# Patient Record
Sex: FEMALE | Race: WHITE | NOT HISPANIC OR LATINO | Employment: UNEMPLOYED | URBAN - METROPOLITAN AREA
[De-identification: names, ages, dates, MRNs, and addresses within clinical notes are randomized per-mention and may not be internally consistent; named-entity substitution may affect disease eponyms.]

---

## 2020-03-19 ENCOUNTER — RECORDS - HEALTHEAST (OUTPATIENT)
Dept: LAB | Facility: CLINIC | Age: 60
End: 2020-03-19

## 2020-03-19 LAB
ALBUMIN SERPL-MCNC: 4.3 G/DL (ref 3.5–5)
ALP SERPL-CCNC: 92 U/L (ref 45–120)
ALT SERPL W P-5'-P-CCNC: 21 U/L (ref 0–45)
AST SERPL W P-5'-P-CCNC: 19 U/L (ref 0–40)
BASOPHILS # BLD AUTO: 0.1 THOU/UL (ref 0–0.2)
BASOPHILS NFR BLD AUTO: 1 % (ref 0–2)
BILIRUB SERPL-MCNC: 0.4 MG/DL (ref 0–1)
BUN SERPL-MCNC: 14 MG/DL (ref 8–22)
CALCIUM SERPL-MCNC: 9.7 MG/DL (ref 8.5–10.5)
CHLORIDE BLD-SCNC: 101 MMOL/L (ref 98–107)
CHOLEST SERPL-MCNC: 259 MG/DL
CHOLEST SERPL-MCNC: 259 MG/DL
CK SERPL-CCNC: 77 U/L (ref 30–190)
CO2 SERPL-SCNC: 26 MMOL/L (ref 22–31)
CREAT SERPL-MCNC: 0.71 MG/DL (ref 0.6–1.1)
EOSINOPHIL # BLD AUTO: 0.1 THOU/UL (ref 0–0.4)
EOSINOPHIL NFR BLD AUTO: 1 % (ref 0–6)
ERYTHROCYTE [DISTWIDTH] IN BLOOD BY AUTOMATED COUNT: 12.1 % (ref 11–14.5)
FASTING STATUS PATIENT QL REPORTED: ABNORMAL
GFR SERPL CREATININE-BSD FRML MDRD: >60 ML/MIN/1.73M2
GGT SERPL-CCNC: 18 U/L (ref 0–50)
GLUCOSE BLD-MCNC: 94 MG/DL (ref 70–125)
HCT VFR BLD AUTO: 44.8 % (ref 35–47)
HDLC SERPL-MCNC: 91 MG/DL
HGB BLD-MCNC: 14.8 G/DL (ref 12–16)
IRON SERPL-MCNC: 105 UG/DL (ref 42–175)
LDH SERPL L TO P-CCNC: 174 U/L (ref 125–220)
LDLC SERPL CALC-MCNC: 158 MG/DL
LYMPHOCYTES # BLD AUTO: 1.9 THOU/UL (ref 0.8–4.4)
LYMPHOCYTES NFR BLD AUTO: 33 % (ref 20–40)
MAGNESIUM SERPL-MCNC: 2 MG/DL (ref 1.8–2.6)
MCH RBC QN AUTO: 32.3 PG (ref 27–34)
MCHC RBC AUTO-ENTMCNC: 33 G/DL (ref 32–36)
MCV RBC AUTO: 98 FL (ref 80–100)
MONOCYTES # BLD AUTO: 0.5 THOU/UL (ref 0–0.9)
MONOCYTES NFR BLD AUTO: 8 % (ref 2–10)
NEUTROPHILS # BLD AUTO: 3.3 THOU/UL (ref 2–7.7)
NEUTROPHILS NFR BLD AUTO: 56 % (ref 50–70)
PHOSPHATE SERPL-MCNC: 3.8 MG/DL (ref 2.5–4.5)
PLATELET # BLD AUTO: 296 THOU/UL (ref 140–440)
PMV BLD AUTO: 10.6 FL (ref 8.5–12.5)
POTASSIUM BLD-SCNC: 3.8 MMOL/L (ref 3.5–5)
PROT SERPL-MCNC: 7.4 G/DL (ref 6–8)
RBC # BLD AUTO: 4.58 MILL/UL (ref 3.8–5.4)
SODIUM SERPL-SCNC: 139 MMOL/L (ref 136–145)
TRIGL SERPL-MCNC: 49 MG/DL
TRIGL SERPL-MCNC: 49 MG/DL
TSH SERPL DL<=0.005 MIU/L-ACNC: 2.24 UIU/ML (ref 0.3–5)
URATE SERPL-MCNC: 3.7 MG/DL (ref 2–7.5)
WBC: 5.8 THOU/UL (ref 4–11)

## 2020-03-20 LAB — 25(OH)D3 SERPL-MCNC: 32.4 NG/ML (ref 30–80)

## 2020-06-03 ENCOUNTER — TRANSFERRED RECORDS (OUTPATIENT)
Dept: HEALTH INFORMATION MANAGEMENT | Facility: CLINIC | Age: 60
End: 2020-06-03

## 2020-09-09 ENCOUNTER — TRANSFERRED RECORDS (OUTPATIENT)
Dept: HEALTH INFORMATION MANAGEMENT | Facility: CLINIC | Age: 60
End: 2020-09-09

## 2020-10-05 ENCOUNTER — TRANSFERRED RECORDS (OUTPATIENT)
Dept: HEALTH INFORMATION MANAGEMENT | Facility: CLINIC | Age: 60
End: 2020-10-05

## 2020-10-26 ENCOUNTER — TRANSFERRED RECORDS (OUTPATIENT)
Dept: HEALTH INFORMATION MANAGEMENT | Facility: CLINIC | Age: 60
End: 2020-10-26

## 2020-11-10 ENCOUNTER — TRANSFERRED RECORDS (OUTPATIENT)
Dept: HEALTH INFORMATION MANAGEMENT | Facility: CLINIC | Age: 60
End: 2020-11-10

## 2020-11-17 ENCOUNTER — TRANSFERRED RECORDS (OUTPATIENT)
Dept: HEALTH INFORMATION MANAGEMENT | Facility: CLINIC | Age: 60
End: 2020-11-17

## 2020-11-18 ENCOUNTER — TRANSFERRED RECORDS (OUTPATIENT)
Dept: HEALTH INFORMATION MANAGEMENT | Facility: CLINIC | Age: 60
End: 2020-11-18

## 2020-11-30 ENCOUNTER — TRANSFERRED RECORDS (OUTPATIENT)
Dept: HEALTH INFORMATION MANAGEMENT | Facility: CLINIC | Age: 60
End: 2020-11-30

## 2020-12-04 ENCOUNTER — TRANSFERRED RECORDS (OUTPATIENT)
Dept: HEALTH INFORMATION MANAGEMENT | Facility: CLINIC | Age: 60
End: 2020-12-04

## 2020-12-05 ENCOUNTER — TRANSFERRED RECORDS (OUTPATIENT)
Dept: HEALTH INFORMATION MANAGEMENT | Facility: CLINIC | Age: 60
End: 2020-12-05

## 2020-12-06 ENCOUNTER — TRANSFERRED RECORDS (OUTPATIENT)
Dept: HEALTH INFORMATION MANAGEMENT | Facility: CLINIC | Age: 60
End: 2020-12-06

## 2020-12-23 ENCOUNTER — TRANSFERRED RECORDS (OUTPATIENT)
Dept: HEALTH INFORMATION MANAGEMENT | Facility: CLINIC | Age: 60
End: 2020-12-23

## 2021-01-06 ENCOUNTER — TRANSFERRED RECORDS (OUTPATIENT)
Dept: HEALTH INFORMATION MANAGEMENT | Facility: CLINIC | Age: 61
End: 2021-01-06

## 2021-01-18 ENCOUNTER — TELEPHONE (OUTPATIENT)
Dept: NEUROSURGERY | Facility: CLINIC | Age: 61
End: 2021-01-18

## 2021-01-18 NOTE — TELEPHONE ENCOUNTER
Health Call Center    Phone Message    May a detailed message be left on voicemail: yes     Reason for Call: Other: Martha is a new pt, self referred. She states she has trigeminal neuralgia and would like to schedule an appointment with Dr. Stokes. Please call her back to discuss.     Action Taken: Message routed to:  [facility] ([facility]    Travel Screening: Not Applicable

## 2021-01-21 ENCOUNTER — DOCUMENTATION ONLY (OUTPATIENT)
Dept: CARE COORDINATION | Facility: CLINIC | Age: 61
End: 2021-01-21

## 2021-02-02 ENCOUNTER — PRE VISIT (OUTPATIENT)
Dept: NEUROSURGERY | Facility: CLINIC | Age: 61
End: 2021-02-02

## 2021-02-02 NOTE — TELEPHONE ENCOUNTER
FUTURE VISIT INFORMATION      FUTURE VISIT INFORMATION:    Date: 2/8/2021    Time: 140pm    Location: [facility] [place]  REFERRAL INFORMATION:    Referring provider:      Referring providers clinic:      Reason for visit/diagnosis      RECORDS REQUESTED FROM:       Clinic name Comments Records Status Imaging Status   [facility] MR Lumbar Spine-8/14/2020    CT Cervical Spine-2/1/2021 Care Everywhere Requested to PACS         [facility] CT Cervical Spine-6/4/2020    MR Head-6/3/2020 Care Everywhere and scanned to Chart Requested to PACS                        2/2/2021-Voicemail left for [facility] and Spoke with [facility] to have images pushed ASAP-MR @ 721am    [facility]-MR @ 556am

## 2021-02-08 ENCOUNTER — VIRTUAL VISIT (OUTPATIENT)
Dept: NEUROSURGERY | Facility: CLINIC | Age: 61
End: 2021-02-08
Payer: COMMERCIAL

## 2021-02-08 DIAGNOSIS — R51.9 FACIAL PAIN: Primary | ICD-10-CM

## 2021-02-08 PROCEDURE — 99203 OFFICE O/P NEW LOW 30 MIN: CPT | Mod: GT | Performed by: NEUROLOGICAL SURGERY

## 2021-02-08 RX ORDER — TIZANIDINE 2 MG/1
2-4 TABLET ORAL EVERY 6 HOURS PRN
Status: ON HOLD | COMMUNITY
Start: 2021-01-14 | End: 2021-08-27

## 2021-02-08 RX ORDER — ACETAMINOPHEN 500 MG
1000 TABLET ORAL PRN
COMMUNITY
Start: 2019-12-04 | End: 2023-01-10

## 2021-02-08 RX ORDER — ALISKIREN 150 MG/1
300 TABLET, FILM COATED ORAL AT BEDTIME
COMMUNITY
Start: 2020-11-10

## 2021-02-08 RX ORDER — DILTIAZEM HYDROCHLORIDE 120 MG/1
120 CAPSULE, EXTENDED RELEASE ORAL AT BEDTIME
COMMUNITY
Start: 2020-09-05 | End: 2022-06-21

## 2021-02-08 RX ORDER — LORAZEPAM 0.5 MG/1
.125-.25 TABLET ORAL 2 TIMES DAILY
COMMUNITY
Start: 2020-07-29 | End: 2021-11-23

## 2021-02-08 RX ORDER — PHENOL 1.4 %
10 AEROSOL, SPRAY (ML) MUCOUS MEMBRANE
COMMUNITY
End: 2022-11-08

## 2021-02-08 RX ORDER — OXYMETAZOLINE HYDROCHLORIDE 0.05 G/100ML
1 SPRAY NASAL 2 TIMES DAILY PRN
COMMUNITY

## 2021-02-08 RX ORDER — HYDROMORPHONE HYDROCHLORIDE 2 MG/1
2 TABLET ORAL DAILY PRN
Status: ON HOLD | COMMUNITY
Start: 2021-01-14 | End: 2021-08-27

## 2021-02-08 RX ORDER — BUTALBITAL/ACETAMINOPHEN 50MG-325MG
1 TABLET ORAL EVERY 4 HOURS PRN
COMMUNITY
Start: 2020-12-03 | End: 2022-11-08

## 2021-02-08 RX ORDER — CETIRIZINE HYDROCHLORIDE 10 MG/1
1 TABLET ORAL AT BEDTIME
COMMUNITY
Start: 2021-01-21 | End: 2021-10-07

## 2021-02-08 RX ORDER — CARBOXYMETHYLCELLULOSE SODIUM 5 MG/ML
1-2 SOLUTION/ DROPS OPHTHALMIC 3 TIMES DAILY PRN
COMMUNITY
Start: 2019-12-04

## 2021-02-08 RX ORDER — IBUPROFEN 200 MG
950 CAPSULE ORAL 2 TIMES DAILY
COMMUNITY
Start: 2020-11-19 | End: 2021-10-07

## 2021-02-08 RX ORDER — TRIPROLIDINE/PSEUDOEPHEDRINE 2.5MG-60MG
0.5 TABLET ORAL 2 TIMES DAILY
COMMUNITY

## 2021-02-08 RX ORDER — CARBAMAZEPINE 100 MG/1
1-2 TABLET, EXTENDED RELEASE ORAL DAILY
Status: ON HOLD | COMMUNITY
Start: 2021-02-04 | End: 2021-08-27

## 2021-02-08 RX ORDER — POLYETHYLENE GLYCOL 3350 17 G/17G
17 POWDER, FOR SOLUTION ORAL DAILY
Status: ON HOLD | COMMUNITY
Start: 2020-11-10 | End: 2021-08-27

## 2021-02-08 RX ORDER — BUTALBITAL, ASPIRIN, AND CAFFEINE 325; 50; 40 MG/1; MG/1; MG/1
1 CAPSULE ORAL EVERY 8 HOURS PRN
Status: ON HOLD | COMMUNITY
Start: 2020-11-06 | End: 2021-08-27

## 2021-02-08 RX ORDER — GABAPENTIN 100 MG/1
100 CAPSULE ORAL AT BEDTIME
COMMUNITY
Start: 2020-11-12 | End: 2022-06-21

## 2021-02-08 RX ORDER — MIRTAZAPINE 15 MG/1
26.25 TABLET, FILM COATED ORAL AT BEDTIME
COMMUNITY
Start: 2020-04-08 | End: 2021-12-09

## 2021-02-08 NOTE — LETTER
Date:April 11, 2021      Patient was self referred, no letter generated. Do not send.        [facility] Health Information

## 2021-02-08 NOTE — PROGRESS NOTES
Service Date: 02/08/2021      HISTORY OF PRESENT ILLNESS:  I had the pleasure to talk to Martha during a neurosurgical video consult using [employer].  She consented to this video consult.      Martha is a 60-year-old woman who is primarily managed in the [facility] system.  She has a longstanding history of neck, shoulder, occipital and facial pain.  Additionally, she has headaches.  She was seen by Dr. Ricketts for these symptoms and was told that most of this pain was arising from her neck and surgical fusion would help with this.  She had A C3 through 7 anterior and posterior cervical fusion.  Following this, she had exacerbation of pain in her neck, occipital region.  Her headaches and her facial pain have gotten worse as well.      She in the past has had a rhizotomy for occipital neuralgia, in addition to Botox injections.  She felt that the Botox injections did alleviate some of the pain.      Since her cervical fusion, she says that the facial pain has gotten a lot worse on the left side.  It is almost on the entire side of her face, but in particular in the maxillary area, and feels like a dull achy pressure that is there all the time.  She has also developed pain in the right maxillary area as well, which again is dull and aching in sensation.  She notes that over the last year or so, she had 2 shocks that had occurred, one when she went under a heat vent and another that occurred sporadically.  Otherwise, she has not had any shocking pain.  She feels that she has a type 2 trigeminal neuralgia and recently went to a neurologist in the [facility] the [facility] system, who had started her on carbamazepine.  She does not feel that the carbamazepine has helped at all.      Today, she had high expectations that we would add that I would diagnose her as having  microvascular decompression would eliminate her facial pain.  I think she has a very complex history of facial pain that Includes bilateral face, occipital region  headaches, neck pain and shoulder pain.  I have explained to her today that I do not think that this is trigeminal neuralgia and I do not expect a microvascular decompression would eliminate her pain.  She was very disappointed in this.  I have explained to her that she has got probably multifactorial type of pain and that her situation can be improved through medical management and likely physical therapy as well.  I would like to bring her to our Facial Pain Clinic for evaluation, with the expectation that we will then make recommendations for her that either we could pursue in our system or the folks over at [facility] could pursue.      Overall, I spent approximately half an hour on the video call with Farzad, with the majority of this time spent in consultation and developing a treatment plan.         DANIELLA COOLEY MD             D: 2021   T: 2021   MT: al      Name:     FARZAD DUDLEY   MRN:      [id number]        Account:      [id number]   :      1960           Service Date: 2021      Document: [id number]

## 2021-02-08 NOTE — PROGRESS NOTES
Martha is a 60 year old who is being evaluated via a billable video visit.      How would you like to obtain your AVS? Mail  If the video visit is dropped, the invitation should be resent by:[phone]      Video-Visit Details    Type of service:  Video Visit    Video Time:: 30 min    Originating Location (pt. Location): Home    Distant Location (provider location):  [facility] NEUROSURGERY [facility]     Platform used for Video Visit: [employer]      Please see full dictation for visit details.

## 2021-02-08 NOTE — LETTER
2/8/2021       RE: Martha Chun  [address] [facility] [place] [address]     Dear Colleague,    Thank you for referring your patient, Martha Chun, to the [facility] NEUROSURGERY CLINIC [place] [facility]. Please see a copy of my visit note below.    Martha is a 60 year old who is being evaluated via a billable video visit.      How would you like to obtain your AVS? Mail  If the video visit is dropped, the invitation should be resent by:[phone]      Video-Visit Details    Type of service:  Video Visit    Video Time:: 30 min    Originating Location (pt. Location): Home    Distant Location (provider location):  [facility] NEUROSURGERY [facility]     Platform used for Video Visit: [employer]      Please see full dictation for visit details.    Service Date: 02/08/2021      HISTORY OF PRESENT ILLNESS:  I had the pleasure to talk to Martha during a neurosurgical video consult using [employer].  She consented to this video consult.      Martha is a 60-year-old woman who is primarily managed in the [facility] system.  She has a longstanding history of neck, shoulder, occipital and facial pain.  Additionally, she has headaches.  She was seen by Dr. Ricketts for these symptoms and was told that most of this pain was arising from her neck and surgical fusion would help with this.  She had A C3 through 7 anterior and posterior cervical fusion.  Following this, she had exacerbation of pain in her neck, occipital region.  Her headaches and her facial pain have gotten worse as well.      She in the past has had a rhizotomy for occipital neuralgia, in addition to Botox injections.  She felt that the Botox injections did alleviate some of the pain.      Since her cervical fusion, she says that the facial pain has gotten a lot worse on the left side.  It is almost on the entire side of her face, but in particular in the maxillary area, and feels like a dull  achy pressure that is there all the time.  She has also developed pain in the right maxillary area as well, which again is dull and aching in sensation.  She notes that over the last year or so, she had 2 shocks that had occurred, one when she went under a heat vent and another that occurred sporadically.  Otherwise, she has not had any shocking pain.  She feels that she has a type 2 trigeminal neuralgia and recently went to a neurologist in the [facility] system, who had started her on carbamazepine.  She does not feel that the carbamazepine has helped at all.      Today, she had high expectations that we would add that I would diagnose her as having *** microvascular decompression would eliminate her facial pain.  I think she has a very complex history of facial pain that Includes bilateral face, occipital region headaches, neck pain and shoulder pain.  I have explained to her today that I do not think that this is trigeminal neuralgia and I do not expect a microvascular decompression would eliminate her pain.  She was very disappointed in this.  I have explained to her that she has got probably multifactorial type of pain and that her situation can be improved through medical management and likely physical therapy as well.  I would like to bring her to our Facial Pain Clinic for evaluation, with the expectation that we will then make recommendations for her that either we could pursue in our system or the folks over at [facility] could pursue.      Overall, I spent approximately half an hour on the video call with Farzad, with the majority of this time spent in consultation and developing a treatment plan.         DANIELLA COOLEY MD             D: 2021   T: 2021   MT: al      Name:     FARZAD DUDLEY   MRN:      [id number]        Account:      [id number]   :      1960           Service Date: 2021      Document: [id number]        Again, thank you for allowing me to participate in  the care of your patient.      Sincerely,    Pavan Stokes MD

## 2021-02-08 NOTE — PATIENT INSTRUCTIONS
I would like to bring her to our Facial Pain Clinic for evaluation, with the expectation that we will then make recommendations for her that either we could pursue in our system or the folks over at [facility] could pursue.     Note sent to Facial Pain Clinic for next available appointment  Diagnosis I have explained to her that she has got probably multifactorial type of pain and that her situation can be improved through medical management and likely physical therapy as well.    Thank you for using [employer]

## 2021-03-05 ENCOUNTER — TELEPHONE (OUTPATIENT)
Dept: NEUROSURGERY | Facility: CLINIC | Age: 61
End: 2021-03-05

## 2021-03-05 NOTE — TELEPHONE ENCOUNTER
Health Call Center    Phone Message    May a detailed message be left on voicemail: yes     Reason for Call: Other: Patient calling stating Dr. Sipple from [facility] is requesting to see if Dr. Stokes can provide patient with an occipital nerve stimulator. Please call to discuss thank you.      Action Taken: Message routed to:  [facility] ([facility]): neurosurgery    Travel Screening: Not Applicable

## 2021-03-09 NOTE — TELEPHONE ENCOUNTER
Callback to patient, left message all options available will be presented at [facility] on 3/15/21.    [facility] will give all options.    Call Izzy RN for questions/concerns

## 2021-03-10 ENCOUNTER — TRANSFERRED RECORDS (OUTPATIENT)
Dept: HEALTH INFORMATION MANAGEMENT | Facility: CLINIC | Age: 61
End: 2021-03-10

## 2021-03-15 ENCOUNTER — VIRTUAL VISIT (OUTPATIENT)
Dept: OTOLARYNGOLOGY | Facility: CLINIC | Age: 61
End: 2021-03-15
Payer: COMMERCIAL

## 2021-03-15 ENCOUNTER — APPOINTMENT (OUTPATIENT)
Dept: OTOLARYNGOLOGY | Facility: CLINIC | Age: 61
End: 2021-03-15
Payer: COMMERCIAL

## 2021-03-15 ENCOUNTER — VIRTUAL VISIT (OUTPATIENT)
Dept: BEHAVIORAL HEALTH | Facility: CLINIC | Age: 61
End: 2021-03-15
Payer: COMMERCIAL

## 2021-03-15 VITALS — HEIGHT: 67 IN | BODY MASS INDEX: 20.72 KG/M2 | WEIGHT: 132 LBS

## 2021-03-15 DIAGNOSIS — R51.9 FACIAL PAIN: Primary | ICD-10-CM

## 2021-03-15 DIAGNOSIS — F33.1 MODERATE EPISODE OF RECURRENT MAJOR DEPRESSIVE DISORDER (H): Primary | ICD-10-CM

## 2021-03-15 PROCEDURE — 99213 OFFICE O/P EST LOW 20 MIN: CPT | Mod: GT | Performed by: NEUROLOGICAL SURGERY

## 2021-03-15 PROCEDURE — 90834 PSYTX W PT 45 MINUTES: CPT | Mod: GT | Performed by: MARRIAGE & FAMILY THERAPIST

## 2021-03-15 PROCEDURE — 99202 OFFICE O/P NEW SF 15 MIN: CPT | Mod: GT | Performed by: OTOLARYNGOLOGY

## 2021-03-15 ASSESSMENT — MIFFLIN-ST. JEOR: SCORE: 1200.03

## 2021-03-15 ASSESSMENT — PAIN SCALES - GENERAL: PAINLEVEL: SEVERE PAIN (6)

## 2021-03-15 NOTE — LETTER
Date:March 16, 2021      Patient was self referred, no letter generated. Do not send.        [facility] Health Information

## 2021-03-15 NOTE — LETTER
3/15/2021       RE: Martha Chun  [address] [facility] [place] [address]     Dear Colleague,    Thank you for referring your patient, Martha Chun, to the [facility] EAR NOSE AND THROAT CLINIC [place] at [facility]. Please see a copy of my visit note below.    Martha is a 61 year old who is being evaluated via a billable video visit.      Martha was evaluated by our facial pain group.  Collectively we feel that she has a number of different factors contributing to her facial pain.      She does likely have some comment of occipital neuralgia.  We agree with continuing her Tegretol which may benefit this.  She will continue to work with her current providers who are planning to do some peripheral blocks to see if this helps the occipital pain.  After these blocks we would like her to follow up with Dr. Shrestha for evaluation of occipital stimulator.    We feel that her current neurologist is doing a very nice job of managing her facial pain.  She will continue to work with her provider at .  I do not feel that this is classic TN and would not benefit from ablative procedures.  I also have reviewed her MRI and see no compression of the TN.  Would not recommend MVD.    She does depression and a lot of stress in her life right now.  It is important to connect her with psychology.  Amando España from our group will reach out to her this week to try and help facilitate this.      Again, thank you for allowing me to participate in the care of your patient.      Sincerely,    Pavan Stokes MD

## 2021-03-15 NOTE — PROGRESS NOTES
Martha is a 61 year old who is being evaluated via a billable video visit.      Video Start Time: 4:25  Video-Visit Details    Type of service:  Video Visit    Video End Time:5:15    Originating Location (pt. Location): Home    Distant Location (provider location):  [facility] EAR NOSE AND THROAT CLINIC [place]     Platform used for Video Visit: [employer]      Patient is a 62 y/o female evaluated in  multidisciplinary facial pain clinic alonzo a video visit. Longstanding history of neck, shoulder, occipital, facial painon left and headaches. Has been followed in [facility]. Has undergone rhizotomy, botox injections and a fusion from C3-T1. No specific ENT related issues identified. See details notes from other providers. Describes pain as a headache in scalp, there all the time. Recently feels like medication trial from local neurologist is helping to some degree. Was not helped by migraine meds. Botox may have helped some. Was denied pain stimulator. Wears oral appliance.     Detailed history in colleagues notes.      Following cervical fusion, she had exacerbation of pain in her neck, occipital region.  Her headaches and her facial pain have gotten worse as well. Mostly left sided pain, some on right. Over the last year or so, she had 2 shocks that had occurred, one when she went under a heat vent and another that occurred sporadically.  Otherwise, she has not had any shocking pain.      GENERAL: Healthy, alert and no distress, affect is appropriate, seems disappointed with current situation.   EYES: Eyes grossly normal to inspection.  No discharge or erythema, or obvious scleral/conjunctival abnormalities.  RESP: No audible wheeze, cough, or visible cyanosis.  No visible retractions or increased work of breathing.    SKIN: Visible skin clear. No significant rash, abnormal pigmentation or lesions.  NEURO: Cranial nerves grossly intact.  Mentation and speech appropriate for age.  PSYCH: Mentation  appears normal, affect normal/bright, judgement and insight intact, normal speech and appearance well-groomed.    A/P: complex pain history. She is aware of mood issues, is working on guided imagery. Amando Taco will reach out to set up options for ongoing therapy. Will work with TMD for self care advice. Team feels she may have occipital neuralgia, but less likely TN.       15 minutes spent on the date of the encounter doing chart review, patient visit, documentation and discussion with other provider(s)   {

## 2021-03-15 NOTE — LETTER
3/15/2021       RE: Martha Chun  [address] [facility] [address]     Dear Colleague,    Thank you for referring your patient, Martha Chun, to the [facility] EAR NOSE AND THROAT CLINIC [place] at [facility]. Please see a copy of my visit note below.    Martha is a 61 year old who is being evaluated via a billable video visit.      Video Start Time: 4:25  Video-Visit Details    Type of service:  Video Visit    Video End Time:5:15    Originating Location (pt. Location): Home    Distant Location (provider location):  [facility] EAR NOSE AND THROAT CLINIC [place]     Platform used for Video Visit: Zoom      Patient is a 62 y/o female evaluated in  multidisciplinary facial pain clinic thorugh a video visit. Longstanding history of neck, shoulder, occipital, facial painon left and headaches. Has been followed in [employer] [facility] system. Has undergone rhizotomy, botox injections and a fusion from C3-T1. No specific ENT related issues identified. See details notes from other providers. Describes pain as a headache in scalp, there all the time. Recently feels like medication trial from local neurologist is helping to some degree. Was not helped by migraine meds. Botox may have helped some. Was denied pain stimulator. Wears oral appliance.     Detailed history in colleagues notes.      Following cervical fusion, she had exacerbation of pain in her neck, occipital region.  Her headaches and her facial pain have gotten worse as well. Mostly left sided pain, some on right. Over the last year or so, she had 2 shocks that had occurred, one when she went under a heat vent and another that occurred sporadically.  Otherwise, she has not had any shocking pain.      GENERAL: Healthy, alert and no distress, affect is appropriate, seems disappointed with current situation.   EYES: Eyes grossly normal to inspection.  No discharge or erythema, or obvious  scleral/conjunctival abnormalities.  RESP: No audible wheeze, cough, or visible cyanosis.  No visible retractions or increased work of breathing.    SKIN: Visible skin clear. No significant rash, abnormal pigmentation or lesions.  NEURO: Cranial nerves grossly intact.  Mentation and speech appropriate for age.  PSYCH: Mentation appears normal, affect normal/bright, judgement and insight intact, normal speech and appearance well-groomed.    A/P: complex pain history. She is aware of mood issues, is working on guided imagery. Amando España will reach out to set up options for ongoing therapy. Will work with TMD for self care advice. Team feels she may have occipital neuralgia, but less likely TN.       15 minutes spent on the date of the encounter doing chart review, patient visit, documentation and discussion with other provider(s)   {  Again, thank you for allowing me to participate in the care of your patient.      Sincerely,    Parvin Johnston MD

## 2021-03-15 NOTE — PROGRESS NOTES
Martha is a 61 year old who is being evaluated via a billable video visit.      Martha was evaluated by our facial pain group.  Collectively we feel that she has a number of different factors contributing to her facial pain.      She does likely have some comment of occipital neuralgia.  We agree with continuing her Tegretol which may benefit this.  She will continue to work with her current providers who are planning to do some peripheral blocks to see if this helps the occipital pain.  After these blocks we would like her to follow up with Dr. Shrestha for evaluation of occipital stimulator.    We feel that her current neurologist is doing a very nice job of managing her facial pain.  She will continue to work with her provider at .  I do not feel that this is classic TN and would not benefit from ablative procedures.  I also have reviewed her MRI and see no compression of the TN.  Would not recommend MVD.    She does depression and a lot of stress in her life right now.  It is important to connect her with psychology.  Amando España from our group will reach out to her this week to try and help facilitate this.

## 2021-03-16 NOTE — PATIENT INSTRUCTIONS
Patient will continue to work with her current providers who are planning to do some peripheral blocks to see if this helps the occipital pain.  After these blocks we would like her to follow up with Dr. Shrestha for evaluation of occipital stimulator.  Amando España from our group will reach out to her this week to try and help facilitate psychology help and referral.    Patient to call Izzy SANCHEZ  after treatment from current provider are completed (Peripheral Blocks) to schedule with Dr Kobi Shrestha.     Thank you for using [employer]

## 2021-03-24 ENCOUNTER — TELEPHONE (OUTPATIENT)
Dept: NEUROSURGERY | Facility: CLINIC | Age: 61
End: 2021-03-24

## 2021-03-24 NOTE — TELEPHONE ENCOUNTER
[facility] Call Center    Phone Message    May a detailed message be left on voicemail: yes     Reason for Call: Other: Writer does not see note in chart regarding this request. pt reporting that her nerve block will be completed at the end of this week and she wants to schedule appt with Dr. Shrestha. Please review and call pt to schedule. Thank you.    Action Taken: Message routed to:  [facility] ([facility]): [facility]rology    Travel Screening: Not Applicable

## 2021-03-30 ENCOUNTER — VIRTUAL VISIT (OUTPATIENT)
Dept: NEUROSURGERY | Facility: CLINIC | Age: 61
End: 2021-03-30
Payer: COMMERCIAL

## 2021-03-30 DIAGNOSIS — M54.81 BILATERAL OCCIPITAL NEURALGIA: Primary | ICD-10-CM

## 2021-03-30 DIAGNOSIS — M79.7 FIBROMYALGIA: ICD-10-CM

## 2021-03-30 DIAGNOSIS — F33.9 RECURRENT MAJOR DEPRESSIVE DISORDER, REMISSION STATUS UNSPECIFIED (H): ICD-10-CM

## 2021-03-30 PROCEDURE — 99214 OFFICE O/P EST MOD 30 MIN: CPT | Mod: GT | Performed by: NEUROLOGICAL SURGERY

## 2021-03-30 NOTE — LETTER
3/30/2021       RE: Martha Chun  [address] [facility] [address]     Dear Colleague,    Thank you for referring your patient, Martha Chun, to the [facility] NEUROSURGERY CLINIC [place] [facility]. Please see a copy of my visit note below.    Martha is a 61 year old who is being evaluated via a billable video visit.      How would you like to obtain your AVS? MAIL  If the video visit is dropped, the invitation should be resent by: PLEASE SEND LINK -[phone]  Will anyone else be joining your video visit? NO      Video-Visit Details    Type of service:  Video Visit    Video Start Time: 4:00 PM  Video End Time: 5:10 PM    Originating Location (pt. Location): Home    Distant Location (provider location):  [facility] NEUROSURGERY [facility]     Platform used for Video Visit: [facility]    Neurosurgery Clinic Note     Reason for Visit: occipital neuralgia    History of Present Illness  Martha Chun is a 61 year old woman with a pertinent past medical history of fibromyalgia, TMJ, anxiety, and depression with previous suicide attempts who presents with progressively worsening headaches and pain over the back of her head. She recalls that she has always had headaches that were amenable to chiropractic manipulation of her upper cervical spine until the summer of 2019. She began to have terrible headaches that were not responsive to manipulation. After seeing several therapists, she underwent a C4 corpectomy and 360 cervical fusion from C3-7 followed by extension to the thoracic spine in December of 2020. Her pain became worse after the fusion with worsened headaches and migraines.    She describes the pain as pulsating and burning with Left sided stabbing pain below and into the ear. She has tenderness over the inion. The pain extends up over to the parietal surface, mostly on the Left. The pain is constantly there. She tried chiropractic  again, which didn't help. She has undergone physical therapy without improvement. She has done numerous rounds of botox without improvement. Radiofrequency ablation has not help for long enough. Gabapentin and tegretol have provided only marginal improvement while causing significant side effects including potentiation of her depression. She has had mild improvement from the use of a TENS unit.     She does have temporary from local anesthetic injections, but these generally only last three hours. Her pain has been revved up over the past week, which she suspects is due to the botox wearing off.     She describes her pain as certainly contributing to poor mental health, and currently she is stuck in a stressful situation with a pending separation from her . She knows that she will have to leave her home, and this is causing significant stress. She feels like she does need to talk to someone. She denies any current suicidal ideation or plan.       PMH  HTN  Fibromyalgia  TMJ  Anxiety and depression  Allergic rhinitis  Suicide attempts in 2019 in     PSH      TUBAL LIGATION     Tubal Ligation/Transection     KNEE ARTHROSCOPY     Arthroscopy, Knee     HIP REPLACEMENT 2011 Right      LEFT TOTAL KNEE REPLACEMENT 2018 Left      CONVERSION PREVIOUS TOTAL HIP ARTHROPLASTY          STAGE 1 - ERP- ANTERIOR CERVICAL FUSION C3-7, PARTIAL INFERIOR CORPECTOMY C4 AND C5 2020        ERP - STAGE 2; POSTERIOR CERVICAL FUSION C3-T1 2020        ERP - STAGE 2; POSTERIOR CERVICAL FUSION C3-T1 2020            FH: noncontributory    Social History     Socioeconomic History     Marital status:      Spouse name: Not on file     Number of children: Not on file     Years of education: Not on file     Highest education level: Not on file   Tobacco Use     Smoking status: Former Smoker     Quit date: 10/10/2014     Years since quittin.4     Smokeless tobacco: Never Used          Allergies  "  Allergen Reactions     Amlodipine Other (See Comments)     Constipation, \"liver pain\".  Constipation, \"liver pain\".       Carvedilol Other (See Comments)     Worsening depression  Worsening depression       Clonidine      Other reaction(s): Dizziness     Codeine Nausea and Vomiting     Other reaction(s): Headache     Cortisone Other (See Comments)     Patient reports and possibly all steroids \"makes me to feel sick\", get fungal infections  Patient reports and possibly all steroids \"makes me to feel sick\", get fungal infections       Duloxetine      Other reaction(s): Headache     Escitalopram Muscle Pain (Myalgia)     Other reaction(s): Myalgias     Mold Nausea     Ringing in ears, racing heart     Oxycodone      Other reaction(s): Headache     Paroxetine Other (See Comments)     Palpitations/racing heart  Palpitations/racing heart       Penicillins Unknown     Does not remember reaction, mother said she allergic to mold; patient refuses Pcn or any derivative  Does not remember reaction, mother said she allergic to mold; patient refuses Pcn or any derivative       Sulfa Drugs Unknown     Tramadol      Other reaction(s): Runny Nose  Facial pain, sinus swelling  Facial pain, sinus swelling         Current Outpatient Medications   Medication     acetaminophen (TYLENOL) 500 MG tablet     aliskiren (TEKTURNA) 150 MG tablet     Butalbital-Acetaminophen (PHRENILIN)  MG TABS per tablet     butalbital-aspirin-caffeine (FIORINAL) -40 MG capsule     calcium citrate (CITRACAL) 950 (200 Ca) MG tablet     carboxymethylcellulose (REFRESH PLUS) 0.5 % SOLN ophthalmic solution     cetirizine (ZYRTEC) 10 MG tablet     cholecalciferol 50 MCG (2000 UT) tablet     diltiazem ER (DILT-XR) 120 MG 24 hr capsule     LORazepam (ATIVAN) 0.5 MG tablet     Melatonin 10 MG TABS tablet     mirtazapine (REMERON) 15 MG tablet     Oxymetazoline HCl (NASAL SPRAY) 0.05 % SOLN     polyethylene glycol (MIRALAX) 17 GM/Dose powder     " tapentadol (NUCYNTA) 50 MG TABS tablet     tiZANidine (ZANAFLEX) 2 MG tablet     triprolidine-pseudoePHEDrine (APRODINE) 2.5-60 MG TABS per tablet     carBAMazepine (TEGRETOL XR) 100 MG 12 hr tablet     gabapentin (NEURONTIN) 100 MG capsule     HYDROmorphone (DILAUDID) 2 MG tablet     No current facility-administered medications for this visit.        ROS: 10 point ROS neg other than the symptoms noted above in the HPI.      Assessment and Plan   Martha Chun is a 61 year old female with a complex form of occipital neuralgia with concomitant major depression and significant life stress. Her pain appears to be repeatedly responsive to local anesthetic and has many of the features of occipital neuralgia including tenderness and radiation to the scalp. She has already proven her condition is refractory to typical medical management as well as extensive cervical fusion. I believe that a trial externalized trial of occipital nerve stimulation is warranted while simultaneously addressing other modifiable contributors to her health. I will enlist our clinical psychologist to reach out to her. I will also refer her to Dr. Mondragon for another opinion. Lastly, in preparation for trial stimulation, I will refer her over to our neuropsychologist, Alyson Pino.       - Visit with Amando España  - referral to Dr. Mondragon  - referral to Alyson Pino  - occipital nerve stimulator trial    Kobi Shrestha MD  Department of Neurosurgery  [facility]

## 2021-03-30 NOTE — PROGRESS NOTES
Martha is a 61 year old who is being evaluated via a billable video visit.      How would you like to obtain your AVS? MAIL  If the video visit is dropped, the invitation should be resent by: PLEASE SEND LINK -[phone]  Will anyone else be joining your video visit? NO      Video Start Time: 4:00 PM  Video-Visit Details    Type of service:  Video Visit    Video End Time: 5:10 PM    Originating Location (pt. Location): Home    Distant Location (provider location):  [facility] NEUROSURGERY CLINIC [place]     Platform used for Video Visit: [facility]    Neurosurgery Clinic Note     Reason for Visit: occipital neuralgia    History of Present Illness  Martha Chun is a 61 year old woman with a pertinent past medical history of fibromyalgia, TMJ, anxiety, and depression with previous suicide attempts who presents with progressively worsening headaches and pain over the back of her head. She recalls that she has always had headaches that were amenable to chiropractic manipulation of her upper cervical spine until the summer of 2019. She began to have terrible headaches that were not responsive to manipulation. After seeing several therapists, she underwent a C4 corpectomy and 360 cervical fusion from C3-7 followed by extension to the thoracic spine in December of 2020. Her pain became worse after the fusion with worsened headaches and migraines.    She describes the pain as pulsating and burning with Left sided stabbing pain below and into the ear. She has tenderness over the inion. The pain extends up over to the parietal surface, mostly on the Left. The pain is constantly there. She tried chiropractic again, which didn't help. She has undergone physical therapy without improvement. She has done numerous rounds of botox without improvement. Radiofrequency ablation has not help for long enough. Gabapentin and tegretol have provided only marginal improvement while causing significant side effects including  "potentiation of her depression. She has had mild improvement from the use of a TENS unit.     She does have temporary from local anesthetic injections, but these generally only last three hours. Her pain has been revved up over the past week, which she suspects is due to the botox wearing off.     She describes her pain as certainly contributing to poor mental health, and currently she is stuck in a stressful situation with a pending separation from her . She knows that she will have to leave her home, and this is causing significant stress. She feels like she does need to talk to someone. She denies any current suicidal ideation or plan.       PMH  HTN  Fibromyalgia  TMJ  Anxiety and depression  Allergic rhinitis  Suicide attempts in 2019 in     PSH      TUBAL LIGATION     Tubal Ligation/Transection     KNEE ARTHROSCOPY     Arthroscopy, Knee     HIP REPLACEMENT 2011 Right      LEFT TOTAL KNEE REPLACEMENT 2018 Left      CONVERSION PREVIOUS TOTAL HIP ARTHROPLASTY          STAGE 1 - ERP- ANTERIOR CERVICAL FUSION C3-7, PARTIAL INFERIOR CORPECTOMY C4 AND C5 2020        ERP - STAGE 2; POSTERIOR CERVICAL FUSION C3-T1 2020        ERP - STAGE 2; POSTERIOR CERVICAL FUSION C3-T1 2020            FH: noncontributory    Social History     Socioeconomic History     Marital status:      Spouse name: Not on file     Number of children: Not on file     Years of education: Not on file     Highest education level: Not on file   Tobacco Use     Smoking status: Former Smoker     Quit date: 10/10/2014     Years since quittin.4     Smokeless tobacco: Never Used          Allergies   Allergen Reactions     Amlodipine Other (See Comments)     Constipation, \"liver pain\".  Constipation, \"liver pain\".       Carvedilol Other (See Comments)     Worsening depression  Worsening depression       Clonidine      Other reaction(s): Dizziness     Codeine Nausea and Vomiting     Other reaction(s): " "Headache     Cortisone Other (See Comments)     Patient reports and possibly all steroids \"makes me to feel sick\", get fungal infections  Patient reports and possibly all steroids \"makes me to feel sick\", get fungal infections       Duloxetine      Other reaction(s): Headache     Escitalopram Muscle Pain (Myalgia)     Other reaction(s): Myalgias     Mold Nausea     Ringing in ears, racing heart     Oxycodone      Other reaction(s): Headache     Paroxetine Other (See Comments)     Palpitations/racing heart  Palpitations/racing heart       Penicillins Unknown     Does not remember reaction, mother said she allergic to mold; patient refuses Pcn or any derivative  Does not remember reaction, mother said she allergic to mold; patient refuses Pcn or any derivative       Sulfa Drugs Unknown     Tramadol      Other reaction(s): Runny Nose  Facial pain, sinus swelling  Facial pain, sinus swelling         Current Outpatient Medications   Medication     acetaminophen (TYLENOL) 500 MG tablet     aliskiren (TEKTURNA) 150 MG tablet     Butalbital-Acetaminophen (PHRENILIN)  MG TABS per tablet     butalbital-aspirin-caffeine (FIORINAL) -40 MG capsule     calcium citrate (CITRACAL) 950 (200 Ca) MG tablet     carboxymethylcellulose (REFRESH PLUS) 0.5 % SOLN ophthalmic solution     cetirizine (ZYRTEC) 10 MG tablet     cholecalciferol 50 MCG (2000 UT) tablet     diltiazem ER (DILT-XR) 120 MG 24 hr capsule     LORazepam (ATIVAN) 0.5 MG tablet     Melatonin 10 MG TABS tablet     mirtazapine (REMERON) 15 MG tablet     Oxymetazoline HCl (NASAL SPRAY) 0.05 % SOLN     polyethylene glycol (MIRALAX) 17 GM/Dose powder     tapentadol (NUCYNTA) 50 MG TABS tablet     tiZANidine (ZANAFLEX) 2 MG tablet     triprolidine-pseudoePHEDrine (APRODINE) 2.5-60 MG TABS per tablet     carBAMazepine (TEGRETOL XR) 100 MG 12 hr tablet     gabapentin (NEURONTIN) 100 MG capsule     HYDROmorphone (DILAUDID) 2 MG tablet     No current " facility-administered medications for this visit.        ROS: 10 point ROS neg other than the symptoms noted above in the HPI.      Assessment and Plan   Martha Chun is a 61 year old female with a complex form of occipital neuralgia with concomitant major depression and significant life stress. Her pain appears to be repeatedly responsive to local anesthetic and has many of the features of occipital neuralgia including tenderness and radiation to the scalp. She has already proven her condition is refractory to typical medical management as well as extensive cervical fusion. I believe that a trial externalized trial of occipital nerve stimulation is warranted while simultaneously addressing other modifiable contributors to her health. I will enlist our clinical psychologist to reach out to her. I will also refer her to Dr. Mondragon for another opinion. Lastly, in preparation for trial stimulation, I will refer her over to our neuropsychologist, Alyson Pino.       - Visit with Amando España  - referral to Dr. Mondragon  - referral to Alyson Pino  - occipital nerve stimulator trial        Kobi Shrestha MD  Department of Neurosurgery  [facility]

## 2021-04-12 ENCOUNTER — TELEPHONE (OUTPATIENT)
Dept: NEUROSURGERY | Facility: CLINIC | Age: 61
End: 2021-04-12

## 2021-04-12 NOTE — CONFIDENTIAL NOTE
Left VM at [facility] medical records dept that requesting neuropsych evaluation be faxed to [phone]. Left my direct phone number as well.     [facility] left a VM saying the exam was not done there so they cannot fax the records. They said it was done through [facility].     Called [facility] medical records dept at [phone]. [facility] has no record or a neuropsych evaluation. Will follow-up with pt.

## 2021-04-12 NOTE — TELEPHONE ENCOUNTER
Pt received a call to schedule a neuropsych evaluation for trial and  placement of occipital nerve stimulator. Pt said she had a neuropsych evaluation done through [facility] within the last 3 weeks, by Dr. Blankenship. I will follow-up with [facility] to get the records. I will have Dr. Shrestha review them; if he needs anything more, I will let pt know.     She has not had been called yet to schedule with Dr. Mondragon. Referral placed 3/30. I have message neurology scheduling about this.     Pt has my direct number. No further questions at this time.

## 2021-04-13 ENCOUNTER — TELEPHONE (OUTPATIENT)
Dept: BEHAVIORAL HEALTH | Facility: CLINIC | Age: 61
End: 2021-04-13
Payer: MEDICAID

## 2021-04-13 ENCOUNTER — TELEPHONE (OUTPATIENT)
Dept: NEUROSURGERY | Facility: CLINIC | Age: 61
End: 2021-04-13

## 2021-04-13 NOTE — TELEPHONE ENCOUNTER
Visit Activities (Refresh list every visit): Phone Encounter    I called Martha to follow up with after the [facility].   Spoke with Martha for about 10-15 minutes today.    We explored a bit further her history with chronic pain complicated by mental health concerns. Discussed the complex interaction of pain and depression/anxiety. She reports that she has had five years or so of counseling, including some marriage therapy and individual work. It appears a lot of that has been focused on relationship issues. Unfortunately, as she reported at the recent [facility], she has multiple stressors right now, and is in the process of leaving a thirty-five year marriage. She reports having difficulty with her adult kids, and is now also needing to search for a new place to live.     She and Dr Shrestha are exploring a possible pain stimulator, she reports, dealing with insurance concerns.     We explored the possible benefits of ongoing therapy to support her in managing the stress of these issues, explore her underlying hx of depression, and give her support in developing coping skills of living with pain.     She reports that she has trouble with video visits and might need someone willing to work over the phone or meet in person, due to computer problems.  She says that although she has had trouble with medications for depression in the past (side effects), but she is open to discussing this with someone, as well.     Discussed the need to find a good fit In a therapist. She admits that she is a bit worn down by it, so not sure she is ready to try, and may be hesitant a bit, but is open. I informed the patient of my new role at [facility], which includes reduced clinical hours. Discussed finding her another provider for ongoing therapy, and offered a referral to [facility], or assistance in finding resources outside our system. We agreed that I would explore a few options  and send some ideas to her in email, and she can let me know what she would like to pursue.    Email resources to: [email or web address]       SHAYNE Shaw, [facility]

## 2021-04-13 NOTE — CONFIDENTIAL NOTE
Pt left VM messages saying that the neuropsych evaluation was done at [facility] by Dr. Blankenship. She has requested that they fax the records to us. I left pt a VM acknowledging her messages and let her know that I will follow-up with [facility] ([phone]) if I do not receive the records. Left my direct number.

## 2021-04-13 NOTE — CONFIDENTIAL NOTE
Spoke with pt to let her know that [facility] cannot fax me the records b/c they said the neuropsych eval was done through [facility]. Christopher said it was not done at one of their clinics. Pt will review her records, call where it was done and request that they fax the neuropsych eval to us. She will let me know where it was done so that if I don't receive it, I can follow-up with the correct provider. I did not see this evaluation in [facility].     No further questions.

## 2021-04-19 ENCOUNTER — TELEPHONE (OUTPATIENT)
Dept: NEUROSURGERY | Facility: CLINIC | Age: 61
End: 2021-04-19

## 2021-04-19 NOTE — CONFIDENTIAL NOTE
Spoke with pt to let her know we have not received a fax from [facility] with pt's neuropsych evaluation. Let her know that I left their med records dept a VM with my direct number and our fax number and requested they fax the records asap. I will call pt when the records are received. Pt in agreement with plan. No further questions.

## 2021-04-19 NOTE — CONFIDENTIAL NOTE
Left VM with med records dept of [facility] ([phone]) requesting pt's neuropsych eval be faxed to [phone]. Left my direct number should there be any questions.

## 2021-04-21 ENCOUNTER — TELEPHONE (OUTPATIENT)
Dept: NEUROSURGERY | Facility: CLINIC | Age: 61
End: 2021-04-21

## 2021-04-21 NOTE — CONFIDENTIAL NOTE
Let pt know that we received her neuropsych eval and that I have forwarded it to Dr. Shrestha for his review. I will follow-up with her about next steps once he reviews the report. Pt in agreement with plan. No further questions at this time.

## 2021-05-04 ENCOUNTER — TELEPHONE (OUTPATIENT)
Dept: NEUROSURGERY | Facility: CLINIC | Age: 61
End: 2021-05-04

## 2021-05-04 NOTE — CONFIDENTIAL NOTE
Spoke with pt to discuss status of occipital nerve stimulator authorization with insurance company. Because insurance considers this investigational, requesting a predetermination will not necessarily be helpful b/c insurance will say the treatment is excluded from the policy. There isn't an opportunity to appeal b/c we haven't requested authorization. I have messaged Dr. Shrestha with a scheduling strategy that may allow time for a potentially lengthy appeal process. I will follow-up with pt when I hear back from him. Pt in agreement with plan. No further questions at this time.

## 2021-05-06 ENCOUNTER — TELEPHONE (OUTPATIENT)
Dept: NEUROSURGERY | Facility: CLINIC | Age: 61
End: 2021-05-06

## 2021-05-06 NOTE — TELEPHONE ENCOUNTER
Pt got a call from scheduling to set up an appt for gavin mcclain. She declined b/c we have the report from the evaluation she already had. Pt wanted to confirm that is the case and I let her know that we do have the report and further evaluation isn't necessary.     Pt mentioned she heard from another doctor that if insurance declines coverage that there is an appeal process through the state. I explained to pt that if we exhaust the appeal process within the insurance company, we will then appeal through the [place] [employer] [place], as this is the agency that regulates insurance within the state. Pt expressed understanding. No further questions at this time.

## 2021-05-17 ENCOUNTER — TELEPHONE (OUTPATIENT)
Dept: NEUROSURGERY | Facility: CLINIC | Age: 61
End: 2021-05-17

## 2021-05-17 NOTE — CONFIDENTIAL NOTE
Pt would like to schedule a follow-up with Dr. Shrestha to discuss plan for occipital nerve stimulator. I have requested our clinic scheduler follow-up with her to schedule a video appt for tomorrow. Pt in agreement with plan. No further questions at this time.

## 2021-05-18 ENCOUNTER — VIRTUAL VISIT (OUTPATIENT)
Dept: NEUROSURGERY | Facility: CLINIC | Age: 61
End: 2021-05-18
Payer: COMMERCIAL

## 2021-05-18 DIAGNOSIS — M54.81 BILATERAL OCCIPITAL NEURALGIA: Primary | ICD-10-CM

## 2021-05-18 DIAGNOSIS — M79.7 FIBROMYALGIA: ICD-10-CM

## 2021-05-18 DIAGNOSIS — F33.9 RECURRENT MAJOR DEPRESSIVE DISORDER, REMISSION STATUS UNSPECIFIED (H): ICD-10-CM

## 2021-05-18 PROCEDURE — 99215 OFFICE O/P EST HI 40 MIN: CPT | Mod: GT | Performed by: NEUROLOGICAL SURGERY

## 2021-05-18 NOTE — PROGRESS NOTES
"Martha is a 61 year old who is being evaluated via a billable video visit.      How would you like to obtain your AVS? mail  If the video visit is dropped, the invitation should be resent by: send link to [phone]  Will anyone else be joining your video visit? no      Video Start Time: 4:03 PM  Video-Visit Details    Type of service:  Video Visit    Video End Time:4:54 PM    Originating Location (pt. Location): Home    Distant Location (provider location):  [facility] [place]     Platform used for Video Visit: [facility]    Neurosurgery [facility] Note    Reason for Visit: discussion about occipital nerve stimulation    History of Present Illness  Martha Chun is a 61 year old woman with a pertinent past medical history of fibromyalgia, TMJ, anxiety, and depression with previous suicide attempts who presents with occipital neuralgia and migraines that have been refractory to medical management. She returns today to talk about trialing an occipital nerve stimulator. She tells me that she did undergo radiofrequency ablation which had minimal effect on her pain. She is interested in moving forward with a percutaneous trial of occipital nerve stimulation and if that works then a permanent implant.    She has no problems with surgery and has had no issues with infection or healing in the past.     Allergies   Allergen Reactions     Amlodipine Other (See Comments)     Constipation, \"liver pain\".  Constipation, \"liver pain\".       Carvedilol Other (See Comments)     Worsening depression  Worsening depression       Clonidine      Other reaction(s): Dizziness     Codeine Nausea and Vomiting     Other reaction(s): Headache     Cortisone Other (See Comments)     Patient reports and possibly all steroids \"makes me to feel sick\", get fungal infections  Patient reports and possibly all steroids \"makes me to feel sick\", get fungal infections       Duloxetine      Other reaction(s): Headache     " Escitalopram Muscle Pain (Myalgia)     Other reaction(s): Myalgias     Mold Nausea     Ringing in ears, racing heart     Oxycodone      Other reaction(s): Headache     Paroxetine Other (See Comments)     Palpitations/racing heart  Palpitations/racing heart       Penicillins Unknown     Does not remember reaction, mother said she allergic to mold; patient refuses Pcn or any derivative  Does not remember reaction, mother said she allergic to mold; patient refuses Pcn or any derivative       Sulfa Drugs Unknown     Tramadol      Other reaction(s): Runny Nose  Facial pain, sinus swelling  Facial pain, sinus swelling         Current Outpatient Medications   Medication     acetaminophen (TYLENOL) 500 MG tablet     aliskiren (TEKTURNA) 150 MG tablet     Butalbital-Acetaminophen (PHRENILIN)  MG TABS per tablet     butalbital-aspirin-caffeine (FIORINAL) -40 MG capsule     calcium citrate (CITRACAL) 950 (200 Ca) MG tablet     carBAMazepine (TEGRETOL XR) 100 MG 12 hr tablet     carboxymethylcellulose (REFRESH PLUS) 0.5 % SOLN ophthalmic solution     cetirizine (ZYRTEC) 10 MG tablet     cholecalciferol 50 MCG (2000 UT) tablet     diltiazem ER (DILT-XR) 120 MG 24 hr capsule     gabapentin (NEURONTIN) 100 MG capsule     HYDROmorphone (DILAUDID) 2 MG tablet     LORazepam (ATIVAN) 0.5 MG tablet     Melatonin 10 MG TABS tablet     mirtazapine (REMERON) 15 MG tablet     Oxymetazoline HCl (NASAL SPRAY) 0.05 % SOLN     polyethylene glycol (MIRALAX) 17 GM/Dose powder     tapentadol (NUCYNTA) 50 MG TABS tablet     tiZANidine (ZANAFLEX) 2 MG tablet     triprolidine-pseudoePHEDrine (APRODINE) 2.5-60 MG TABS per tablet     No current facility-administered medications for this visit.            Assessment and Plan   Martha Chun is a 61 year old female with overlapping functional pain disorders and occipital neuralgia that is refractory to medical management at this point.  While she does have significant life stressors  currently at this time she has responded to local anesthetic in the past and is failed numerous medication trials.  A percutaneous trial implant of an occipital nerve stimulator appears to be the least invasive option at this time.  We will move forward with obtaining preapproval for occipital nerve stimulator trial.        Kobi Shrestha MD  Department of Neurosurgery  [facility]

## 2021-05-18 NOTE — LETTER
"5/18/2021       RE: Martha Chun  [address] [facility] [place] [address]     Dear Colleague,    Thank you for referring your patient, Martha Chun, to the [facility] NEUROSURGERY [facility]. Please see a copy of my visit note below.    Martha is a 61 year old who is being evaluated via a billable video visit.      How would you like to obtain your AVS? mail  If the video visit is dropped, the invitation should be resent by: send link to [phone]  Will anyone else be joining your video visit? no      Video-Visit Details    Type of service:  Video Visit    Video Start Time: 4:03 PM  Video End Time:4:54 PM    Originating Location (pt. Location): Home    Distant Location (provider location):  [facility] NEUROSURGERY [facility]     Platform used for Video Visit: [facility]    Neurosurgery [facility] Note    Reason for Visit: discussion about occipital nerve stimulation    History of Present Illness  Martha Chun is a 61 year old woman with a pertinent past medical history of fibromyalgia, TMJ, anxiety, and depression with previous suicide attempts who presents with occipital neuralgia and migraines that have been refractory to medical management. She returns today to talk about trialing an occipital nerve stimulator. She tells me that she did undergo radiofrequency ablation which had minimal effect on her pain. She is interested in moving forward with a percutaneous trial of occipital nerve stimulation and if that works then a permanent implant.    She has no problems with surgery and has had no issues with infection or healing in the past.     Allergies   Allergen Reactions     Amlodipine Other (See Comments)     Constipation, \"liver pain\".  Constipation, \"liver pain\".       Carvedilol Other (See Comments)     Worsening depression  Worsening depression       Clonidine      Other reaction(s): Dizziness     Codeine Nausea and Vomiting     " "Other reaction(s): Headache     Cortisone Other (See Comments)     Patient reports and possibly all steroids \"makes me to feel sick\", get fungal infections  Patient reports and possibly all steroids \"makes me to feel sick\", get fungal infections       Duloxetine      Other reaction(s): Headache     Escitalopram Muscle Pain (Myalgia)     Other reaction(s): Myalgias     Mold Nausea     Ringing in ears, racing heart     Oxycodone      Other reaction(s): Headache     Paroxetine Other (See Comments)     Palpitations/racing heart  Palpitations/racing heart       Penicillins Unknown     Does not remember reaction, mother said she allergic to mold; patient refuses Pcn or any derivative  Does not remember reaction, mother said she allergic to mold; patient refuses Pcn or any derivative       Sulfa Drugs Unknown     Tramadol      Other reaction(s): Runny Nose  Facial pain, sinus swelling  Facial pain, sinus swelling         Current Outpatient Medications   Medication     acetaminophen (TYLENOL) 500 MG tablet     aliskiren (TEKTURNA) 150 MG tablet     Butalbital-Acetaminophen (PHRENILIN)  MG TABS per tablet     butalbital-aspirin-caffeine (FIORINAL) -40 MG capsule     calcium citrate (CITRACAL) 950 (200 Ca) MG tablet     carBAMazepine (TEGRETOL XR) 100 MG 12 hr tablet     carboxymethylcellulose (REFRESH PLUS) 0.5 % SOLN ophthalmic solution     cetirizine (ZYRTEC) 10 MG tablet     cholecalciferol 50 MCG (2000 UT) tablet     diltiazem ER (DILT-XR) 120 MG 24 hr capsule     gabapentin (NEURONTIN) 100 MG capsule     HYDROmorphone (DILAUDID) 2 MG tablet     LORazepam (ATIVAN) 0.5 MG tablet     Melatonin 10 MG TABS tablet     mirtazapine (REMERON) 15 MG tablet     Oxymetazoline HCl (NASAL SPRAY) 0.05 % SOLN     polyethylene glycol (MIRALAX) 17 GM/Dose powder     tapentadol (NUCYNTA) 50 MG TABS tablet     tiZANidine (ZANAFLEX) 2 MG tablet     triprolidine-pseudoePHEDrine (APRODINE) 2.5-60 MG TABS per tablet     No current " facility-administered medications for this visit.        Assessment and Plan   Martha Chun is a 61 year old female with overlapping functional pain disorders and occipital neuralgia that is refractory to medical management at this point.  While she does have significant life stressors currently at this time she has responded to local anesthetic in the past and is failed numerous medication trials.  A percutaneous trial implant of an occipital nerve stimulator appears to be the least invasive option at this time.  We will move forward with obtaining preapproval for occipital nerve stimulator trial.    Kobi Shrestha MD  Department of Neurosurgery  [facility]

## 2021-05-20 ENCOUNTER — TELEPHONE (OUTPATIENT)
Dept: NEUROSURGERY | Facility: CLINIC | Age: 61
End: 2021-05-20

## 2021-05-20 NOTE — CONFIDENTIAL NOTE
Left  for pt returning her call. I will try her again tomorrow if I don't hear back from her. I left my direct number.

## 2021-05-21 ENCOUNTER — TELEPHONE (OUTPATIENT)
Dept: NEUROSURGERY | Facility: CLINIC | Age: 61
End: 2021-05-21

## 2021-05-21 NOTE — CONFIDENTIAL NOTE
Spoke with pt to discuss our approach with the insurance company for her surgery. She had a phone appt with Dr. Shrestha last Tuesday in which they also discussed this. I will follow-up with Dr. Shrestha Tuesday afternoon in clinic to discuss status with him and follow-up with pt afterwards. Pt in agreement with plan. No further questions at this time.

## 2021-05-21 NOTE — TELEPHONE ENCOUNTER
Returned pt's call and left a VM. Asked her to call me back and leave a message with best time to call if she gets my VM.

## 2021-05-26 ENCOUNTER — RECORDS - HEALTHEAST (OUTPATIENT)
Dept: ADMINISTRATIVE | Facility: CLINIC | Age: 61
End: 2021-05-26

## 2021-06-03 ENCOUNTER — TELEPHONE (OUTPATIENT)
Dept: NEUROSURGERY | Facility: CLINIC | Age: 61
End: 2021-06-03

## 2021-06-03 NOTE — TELEPHONE ENCOUNTER
Call back to patient.   Referral requested to [facility].  To Dept of Pain Medicine if needed.  Patient states it just seems to take a long time.     Explained Stimulator issues do take a long time and will have Keya callback next week.  Sorry I am not in the know on this.  Patient voices understanding, just wanted to let patient know call was received and we would work it through.    Voices understanding.

## 2021-06-03 NOTE — TELEPHONE ENCOUNTER
[facility] Call Center    Phone Message    May a detailed message be left on voicemail: yes     Reason for Call: Other: Pt called to state to Dr. Shrestha that in view od how long this is taking, she assumes he is no longer interested in pursuing stimulator.      She is now requesting ans would really appreciate referral to [place] submitted to her insurance as [place] would  pursue pain stimulator.    Please call Pt back to advise.    Action Taken: Message routed to:  [facility] Neurosurgery    Travel Screening: Not Applicable

## 2021-06-09 ENCOUNTER — TELEPHONE (OUTPATIENT)
Dept: NEUROSURGERY | Facility: CLINIC | Age: 61
End: 2021-06-09

## 2021-06-09 DIAGNOSIS — M54.81 BILATERAL OCCIPITAL NEURALGIA: Primary | ICD-10-CM

## 2021-06-09 NOTE — TELEPHONE ENCOUNTER
Spoke with pt about occipital nerve stimulation trial and implant and let her know I will send her the letter Dr[facility]rest[facility]. Pt expressed that she knows [employer] will deny coverage and she would like to get information on costs if she pays out of pocket. Will request that Dr. Shrestha enter the case request so that we can pursue insurance coverage and help her find out costs. Pt in agreement with plan. No further questions at this time.

## 2021-06-11 ENCOUNTER — TELEPHONE (OUTPATIENT)
Dept: NEUROSURGERY | Facility: CLINIC | Age: 61
End: 2021-06-11

## 2021-06-11 NOTE — CONFIDENTIAL NOTE
Pt left me a VM earlier asking if Dr. Shrestha could place a cervical spine stimulator at the same time he is placing the occipital nerve stimulator (if the occipital stimulator gets approved). Dr. Shrestha said this is not possible, and pt expressed understanding.     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 Pt wants to know if the occipital nerve stimulation will improve the hypermobility. I will follow-up with Dr. Shrestha, but I don't think so.     Pt is moving in July so would want to wait until August for surgery. I will give our surgery scheduler this information.     No further questions at this time.

## 2021-06-15 ENCOUNTER — TELEPHONE (OUTPATIENT)
Dept: NEUROSURGERY | Facility: CLINIC | Age: 61
End: 2021-06-15

## 2021-06-15 PROBLEM — M54.81 BILATERAL OCCIPITAL NEURALGIA: Status: ACTIVE | Noted: 2021-06-15

## 2021-06-15 NOTE — TELEPHONE ENCOUNTER
I spoke to the patient she is aware that her surgery date on 08/27/21 is a place [facility] so that her insurance can review and work on approval or denial. The patient is aware this might be a long process and it could be denied. I spoke to Dr. Shrestha's RN CC and she will be working with our Prior Auth team throughout the whole process. Patient had no questions or concerns.

## 2021-06-16 DIAGNOSIS — Z11.59 ENCOUNTER FOR SCREENING FOR OTHER VIRAL DISEASES: ICD-10-CM

## 2021-06-28 ENCOUNTER — TELEPHONE (OUTPATIENT)
Dept: NEUROSURGERY | Facility: CLINIC | Age: 61
End: 2021-06-28

## 2021-06-28 NOTE — TELEPHONE ENCOUNTER
Health Call Center    Phone Message    May a detailed message be left on voicemail: yes     Reason for Call: Other: pt has questions:    1) pt has been tracking her headaches and the additional nerves are being pinched, greater auricular nerve and the auricular temporal nerve--will the occipital nerve stimulator affect /calm those nerves as well?  Or does the stimulator need to be custom-made for pt?    2) Did insurance company respond to Dr. Shrestha's letter?    Please call pt to respond.      Action Taken: Message routed to:  [facility]    Travel Screening: Not Applicable

## 2021-06-29 NOTE — TELEPHONE ENCOUNTER
Spoke to Martha,  Continue to wait on Insurance letter and information.  Martha wants Dr Shrestha to know if Insurance denies procedure she is willing to pay.     Requested let time go by and see what will happen.  Procedure is not scheduled until 8/27/21.    Patient voices understanding, but very frustrated it is taking so long.

## 2021-07-02 ENCOUNTER — TELEPHONE (OUTPATIENT)
Dept: NEUROSURGERY | Facility: CLINIC | Age: 61
End: 2021-07-02

## 2021-07-02 NOTE — TELEPHONE ENCOUNTER
[facility] Call Center    Phone Message    May a detailed message be left on voicemail: yes     Reason for Call: Other: Pt called to request to schedule appt with Dr. Shrestha due to additional pain she is experiencing.      Please call Pt back to schedule.    Action Taken: Message routed to:  [facility] Neurosurgery    Travel Screening: Not Applicable

## 2021-07-05 NOTE — TELEPHONE ENCOUNTER
Writer LVM for pt to call back and schedule follow up with Dr Shrestha.    Please schedule next available appt with Dr. Shrestha. In person or virtual is good    Chante Costa

## 2021-07-13 ENCOUNTER — RECORDS - HEALTHEAST (OUTPATIENT)
Dept: ADMINISTRATIVE | Facility: CLINIC | Age: 61
End: 2021-07-13

## 2021-07-20 ENCOUNTER — VIRTUAL VISIT (OUTPATIENT)
Dept: NEUROSURGERY | Facility: CLINIC | Age: 61
End: 2021-07-20
Payer: COMMERCIAL

## 2021-07-20 DIAGNOSIS — M54.81 BILATERAL OCCIPITAL NEURALGIA: Primary | ICD-10-CM

## 2021-07-20 PROCEDURE — 99213 OFFICE O/P EST LOW 20 MIN: CPT | Mod: GT | Performed by: NEUROLOGICAL SURGERY

## 2021-07-20 RX ORDER — BUTALBITAL, ACETAMINOPHEN AND CAFFEINE 50; 325; 40 MG/1; MG/1; MG/1
1 TABLET ORAL EVERY 4 HOURS PRN
COMMUNITY
Start: 2021-07-02 | End: 2022-11-08

## 2021-07-20 RX ORDER — DILTIAZEM HYDROCHLORIDE 120 MG/1
120 CAPSULE, COATED, EXTENDED RELEASE ORAL DAILY
Status: ON HOLD | COMMUNITY
Start: 2021-07-04 | End: 2021-08-27

## 2021-07-20 RX ORDER — MIRTAZAPINE 7.5 MG/1
2 TABLET, FILM COATED ORAL AT BEDTIME
COMMUNITY
Start: 2021-07-13 | End: 2022-01-18

## 2021-07-20 ASSESSMENT — PATIENT HEALTH QUESTIONNAIRE - PHQ9: SUM OF ALL RESPONSES TO PHQ QUESTIONS 1-9: 4

## 2021-07-20 NOTE — PROGRESS NOTES
"Martha is a 61 year old who is being evaluated via a billable video visit.      How would you like to obtain your AVS? Mail  If the video visit is dropped, the invitation should be resent by: [phone]      Video Start Time: 1:48 PM  Video-Visit Details    Type of service:  Video Visit    Video End Time:2:28 PM    Originating Location (pt. Location): Home    Distant Location (provider location):  [facility] NEUROSURGERY CLINIC [place]     Platform used for Video Visit: [facility]    Neurosurgery Clinic Note    Reason for Visit: occipital neuralgia follow up    History of Present Illness  Martha Chun is a 61 year old woman with occipital neuralgia and complex facial pain and migraine who returns to discuss the ongoing discussion with her insurance company about covering a trial of occipital nerve stimulation for treatment of her occipital neuralgia. She continues to have significant pain and is hoping to be able to move forward with her life. Otherwise, she has been also suffering from significant back pain.          Allergies   Allergen Reactions     Amlodipine Other (See Comments)     Constipation, \"liver pain\".  Constipation, \"liver pain\".       Carvedilol Other (See Comments)     Worsening depression  Worsening depression       Clonidine      Other reaction(s): Dizziness     Codeine Nausea and Vomiting     Other reaction(s): Headache     Cortisone Other (See Comments)     Patient reports and possibly all steroids \"makes me to feel sick\", get fungal infections  Patient reports and possibly all steroids \"makes me to feel sick\", get fungal infections       Duloxetine      Other reaction(s): Headache     Escitalopram Muscle Pain (Myalgia)     Other reaction(s): Myalgias     Mold Nausea     Ringing in ears, racing heart     Oxycodone      Other reaction(s): Headache     Paroxetine Other (See Comments)     Palpitations/racing heart  Palpitations/racing heart       Penicillins Unknown     Does not remember " reaction, mother said she allergic to mold; patient refuses Pcn or any derivative  Does not remember reaction, mother said she allergic to mold; patient refuses Pcn or any derivative       Sulfa Drugs Unknown     Tramadol      Other reaction(s): Runny Nose  Facial pain, sinus swelling  Facial pain, sinus swelling         Current Outpatient Medications   Medication     acetaminophen (TYLENOL) 500 MG tablet     aliskiren (TEKTURNA) 150 MG tablet     Butalbital-Acetaminophen (PHRENILIN)  MG TABS per tablet     butalbital-acetaminophen-caffeine (ESGIC) -40 MG tablet     butalbital-aspirin-caffeine (FIORINAL) -40 MG capsule     calcium citrate (CITRACAL) 950 (200 Ca) MG tablet     carBAMazepine (TEGRETOL XR) 100 MG 12 hr tablet     carboxymethylcellulose (REFRESH PLUS) 0.5 % SOLN ophthalmic solution     cetirizine (ZYRTEC) 10 MG tablet     cholecalciferol 50 MCG (2000 UT) tablet     diltiazem ER (DILT-XR) 120 MG 24 hr capsule     diltiazem ER COATED BEADS (CARDIZEM CD/CARTIA XT) 120 MG 24 hr capsule     gabapentin (NEURONTIN) 100 MG capsule     HYDROmorphone (DILAUDID) 2 MG tablet     LORazepam (ATIVAN) 0.5 MG tablet     Melatonin 10 MG TABS tablet     mirtazapine (REMERON) 15 MG tablet     mirtazapine (REMERON) 7.5 MG tablet     Oxymetazoline HCl (NASAL SPRAY) 0.05 % SOLN     polyethylene glycol (MIRALAX) 17 GM/Dose powder     tapentadol (NUCYNTA) 50 MG TABS tablet     tiZANidine (ZANAFLEX) 2 MG tablet     triprolidine-pseudoePHEDrine (APRODINE) 2.5-60 MG TABS per tablet     No current facility-administered medications for this visit.       ROS: 10 point ROS neg other than the symptoms noted above in the HPI.          Assessment and Plan   Martha Chun is a 61 year old female with overlapping functional pain disorders and occipital neuralgia that is refractory to medical management.  While she does have significant life stressors currently at this time she has responded to local anesthetic in the past  and is failed numerous medication trials.  A percutaneous trial implant of an occipital nerve stimulator appears to be the least invasive option at this time.    Her insurance company has denied approval for an occipital stimulator trial for which will now have to appeal.  We discussed the appeal process and we had an active discussion about the externalization process.        Kobi Shrestha MD  Department of Neurosurgery  [facility]

## 2021-07-20 NOTE — LETTER
"7/20/2021       RE: Mratha Chun  [id number]  [place] [address]     Dear Colleague,    Thank you for referring your patient, Martha Chun, to the [facility] NEUROSURGERY CLINIC [place] at [facility]. Please see a copy of my visit note below.    Neurosurgery Clinic Note    Reason for Visit: occipital neuralgia follow up    History of Present Illness  Martha Chun is a 61 year old woman with occipital neuralgia and complex facial pain and migraine who returns to discuss the ongoing discussion with her insurance company about covering a trial of occipital nerve stimulation for treatment of her occipital neuralgia. She continues to have significant pain and is hoping to be able to move forward with her life. Otherwise, she has been also suffering from significant back pain.      Allergies   Allergen Reactions     Amlodipine Other (See Comments)     Constipation, \"liver pain\".  Constipation, \"liver pain\".       Carvedilol Other (See Comments)     Worsening depression  Worsening depression       Clonidine      Other reaction(s): Dizziness     Codeine Nausea and Vomiting     Other reaction(s): Headache     Cortisone Other (See Comments)     Patient reports and possibly all steroids \"makes me to feel sick\", get fungal infections  Patient reports and possibly all steroids \"makes me to feel sick\", get fungal infections       Duloxetine      Other reaction(s): Headache     Escitalopram Muscle Pain (Myalgia)     Other reaction(s): Myalgias     Mold Nausea     Ringing in ears, racing heart     Oxycodone      Other reaction(s): Headache     Paroxetine Other (See Comments)     Palpitations/racing heart  Palpitations/racing heart       Penicillins Unknown     Does not remember reaction, mother said she allergic to mold; patient refuses Pcn or any derivative  Does not remember reaction, mother said she allergic to mold; patient refuses Pcn or any " derivative       Sulfa Drugs Unknown     Tramadol      Other reaction(s): Runny Nose  Facial pain, sinus swelling  Facial pain, sinus swelling         Current Outpatient Medications   Medication     acetaminophen (TYLENOL) 500 MG tablet     aliskiren (TEKTURNA) 150 MG tablet     Butalbital-Acetaminophen (PHRENILIN)  MG TABS per tablet     butalbital-acetaminophen-caffeine (ESGIC) -40 MG tablet     butalbital-aspirin-caffeine (FIORINAL) -40 MG capsule     calcium citrate (CITRACAL) 950 (200 Ca) MG tablet     carBAMazepine (TEGRETOL XR) 100 MG 12 hr tablet     carboxymethylcellulose (REFRESH PLUS) 0.5 % SOLN ophthalmic solution     cetirizine (ZYRTEC) 10 MG tablet     cholecalciferol 50 MCG (2000 UT) tablet     diltiazem ER (DILT-XR) 120 MG 24 hr capsule     diltiazem ER COATED BEADS (CARDIZEM CD/CARTIA XT) 120 MG 24 hr capsule     gabapentin (NEURONTIN) 100 MG capsule     HYDROmorphone (DILAUDID) 2 MG tablet     LORazepam (ATIVAN) 0.5 MG tablet     Melatonin 10 MG TABS tablet     mirtazapine (REMERON) 15 MG tablet     mirtazapine (REMERON) 7.5 MG tablet     Oxymetazoline HCl (NASAL SPRAY) 0.05 % SOLN     polyethylene glycol (MIRALAX) 17 GM/Dose powder     tapentadol (NUCYNTA) 50 MG TABS tablet     tiZANidine (ZANAFLEX) 2 MG tablet     triprolidine-pseudoePHEDrine (APRODINE) 2.5-60 MG TABS per tablet     No current facility-administered medications for this visit.       ROS: 10 point ROS neg other than the symptoms noted above in the HPI.    Assessment and Plan   Martha Chun is a 61 year old female with overlapping functional pain disorders and occipital neuralgia that is refractory to medical management.  While she does have significant life stressors currently at this time she has responded to local anesthetic in the past and is failed numerous medication trials.  A percutaneous trial implant of an occipital nerve stimulator appears to be the least invasive option at this time.    Her insurance  company has denied approval for an occipital stimulator trial for which will now have to appeal.  We discussed the appeal process and we had an active discussion about the externalization process.        Kobi Shrestha MD  Department of Neurosurgery  [facility]      Again, thank you for allowing me to participate in the care of your patient.      Sincerely,    Kobi Shrestha MD

## 2021-07-21 ENCOUNTER — RECORDS - HEALTHEAST (OUTPATIENT)
Dept: ADMINISTRATIVE | Facility: CLINIC | Age: 61
End: 2021-07-21

## 2021-07-28 ENCOUNTER — TELEPHONE (OUTPATIENT)
Dept: NEUROSURGERY | Facility: CLINIC | Age: 61
End: 2021-07-28

## 2021-07-28 NOTE — CONFIDENTIAL NOTE
Becky from  [employer] called to get some general dates that may work for a peer to peer. She said Tiffani would be calling to set a specific date and time between Dr. Vincent and Dr. Shrestha. Peer to peer will not be with a neurosurgeon. Will await call from Tiffani.

## 2021-07-28 NOTE — CONFIDENTIAL NOTE
Received call from Tiffani to schedule peer to peer with Dr. Shrestha and medical director Dr. Vincent. We tentatively settled on 10:00 am tomorrow, 7/29. I will call Tiffani back at [phone] when I receive confirmation from Dr. Shrestha.

## 2021-07-28 NOTE — CONFIDENTIAL NOTE
Left VM wicho Nixon at  asking if we can change the time of the peer to peer tomorrow from 10:00 am to either 12:00, 1:00 or 2:00 pm. Requested a call back on my direct line as soon as possible.

## 2021-07-28 NOTE — CONFIDENTIAL NOTE
Left  at  Utilization Review ([phone]) to request a peer to peer review of denial of occipital nerve stim surgery. Requested call back to schedule between Dr. Shrestha and a neurosurgeon with .     Authorization # [id number]    (initially called number on the denial form - [phone]. Was transferred to [employer] (Carmen) at [phone], and then [facility] to [facility]).

## 2021-07-29 ENCOUNTER — PATIENT OUTREACH (OUTPATIENT)
Dept: NEUROSURGERY | Facility: CLINIC | Age: 61
End: 2021-07-29

## 2021-07-29 NOTE — PROGRESS NOTES
"Pt is scheduled for trial of occipital nerve stimulator 8/27/21. Pt is aware that [employer] denied authorization for surgery and I let her know that Dr. Shrestha is scheduled for a [id number] with the medical director this afternoon. If the denial is upheld, the next step is a 2nd level written appeal. We purposely scheduled the surgery farther out so we would have time to manage the appeal process.     Pt expressed understanding of the above, but says she can't wait that long for surgery because the pain is so overwhelming and constant. She expressed that she recently sold her house and moved into an apartment, but cannot invite friends to see it because \"I can barely lift my head off the pillow.\" She expressed how this surgery is her only hope for relief, that she wants to keep living, but that the pain is so bad she does not know how she can withstand it. She said she is not suicidal and has no plan to harm herself or others.     She wants to figure out a way to pay for the surgery herself  if insurance will not cover it; she is also pursing kiersten coverage through [employer]. She would like to move the surgery date up if possible.     I encouraged pt to let us go through the insurance appeal process to try to obtain coverage. Pt agreed for now, but would still like to move the date up and possibly forgo that.     I will follow-up with Dr. Shrestha after the peer to peer for next steps and will keep the pt informed. No further questions at this time.   "

## 2021-08-04 ENCOUNTER — PATIENT OUTREACH (OUTPATIENT)
Dept: NEUROSURGERY | Facility: CLINIC | Age: 61
End: 2021-08-04

## 2021-08-04 NOTE — PROGRESS NOTES
Spoke with pt who said that she applied for [facility] care with [facility] for the surgery scheduled on 8/27, as insurance has denied coverage. Pt said that she did not qualify, so she would like to get a cost estimate for surgery in the event that she has to pay out of pocket. I let her know the [place] code is 75630.    We discussed that Dr. Shrestha and I would like to pursue all avenues of appeal. Dr. Shrestha is not keen on pt paying out of pocket b/c if the trial stimulation does not work, pt will be in a worse financial position and she will still be in pain. He does not want pt to sustain this kind of risk. Pt expressed understanding.    No further questions at this time.

## 2021-08-12 ENCOUNTER — TELEPHONE (OUTPATIENT)
Dept: NEUROSURGERY | Facility: CLINIC | Age: 61
End: 2021-08-12

## 2021-08-12 NOTE — CONFIDENTIAL NOTE
Pt has questions about occipital nerve stimulation surgery planned for the end of August pending insurance approval.     She wants to confirm that the stimulation will also address her shooting facial pain. I will follow-up with Dr. Shrestha, as I am not sure.     She wants to know if this type of neuromodulation will also improve her depression/mood issues. I let pt know that if her pain improves, mood may be improved secondarily, but that this type of neuromodulation is not used to treat depression directly. I will confirm with Dr. Shrestha, however.     Pt says she is looking into getting a bank loan and her  is looking into other sources of funding should insurance not authorize the surgery. We have discussed that Dr. Shrestha does not want to proceed without insurance coverage and I mentioned this again. Pt is very focused on moving forward regardless. We will know more re authorization in the coming days.     Will follow-up with Dr. Shrestha about the above and get back to pt.

## 2021-08-13 ENCOUNTER — TELEPHONE (OUTPATIENT)
Dept: NEUROSURGERY | Facility: CLINIC | Age: 61
End: 2021-08-13

## 2021-08-13 ENCOUNTER — DOCUMENTATION ONLY (OUTPATIENT)
Dept: NEUROSURGERY | Facility: CLINIC | Age: 61
End: 2021-08-13

## 2021-08-13 NOTE — CONFIDENTIAL NOTE
Spoke with pt re her question about whether the occipital nerve stimulator will resolve her facial pain. I let her know I talked to Dr. Shrestha and he said that the facial pain will have to be addressed separately, as there won't be room for more electrodes. Pt expressed concern about this and would like an appointment with Dr. Shrestha to discuss how her facial pain will be addressed.     Requested that clinic scheduler follow up with pt to schedule a phone/video appointment.

## 2021-08-13 NOTE — CONFIDENTIAL NOTE
"Received call from Carmen [facility] acknowledging receipt of appeal documentation and letting us know they will process this as a \"fast\" appeal. [phone].  "

## 2021-08-13 NOTE — PROGRESS NOTES
Faxed Fast Appeal to [facility] at [phone]. Included [employer] denial letter, facesheet, provider letter, clinical notes, and medical literature.

## 2021-08-16 ENCOUNTER — DOCUMENTATION ONLY (OUTPATIENT)
Dept: NEUROSURGERY | Facility: CLINIC | Age: 61
End: 2021-08-16

## 2021-08-16 ENCOUNTER — TELEPHONE (OUTPATIENT)
Dept: NEUROSURGERY | Facility: CLINIC | Age: 61
End: 2021-08-16

## 2021-08-16 ENCOUNTER — PATIENT OUTREACH (OUTPATIENT)
Dept: NEUROSURGERY | Facility: CLINIC | Age: 61
End: 2021-08-16

## 2021-08-16 NOTE — CONFIDENTIAL NOTE
Received 8/13/21 VM from Carmen [facility] letting me know that pt's 8/27 surgery has been approved. Authorization #[id number].     Carmen: [phone].

## 2021-08-16 NOTE — PROGRESS NOTES
Called pt to discuss that [facility] approved her 8/27 surgery on 8/13. Pt had also gotten a call from , so she was aware.     Because we were waiting for prior authorization before scheduling pt's PAC and covid appts, we will now schedule these. I have message our surgery scheduler and pt should get a call in the next few days to get these appts scheduled.     Will also f/u with clinic scheduler for a telephone appt with Dr. Shrestha tomorrow.     I will send a packet of information in the mail today with information about preparing for surgery. I will be out of the office tomorrow through Friday, so I may send it out without the PAC and covid test dates.     We reviewed the following preop information:     Occipital nerve stimulator  8/27/21 at 8:00 am. Arrive on 3C at 6:00 am  Dr. Shrestha    Discussed pre-op routine and requirements to include:  surgical procedure, post-op recovery and expectations, need for H&P/PAC, NPO prior to OR for 8 hours, clear liquids OK until 2 hours before surgery; pre-op antibacterial showers, pain control and importance of follow-up visits.  Surgery scheduling will coordinate OR time/date and update patient as appropriate.  3C will call with more instructions 24-48 hour pre-op.   Ample time was provided for patient questions and in-depth discussion of topics of heightened interest.      A four ounce bottle of antibacterial soap solution may be given to patient at PAC appt. If not, pt will need to purchase Hibiclens or Techni-Care (or comparable brand) at local pharmacy. Discussed specific instructions for use the night before and the morning of surgery.     Discussed stopping asprin and and all aspirin products 7 days before surgery. Stop ibuprofen 24 hours before surgery. Pt occasionally takes meloxicam, which should be stopped 10 days before surgery. Pt expressed understanding.     Discussed that pt will need someone to stay with her the first 24 hours after discharge. Estimated  length of hospital stay is 2-3 days, per Dr. Shrestha's case request.       Patient verbalized understanding of instructions.  Approximately 20 minutes spent with patient discussing and reviewing. No further questions at this time.

## 2021-08-17 ENCOUNTER — VIRTUAL VISIT (OUTPATIENT)
Dept: NEUROSURGERY | Facility: CLINIC | Age: 61
End: 2021-08-17
Payer: COMMERCIAL

## 2021-08-17 DIAGNOSIS — M54.81 BILATERAL OCCIPITAL NEURALGIA: Primary | ICD-10-CM

## 2021-08-17 PROCEDURE — 99213 OFFICE O/P EST LOW 20 MIN: CPT | Mod: TEL | Performed by: NEUROLOGICAL SURGERY

## 2021-08-17 NOTE — LETTER
8/17/2021       RE: Martha Chun  [address]  [place] [address]     Dear Colleague,    Thank you for referring your patient, Martha Chun, to the [facility] NEUROSURGERY [facility]INIC [place] [facility]. Please see a copy of my visit note below.    Martha is a 61 year old who is being evaluated via a billable telephone visit.      What phone number would you like to be contacted at? [phone]    How would you like to obtain your AVS? Mail a copy  Phone call duration: 20 minutes      Today and then I discussed her upcoming procedure of externalized trial of occipital nerve stimulator.  She had several questions which we discussed in detail.  She is ready for surgery and expect excited about the prospects of pain relief.    -------  Kobi Shrestha MD      Again, thank you for allowing me to participate in the care of your patient.      Sincerely,    Kobi Shrestha MD

## 2021-08-17 NOTE — LETTER
8/17/2021       RE: Martha Chun  [address]  [place] [address]     Dear Colleague,    Thank you for referring your patient, Martha Chun, to the [facility] NEUROSURGERY CLINIC [facility]. Please see a copy of my visit note below.    Today and then I discussed her upcoming procedure of externalized trial of occipital nerve stimulator.  She had several questions which we discussed in detail.  She is ready for surgery and expect excited about the prospects of pain relief.    -------  Kobi Shrestha MD      Again, thank you for allowing me to participate in the care of your patient.      Sincerely,    Kobi Shrestha MD

## 2021-08-17 NOTE — PROGRESS NOTES
Martha is a 61 year old who is being evaluated via a billable telephone visit.      What phone number would you like to be contacted at? [phone]    How would you like to obtain your AVS? Mail a copy  Phone call duration: 20 minutes      Today and then I discussed her upcoming procedure of externalized trial of occipital nerve stimulator.  She had several questions which we discussed in detail.  She is ready for surgery and expect excited about the prospects of pain relief.    -------  Kobi Shrestha MD

## 2021-08-18 ENCOUNTER — TELEPHONE (OUTPATIENT)
Dept: NEUROSURGERY | Facility: CLINIC | Age: 61
End: 2021-08-18

## 2021-08-18 NOTE — TELEPHONE ENCOUNTER
FUTURE VISIT INFORMATION      SURGERY INFORMATION:    Date: 21    Location: [facility] OR    Surgeon:  Kobi Shrestha MD    Anesthesia Type:  General    Procedure: Externalized trial of percutaneous occipital nerve stimulator bilateral  Percutaneous implantation of neurostimulator electrode array; cranial nerve CPT 61499     Consult: Virtual visit 21    RECORDS REQUESTED FROM:       Primary Care Provider: Fox Vieira MD- [facility]    Most recent EKG+ Tracin/10/20

## 2021-08-18 NOTE — TELEPHONE ENCOUNTER
DOS:   08/27/21  Provider:   Dr. Shrestha  Location:    [facility] OR  PAC:    08/23/21  COVID test:    08/25  Post Op:      09/28/21  Patient Called: Called and confirmed all upcoming appointments with patient.    Surgery packet sent    The nurse mailed out     Nuvasive:    N/A     Extra Imaging:    N/A    Additional comments: The patient is aware they need a PRE-OP/PAC appt at least a couple of weeks before surgery date. We went over the patient needing a  and someone to stay with them for 24 hours after the surgery. The patient was given my direct number for any questions [phone]

## 2021-08-23 ENCOUNTER — ANESTHESIA EVENT (OUTPATIENT)
Dept: SURGERY | Facility: CLINIC | Age: 61
End: 2021-08-23
Payer: COMMERCIAL

## 2021-08-23 ENCOUNTER — VIRTUAL VISIT (OUTPATIENT)
Dept: SURGERY | Facility: CLINIC | Age: 61
End: 2021-08-23
Payer: COMMERCIAL

## 2021-08-23 ENCOUNTER — PRE VISIT (OUTPATIENT)
Dept: SURGERY | Facility: CLINIC | Age: 61
End: 2021-08-23

## 2021-08-23 DIAGNOSIS — M54.81 BILATERAL OCCIPITAL NEURALGIA: Primary | ICD-10-CM

## 2021-08-23 DIAGNOSIS — Z01.818 PREOPERATIVE EVALUATION TO RULE OUT SURGICAL CONTRAINDICATION: Primary | ICD-10-CM

## 2021-08-23 DIAGNOSIS — Z01.818 PRE-OP EXAMINATION: ICD-10-CM

## 2021-08-23 PROCEDURE — 99204 OFFICE O/P NEW MOD 45 MIN: CPT | Mod: GT | Performed by: NURSE PRACTITIONER

## 2021-08-23 RX ORDER — IBUPROFEN 200 MG
200 TABLET ORAL EVERY 4 HOURS PRN
Status: ON HOLD | COMMUNITY
End: 2021-08-27

## 2021-08-23 ASSESSMENT — LIFESTYLE VARIABLES: TOBACCO_USE: 1

## 2021-08-23 ASSESSMENT — PAIN SCALES - GENERAL: PAINLEVEL: MODERATE PAIN (4)

## 2021-08-23 NOTE — ANESTHESIA PREPROCEDURE EVALUATION
"Anesthesia Pre-Procedure Evaluation    Patient: Martha Chun   MRN: [id number] : 1960        Preoperative Diagnosis: * No surgery found *   Procedure :      No past medical history on file.   Past Surgical History:   Procedure Laterality Date     C LIGATE FALLOPIAN TUBE      Description: Tubal Ligation;  Recorded: 2012;      Allergies   Allergen Reactions     Amlodipine Other (See Comments)     Constipation, \"liver pain\".  Constipation, \"liver pain\".       Carvedilol Other (See Comments)     Worsening depression  Worsening depression       Clonidine      Other reaction(s): Dizziness     Codeine Nausea and Vomiting     Other reaction(s): Headache     Cortisone Other (See Comments)     Patient reports and possibly all steroids \"makes me to feel sick\", get fungal infections  Patient reports and possibly all steroids \"makes me to feel sick\", get fungal infections       Duloxetine      Other reaction(s): Headache     Escitalopram Muscle Pain (Myalgia)     Other reaction(s): Myalgias     Mold Nausea     Ringing in ears, racing heart     Oxycodone      Other reaction(s): Headache     Paroxetine Other (See Comments)     Palpitations/racing heart  Palpitations/racing heart       Penicillins Unknown     Does not remember reaction, mother said she allergic to mold; patient refuses Pcn or any derivative  Does not remember reaction, mother said she allergic to mold; patient refuses Pcn or any derivative       Sulfa Drugs Unknown     Tramadol      Other reaction(s): Runny Nose  Facial pain, sinus swelling  Facial pain, sinus swelling        Social History     Tobacco Use     Smoking status: Former Smoker     Quit date: 10/10/2014     Years since quittin.8     Smokeless tobacco: Never Used   Substance Use Topics     Alcohol use: Not on file      Wt Readings from Last 1 Encounters:   03/15/21 59.9 kg (132 lb)        Anesthesia Evaluation   Pt has had prior anesthetic. Type: General and MAC.    No history of " anesthetic complications       ROS/MED HX  ENT/Pulmonary: Comment: (+) motion sickness    (+) ALINE risk factors, snores loudly, hypertension, allergic rhinitis, tobacco use (Quit 10/10/14.  Smoked ~15 years 1 PPD.), Past use, 1 packs/day, 15  Pack-Year Hx,      Neurologic: Comment: B/L occipital neuralgia    (+) fibromyalgia.         (+) migraines,     Cardiovascular: Comment: Denies cardiac symptoms including chest pain, SOB, palpitations, syncope, GARCIA, orthopnea, or PND.      (+) hypertension-----Previous cardiac testing     METS/Exercise Tolerance: >4 METS    Hematologic:     (+) History of blood clots (Daughter with h/o DVT 2/2 oral birth control pills. ), pt is not anticoagulated,  (-) history of blood transfusion   Musculoskeletal: Comment: Degenerative arthritis:   - s/p cervical fusion posterior C3-T1 12/2020   - S/p cervical fusion anterior C3-7 fusion 11/2020   - s/p right EMELINA 2011   - s/p left TKA.       Chronic pain: In bed by 15:00 in the afternoon due to pain.      (+) arthritis,     GI/Hepatic:  - neg GI/hepatic ROS     Renal/Genitourinary:  - neg Renal ROS     Endo:  - neg endo ROS     Psychiatric/Substance Use:     (+) psychiatric history anxiety and depression  (-) alcohol abuse history and chronic opioid use history   Infectious Disease: Comment: Did not get COVID vaccine - neg infectious disease ROS     Malignancy:  - neg malignancy ROS     Other: Comment:   from patient after 33 years of marriage.   is in home and patient is in an apartment.   will be brining her for the surgery.  - neg other ROS    (+) , H/O Chronic Pain,        Physical Exam    Airway        Mallampati: II   TM distance: > 3 FB   Neck ROM: limited   Mouth opening: > 3 cm    Respiratory Devices and Support         Dental  no notable dental history         Cardiovascular   cardiovascular exam normal          Pulmonary   pulmonary exam normal                OUTSIDE LABS:  CBC:   Lab Results    Component Value Date    WBC 5.8 03/19/2020    HGB 14.8 03/19/2020    HCT 44.8 03/19/2020     03/19/2020     BMP:   Lab Results   Component Value Date     03/19/2020    POTASSIUM 3.8 03/19/2020    CHLORIDE 101 03/19/2020    CO2 26 03/19/2020    BUN 14 03/19/2020    CR 0.71 03/19/2020    GLC 94 03/19/2020     COAGS: No results found for: PTT, INR, FIBR  POC: No results found for: BGM, HCG, HCGS  HEPATIC:   Lab Results   Component Value Date    ALBUMIN 4.3 03/19/2020    PROTTOTAL 7.4 03/19/2020    ALT 21 03/19/2020    AST 19 03/19/2020    GGT 18 03/19/2020    ALKPHOS 92 03/19/2020    BILITOTAL 0.4 03/19/2020     OTHER:   Lab Results   Component Value Date    ALYSE 9.7 03/19/2020    PHOS 3.8 03/19/2020    MAG 2.0 03/19/2020    TSH 2.24 03/19/2020       Anesthesia Plan    ASA Status:  2   NPO Status:  NPO Appropriate    Anesthesia Type: General.     - Airway: ETT   Induction: Intravenous.   Maintenance: Balanced.   Techniques and Equipment:     - Airway: Video-Laryngoscope, Fiberoptic Bronchoscope         Consents    Anesthesia Plan(s) and associated risks, benefits, and realistic alternatives discussed. Questions answered and patient/representative(s) expressed understanding.     - Discussed with:  Patient      - Extended Intubation/Ventilatory Support Discussed: No.      - Patient is DNR/DNI Status: No    Use of blood products discussed: No .     Postoperative Care    Pain management: IV analgesics.   PONV prophylaxis: Ondansetron (or other 5HT-3), Scopolamine patch     Comments:    Very limited neck mobility, including extension, however enough clearance from chest to attempt videolaryngoscope. Discussed waking up and doing awake FOI. Voice hoarseness from neck surgeries, so will avoid regional block for awake FOI.          PAC Discussion and Assessment    ASA Classification: 3  Case is suitable for: [place]  Anesthetic techniques and relevant risks discussed: GA                  PAC Resident/NP  Anesthesia Assessment: Martha Chun is a 61-year-old female scheduled for Externalized trial of percutaneous occipital nerve stimulator bilateral  Percutaneous implantation of neurostimulator electrode array; cranial nerve CPT 81428  - Bilateral with Kobi Shrestha MD on 8/27/2021 at [facility] under general anesthesia.      Ms. Chun has a history of occipital neuralgia, complex facial pain and migraines.  She was seen by Dr. Shrestha on 7/20/21 in neurosurgical follow-up for trial of occipital nerve stimulation for treatment of her occipital neuralgia.   When Ms Chun saw Dr. Shrestha in July, they were working with the insurance company on coverage for the stimulator.   Ms. Chun has overlapping functional pain disorders and occipital neuralgia that has been refractory to medical management.  Records indicate that a percutaneous trial implant of an occipital nerve stimulator appears to be the least invasive option.  Ms. Chun presents to the PAC in virtual visit in preparation for the above surgical intervention.           PROCEDURES      EKG 11/11/2020     Sinus rhythm      Septal infarct vs lead placement (V2)      Abnormal ECG      When compared with ECG of 26-JUN-2020 12:23,      Vent. rate has decreased BY  42 BPM      Septal infarct is now Present      Confirmed by MD JEFFRIES MICHAEL     The patient's records and results personally reviewed by this provider.     Outside records reviewed from: care everywhere    ASSESSMENT and PLAN    Specific operative considerations:   - ALINE # of risks 2/8 = low   - VTE risk:  1.8%  - Risk of PONV score = 2.  If > 2, anti-emetic intervention recommended. (+) h/o motion sickness.       #  Cardiology  - Denies known coronary artery disease.  Denies cardiac symptoms. METS:  >4. RCRI : No serious cardiac risks.  0.4 % risk of major adverse cardiac event.       - HTN, take diltiazem as prescribed DOS. Hold Aliskiren per  protocol.     #  Pulmonary   - remote  smoking hx  - Denies pulmonary symptoms  - No inhalers     #  Orthopedic  - Degenerative arthritis, consideration for careful positioning.    ~ s/p cervical fusion posterior C3-T1 2020    ~ S/p cervical fusion anterior C3-7 fusion 2020    ~ s/p right EMELINA     ~ s/p left TKA.   # Neuro   - Chronic pain, reports most days in bed by 15:00 due to pain.    ~  overlapping functional pain disorders including migraines, fibromyalgia and b/l occipital neuralgia.  B/l occipital neuralgia refractory to medical management.  Above percutaneous trial implant of an occipital nerve stimulator surgery as planned above.  Pre-op labs ordered by Dr. Shrestha.  Okay to take pain medications as prescribed with the exception of Fiorinal w/ aspirin component >>> hold for 7 days prior to surgery.     - Depression and anxiety with h/o suicide attempt in the past.  Going through a separation with spouse.  Take antidepressants and antianxiety as prescribed DOS.     #  HEENT    - Seasonal allergies, take Loratadine as prescribed DOS.     # Multiple medication allergies    #  ID  - COVID-19 testing per surgeon's office    #   Anesthesia considerations  - s/p Cervical fusion   -  Refer to PAC assessment in anesthesia records      Arrival time, NPO, shower and medication instructions provided by nursing staff today.  Patient is an acceptable candidate for the proposed procedure.  No further diagnostic evaluation is needed.     **For further details of assessment, testing, and physical exam please see H and P completed on same date.      --  Type of service:  Video Visit    Patient verbally consented to video service today: YES    Two identifiers used:  yes (name and )    Video Start Time: 10:30  Video End Time (time video stopped): 10:57    Originating Location (pt. Location): Home    Distant Location (provider location):  home    Mode of Communication:  Video Conference via abeo    Please note, because this was a virtual visit,  a full physical could not be completed.  On the DOS, the OOD of anesthesia will complete the appropriate components of the physical exam.    Reviewed and Signed by PAC Mid-Level Provider/Resident  Mid-Level Provider/Resident: Luli CLARKE CNP  Date: 8/23/21                                 TRICIA Andre CNP

## 2021-08-23 NOTE — H&P
Pre-Operative H & P     CC:  Preoperative exam to assess for increased cardiopulmonary risk while undergoing surgery and anesthesia.    Date of Encounter: 8/23/2021  Primary Care Physician:  No primary care provider on file.    HPI  Martha Chun is a 61 year old female who presents for pre-operative H & P in preparation for Externalized trial of percutaneous occipital nerve stimulator bilateral  Percutaneous implantation of neurostimulator electrode array; cranial nerve CPT 96137  - Bilateral with Kobi Shrestha MD on 8/27/2021 at [facility] under general anesthesia.      Ms. Chun has a history of occipital neuralgia, complex facial pain and migraines.  She was seen by Dr. Shrestha on 7/20/21 in neurosurgical follow-up for trial of occipital nerve stimulation for treatment of her occipital neuralgia.   When Ms Chun saw Dr. Shrestha in July, they were working with the insurance company on coverage for the stimulator.   Ms. Chun has overlapping functional pain disorders and occipital neuralgia that has been refractory to medical management.  Records indicate that a percutaneous trial implant of an occipital nerve stimulator appears to be the least invasive option.  Ms. Chun presents to the PAC in virtual visit in preparation for the above surgical intervention.        History is obtained from the patient and electronic health record.   Dx:  B/l occipital neuralgia and pre-op exam    Past Medical History  Past Medical History:   Diagnosis Date     Anxiety      Degenerative arthritis      Depression      Fibromyalgia      Hypertension      Migraines      Occipital neuralgia     b/l     Other chronic pain      Seasonal allergies        Past Surgical History  Past Surgical History:   Procedure Laterality Date     C LIGATE FALLOPIAN TUBE      Description: Tubal Ligation;  Recorded: 01/16/2012;     ORTHOPEDIC SURGERY      s/p right EMELINA; left TKA and s/p cervical fusions       Hx of Blood  transfusions/reactions: denies     Hx of abnormal bleeding or anti-platelet use: yes - Fiorinal for migraines that has ASA.     Menstrual history: No LMP recorded. Patient is postmenopausal.:     Steroid use in the last year: denies     Personal or FH with difficulty with Anesthesia:  denies    Prior to Admission Medications  Current Outpatient Medications   Medication Sig Dispense Refill     aliskiren (TEKTURNA) 150 MG tablet Take 150 mg by mouth At Bedtime        Butalbital-Acetaminophen (PHRENILIN)  MG TABS per tablet Take 1 tablet by mouth every 4 hours as needed       butalbital-acetaminophen-caffeine (ESGIC) -40 MG tablet Take 1 tablet by mouth every 4 hours as needed       calcium citrate (CITRACAL) 950 (200 Ca) MG tablet Take 950 mg by mouth 2 times daily       carboxymethylcellulose (REFRESH PLUS) 0.5 % SOLN ophthalmic solution Apply 1-2 drops to eye daily as needed       cetirizine (ZYRTEC) 10 MG tablet Take 1 tablet by mouth At Bedtime        cholecalciferol 50 MCG (2000 UT) tablet Take 2 tablets by mouth every morning        diltiazem ER (DILT-XR) 120 MG 24 hr capsule Take 120 mg by mouth At Bedtime        diphenhydrAMINE-acetaminophen (TYLENOL PM)  MG tablet Take 1 tablet by mouth nightly as needed for sleep       gabapentin (NEURONTIN) 100 MG capsule Take 100 mg by mouth At Bedtime        ibuprofen (ADVIL/MOTRIN) 200 MG tablet Take 200 mg by mouth every 4 hours as needed for mild pain       LORazepam (ATIVAN) 0.5 MG tablet Take 0.125-0.25 mg by mouth 2 times daily       Melatonin 10 MG TABS tablet Take 10 mg by mouth nightly as needed        mirtazapine (REMERON) 15 MG tablet Take 26.25 mg by mouth At Bedtime        Oxymetazoline HCl (NASAL SPRAY) 0.05 % SOLN Spray 1 spray in nostril 2 times daily       triprolidine-pseudoePHEDrine (APRODINE) 2.5-60 MG TABS per tablet Take 0.5 tablets by mouth every 6 hours as needed       acetaminophen (TYLENOL) 500 MG tablet Take 1,000 mg by mouth  "every 8 hours as needed (Patient not taking: Reported on 8/23/2021)       butalbital-aspirin-caffeine (FIORINAL) -40 MG capsule Take 1 capsule by mouth every 8 hours as needed (Patient not taking: Reported on 8/23/2021)       carBAMazepine (TEGRETOL XR) 100 MG 12 hr tablet Take 1-2 tablets by mouth daily (Patient not taking: Reported on 8/23/2021)       diltiazem ER COATED BEADS (CARDIZEM CD/CARTIA XT) 120 MG 24 hr capsule Take 120 mg by mouth daily       HYDROmorphone (DILAUDID) 2 MG tablet Take 2 mg by mouth daily as needed (Patient not taking: Reported on 8/23/2021)       mirtazapine (REMERON) 7.5 MG tablet Take 2 tablets by mouth daily       polyethylene glycol (MIRALAX) 17 GM/Dose powder Take 17 g by mouth daily (Patient not taking: Reported on 8/23/2021)       tapentadol (NUCYNTA) 50 MG TABS tablet Take 1 tablet by mouth daily (Patient not taking: Reported on 8/23/2021)       tiZANidine (ZANAFLEX) 2 MG tablet Take 2-4 mg by mouth every 6 hours as needed (Patient not taking: Reported on 8/23/2021)         Allergies  Allergies   Allergen Reactions     Amlodipine Other (See Comments)     Constipation, \"liver pain\".  Constipation, \"liver pain\".       Carvedilol Other (See Comments)     Worsening depression  Worsening depression       Clonidine      Other reaction(s): Dizziness     Codeine Nausea and Vomiting     Other reaction(s): Headache     Cortisone Other (See Comments)     Patient reports and possibly all steroids \"makes me to feel sick\", get fungal infections  Patient reports and possibly all steroids \"makes me to feel sick\", get fungal infections       Duloxetine      Other reaction(s): Headache     Escitalopram Muscle Pain (Myalgia)     Other reaction(s): Myalgias     Mold Nausea     Ringing in ears, racing heart     Oxycodone      Other reaction(s): Headache     Paroxetine Other (See Comments)     Palpitations/racing heart  Palpitations/racing heart       Penicillins Unknown     Does not remember " reaction, mother said she allergic to mold; patient refuses Pcn or any derivative  Does not remember reaction, mother said she allergic to mold; patient refuses Pcn or any derivative       Sulfa Drugs Unknown     Tramadol      Other reaction(s): Runny Nose  Facial pain, sinus swelling  Facial pain, sinus swelling         Social History  Social History     Socioeconomic History     Marital status:      Spouse name: Not on file     Number of children: Not on file     Years of education: Not on file     Highest education level: Not on file   Occupational History     Not on file   Tobacco Use     Smoking status: Former Smoker     Packs/day: 1.00     Years: 15.00     Pack years: 15.00     Quit date: 10/10/2014     Years since quittin.8     Smokeless tobacco: Never Used   Substance and Sexual Activity     Alcohol use: Not Currently     Comment: Last drink 10/2014     Drug use: Not Currently     Sexual activity: Not on file   Other Topics Concern     Not on file   Social History Narrative     Not on file     Social Determinants of Health     Financial Resource Strain:      Difficulty of Paying Living Expenses:    Food Insecurity:      Worried About Running Out of Food in the Last Year:      Ran Out of Food in the Last Year:    Transportation Needs:      Lack of Transportation (Medical):      Lack of Transportation (Non-Medical):    Physical Activity:      Days of Exercise per Week:      Minutes of Exercise per Session:    Stress:      Feeling of Stress :    Social Connections:      Frequency of Communication with Friends and Family:      Frequency of Social Gatherings with Friends and Family:      Attends Congregation Services:      Active Member of Clubs or Organizations:      Attends Club or Organization Meetings:      Marital Status:    Intimate Partner Violence:      Fear of Current or Ex-Partner:      Emotionally Abused:      Physically Abused:      Sexually Abused:        Family History  Family History    Problem Relation Age of Onset     Cancer Mother      Diabetes Father      Hypertension Father        ROS/MED HX  ENT/Pulmonary: Comment: (+) motion sickness    (+) ALINE risk factors, snores loudly, hypertension, allergic rhinitis, tobacco use (Quit 10/10/14.  Smoked ~15 years 1 PPD.), Past use, 1 packs/day, 15  Pack-Year Hx,      Neurologic: Comment: B/L occipital neuralgia    (+) fibromyalgia.         (+) migraines,     Cardiovascular: Comment: Denies cardiac symptoms including chest pain, SOB, palpitations, syncope, GARCIA, orthopnea, or PND.      (+) hypertension-----Previous cardiac testing     METS/Exercise Tolerance: >4 METS    Hematologic:     (+) History of blood clots (Daughter with h/o DVT 2/2 oral birth control pills. ), pt is not anticoagulated,  (-) history of blood transfusion   Musculoskeletal: Comment: Degenerative arthritis:   - s/p cervical fusion posterior C3-T1 12/2020   - S/p cervical fusion anterior C3-7 fusion 11/2020   - s/p right EMELINA 2011   - s/p left TKA.       Chronic pain: In bed by 15:00 in the afternoon due to pain.      (+) arthritis,     GI/Hepatic:  - neg GI/hepatic ROS     Renal/Genitourinary:  - neg Renal ROS     Endo:  - neg endo ROS     Psychiatric/Substance Use:     (+) psychiatric history anxiety and depression  (-) alcohol abuse history and chronic opioid use history   Infectious Disease: Comment: Did not get COVID vaccine - neg infectious disease ROS     Malignancy:  - neg malignancy ROS     Other: Comment:   from patient after 33 years of marriage.   is in home and patient is in an apartment.   will be brining her for the surgery.  - neg other ROS    (+) , H/O Chronic Pain,           No vital signs taken since this is a virtual visit.       Physical Exam  Constitutional: Awake, alert, cooperative, no apparent distress, and appears stated age.  HENT: Normocephalic   Respiratory: Regular respiratory rate.  Effort is non-labored, quiet, easy  breathing.   Musculoskeletal:  Full ROM of neck.   Neurologic: Awake, alert, oriented to name, place and time.   Neuropsychiatric: Calm, cooperative. Normal affect.     **Please note, because this was a virtual visit due to COVID -19 pandemic, a full physical could not be completed.  On the DOS, the OOD of anesthesia will complete the appropriate components of the physical exam. Please refer to the physical examination documented by the anesthesiologist in the anesthesia record on the day of surgery. **      Labs: (personally reviewed)  Lab Results   Component Value Date    WBC 5.8 03/19/2020     Lab Results   Component Value Date    RBC 4.58 03/19/2020     Lab Results   Component Value Date    HGB 14.8 03/19/2020     Lab Results   Component Value Date    HCT 44.8 03/19/2020     Lab Results   Component Value Date    MCV 98 03/19/2020     Lab Results   Component Value Date    MCH 32.3 03/19/2020     Lab Results   Component Value Date    MCHC 33.0 03/19/2020     Lab Results   Component Value Date    RDW 12.1 03/19/2020     Lab Results   Component Value Date     03/19/2020       Last Comprehensive Metabolic Panel:  Sodium   Date Value Ref Range Status   03/19/2020 139 136 - 145 mmol/L Final     Potassium   Date Value Ref Range Status   03/19/2020 3.8 3.5 - 5.0 mmol/L Final     Chloride   Date Value Ref Range Status   03/19/2020 101 98 - 107 mmol/L Final     Carbon Dioxide (CO2)   Date Value Ref Range Status   03/19/2020 26 22 - 31 mmol/L Final     Glucose   Date Value Ref Range Status   03/19/2020 94 70 - 125 mg/dL Final     Urea Nitrogen   Date Value Ref Range Status   03/19/2020 14 8 - 22 mg/dL Final     Creatinine   Date Value Ref Range Status   03/19/2020 0.71 0.60 - 1.10 mg/dL Final     GFR Estimate   Date Value Ref Range Status   03/19/2020 >60 >60 mL/min/1.73m2 Final     Calcium   Date Value Ref Range Status   03/19/2020 9.7 8.5 - 10.5 mg/dL Final       Lab Results   Component Value Date    AST 19  03/19/2020     Lab Results   Component Value Date    ALT 21 03/19/2020     No results found for: BILICONJ   Lab Results   Component Value Date    BILITOTAL 0.4 03/19/2020     Lab Results   Component Value Date    ALBUMIN 4.3 03/19/2020     Lab Results   Component Value Date    PROTTOTAL 7.4 03/19/2020      Lab Results   Component Value Date    ALKPHOS 92 03/19/2020     PROCEDURES   EKG 11/11/2020  Sinus rhythm    Septal infarct vs lead placement (V2)    Abnormal ECG    When compared with ECG of 26-JUN-2020 12:23,    Vent. rate has decreased BY  42 BPM    Septal infarct is now Present    Confirmed by MD JEFFRIES MICHAEL     The patient's records and results personally reviewed by this provider.     Outside records reviewed from: care everywhere      ASSESSMENT and PLAN  Specific operative considerations:   - ALINE # of risks 2/8 = low   - VTE risk:  1.8%  - Risk of PONV score = 2.  If > 2, anti-emetic intervention recommended. (+) h/o motion sickness.       #  Cardiology  - Denies known coronary artery disease.  Denies cardiac symptoms. METS:  >4. RCRI : No serious cardiac risks.  0.4 % risk of major adverse cardiac event.       - HTN, take diltiazem as prescribed DOS.  Hold Aliskiren per [facility] protocol.     #  Pulmonary   - remote smoking hx  - Denies pulmonary symptoms  - No inhalers     #  Orthopedic  - Degenerative arthritis, consideration for careful positioning.    ~ s/p cervical fusion posterior C3-T1 12/2020    ~ S/p cervical fusion anterior C3-7 fusion 11/2020    ~ s/p right EMELINA 2011    ~ s/p left TKA.     # Neuro   - Chronic pain, reports most days in bed by 15:00 due to pain.    ~  overlapping functional pain disorders including migraines, fibromyalgia and b/l occipital neuralgia.  B/l occipital neuralgia refractory to medical management.  Above percutaneous trial implant of an occipital nerve stimulator surgery as planned above.  Pre-op labs ordered by Dr. Shrestha.  Okay to take pain medications as prescribed  "with the exception of Fiorinal w/ aspirin component >>> hold for 7 days prior to surgery.     - Depression and anxiety with h/o suicide attempt in the past.  Going through a separation with spouse.  Reports \"stable\". Take antidepressants and antianxiety as prescribed DOS.     #  HEENT    - Seasonal allergies, take Loratadine as prescribed DOS.     # Multiple medication allergies    #  ID  - COVID-19 testing per surgeon's office    #   Anesthesia considerations  - s/p Cervical fusion   -  Refer to PAC assessment in anesthesia records      Arrival time, NPO, shower and medication instructions provided by nursing staff today.  Patient is an acceptable candidate for the proposed procedure.  No further diagnostic evaluation is needed.     **For further details of assessment, testing, and physical exam please see H and P completed on same date.      --  Type of service:  Video Visit    Patient verbally consented to video service today: YES    Two identifiers used:  yes (name and )    Video Start Time: 10:30  Video End Time (time video stopped): 10:57    Originating Location (pt. Location): Home    Distant Location (provider location):  home    Mode of Communication:  Video Conference via [employer]    Please note, because this was a virtual visit, a full physical could not be completed.  On the DOS, the OOD of anesthesia will complete the appropriate components of the physical exam.    TRICIA Andre CNP  Preoperative Assessment Center  [facility] [place]  Phone: [phone]  Fax: [phone]  "

## 2021-08-23 NOTE — PATIENT INSTRUCTIONS
Preparing for Your Surgery      Name:  Martha Chun   MRN:  [id number]   :  1960   Today's Date:  2021       Arriving for surgery:  Surgery date:  21  Arrival time:  6:00 am    Restrictions due to COVID 19:       One visitor is allowed in the Pre Op area. When you go into surgery, one visitor is allowed to wait in the Surgery Waiting Room       (provided there is enough space to social distance).   After surgery- Two visitors are allowed at a time if you have a private room and one visitor is allowed for those in a semi-private room.   Every 4 days the visitor(s) can rotate. During the 4 day period, the visitor(s) must be consistent. No visitors under the age of 18 years old.   Visiting Hours: 8 am - 8:30 pm   No ill visitors.   All visitors must wear face mask.    [employer] parking is available for anyone with mobility limitations or disabilities.  ([place]  24 hours/ 7 days a week; [facility]  7 am- 3:30 pm, Mon- Fri)    Please come to:     [facility] [address]  [address] [place] [address]    - ? parking is available in front of the hospital      -    Please proceed to [address] on the 3rd floor. [phone]?     - ?If you are in need of directions, wheelchair or escort please stop at the Information Desk in the lobby.  Inform the information person that you are here for surgery; a wheelchair and escort to [address] will be provided.?     What can I eat or drink?  -  You may eat and drink normally for up to 8 hours before your surgery. (Until midnight)  -  You may have clear liquids until 2 hours before surgery. (Until 6:00 am)    Examples of clear liquids:  Water  Clear broth  Juices (apple, white grape, white cranberry  and cider) without pulp  Noncarbonated, powder based beverages  (lemonade and Samy-Aid)  Sodas (Sprite, 7-Up, ginger ale and seltzer)  Coffee or tea (without milk or cream)  Gatorade    -  No Alcohol for at  least 24 hours before surgery     Which medicines can I take?    Hold Aspirin for 7 days before surgery.   Hold Multivitamins for 7 days before surgery.  Hold Supplements for 7 days before surgery.    **Hold Ibuprofen (Advil, Motrin) for 1 day before surgery--unless otherwise directed by surgeon.**    Hold Naproxen (Aleve) for 4 days before surgery.    **Hold Aliskiren (Tekturna) the night before surgery.*    -  DO NOT take these medications the day of surgery:  Calcium Citrate (Citracal)    -  PLEASE TAKE these medications the day of surgery:  Refresh eye drops  Lorazepam (Ativan)  Nasal Spray  Diltiazem (Cardizem)  Mirtazepine (Remeron)  Butalbital-Acetaminophen (Phrenilin)  Butalbital-Acetaminophen-Caffeine (Esgic)    How do I prepare myself?  - Please take 2 showers before surgery using Scrubcare or Hibiclens soap.    Use this soap only from the neck to your toes.     Leave the soap on your skin for one minute--then rinse thoroughly.      You may use your own shampoo and conditioner; no other hair products.   - Please remove all jewelry and body piercings.  - No lotions, deodorants or fragrance.  - No makeup or fingernail polish.   - Bring your ID and insurance card.    -If you have a Deep Brain Stimulator, Spinal Cord Stimulator or any neuro stimulator device---you must bring the remote control to the hospital     - All patients are required to have a Covid-19 test within 4 days of surgery/procedure.      -Patients will be contacted by the [facility] scheduling team within 1 week of surgery to make an appointment.      - Patients may call the Scheduling team at [phone] if they have not been scheduled within 4 days of  surgery.      Questions or Concerns:    - For any questions regarding the day of surgery or your hospital stay, please contact the Pre Admission Nursing Office at [phone].       - If you have health changes between today and your surgery please call your surgeon.       For  questions after surgery please call your surgeons office.

## 2021-08-23 NOTE — PROGRESS NOTES
Martha is a 61 year old who is being evaluated via a billable video visit.      How would you like to obtain your AVS? Email a Copy  If the video visit is dropped, the invitation should be resent by: Text to cell phone: [phone]    HPI         Review of Systems         Objective    Vitals - Patient Reported  Pain Score: Moderate Pain (4)  Pain Loc:  (behind ear)        Physical Exam

## 2021-08-25 ENCOUNTER — LAB (OUTPATIENT)
Dept: LAB | Facility: CLINIC | Age: 61
End: 2021-08-25

## 2021-08-25 ENCOUNTER — LAB (OUTPATIENT)
Dept: LAB | Facility: CLINIC | Age: 61
End: 2021-08-25
Payer: COMMERCIAL

## 2021-08-25 DIAGNOSIS — Z11.59 ENCOUNTER FOR SCREENING FOR OTHER VIRAL DISEASES: ICD-10-CM

## 2021-08-25 DIAGNOSIS — Z01.818 PREOPERATIVE EVALUATION TO RULE OUT SURGICAL CONTRAINDICATION: ICD-10-CM

## 2021-08-25 LAB
ANION GAP SERPL CALCULATED.3IONS-SCNC: 1 MMOL/L (ref 3–14)
APTT PPP: 26 SECONDS (ref 22–38)
BUN SERPL-MCNC: 15 MG/DL (ref 7–30)
CALCIUM SERPL-MCNC: 8.9 MG/DL (ref 8.5–10.1)
CHLORIDE BLD-SCNC: 104 MMOL/L (ref 94–109)
CO2 SERPL-SCNC: 32 MMOL/L (ref 20–32)
CREAT SERPL-MCNC: 0.71 MG/DL (ref 0.52–1.04)
ERYTHROCYTE [DISTWIDTH] IN BLOOD BY AUTOMATED COUNT: 12.1 % (ref 10–15)
GFR SERPL CREATININE-BSD FRML MDRD: >90 ML/MIN/1.73M2
GLUCOSE BLD-MCNC: 91 MG/DL (ref 70–99)
HCT VFR BLD AUTO: 41.6 % (ref 35–47)
HGB BLD-MCNC: 13.7 G/DL (ref 11.7–15.7)
INR PPP: 0.91 (ref 0.85–1.15)
MCH RBC QN AUTO: 32.2 PG (ref 26.5–33)
MCHC RBC AUTO-ENTMCNC: 32.9 G/DL (ref 31.5–36.5)
MCV RBC AUTO: 98 FL (ref 78–100)
PLATELET # BLD AUTO: 303 10E3/UL (ref 150–450)
POTASSIUM BLD-SCNC: 3.7 MMOL/L (ref 3.4–5.3)
RBC # BLD AUTO: 4.26 10E6/UL (ref 3.8–5.2)
SARS-COV-2 RNA RESP QL NAA+PROBE: NEGATIVE
SODIUM SERPL-SCNC: 137 MMOL/L (ref 133–144)
WBC # BLD AUTO: 8 10E3/UL (ref 4–11)

## 2021-08-25 PROCEDURE — 85730 THROMBOPLASTIN TIME PARTIAL: CPT

## 2021-08-25 PROCEDURE — 36415 COLL VENOUS BLD VENIPUNCTURE: CPT

## 2021-08-25 PROCEDURE — U0005 INFEC AGEN DETEC AMPLI PROBE: HCPCS

## 2021-08-25 PROCEDURE — 85027 COMPLETE CBC AUTOMATED: CPT

## 2021-08-25 PROCEDURE — 85610 PROTHROMBIN TIME: CPT

## 2021-08-25 PROCEDURE — 80048 BASIC METABOLIC PNL TOTAL CA: CPT

## 2021-08-25 PROCEDURE — U0003 INFECTIOUS AGENT DETECTION BY NUCLEIC ACID (DNA OR RNA); SEVERE ACUTE RESPIRATORY SYNDROME CORONAVIRUS 2 (SARS-COV-2) (CORONAVIRUS DISEASE [COVID-19]), AMPLIFIED PROBE TECHNIQUE, MAKING USE OF HIGH THROUGHPUT TECHNOLOGIES AS DESCRIBED BY CMS-2020-01-R: HCPCS

## 2021-08-27 ENCOUNTER — APPOINTMENT (OUTPATIENT)
Dept: GENERAL RADIOLOGY | Facility: CLINIC | Age: 61
End: 2021-08-27
Attending: NEUROLOGICAL SURGERY
Payer: COMMERCIAL

## 2021-08-27 ENCOUNTER — ANESTHESIA (OUTPATIENT)
Dept: SURGERY | Facility: CLINIC | Age: 61
End: 2021-08-27
Payer: COMMERCIAL

## 2021-08-27 ENCOUNTER — HOSPITAL ENCOUNTER (OUTPATIENT)
Facility: CLINIC | Age: 61
Discharge: HOME | End: 2021-08-27
Attending: NEUROLOGICAL SURGERY | Admitting: NEUROLOGICAL SURGERY
Payer: COMMERCIAL

## 2021-08-27 VITALS
OXYGEN SATURATION: 99 % | TEMPERATURE: 98.8 F | HEIGHT: 66 IN | RESPIRATION RATE: 18 BRPM | WEIGHT: 137.13 LBS | DIASTOLIC BLOOD PRESSURE: 88 MMHG | BODY MASS INDEX: 22.04 KG/M2 | HEART RATE: 80 BPM | SYSTOLIC BLOOD PRESSURE: 142 MMHG

## 2021-08-27 DIAGNOSIS — M54.81 BILATERAL OCCIPITAL NEURALGIA: ICD-10-CM

## 2021-08-27 LAB — GLUCOSE BLDC GLUCOMTR-MCNC: 82 MG/DL (ref 70–99)

## 2021-08-27 PROCEDURE — 250N000011 HC RX IP 250 OP 636: Performed by: ANESTHESIOLOGY

## 2021-08-27 PROCEDURE — 250N000009 HC RX 250: Performed by: ANESTHESIOLOGY

## 2021-08-27 PROCEDURE — 999N000141 HC STATISTIC PRE-PROCEDURE NURSING ASSESSMENT: Performed by: NEUROLOGICAL SURGERY

## 2021-08-27 PROCEDURE — C1778 LEAD, NEUROSTIMULATOR: HCPCS | Performed by: NEUROLOGICAL SURGERY

## 2021-08-27 PROCEDURE — 999N000180 XR SURGERY CARM FLUORO LESS THAN 5 MIN: Mod: TC

## 2021-08-27 PROCEDURE — 64553 IMPLANT NEUROELECTRODES: CPT | Mod: 59 | Performed by: NEUROLOGICAL SURGERY

## 2021-08-27 PROCEDURE — 272N000001 HC OR GENERAL SUPPLY STERILE: Performed by: NEUROLOGICAL SURGERY

## 2021-08-27 PROCEDURE — 710N000011 HC RECOVERY PHASE 1, LEVEL 3, PER MIN: Performed by: NEUROLOGICAL SURGERY

## 2021-08-27 PROCEDURE — 250N000025 HC SEVOFLURANE, PER MIN: Performed by: NEUROLOGICAL SURGERY

## 2021-08-27 PROCEDURE — 250N000013 HC RX MED GY IP 250 OP 250 PS 637: Performed by: ANESTHESIOLOGY

## 2021-08-27 PROCEDURE — 258N000003 HC RX IP 258 OP 636: Performed by: ANESTHESIOLOGY

## 2021-08-27 PROCEDURE — 272N000002 HC OR SUPPLY OTHER OPNP: Performed by: NEUROLOGICAL SURGERY

## 2021-08-27 PROCEDURE — 250N000011 HC RX IP 250 OP 636: Performed by: NEUROLOGICAL SURGERY

## 2021-08-27 PROCEDURE — 710N000012 HC RECOVERY PHASE 2, PER MINUTE: Performed by: NEUROLOGICAL SURGERY

## 2021-08-27 PROCEDURE — 360N000084 HC SURGERY LEVEL 4 W/ FLUORO, PER MIN: Performed by: NEUROLOGICAL SURGERY

## 2021-08-27 PROCEDURE — 370N000017 HC ANESTHESIA TECHNICAL FEE, PER MIN: Performed by: NEUROLOGICAL SURGERY

## 2021-08-27 PROCEDURE — 250N000009 HC RX 250: Performed by: NEUROLOGICAL SURGERY

## 2021-08-27 RX ORDER — CLINDAMYCIN HCL 300 MG
300 CAPSULE ORAL 3 TIMES DAILY
Qty: 93 CAPSULE | Refills: 0 | Status: SHIPPED | OUTPATIENT
Start: 2021-08-27 | End: 2021-09-27

## 2021-08-27 RX ORDER — CLINDAMYCIN PHOSPHATE 900 MG/50ML
900 INJECTION, SOLUTION INTRAVENOUS SEE ADMIN INSTRUCTIONS
Status: DISCONTINUED | OUTPATIENT
Start: 2021-08-27 | End: 2021-08-27 | Stop reason: HOSPADM

## 2021-08-27 RX ORDER — SODIUM CHLORIDE, SODIUM LACTATE, POTASSIUM CHLORIDE, CALCIUM CHLORIDE 600; 310; 30; 20 MG/100ML; MG/100ML; MG/100ML; MG/100ML
INJECTION, SOLUTION INTRAVENOUS CONTINUOUS
Status: DISCONTINUED | OUTPATIENT
Start: 2021-08-27 | End: 2021-08-27 | Stop reason: HOSPADM

## 2021-08-27 RX ORDER — SCOLOPAMINE TRANSDERMAL SYSTEM 1 MG/1
1 PATCH, EXTENDED RELEASE TRANSDERMAL ONCE
Status: DISCONTINUED | OUTPATIENT
Start: 2021-08-27 | End: 2021-08-27 | Stop reason: HOSPADM

## 2021-08-27 RX ORDER — ONDANSETRON 2 MG/ML
INJECTION INTRAMUSCULAR; INTRAVENOUS PRN
Status: DISCONTINUED | OUTPATIENT
Start: 2021-08-27 | End: 2021-08-27

## 2021-08-27 RX ORDER — FENTANYL CITRATE 50 UG/ML
INJECTION, SOLUTION INTRAMUSCULAR; INTRAVENOUS PRN
Status: DISCONTINUED | OUTPATIENT
Start: 2021-08-27 | End: 2021-08-27

## 2021-08-27 RX ORDER — ACETAMINOPHEN 325 MG/1
975 TABLET ORAL ONCE
Status: COMPLETED | OUTPATIENT
Start: 2021-08-27 | End: 2021-08-27

## 2021-08-27 RX ORDER — ALBUTEROL SULFATE 0.83 MG/ML
2.5 SOLUTION RESPIRATORY (INHALATION) ONCE
Status: COMPLETED | OUTPATIENT
Start: 2021-08-27 | End: 2021-08-27

## 2021-08-27 RX ORDER — PROPOFOL 10 MG/ML
INJECTION, EMULSION INTRAVENOUS PRN
Status: DISCONTINUED | OUTPATIENT
Start: 2021-08-27 | End: 2021-08-27

## 2021-08-27 RX ORDER — OXYCODONE HYDROCHLORIDE 5 MG/1
5 TABLET ORAL EVERY 4 HOURS PRN
Status: DISCONTINUED | OUTPATIENT
Start: 2021-08-27 | End: 2021-08-27 | Stop reason: HOSPADM

## 2021-08-27 RX ORDER — ACETAMINOPHEN 325 MG/1
650 TABLET ORAL EVERY 4 HOURS PRN
Status: DISCONTINUED | OUTPATIENT
Start: 2021-08-27 | End: 2021-08-27 | Stop reason: HOSPADM

## 2021-08-27 RX ORDER — CLINDAMYCIN PHOSPHATE 900 MG/50ML
900 INJECTION, SOLUTION INTRAVENOUS
Status: DISCONTINUED | OUTPATIENT
Start: 2021-08-27 | End: 2021-08-27 | Stop reason: HOSPADM

## 2021-08-27 RX ORDER — BUPIVACAINE HYDROCHLORIDE AND EPINEPHRINE 5; 5 MG/ML; UG/ML
INJECTION, SOLUTION PERINEURAL PRN
Status: DISCONTINUED | OUTPATIENT
Start: 2021-08-27 | End: 2021-08-27 | Stop reason: HOSPADM

## 2021-08-27 RX ORDER — DEXMEDETOMIDINE HYDROCHLORIDE 4 UG/ML
0.2-0.7 INJECTION, SOLUTION INTRAVENOUS CONTINUOUS
Status: DISCONTINUED | OUTPATIENT
Start: 2021-08-27 | End: 2021-08-27 | Stop reason: HOSPADM

## 2021-08-27 RX ORDER — SENNA AND DOCUSATE SODIUM 50; 8.6 MG/1; MG/1
1 TABLET, FILM COATED ORAL AT BEDTIME
Qty: 14 TABLET | Refills: 0 | Status: SHIPPED | OUTPATIENT
Start: 2021-08-27 | End: 2021-09-10

## 2021-08-27 RX ORDER — EPHEDRINE SULFATE 50 MG/ML
INJECTION, SOLUTION INTRAMUSCULAR; INTRAVENOUS; SUBCUTANEOUS PRN
Status: DISCONTINUED | OUTPATIENT
Start: 2021-08-27 | End: 2021-08-27

## 2021-08-27 RX ORDER — LIDOCAINE 40 MG/G
CREAM TOPICAL
Status: DISCONTINUED | OUTPATIENT
Start: 2021-08-27 | End: 2021-08-27 | Stop reason: HOSPADM

## 2021-08-27 RX ORDER — CAFFEINE 200 MG
100 TABLET ORAL ONCE
Status: COMPLETED | OUTPATIENT
Start: 2021-08-27 | End: 2021-08-27

## 2021-08-27 RX ORDER — ONDANSETRON 4 MG/1
4 TABLET, ORALLY DISINTEGRATING ORAL EVERY 30 MIN PRN
Status: DISCONTINUED | OUTPATIENT
Start: 2021-08-27 | End: 2021-08-27 | Stop reason: HOSPADM

## 2021-08-27 RX ORDER — HYDROMORPHONE HYDROCHLORIDE 2 MG/1
2 TABLET ORAL EVERY 6 HOURS PRN
Qty: 30 TABLET | Refills: 0 | Status: SHIPPED | OUTPATIENT
Start: 2021-08-27 | End: 2021-09-04

## 2021-08-27 RX ORDER — ONDANSETRON 2 MG/ML
4 INJECTION INTRAMUSCULAR; INTRAVENOUS EVERY 30 MIN PRN
Status: DISCONTINUED | OUTPATIENT
Start: 2021-08-27 | End: 2021-08-27 | Stop reason: HOSPADM

## 2021-08-27 RX ORDER — LIDOCAINE HYDROCHLORIDE 10 MG/ML
INJECTION, SOLUTION EPIDURAL; INFILTRATION; INTRACAUDAL; PERINEURAL PRN
Status: DISCONTINUED | OUTPATIENT
Start: 2021-08-27 | End: 2021-08-27 | Stop reason: HOSPADM

## 2021-08-27 RX ORDER — SODIUM CHLORIDE, SODIUM LACTATE, POTASSIUM CHLORIDE, CALCIUM CHLORIDE 600; 310; 30; 20 MG/100ML; MG/100ML; MG/100ML; MG/100ML
INJECTION, SOLUTION INTRAVENOUS CONTINUOUS PRN
Status: DISCONTINUED | OUTPATIENT
Start: 2021-08-27 | End: 2021-08-27

## 2021-08-27 RX ORDER — ESMOLOL HYDROCHLORIDE 10 MG/ML
INJECTION INTRAVENOUS PRN
Status: DISCONTINUED | OUTPATIENT
Start: 2021-08-27 | End: 2021-08-27

## 2021-08-27 RX ADMIN — SUGAMMADEX 200 MG: 100 INJECTION, SOLUTION INTRAVENOUS at 10:20

## 2021-08-27 RX ADMIN — Medication 1 UNITS: at 09:43

## 2021-08-27 RX ADMIN — CLINDAMYCIN PHOSPHATE 900 MG: 900 INJECTION, SOLUTION INTRAVENOUS at 09:07

## 2021-08-27 RX ADMIN — ESMOLOL HYDROCHLORIDE 30 MG: 10 INJECTION, SOLUTION INTRAVENOUS at 08:57

## 2021-08-27 RX ADMIN — SCOPALAMINE 1 PATCH: 1 PATCH, EXTENDED RELEASE TRANSDERMAL at 07:47

## 2021-08-27 RX ADMIN — SODIUM CHLORIDE, POTASSIUM CHLORIDE, SODIUM LACTATE AND CALCIUM CHLORIDE: 600; 310; 30; 20 INJECTION, SOLUTION INTRAVENOUS at 08:08

## 2021-08-27 RX ADMIN — SODIUM CHLORIDE, POTASSIUM CHLORIDE, SODIUM LACTATE AND CALCIUM CHLORIDE: 600; 310; 30; 20 INJECTION, SOLUTION INTRAVENOUS at 07:08

## 2021-08-27 RX ADMIN — ACETAMINOPHEN 975 MG: 325 TABLET, FILM COATED ORAL at 07:09

## 2021-08-27 RX ADMIN — Medication 80 MG: at 08:46

## 2021-08-27 RX ADMIN — LIDOCAINE HYDROCHLORIDE 3 ML: 40 INJECTION, SOLUTION RETROBULBAR; TOPICAL at 08:02

## 2021-08-27 RX ADMIN — PROPOFOL 30 MG: 10 INJECTION, EMULSION INTRAVENOUS at 08:53

## 2021-08-27 RX ADMIN — Medication 0.5 UNITS: at 09:33

## 2021-08-27 RX ADMIN — FENTANYL CITRATE 75 MCG: 50 INJECTION, SOLUTION INTRAMUSCULAR; INTRAVENOUS at 09:11

## 2021-08-27 RX ADMIN — Medication 10 MG: at 09:23

## 2021-08-27 RX ADMIN — PHENYLEPHRINE HYDROCHLORIDE 200 MCG: 10 INJECTION INTRAVENOUS at 09:20

## 2021-08-27 RX ADMIN — Medication 10 MG: at 09:29

## 2021-08-27 RX ADMIN — Medication 50 MG: at 11:40

## 2021-08-27 RX ADMIN — Medication 5 MG: at 09:09

## 2021-08-27 RX ADMIN — MIDAZOLAM 2 MG: 1 INJECTION INTRAMUSCULAR; INTRAVENOUS at 08:00

## 2021-08-27 RX ADMIN — PROPOFOL 50 MG: 10 INJECTION, EMULSION INTRAVENOUS at 08:51

## 2021-08-27 RX ADMIN — PHENYLEPHRINE HYDROCHLORIDE 100 MCG: 10 INJECTION INTRAVENOUS at 09:41

## 2021-08-27 RX ADMIN — ALBUTEROL SULFATE 2.5 MG: 2.5 SOLUTION RESPIRATORY (INHALATION) at 07:09

## 2021-08-27 RX ADMIN — ROCURONIUM BROMIDE 30 MG: 10 INJECTION INTRAVENOUS at 08:53

## 2021-08-27 RX ADMIN — PROPOFOL 100 MG: 10 INJECTION, EMULSION INTRAVENOUS at 08:46

## 2021-08-27 RX ADMIN — ONDANSETRON 4 MG: 2 INJECTION INTRAMUSCULAR; INTRAVENOUS at 09:13

## 2021-08-27 ASSESSMENT — MIFFLIN-ST. JEOR: SCORE: 1213.75

## 2021-08-27 NOTE — DISCHARGE INSTRUCTIONS
Same-Day Surgery   Adult Discharge Orders & Instructions     For 24 hours after surgery:  1. Get plenty of rest.  A responsible adult must stay with you for at least 24 hours after you leave the hospital.   2. Pain medication can slow your reflexes. Do not drive or use heavy equipment.  If you have weakness or tingling, don't drive or use heavy equipment until this feeling goes away.  3. Mixing alcohol and pain medication can cause dizziness and slow your breathing. It can even be fatal. Do not drink alcohol while taking pain medication.  4. Avoid strenuous or risky activities.  Ask for help when climbing stairs.   5. You may feel lightheaded.  If so, sit for a few minutes before standing.  Have someone help you get up.   6. If you have nausea (feel sick to your stomach), drink only clear liquids such as apple juice, ginger ale, broth or 7-Up.  Rest may also help.  Be sure to drink enough fluids.  Move to a regular diet as you feel able. Take pain medications with a small amount of solid food, such as toast or crackers, to avoid nausea.   7. A slight fever is normal. Call the doctor if your fever is over 100 F (37.7 C) (taken under the tongue) or lasts longer than 24 hours.  8. You may have a dry mouth, muscle aches, trouble sleeping or a sore throat.  These symptoms should go away after 24 hours.  9. Do not make important or legal decisions.   Pain Management:      1. Take pain medication (if prescribed) for pain as directed by your physician.        2. WARNING: If the pain medication you have been prescribed contains Tylenol  (acetaminophen), DO NOT take additional doses of Tylenol (acetaminophen).     Call your doctor for any of the followin.  Signs of infection (fever, growing tenderness at the surgery site, severe pain, a large amount of drainage or bleeding, foul-smelling drainage, redness, swelling).    2.  It has been over 8 to 10 hours since surgery and you are still not able to urinate (pee).    3.   Headache for over 24 hours.    4.  Numbness, tingling or weakness the day after surgery (if you had spinal anesthesia).  To contact a doctor, call _____________________________________ or:      [phone] and ask for the Resident On Call for:          __________________________________________ (answered 24 hours a day)      Emergency Department:  [facility]: [phone]  [facility]: [phone]               Rev. 10/2014 Scopolamine Patch  (Absorbed through the skin)    The Scopolamine Patch prevents nausea and vomiting caused by motion sickness or anesthesia and surgery in adults.    Brand Name(s): Transderm Scop, Transderm-Scope  There may be other brand names for this medicine.    When This Medicine Should Not Be Used:  You should not use this medicine if you have had an allergic reaction to scopolamine, or if you have narrow angle glaucoma.    How to Use This Medicine:    Your doctor will tell you how many patches to use, where to apply them, and how often to apply them.     Do not use more patches or apply them more often than your doctor tells you to.    This medicine comes with patient instructions. Read and follow these instructions carefully. Ask your doctor or pharmacist if you have any questions.    To prevent motion sickness, apply the patch at least 4 hours before you need it.    Wash and dry your hands thoroughly before applying the patch.    Leave the patch in its sealed wrapper until you are ready to put it on. Tear the wrapper open carefully. NEVER CUT the wrapper or the patch with scissors. Do not use any patch that has been cut by accident.    Take the liner off the sticky side before applying.    Apply the patch to dry, hairless skin behind the ear.    If the patch is loose or falls off, apply a new patch at a different place behind the ear.    After you take off the patch, wash the place where the patch was and your hands thoroughly.    Only one patch  should be used at any time.    If a dose is missed:  If you forget to wear or change a patch, put one on as soon as you can. If it is almost time to put on your next patch, wait until then to apply a new patch and skip the one you missed. Do not apply extra patches to make up for a missed dose.    How to Store and Dispose of This Medicine:    Store the patches at room temperature in a closed container, away from heat, moisture and direct light.    Fold the used patch in half with the sticky sides together. Throw any used patch away so that children or pets cannot get to it. You will also need to throw away old patches after the expiration date has passed.    Keep all medicine away from children and never share your medicine with anyone.    Ask your doctor or pharmacist before using any other medicine, including over-the-counter medicines, vitamins, and herbal products.  Tell your doctor if you are using any medicines that make you sleepy. These include sleeping pills, cold and allergy medicine, narcotic pain relievers and sedatives.     Tell your doctor if you are using any medicine that make you sleepy. These include sleeping pills, lazaro and allergy medicine, narcotic pain relievers and sedatives.    Do not drink alcohol while you are using this medicine.     Warnings While Using This Medicine:    Make sure your doctor knows if you are pregnant or breastfeeding or if you have glaucoma, prostate problems, trouble urinating, blocked bowels, liver disease, kidney disease or a history of seizures or mental illness.    This medicine can cause blurring of vision and other vision problems if it comes in contact with the eyes. This medicine may also cause problems with urination. If any of these reactions occur, remove the patch and call your doctor right away.    This medicine may make you dizzy or drowsy. Avoid driving, using machines, or doing anything else that could be dangerous if you are not alert. If you plan to  participate in underwater sports, this medicine may cause disorienting effects. If this is a concern for you, talk with your doctor.    This medicine may make you sweat less and cause your body to get too hot. Be careful in hot weather, when you are exercising or if using sauna or whirlpool.    Make sure any doctor or dentist who treats you knows that you are using this medicine. This medicine may affect the results of certain medical tests.    Skin burns have been reported at the patch site in several patients wearing an aluminized transdermal system during a magnetic resonance imaging scan (MRI). Because Transderm Scop contains aluminum, it is recommended to remove the system before undergoing an MRI.    Call your doctor right away if you notice any of these side effects:    Allergic reaction: Itching or hives, swelling in your face or hands, swelling or tingling in your mouth or throat, chest tightness, trouble breathing    Blurred vision    Confusion or memory loss    Fast, slow or uneven heartbeat    Lightheadedness, dizziness, drowsiness or fainting    Seeing, hearing or feeling things that are not there    Severe eye pain    Trouble urinating    If you notice these less serious side effects, talk with your doctor:    Dry mouth    Dry, itchy or red eyes    Restlessness    Skin rash or redness    If you notice other side effects that you think are caused by this medicine, tell your doctor immediately.    Rev. 4/2014    Scopolamine Patch- (Absorbed through the skin)    Prevents nausea and vomiting caused by motion sickness or anesthesia and surgery in adults.    Brand Name(s): Transderm Scop, Transderm-Scope  There may be other brand names for this medicine.    When This Medicine Should Not Be Used:  You should not use this medicine if you have had an allergic reaction to scopolomine, or if you have narrow angle glaucoma.    How to Use This Medicine:  Patch      Your doctor will tell you how many patches to use,  where to apply them, and how often to apply them. Do not use more patches or apply them more often than your doctor tells you to.    This medicine comes with patient instructions. Read and follow these instructions carefully. Ask your doctor or pharmacist if you have any questions.    To prevent motion sickness, apply the patch at least 4 hours before you need it.    Wash and dry your hands thoroughly before applying the patch.    Leave the patch in its sealed wrapper until you are ready to put it on. Tear the wrapper open carefully. NEVER CUT the wrapper or the patch with scissors. Do not use any patch that has been cut by accident.    Take the liner off the sticky side before applying.    Apply the patch to dry, hairless skin behind the ear.    If the patch is loose or falls off,apply a new patch at a different place behind the ear.    After you take off the patch, wash the place where the patch was and your hands thoroughly.    Only one patch should be used at any time.    If a dose is missed:      If you forget to wear or change a patch, put one on as soon as you can. If it is almost time to put on your next patch, wait until then to apply a new patch and skip the one you missed. Do not apply extra patches to make up for a missed dose.    How to Store and Dispose of This Medicine:      Store the patches at room temperature in a closed container, away from heat, moisture, and direct light.    Fold the used patch in half with the sticky sides together. Throw any used patch away so that children or pets cannot get to it. You will also need to throw away old patches after the expiration date has passed.    Keep all medicine away from children and never share your medicine with anyone.    Drugs and Foods to Avoid:  Ask your doctor or pharmacist before using any other medicine, including over-the-counter medicines, vitamins, and herbal products.      Tell your doctor if you are using any medicines that make you  sleepy. These include sleeping pills, cold and allergy medicine, narcotic pain relievers, and sedatives.     Do not drink alcohol while you are using this medicine.    Warnings While Using This Medicine:      Make sure your doctor knows if you are pregnant or breastfeeding, or if you have glaucoma, prostate problems, trouble urinating, blocked bowels, liver disease, kidney disease, or a history of seizures or mental illness.    This medicine can cause blurring of vision and other vision problems if it comes in contact with the eyes. This medicine may also cause problems with urination. If any of these reactions occur, remove the patch and call your doctor right away.    This medicine may make you dizzy or drowsy. Avoid driving, using machines, or doing anything else that could be dangerous if you are not alert. If you plan to participate in underwater sports, this medicine may cause disorienting effects. If this is a concern for you, talk with your doctor.    This medicine may make you sweat less and cause your body to get too hot. Be careful in hot weather, when your are exercising, or if using sauna or whirlpool.    Make sure any doctor or dentist who treats you knows that you are using this medicine. This medicine may affect the results of certain medical tests.    Skin burns have been reported at the patch site in several patients wearing an aluminized transdermal system during a magnetic resonance imaging scan (MRI). Because Transderm Scop contains aluminum, it is recommended to remove the system before undergoing an MRI.    Possible Side Effects While Using This Medicine:  Call your doctor right away if you notice any of these side effects:      Allergic reaction: Itching or hives, swelling in your face or hands, swelling or tingling in your mouth or throat, chest tightness, trouble breathing.    Blurred vision,    Confusion or memory loss.    Fast,slow, or uneven heartbeat.    Lightheadedness, dizziness,  drowsiness, or fainting.    Seeing, hearing, or feeling things that are not there.    Severe eye pain.    Trouble urinating.      If you notice these less serious side effects, talk with your doctor:      Dry mouth.    Dry, itchy, or red eyes.    Restlessness    Skin rash or redness.    If yoScopolamine Patch- (Absorbed through the skin)    Prevents nausea and vomiting caused by motion sickness or anesthesia and surgery in adults.    Brand Name(s): Transderm Scop, Transderm-Scope  There may be other brand names for this medicine.    When This Medicine Should Not Be Used:  You should not use this medicine if you have had an allergic reaction to scopolomine, or if you have narrow angle glaucoma.    How to Use This Medicine:  Patch      Your doctor will tell you how many patches to use, where to apply them, and how often to apply them. Do not use more patches or apply them more often than your doctor tells you to.    This medicine comes with patient instructions. Read and follow these instructions carefully. Ask your doctor or pharmacist if you have any questions.    To prevent motion sickness, apply the patch at least 4 hours before you need it.    Wash and dry your hands thoroughly before applying the patch.    Leave the patch in its sealed wrapper until you are ready to put it on. Tear the wrapper open carefully. NEVER CUT the wrapper or the patch with scissors. Do not use any patch that has been cut by accident.    Take the liner off the sticky side before applying.    Apply the patch to dry, hairless skin behind the ear.    If the patch is loose or falls off,apply a new patch at a different place behind the ear.    After you take off the patch, wash the place where the patch was and your hands thoroughly.    Only one patch should be used at any time.    If a dose is missed:      If you forget to wear or change a patch, put one on as soon as you can. If it is almost time to put on your next patch, wait until then  to apply a new patch and skip the one you missed. Do not apply extra patches to make up for a missed dose.    How to Store and Dispose of This Medicine:      Store the patches at room temperature in a closed container, away from heat, moisture, and direct light.    Fold the used patch in half with the sticky sides together. Throw any used patch away so that children or pets cannot get to it. You will also need to throw away old patches after the expiration date has passed.    Keep all medicine away from children and never share your medicine with anyone.    Drugs and Foods to Avoid:  Ask your doctor or pharmacist before using any other medicine, including over-the-counter medicines, vitamins, and herbal products.      Tell your doctor if you are using any medicines that make you sleepy. These include sleeping pills, cold and allergy medicine, narcotic pain relievers, and sedatives.     Do not drink alcohol while you are using this medicine.    Warnings While Using This Medicine:      Make sure your doctor knows if you are pregnant or breastfeeding, or if you have glaucoma, prostate problems, trouble urinating, blocked bowels, liver disease, kidney disease, or a history of seizures or mental illness.    This medicine can cause blurring of vision and other vision problems if it comes in contact with the eyes. This medicine may also cause problems with urination. If any of these reactions occur, remove the patch and call your doctor right away.    This medicine may make you dizzy or drowsy. Avoid driving, using machines, or doing anything else that could be dangerous if you are not alert. If you plan to participate in underwater sports, this medicine may cause disorienting effects. If this is a concern for you, talk with your doctor.    This medicine may make you sweat less and cause your body to get too hot. Be careful in hot weather, when your are exercising, or if using sauna or whirlpool.    Make sure any doctor or  dentist who treats you knows that you are using this medicine. This medicine may affect the results of certain medical tests.    Skin burns have been reported at the patch site in several patients wearing an aluminized transdermal system during a magnetic resonance imaging scan (MRI). Because Transderm Scop contains aluminum, it is recommended to remove the system before undergoing an MRI.    Possible Side Effects While Using This Medicine:  Call your doctor right away if you notice any of these side effects:      Allergic reaction: Itching or hives, swelling in your face or hands, swelling or tingling in your mouth or throat, chest tightness, trouble breathing.    Blurred vision,    Confusion or memory loss.    Fast,slow, or uneven heartbeat.    Lightheadedness, dizziness, drowsiness, or fainting.    Seeing, hearing, or feeling things that are not there.    Severe eye pain.    Trouble urinating.      If you notice these less serious side effects, talk with your doctor:      Dry mouth.    Dry, itchy, or red eyes.    Restlessness    Skin rash or redness.    If you notice other side effects that you think are caused by this medicine, tell your doctor immediately.u notice other side effects that you think are caused by this medicine, tell your doctor immediately.[facility], [place]  Same-Day Surgery   Adult Discharge Orders & Instructions     For 24 hours after surgery    1. Get plenty of rest.  A responsible adult must stay with you for at least 24 hours after you leave the hospital.   2. Do not drive or use heavy equipment.  If you have weakness or tingling, don't drive or use heavy equipment until this feeling goes away.  3. Do not drink alcohol.  4. Avoid strenuous or risky activities.  Ask for help when climbing stairs.   5. You may feel lightheaded.  IF so, sit for a few minutes before standing.  Have someone help you get up.   6. If you have nausea (feel sick to your stomach): Drink  only clear liquids such as apple juice, ginger ale, broth or 7-Up.  Rest may also help.  Be sure to drink enough fluids.  Move to a regular diet as you feel able.  7. You may have a slight fever. Call the doctor if your fever is over 100 F (37.7 C) (taken under the tongue) or lasts longer than 24 hours.  8. You may have a dry mouth, a sore throat, muscle aches or trouble sleeping.  These should go away after 24 hours.  9. Do not make important or legal decisions.   Call your doctor for any of the followin.  Signs of infection (fever, growing tenderness at the surgery site, a large amount of drainage or bleeding, severe pain, foul-smelling drainage, redness, swelling).    2. It has been over 8 to 10 hours since surgery and you are still not able to urinate (pass water).    3.  Headache for over 24 hours.      To contact a doctor, call ____Dr Shrestha  at [phone] at the Neurosurgery Clinic from 8 am till 5 pm -- _____________________________ or:        [phone] and ask for the resident on call for   _______Neurosurgery ___________________ (answered 24 hours a day)      Emergency Department:    [facility]: [phone]       (TTY for hearing impaired: [phone])

## 2021-08-27 NOTE — PROGRESS NOTES
SPIRITUAL HEALTH SERVICES  [facility] ([place] 3C   PRE-SURGERY VISIT    Pt was listed on pre-surgery list for  visit. However pt declined visit stating she would prefer a  visit after her surgery, once she has gone to the floor.     Unit  please follow-up.    Rev. Chari Kirby MDiv, [facility]  Staff    Pager [phone]

## 2021-08-27 NOTE — OR NURSING
Patient is anxious about not having leads placed to cover temporal area. Dr Shrestha came by and explained plan for temporal coverage after trial is over. Martha is anxious unable to get comfortable.

## 2021-08-27 NOTE — ANESTHESIA POSTPROCEDURE EVALUATION
Patient: Martha Chun    Procedure(s):  Externalized trial of percutaneous occipital nerve stimulator bilateral Percutaneous implantation of neurostimulator electrode array; cranial nerve CPT 14446    Diagnosis:Bilateral occipital neuralgia [M54.81]  Diagnosis Additional Information: No value filed.    Anesthesia Type:  General    Note:  Disposition: ICU   Postop Pain Control: Uneventful            Sign Out: Well controlled pain   PONV: No   Neuro/Psych: Uneventful            Sign Out: Acceptable/Baseline neuro status   Airway/Respiratory: Uneventful            Sign Out: Acceptable/Baseline resp. status   CV/Hemodynamics: Uneventful            Sign Out: Acceptable CV status; No obvious hypovolemia; No obvious fluid overload   Other NRE: NONE   DID A NON-ROUTINE EVENT OCCUR? No           Last vitals:  Vitals Value Taken Time   /75 08/27/21 1253   Temp 36.8  C (98.2  F) 08/27/21 1253   Pulse 78 08/27/21 1253   Resp 18 08/27/21 1253   SpO2 100 % 08/27/21 1254   Vitals shown include unvalidated device data.    Electronically Signed By: Mikhail Carey MD  August 27, 2021  1:01 PM

## 2021-08-27 NOTE — BRIEF OP NOTE
[facility]    Brief Operative Note    Pre-operative diagnosis: Bilateral occipital neuralgia [M54.81]  Post-operative diagnosis Same as pre-operative diagnosis    Procedure: Procedure(s):  Externalized trial of percutaneous occipital nerve stimulator bilateral Percutaneous implantation of neurostimulator electrode array; cranial nerve CPT 96652  Surgeon: Surgeon(s) and Role:     * Kobi Shrestha MD - Primary     * Kobi Soto MD - Resident - Assisting  Anesthesia: General   Estimated blood loss: 2 ml  Drains: None  Specimens: * No specimens in log *  Findings:   Bilateral occipital electrodes placed. Good impedances seen. Anchored in place.   Complications: None.  Implants: * No implants in log *

## 2021-08-27 NOTE — ANESTHESIA CARE TRANSFER NOTE
Patient: Martha Chun    Procedure(s):  Externalized trial of percutaneous occipital nerve stimulator bilateral Percutaneous implantation of neurostimulator electrode array; cranial nerve CPT 40007    Diagnosis: Bilateral occipital neuralgia [M54.81]  Diagnosis Additional Information: No value filed.    Anesthesia Type:   General     Note:    Oropharynx: spontaneously breathing and oropharynx clear of all foreign objects  Level of Consciousness: drowsy  Oxygen Supplementation: face mask  Level of Supplemental Oxygen (L/min / FiO2): 8  Independent Airway: airway patency satisfactory and stable  Dentition: dentition unchanged  Vital Signs Stable: post-procedure vital signs reviewed and stable  Report to RN Given: handoff report given  Patient transferred to: PACU    Handoff Report: Identifed the Patient, Identified the Reponsible Provider, Reviewed the pertinent medical history, Discussed the surgical course, Reviewed Intra-OP anesthesia mangement and issues during anesthesia, Set expectations for post-procedure period and Allowed opportunity for questions and acknowledgement of understanding      Vitals:  Vitals Value Taken Time   /78 08/27/21 1030   Temp 36 08/27/21 1033   Pulse 74 08/27/21 1033   Resp 14    SpO2 100 % 08/27/21 1033   Vitals shown include unvalidated device data.    Electronically Signed By: Kelly England MD  August 27, 2021  10:34 AM

## 2021-08-27 NOTE — ANESTHESIA PROCEDURE NOTES
Airway       Patient location during procedure: OR       Procedure Start/Stop Times: 8/27/2021 8:52 AM  Staff -        Anesthesiologist:  Mikhail Carey MD       Resident/Fellow: Kelly England MD       Performed By: residentIndications and Patient Condition       Indications for airway management: diogenes-procedural       Induction type:intravenous       Mask difficulty assessment: 1 - vent by mask    Final Airway Details       Final airway type: endotracheal airway       Successful airway: ETT - single  Endotracheal Airway Details        ETT size (mm): 7.0       Cuffed: yes       Successful intubation technique: video laryngoscopy       VL Blade Size: Glidescope 3       Grade View of Cords: 1       Adjucts: stylet       Position: Left       Measured from: lips       Secured at (cm): 22       Bite block used: Soft    Post intubation assessment        Placement verified by: capnometry, equal breath sounds and chest rise        Number of attempts at approach: 1       Number of other approaches attempted: 0       Secured with: pink tape (Benzoin, tegaderm)       Ease of procedure: easy       Dentition: Intact and Unchanged

## 2021-08-30 NOTE — OP NOTE
Procedure Date: 08/27/2021    ATTENDING SURGEON:  Kobi Shrestha MD    :  Kobi Soto MD, Ph.D    PREOPERATIVE DIAGNOSIS:  Bilateral occipital neuralgia.    POSTOPERATIVE DIAGNOSIS:  Bilateral occipital neuralgia.    PROCEDURE PERFORMED:    1.  Externalized trial of percutaneous occipital nerve stimulator.  2.  Use of intraoperative fluoroscopy.    ANESTHESIA:  General endotracheal.    ESTIMATED BLOOD LOSS:  2 mL    DRAINS:  None.    SPECIMENS:  Nil.    FINDINGS:  Bilateral electrodes placed suboccipital.     COMPLICATIONS:  Nil.    IMPLANTS:  Abbott octrode electrodes x 2, one with a left paramedian entry point towards the right hand side serial #[id number] and a right paramedian entry trajectory towards the left hand side serial #[id number].  Generator placed in pocket over the right shoulder.    INDICATIONS FOR OPERATION:  Martha Chnu is a 61-year-old female who has a history of occipital neuralgia and complex facial pain, who was then seen by Dr. Shrestha at the [facility] to discuss an occipital nerve stimulation trial for treatment of her longstanding occipital neuralgia, significant occipital pain, and had a successful trial of occipital nerve block; however, that did not provide any durable relief.  After insurance approval, the patient was booked for surgery.  Risks, benefits and alternatives were thoroughly explained.  Questions were elicited.  The patient did receive the aforementioned surgery.    DESCRIPTION OF PROCEDURE:  The patient was identified in the preoperative holding area using 2 unique identifiers.  Informed consent was signed and verified.    Our Anesthesia colleagues brought the patient back to the operating room.  There, general anesthesia was induced.  The patient was successfully endotracheally intubated.  Adequate IV access was obtained.  Rousseau skull clamp was applied.  The patient was then positioned in the prone position on the  operating table with gel rolls.  Belgrade skull clamp was attached to the Belgrade bed adaptor.  Care was taken to ensure that all pressure points were adequately padded.  Her neck was in mild flexion.  Her posterior occiput was identified and marked out and shaved.  The site was then prepped and draped in the standard sterile fashion.  Both sites were infiltrated with local anesthetic.  Surgical timeout was performed confirming the patient's identity, side and site of surgery, procedure to be performed, administration of preoperative prophylactic antibiotics and presence of appropriate imaging, medical devices, and representative in the room.  Following a surgical timeout, a 15 blade scalpel was used to make stab incisions over the paramedian entry points on the right hand side.  The 14-gauge Tuohy needle introducer was slightly curved and advanced towards the left mastoid on fluoro.  At a point approximately FCI between the inion/occipital protuberance mastoid.  Lateral fluoroscopic x-rays were obtained, confirming the appropriate trajectory.  Once this appropriate depth was reached the octrode Abbott electrode was passed through the 14-gauge Tuohy needle.  The introducer needle was then removed.  A lateral x-ray was taken confirming it maintained its position.  The same procedure was repeated on the contralateral side.  Both electrodes demonstrated good positioning.    At this point, we attempted placement for facial pain electrodes.  A similar process was initiated.  A stab incision made.  A 14-gauge Tuohy needle was advanced in the supratemporal space; however, we were unable to adequately advance  without perforating through the supratemporal skin, dermis, and epidermis.  Decision was made at that point to hold off on the more anterior electrodes until final implant if she found the effect alleviating.  Lateral x-rays were taken confirming the appropriate placement.  The occipital electrodes were anchored  in place utilizing 3-0 nylon suture.  The patient electrode entry sites were sutured closed utilizing a 3-0 Monocryl suture.  Wound was clean, dry and chloraprepped.  Sterile dressings were applied.  Drapes were taken down.  The patient was returned to the supine position on her hospital bed.  Rousseau skull clamp was removed.  The patient was successfully extubated.  No immediate postoperative complications were noted.  All sponge, needle and instrument counts were correct x 2 at the conclusion of procedure.  Dr. Moise was present and scrubbed for all critical portions of the procedure and immediately available for the remainder.    Kobi Moise MD    As Dictated by KOBI NAJERA MD        ATTESTATION: My signature attests that I was present for the key portions and immediately available for the entire operation described above. I agree with the above findings.    KOBI MOISE MD      D: 2021   T: 2021   MT: [id number]    Name:     FARZAD DUDLEY  MRN:      [id number]        Account:        [id number]   :      1960           Procedure Date: 2021     Document: [id number]

## 2021-08-31 ENCOUNTER — TELEPHONE (OUTPATIENT)
Dept: NEUROSURGERY | Facility: CLINIC | Age: 61
End: 2021-08-31

## 2021-08-31 ENCOUNTER — PATIENT OUTREACH (OUTPATIENT)
Dept: NEUROSURGERY | Facility: CLINIC | Age: 61
End: 2021-08-31

## 2021-08-31 NOTE — PROGRESS NOTES
Pt s/p trial bilateral occipital nerve stimulator by Dr. Shrestha on 8/27/21. Left VM checking on pt's postop status. Left my direct number and requested pt call at her earliest convenience.

## 2021-09-01 NOTE — CONFIDENTIAL NOTE
Left 2nd message to check on pt's postop status following trial occipital nerve stimulation by Dr. Shrestha on 8/27. Will continue to try reaching pt.

## 2021-09-02 ENCOUNTER — PATIENT OUTREACH (OUTPATIENT)
Dept: NEUROSURGERY | Facility: CLINIC | Age: 61
End: 2021-09-02

## 2021-09-02 NOTE — PROGRESS NOTES
"[facility]: Post-Discharge Note  SITUATION                                                      Admission:    Admission Date: 08/27/21   Reason for Admission: Externalized trial of percutaneous occipital nerve stimulator.  Discharge:   Discharge Date: 08/27/21  Discharge Diagnosis: Occipital neuralgia    BACKGROUND                                                      Neurosurgery Discharge Coordination Note     Attending physician: Dr. Shrestha  Discharge to: Home     Current status: Patient states she is not getting any pain relief from the stimulation and thinks that the surgery and stimulation are making her headache pain worse. Pt repeatedly questioned \"whether the lesser occipital nerve is being stimulated because the whole left side of my head is throbbing.\" She also mentioned that when she gets high cervical spine adjustments from her chiropractor, her headache pain decreases for a day and then comes back, which leads her to think that the a C2 ganglionectomy, which she says she has been reading about, would be helpful for her pain.     Pt says she would like the leads out asap. She has worked with the [employer] rep twice by phone but has only made one stimulation adjustment so far.     Denies redness, swelling, increased tenderness, drainage, incision opening or elevated temp. Reports Incision CDI without signs of infection. Dressings intact  Denies current bowel or bladder issues.    Discharge instructions and medications reviewed with patient.  Follow up appointments/imaging/tests needed: 2 week post op with NP Hue Michaels on 9/10 at 12:30 pm       Will follow-up with Dr. Shrestha and [facility] re plan of care.    RN triage/on call number given: [phone]/ [phone]        ASSESSMENT           Discharge Assessment  How are your symptoms? (Red Flag symptoms escalate to triage hotline per guidelines): Worsening  Does the patient have all of their medications?: Yes  Discharge follow-up appointment " scheduled within 14 calendar days? : Yes  Discharge Follow Up Appointment Date: 09/10/21  Discharge Follow Up Appointment Scheduled with?: Specialty Care Provider         Post-op (Clinicians Only)  Did the patient have surgery or a procedure: Yes  Incision: closed;healing  Drainage: No  Bleeding: none  Fever: No  Chills: No  Redness: No  Warmth: No  Swelling: No  Incision site pain: Yes  Closure: suture  Eating & Drinking: eating and drinking without complaints/concerns  PO Intake: regular diet  Bowel Function: normal  Urinary Status: voiding without complaint/concerns        PLAN                                                      Outpatient Plan: see above    Future Appointments   Date Time Provider Department Center   9/10/2021 12:30 PM Filomena Michaels APRN [facility]         For any urgent concerns, please contact our 24 hour nurse triage line: [phone] ([phone])         FARHAN ROSAS RN

## 2021-09-03 ENCOUNTER — TELEPHONE (OUTPATIENT)
Dept: NEUROSURGERY | Facility: CLINIC | Age: 61
End: 2021-09-03

## 2021-09-03 NOTE — TELEPHONE ENCOUNTER
Writer LVM for pt explaining that Dr. Shrestha would like to see her in clinic on 9/7 at 1:30. Writer asked pt to call back and confirm that they can attend this appt.    Please message writer if pt is able or not able to make appt.    Chante Costa

## 2021-09-07 ENCOUNTER — OFFICE VISIT (OUTPATIENT)
Dept: NEUROSURGERY | Facility: CLINIC | Age: 61
End: 2021-09-07
Payer: COMMERCIAL

## 2021-09-07 VITALS
BODY MASS INDEX: 21.98 KG/M2 | OXYGEN SATURATION: 99 % | RESPIRATION RATE: 16 BRPM | WEIGHT: 136.2 LBS | SYSTOLIC BLOOD PRESSURE: 167 MMHG | HEART RATE: 79 BPM | DIASTOLIC BLOOD PRESSURE: 83 MMHG

## 2021-09-07 DIAGNOSIS — R51.9 FACIAL PAIN: ICD-10-CM

## 2021-09-07 DIAGNOSIS — M54.81 BILATERAL OCCIPITAL NEURALGIA: Primary | ICD-10-CM

## 2021-09-07 PROCEDURE — 99213 OFFICE O/P EST LOW 20 MIN: CPT | Performed by: NEUROLOGICAL SURGERY

## 2021-09-07 ASSESSMENT — PAIN SCALES - GENERAL: PAINLEVEL: SEVERE PAIN (6)

## 2021-09-07 NOTE — NURSING NOTE
Chief Complaint   Patient presents with     RECHECK     [facility] RETURN - trial occipital nerve stimulator programming     Sheldon Lechuga

## 2021-09-07 NOTE — LETTER
"9/7/2021       RE: Martha Chun  [address]  [place] [address]     Dear Colleague,    Thank you for referring your patient, Martha Chun, to the [facility]OS[facility]IC [place] at [facility]. Please see a copy of my visit note below.    Neurosurgery Clinic Note    Reason for Visit: s/p externalization of occipital nerve stimulator electrodes    History of Present Illness  Martha Chun is following up today to discuss coverage from her occipital nerve stimulator trial. Apparently, she was unable to achieve appropriate coverage with recent programming attempts. I asked her to come into clinic for reprogramming.         Allergies   Allergen Reactions     Amlodipine Other (See Comments)     Constipation, \"liver pain\".  Constipation, \"liver pain\".       Clonidine      Other reaction(s): Dizziness     Escitalopram Muscle Pain (Myalgia)     Other reaction(s): Myalgias     Mold Nausea     Ringing in ears, racing heart     Paroxetine Other (See Comments)     Palpitations/racing heart  Palpitations/racing heart       Penicillins Unknown     Does not remember reaction, mother said she allergic to mold; patient refuses Pcn or any derivative  Does not remember reaction, mother said she allergic to mold; patient refuses Pcn or any derivative       Sulfa Drugs Unknown     Tramadol      Other reaction(s): Runny Nose  Facial pain, sinus swelling  Facial pain, sinus swelling       Carvedilol Other (See Comments)     Worsening depression  Worsening depression       Codeine Headache and Nausea and Vomiting     Cortisone Other (See Comments)     Patient reports and possibly all steroids \"makes me to feel sick\", get fungal infections       Duloxetine Headache     Oxycodone Headache       Current Outpatient Medications   Medication     acetaminophen (TYLENOL) 500 MG tablet     aliskiren (TEKTURNA) 150 MG tablet     Butalbital-Acetaminophen (PHRENILIN) "  MG TABS per tablet     butalbital-acetaminophen-caffeine (ESGIC) -40 MG tablet     calcium citrate (CITRACAL) 950 (200 Ca) MG tablet     carboxymethylcellulose (REFRESH PLUS) 0.5 % SOLN ophthalmic solution     cetirizine (ZYRTEC) 10 MG tablet     cholecalciferol 50 MCG (2000 UT) tablet     clindamycin (CLEOCIN) 300 MG capsule     diltiazem ER (DILT-XR) 120 MG 24 hr capsule     diphenhydrAMINE-acetaminophen (TYLENOL PM)  MG tablet     gabapentin (NEURONTIN) 100 MG capsule     LORazepam (ATIVAN) 0.5 MG tablet     Melatonin 10 MG TABS tablet     mirtazapine (REMERON) 15 MG tablet     mirtazapine (REMERON) 7.5 MG tablet     Oxymetazoline HCl (NASAL SPRAY) 0.05 % SOLN     triprolidine-pseudoePHEDrine (APRODINE) 2.5-60 MG TABS per tablet     No current facility-administered medications for this visit.             Assessment and Plan   Martha Chun is a 61 year old female undergoing externalized trial of occipital nerve stimulator electrodes. Today I was able to reprogram the EPG in order to provide coverage over bilateral occipital nerves with extension to bilateral scalp. She is going to continue to use the stimulator and assess its effectiveness over the next few days.      Kobi Shrestha MD  Department of Neurosurgery  [facility]        Again, thank you for allowing me to participate in the care of your patient.      Sincerely,    Kobi Shrestha MD

## 2021-09-08 ENCOUNTER — TELEPHONE (OUTPATIENT)
Dept: NEUROSURGERY | Facility: CLINIC | Age: 61
End: 2021-09-08

## 2021-09-08 NOTE — TELEPHONE ENCOUNTER
[facility] Call Center    Phone Message    May a detailed message be left on voicemail: yes     Reason for Call: Other: Pt calling in requesting to r/s her apoinment on 09/10/21, please call back to discuss further      Action Taken: Message routed to:  [facility] ([facility] neurosurgery     Travel Screening: Not Applicable

## 2021-09-09 NOTE — TELEPHONE ENCOUNTER
Writer spoke with pt and rescheduled wound check for 9/14. Pt does not have any concerns or irritation near wound    Chante Costa

## 2021-09-12 NOTE — PROGRESS NOTES
"Neurosurgery Clinic Note    Reason for Visit: s/p externalization of occipital nerve stimulator electrodes    History of Present Illness  Martha Chun is following up today to discuss coverage from her occipital nerve stimulator trial. Apparently, she was unable to achieve appropriate coverage with recent programming attempts. I asked her to come into clinic for reprogramming.         Allergies   Allergen Reactions     Amlodipine Other (See Comments)     Constipation, \"liver pain\".  Constipation, \"liver pain\".       Clonidine      Other reaction(s): Dizziness     Escitalopram Muscle Pain (Myalgia)     Other reaction(s): Myalgias     Mold Nausea     Ringing in ears, racing heart     Paroxetine Other (See Comments)     Palpitations/racing heart  Palpitations/racing heart       Penicillins Unknown     Does not remember reaction, mother said she allergic to mold; patient refuses Pcn or any derivative  Does not remember reaction, mother said she allergic to mold; patient refuses Pcn or any derivative       Sulfa Drugs Unknown     Tramadol      Other reaction(s): Runny Nose  Facial pain, sinus swelling  Facial pain, sinus swelling       Carvedilol Other (See Comments)     Worsening depression  Worsening depression       Codeine Headache and Nausea and Vomiting     Cortisone Other (See Comments)     Patient reports and possibly all steroids \"makes me to feel sick\", get fungal infections       Duloxetine Headache     Oxycodone Headache       Current Outpatient Medications   Medication     acetaminophen (TYLENOL) 500 MG tablet     aliskiren (TEKTURNA) 150 MG tablet     Butalbital-Acetaminophen (PHRENILIN)  MG TABS per tablet     butalbital-acetaminophen-caffeine (ESGIC) -40 MG tablet     calcium citrate (CITRACAL) 950 (200 Ca) MG tablet     carboxymethylcellulose (REFRESH PLUS) 0.5 % SOLN ophthalmic solution     cetirizine (ZYRTEC) 10 MG tablet     cholecalciferol 50 MCG (2000 UT) tablet     clindamycin " (CLEOCIN) 300 MG capsule     diltiazem ER (DILT-XR) 120 MG 24 hr capsule     diphenhydrAMINE-acetaminophen (TYLENOL PM)  MG tablet     gabapentin (NEURONTIN) 100 MG capsule     LORazepam (ATIVAN) 0.5 MG tablet     Melatonin 10 MG TABS tablet     mirtazapine (REMERON) 15 MG tablet     mirtazapine (REMERON) 7.5 MG tablet     Oxymetazoline HCl (NASAL SPRAY) 0.05 % SOLN     triprolidine-pseudoePHEDrine (APRODINE) 2.5-60 MG TABS per tablet     No current facility-administered medications for this visit.             Assessment and Plan   Martha Chun is a 61 year old female undergoing externalized trial of occipital nerve stimulator electrodes. Today I was able to reprogram the EPG in order to provide coverage over bilateral occipital nerves with extension to bilateral scalp. She is going to continue to use the stimulator and assess its effectiveness over the next few days.      Kobi Shrestha MD  Department of Neurosurgery  [facility]

## 2021-09-14 ENCOUNTER — OFFICE VISIT (OUTPATIENT)
Dept: NEUROSURGERY | Facility: CLINIC | Age: 61
End: 2021-09-14
Payer: COMMERCIAL

## 2021-09-14 VITALS
DIASTOLIC BLOOD PRESSURE: 74 MMHG | OXYGEN SATURATION: 96 % | SYSTOLIC BLOOD PRESSURE: 151 MMHG | RESPIRATION RATE: 16 BRPM | HEART RATE: 85 BPM

## 2021-09-14 DIAGNOSIS — M54.81 BILATERAL OCCIPITAL NEURALGIA: Primary | ICD-10-CM

## 2021-09-14 PROCEDURE — 99212 OFFICE O/P EST SF 10 MIN: CPT | Performed by: PHYSICIAN ASSISTANT

## 2021-09-14 RX ORDER — CROMOLYN SODIUM 5.2 MG
AEROSOL, SPRAY WITH PUMP (ML) NASAL
COMMUNITY
Start: 2021-09-07 | End: 2021-10-07

## 2021-09-14 RX ORDER — FLUTICASONE PROPIONATE 50 MCG
2 SPRAY, SUSPENSION (ML) NASAL EVERY EVENING
COMMUNITY

## 2021-09-14 ASSESSMENT — PAIN SCALES - GENERAL: PAINLEVEL: MODERATE PAIN (5)

## 2021-09-14 NOTE — LETTER
2021       RE: Martha Chun  [address]  [address]  [place] [address]     Dear Colleague,    Thank you for referring your patient, aMrtha Chun, to the [facility] NEUROSURGERY CLINIC [place] at [facility]. Please see a copy of my visit note below.      [facility]  Department of Neurosurgery    Name: Martha Chun  MRN: [id number]  Age: 61 year old  : 1960  2021      Chief Complaint:   S/P externalization of occipital nerve stimulator electrodes, follow up visit    History of Present Illness:   Martha Chun is a 61 year old female with a history of bilateral occipital neuralgia who is seen today for a follow up visit. She underwent trial of an externalized occipital nerve stimulator recently with Dr. Shrestha. She needs her electrode leads removed today. She reports that she's had significant relief of her pain with this trial. She thinks overall bilateral pain is at least >50% improved, likely around 70%. She denies new complaints today.     Review of Systems:   Pertinent items are noted in HPI or as in patient entered ROS below, remainder of complete ROS is negative.     Physical Exam:   BP (!) 151/74   Pulse 85   Resp 16   SpO2 96%    General: No acute distress.    MSK: Moves all extremities.  No obvious deformity.  Neuro: The patient is fully oriented. Speech is normal. Gait is normal.  Psych: Normal mood and affect. Behavior is normal.    Skin: Her electrodes were removed today using sterile instrumentation. No complications, patient tolerated this well.     Assessment:  S/P externalization of occipital nerve stimulator electrodes, follow up visit    Plan:  Will discuss next steps with Dr. Shrestha and the patient was encouraged to reach out in the meantime with any new questions or concerns.      Ofelia Ortiz PA-C  Department of Neurosurgery          Again, thank you for allowing me to participate in the care  of your patient.      Sincerely,    Ofelia Ortiz PA-C

## 2021-09-14 NOTE — PROGRESS NOTES
[facility]  Department of Neurosurgery    Name: Martha Chun  MRN: [id number]  Age: 61 year old  : 1960  2021      Chief Complaint:   S/P externalization of occipital nerve stimulator electrodes, follow up visit    History of Present Illness:   Martha Chun is a 61 year old female with a history of bilateral occipital neuralgia who is seen today for a follow up visit. She underwent trial of an externalized occipital nerve stimulator recently with Dr. Shrestha. She needs her electrode leads removed today. She reports that she's had significant relief of her pain with this trial. She thinks overall bilateral pain is at least >50% improved, likely around 70%. She denies new complaints today.     Review of Systems:   Pertinent items are noted in HPI or as in patient entered ROS below, remainder of complete ROS is negative.     Physical Exam:   BP (!) 151/74   Pulse 85   Resp 16   SpO2 96%    General: No acute distress.    MSK: Moves all extremities.  No obvious deformity.  Neuro: The patient is fully oriented. Speech is normal. Gait is normal.  Psych: Normal mood and affect. Behavior is normal.    Skin: Her electrodes were removed today using sterile instrumentation. No complications, patient tolerated this well.     Assessment:  S/P externalization of occipital nerve stimulator electrodes, follow up visit    Plan:  Will discuss next steps with Dr. Shrestha and the patient was encouraged to reach out in the meantime with any new questions or concerns.      Ofelia Ortiz PA-C  Department of Neurosurgery

## 2021-09-14 NOTE — NURSING NOTE
Chief Complaint   Patient presents with     Surgical Followup     [facility] Return - 2 wk post-op, suture removal and wound check     Bentley Payne

## 2021-09-17 DIAGNOSIS — R51.9 FACIAL PAIN: ICD-10-CM

## 2021-09-17 DIAGNOSIS — M54.81 BILATERAL OCCIPITAL NEURALGIA: Primary | ICD-10-CM

## 2021-09-22 ENCOUNTER — TELEPHONE (OUTPATIENT)
Dept: NEUROSURGERY | Facility: CLINIC | Age: 61
End: 2021-09-22

## 2021-09-22 NOTE — TELEPHONE ENCOUNTER
FUTURE VISIT INFORMATION      SURGERY INFORMATION:    Date: 10/22/21    Location: [facility] or    Surgeon:  Kobi Shrestha MD    Anesthesia Type:  general    Procedure: Phase II occipital nerve stimulator placement, bilateral occipital electrodes and bilateral trigeminal electrodes, Implantation of bilateral subcutaneous neurostimulator  Percutaneous implantation of neurostimulator electrode array; cranial nerve Implantable neurostimulator electrode, each CPT 52152 Implantable neurostimulator pulse generator, dual array, nonrechargeable, includes extension CPT       RECORDS REQUESTED FROM:       Primary Care Provider: Fox Vieira MD  - [facility]    Most recent EKG+ Tracin/10/20

## 2021-09-28 DIAGNOSIS — Z11.59 ENCOUNTER FOR SCREENING FOR OTHER VIRAL DISEASES: ICD-10-CM

## 2021-10-04 ENCOUNTER — TELEPHONE (OUTPATIENT)
Dept: NEUROSURGERY | Facility: CLINIC | Age: 61
End: 2021-10-04

## 2021-10-04 NOTE — CONFIDENTIAL NOTE
Pt left me a VM saying she wants to cancel the PAC appt on 10/7 and schedule an H&P with a primary care provider at [facility] on 10/19. She then spoke with our  who canceled the 10/7 appt. Pt has not yet scheduled the H&P with her PCP.     Pt said she had additional questions about her surgery. Left her a VM with my direct number and she may call at her convenience.

## 2021-10-05 ENCOUNTER — DOCUMENTATION ONLY (OUTPATIENT)
Dept: NEUROSURGERY | Facility: CLINIC | Age: 61
End: 2021-10-05

## 2021-10-05 NOTE — TELEPHONE ENCOUNTER
FUTURE VISIT INFORMATION        SURGERY INFORMATION:    Date: 10/22/21    Location: [facility] or    Surgeon:  Kobi Shrestha MD    Anesthesia Type:  general    Procedure: Phase II occipital nerve stimulator placement, bilateral occipital electrodes and bilateral trigeminal electrodes, Implantation of bilateral subcutaneous neurostimulator  Percutaneous implantation of neurostimulator electrode array; cranial nerve Implantable neurostimulator electrode, each CPT 81509 Implantable neurostimulator pulse generator, dual array, nonrechargeable, includes extension CPT         RECORDS REQUESTED FROM:         [facility]     Most recent EKG+ Tracin/10/20

## 2021-10-05 NOTE — TELEPHONE ENCOUNTER
Pt has concerns she would like addressed prior to surgery on 10/22/21:    1. She has one stitch on back of right neck she would like removed  2. Pt perceived that anesthesiologist team was talking a lot during first procedure which made her uncomfortable.   3. She was given anti-anxiety medication through her IV which burned when it was injected. She will need medication to be pushed more slowly to avoid burning.   4. Pt has a bad left hip. She was more sore than usual after the first procedure. She requests more attention to positioning.   5. Pt requesting that either [employer] rep Shivam or TYRESE be present for surgery and programming. Pt specifically requests not to have the other rep.  6. Pt wants to confirm position of stimulators   7. Pt would like to know if stimulation can be extended to C1 and C2. She said she has discussed this and does not think it's possible but wants to let Dr. Shrestha know this is important to her for pain management.   8. Looking ahead to the spinal cord stim surgery, where will those batteries go? She sleeps on her back so does not think she can have the battery at the buttock.   9. Pt would like a referral to pain management for medication management.     Will follow-up with pt after discussing with Dr. Shrestha. She is in agreement with plan.

## 2021-10-05 NOTE — TELEPHONE ENCOUNTER
FUTURE VISIT INFORMATION        SURGERY INFORMATION:    Date: 10/22/21    Location: [facility] or    Surgeon:  Kobi Shrestha MD    Anesthesia Type:  general    Procedure: Phase II occipital nerve stimulator placement, bilateral occipital electrodes and bilateral trigeminal electrodes, Implantation of bilateral subcutaneous neurostimulator  Percutaneous implantation of neurostimulator electrode array; cranial nerve Implantable neurostimulator electrode, each CPT 80708 Implantable neurostimulator pulse generator, dual array, nonrechargeable, includes extension CPT         RECORDS REQUESTED FROM:         Primary Care Provider: Fox Vieira MD  - [facility]     Most recent EKG+ Tracin/10/20

## 2021-10-07 ENCOUNTER — PRE VISIT (OUTPATIENT)
Dept: SURGERY | Facility: CLINIC | Age: 61
End: 2021-10-07

## 2021-10-07 ENCOUNTER — ANESTHESIA EVENT (OUTPATIENT)
Dept: SURGERY | Facility: CLINIC | Age: 61
End: 2021-10-07

## 2021-10-07 ENCOUNTER — VIRTUAL VISIT (OUTPATIENT)
Dept: SURGERY | Facility: CLINIC | Age: 61
End: 2021-10-07
Payer: COMMERCIAL

## 2021-10-07 DIAGNOSIS — R51.9 FACIAL PAIN: ICD-10-CM

## 2021-10-07 DIAGNOSIS — M54.81 BILATERAL OCCIPITAL NEURALGIA: ICD-10-CM

## 2021-10-07 DIAGNOSIS — Z01.818 PREOP EXAMINATION: Primary | ICD-10-CM

## 2021-10-07 PROCEDURE — 99215 OFFICE O/P EST HI 40 MIN: CPT | Mod: GT | Performed by: NURSE PRACTITIONER

## 2021-10-07 RX ORDER — BUTALBITAL, ASPIRIN, AND CAFFEINE 325; 50; 40 MG/1; MG/1; MG/1
1 CAPSULE ORAL EVERY 4 HOURS PRN
COMMUNITY
Start: 2021-10-07 | End: 2021-11-23

## 2021-10-07 ASSESSMENT — LIFESTYLE VARIABLES: TOBACCO_USE: 1

## 2021-10-07 ASSESSMENT — PAIN SCALES - GENERAL: PAINLEVEL: SEVERE PAIN (7)

## 2021-10-07 NOTE — PROGRESS NOTES
Martha is a 61 year old who is being evaluated via a billable video visit.      How would you like to obtain your AVS? Mail a copy  If the video visit is dropped, the invitation should be resent by: Text to cell phone: [phone]    HPI       Review of Systems         Objective    Vitals - Patient Reported  Pain Score: Severe Pain (7) (Headache)        Physical Exam

## 2021-10-07 NOTE — PATIENT INSTRUCTIONS
Preparing for Your Surgery      Name:  Martha Chun   MRN:  [id number]   :  1960   Today's Date:  10/7/2021       Arriving for surgery:  Surgery date:  10/22/21  Arrival time:  9:10 am    Restrictions due to COVID 19:       One visitor is allowed in the Pre Op area.       When you go into surgery, one visitor is allowed to wait in the Surgery Waiting Room       (provided there is enough space to social distance).         In hospital patients are allowed 1 visitor per day       The visitor may change daily     Visiting Hours: 8 am - 8:30 pm   No ill visitors.   All visitors must wear face mask.    [employer] parking is available for anyone with mobility limitations or disabilities.  ([place]  24 hours/ 7 days a week; [facility]  7 am- 3:30 pm, Mon- Fri)    Please come to:     [facility] [address]  [address] [place] [address]     -    On arrival to hospital, you will be asked some screening questions and directed to Patient Registration. Patient Registration will then give you further instructions. [phone]?     - ?If you are in need of directions, wheelchair or escort please stop at the Information Desk in the lobby.  Inform the information person that you are here for surgery; a wheelchair and escort to [address] will be provided.?     What can I eat or drink?  -  You may eat and drink normally for up to 8 hours before your surgery. (Until 3:10 am)  -  You may have clear liquids until 2 hours before surgery. (Until 9:10 am)    Examples of clear liquids:  Water  Clear broth  Juices (apple, white grape, white cranberry  and cider) without pulp  Noncarbonated, powder based beverages  (lemonade and Samy-Aid)  Sodas (Sprite, 7-Up, ginger ale and seltzer)  Coffee or tea (without milk or cream)  Gatorade    -  No Alcohol for at least 24 hours before surgery     Which medicines can I take?  * Hold Aspirin/Aspirin products for 7 days before  surgery.  (Includes Butalbital-Aspirin-Caffeine (Fiorinal))  Hold Multivitamins for 7 days before surgery.  * Hold Supplements for 7 days before surgery. Take the last dose of Fish Oil on 10-14-21 and hold until surgery.  * Hold Ibuprofen (Advil, Motrin) for 1 day before surgery--unless otherwise directed by surgeon.  Hold Naproxen (Aleve) for 4 days before surgery.    -  DO NOT take these medications the day of surgery:  Vitamin D3,     -  PLEASE TAKE these medications the day of surgery:  Lorazepam (Ativan), Oxymetazoline nasal spray,   Acetaminophen (Tylenol) if needed  Refresh Plus eye drops if needed  Triprolidine-Pseudoephedrine (Aprodine) if needed  Butalbital-Acetaminophen (Phrenilin) if needed  Butalbital-Acetaminophen-Caffeine (Fioricet) if needed    How do I prepare myself?  - Please take 2 showers before surgery using Scrubcare or Hibiclens soap.    Use this soap only from the neck to your toes.     Leave the soap on your skin for one minute--then rinse thoroughly.      You may use your own shampoo and conditioner; no other hair products.   - Please remove all jewelry and body piercings.  - No lotions, deodorants or fragrance.  - No makeup or fingernail polish.   - Bring your ID and insurance card.    -If you have a Deep Brain Stimulator, Spinal Cord Stimulator or any neuro stimulator device---you must bring the remote control to the hospital     - All patients are required to have a Covid-19 test within 4 days of surgery/procedure.      -Patients will be contacted by the [facility] scheduling team within 1 week of surgery to make an appointment.      - Patients may call the Scheduling team at [phone] if they have not been scheduled within 4 days of  surgery.      ALL PATIENTS GOING HOME THE SAME DAY OF SURGERY ARE REQUIRED TO HAVE A RESPONSIBLE ADULT TO DRIVE AND BE IN ATTENDANCE WITH THEM FOR 24 HOURS FOLLOWING SURGERY.      Questions or Concerns:    - For any questions regarding the day of  surgery or your hospital stay, please contact the Pre Admission Nursing Office at [phone].       - If you have health changes between today and your surgery please call your surgeon.       For questions after surgery please call your surgeons office.

## 2021-10-07 NOTE — ANESTHESIA PREPROCEDURE EVALUATION
"Anesthesia Pre-Procedure Evaluation    Patient: Martha Chun   MRN: [id number] : 1960        Preoperative Diagnosis: * No surgery found *    Procedure :   Video Visit       Past Medical History:   Diagnosis Date     Anxiety      Degenerative arthritis      Depression      Fibromyalgia      Hypertension      Migraines      Occipital neuralgia     b/l     Other chronic pain      Seasonal allergies       Past Surgical History:   Procedure Laterality Date     C LIGATE FALLOPIAN TUBE      Description: Tubal Ligation;  Recorded: 2012;     CERVICAL FUSION       INSERT STIMULATOR OCCIPITAL Bilateral 2021    Procedure: Externalized trial of percutaneous occipital nerve stimulator bilateral Percutaneous implantation of neurostimulator electrode array; cranial nerve CPT 40290;  Surgeon: Kobi Shrestha MD;  Location: [facility] OR     ORTHOPEDIC SURGERY      s/p right EMELINA; left TKA and s/p cervical fusions      Allergies   Allergen Reactions     Amlodipine Other (See Comments)     Constipation, \"liver pain\".  Constipation, \"liver pain\".       Clonidine      Other reaction(s): Dizziness     Escitalopram Muscle Pain (Myalgia)     Other reaction(s): Myalgias     Mold Nausea     Ringing in ears, racing heart     Paroxetine Other (See Comments)     Palpitations/racing heart  Palpitations/racing heart       Penicillins Unknown     Does not remember reaction, mother said she allergic to mold; patient refuses Pcn or any derivative  Does not remember reaction, mother said she allergic to mold; patient refuses Pcn or any derivative       Sulfa Drugs Unknown     Tramadol      Other reaction(s): Runny Nose  Facial pain, sinus swelling  Facial pain, sinus swelling       Carvedilol Other (See Comments)     Worsening depression  Worsening depression       Codeine Headache and Nausea and Vomiting     Cortisone Other (See Comments)     Patient reports and possibly all steroids \"makes me to feel sick\", get fungal infections       " Duloxetine Headache     Oxycodone Headache      Social History     Tobacco Use     Smoking status: Former Smoker     Packs/day: 1.00     Years: 15.00     Pack years: 15.00     Quit date: 10/10/2014     Years since quittin.9     Smokeless tobacco: Never Used   Substance Use Topics     Alcohol use: Not Currently     Comment: Last drink 10/2014      Wt Readings from Last 1 Encounters:   21 61.8 kg (136 lb 3.2 oz)        Anesthesia Evaluation   Pt has had prior anesthetic. Type: General and Regional.    History of anesthetic complications  - PONV.      ROS/MED HX  ENT/Pulmonary:     (+) ALINE risk factors, snores loudly, allergic rhinitis, tobacco use, Past use,  (-) recent URI   Neurologic: Comment: Bilateral occipital neuralgia and facial pain    (+) migraines,     Cardiovascular:     (+) hypertension-----Previous cardiac testing   Echo: Date: Results:    Stress Test: Date: Results:    ECG Reviewed: Date: 2020 Results:    Cath: Date: Results:      METS/Exercise Tolerance: 3 - Able to walk 1-2 blocks without stopping    Hematologic:  - neg hematologic  ROS  (-) history of blood clots and history of blood transfusion   Musculoskeletal: Comment: Fibromyalgia, DJD, chronic neck, back and multiple joint pain  (+) arthritis,     GI/Hepatic:  - neg GI/hepatic ROS     Renal/Genitourinary:  - neg Renal ROS     Endo:  - neg endo ROS     Psychiatric/Substance Use:     (+) psychiatric history anxiety and depression  (-) alcohol abuse history   Infectious Disease:  - neg infectious disease ROS  (-) Recent Fever   Malignancy:   (+) Malignancy, History of Skin.    Other:  - neg other ROS    (+) , H/O Chronic Pain, (-) Any chance pregnant          OUTSIDE LABS:  CBC:   Lab Results   Component Value Date    WBC 8.0 2021    WBC 5.8 2020    HGB 13.7 2021    HGB 14.8 2020    HCT 41.6 2021    HCT 44.8 2020     2021     2020     BMP:   Lab Results   Component Value  Date     08/25/2021     03/19/2020    POTASSIUM 3.7 08/25/2021    POTASSIUM 3.8 03/19/2020    CHLORIDE 104 08/25/2021    CHLORIDE 101 03/19/2020    CO2 32 08/25/2021    CO2 26 03/19/2020    BUN 15 08/25/2021    BUN 14 03/19/2020    CR 0.71 08/25/2021    CR 0.71 03/19/2020    GLC 82 08/27/2021    GLC 91 08/25/2021     COAGS:   Lab Results   Component Value Date    PTT 26 08/25/2021    INR 0.91 08/25/2021     POC: No results found for: BGM, HCG, HCGS  HEPATIC:   Lab Results   Component Value Date    ALBUMIN 4.3 03/19/2020    PROTTOTAL 7.4 03/19/2020    ALT 21 03/19/2020    AST 19 03/19/2020    GGT 18 03/19/2020    ALKPHOS 92 03/19/2020    BILITOTAL 0.4 03/19/2020     OTHER:   Lab Results   Component Value Date    ALYSE 8.9 08/25/2021    PHOS 3.8 03/19/2020    MAG 2.0 03/19/2020    TSH 2.24 03/19/2020             PAC Discussion and Assessment    ASA Classification: 3  Case is suitable for: [place]  Anesthetic techniques and relevant risks discussed: GA                  PAC Resident/NP Anesthesia Assessment: Martha Chun 61 year old female scheduled for Phase II occipital nerve stimulator placement, bilateral occipital electrodes and bilateral trigeminal electrodes, Implantation of bilateral subcutaneous neurostimulator  Percutaneous implantation of neurostimulator electrode array; cranial nerve Implantable neurostimulator electrode, each CPT 01560 Implantable neurostimulator pulse generator, dual array, nonrechargeable, includes extension on 10/22/21 by Dr. Shrestha in treatment of bilateral occipital neuralgia and facial pain.  PAC referral for risk assessment and optimization for anesthesia with comorbid conditions of: hypertension, allergic rhinitis, history of smoking, fibromyalgia, TMJ, migraines, arthritis, depression and anxiety.     Pre-operative considerations:  1.  Cardiac:  Functional status- METS 3.  A little over a year ago, prior to having a cervical fusion, she was using a nordic track and  doing yoga for exercise.   Now she notes that she isn't able to exercise due to pain, but can walk the distance of a block or two in the store with no cardiopulmonary complications.  Hypertension is managed with diltiazema nd tekturna.  Intermediate risk surgery with 0.4% risk of major adverse cardiac event.   2.  Pulm:  Airway- MORRIS.  Neck ROM limited s/p cervical fusion.  ALINE risk: intermediate.  Quit smoking in 2014.  3.  GI:  Risk of PONV score = 4.  If > 2, anti-emetic intervention recommended.  She isn't quite sure, but thinks she has had a history of PONV.  PONV prevention measures as per anesthesia DOS.  4. Musculoskeletal:  She has chronic back, neck and multiple joint pain.  Consider cautious positioning.   5. Neuro:  Hold fiorinal 7 days prior to surgery due to aspirin content.  6. Other:  She requests that any IV push medications be given very slowly as with the last procedure something was pushed too fast and caused severe burning.  7. ENT:  Has a mouthpiece that she wears for TMJ.    VTE risk: 1.8% due to a family history of her daughter having a PE secondary to depo-provera.    Virtual visit - Please refer to the physical examination documented by the anesthesiologist in the anesthesia record on the day of surgery      Patient is optimized and is acceptable candidate for the proposed procedure.  No further diagnostic evaluation is needed.     **For further details of assessment, testing, and physical exam please see H and P completed on same date.          Sherri Castellon DNP, RN, APRN      Reviewed and Signed by PAC Mid-Level Provider/Resident  Mid-Level Provider/Resident: Sherri Castellon DNP, RN, APRN  Date: 10/7/21  Time: 7671                               TRICIA Avila CNP

## 2021-10-07 NOTE — H&P
Pre-Operative H & P     CC:  Preoperative exam to assess for increased cardiopulmonary risk while undergoing surgery and anesthesia.    Date of Encounter: 10/7/2021  Primary Care Physician:  Fox Vieira     Reason for visit:   Encounter Diagnoses   Name Primary?     Preop examination Yes     Bilateral occipital neuralgia      Facial pain          HPI  Martha Chun is a 61 year old female who presents for pre-operative H & P in preparation for Phase II occipital nerve stimulator placement, bilateral occipital electrodes and bilateral trigeminal electrodes, Implantation of bilateral subcutaneous neurostimulator  Percutaneous implantation of neurostimulator electrode array; cranial nerve Implantable neurostimulator electrode, each CPT 73265 Implantable neurostimulator pulse generator, dual array, nonrechargeable, includes extension on 10/22/21 by Dr. Shrestha at [facility].     Martha Chun 61 year old female with hypertension, allergic rhinitis, history of smoking, fibromyalgia, TMJ, migraines, arthritis, depression and anxiety that has bilateral occipital neuralgia and facial pain. She is currently managed on gabapentin.  She underwent trial of an externalized occipital nerve stimulator recently with Dr. Shrestha.  She reports that she's had significant relief of her pain with this trial after some adjustments were made.   The above listed procedure has now been recommended for long term treatment.      History is obtained from the patient and chart review      Hx of abnormal bleeding or anti-platelet use: none    Menstrual history: No LMP recorded. Patient is postmenopausal.:     Prior to Admission Medications  Current Outpatient Medications   Medication Sig Dispense Refill     acetaminophen (TYLENOL) 500 MG tablet Take 1,000 mg by mouth every 8 hours as needed        aliskiren (TEKTURNA) 150 MG tablet Take 150 mg by mouth At Bedtime        Butalbital-Acetaminophen  (PHRENILIN)  MG TABS per tablet Take 1 tablet by mouth every 4 hours as needed       butalbital-aspirin-caffeine (FIORINAL) -40 MG capsule Take 1 capsule by mouth every 4 hours as needed for headaches       carboxymethylcellulose (REFRESH PLUS) 0.5 % SOLN ophthalmic solution Apply 1-2 drops to eye daily as needed       cholecalciferol 50 MCG (2000 UT) tablet Take 2 tablets by mouth every morning        diltiazem ER (DILT-XR) 120 MG 24 hr capsule Take 120 mg by mouth At Bedtime        diphenhydrAMINE-acetaminophen (TYLENOL PM)  MG tablet Take 1 tablet by mouth nightly as needed for sleep       fluticasone (FLONASE) 50 MCG/ACT nasal spray Spray 1 spray into both nostrils every evening        gabapentin (NEURONTIN) 100 MG capsule Take 100 mg by mouth At Bedtime        LORazepam (ATIVAN) 0.5 MG tablet Take 0.125-0.25 mg by mouth 2 times daily       Melatonin 10 MG TABS tablet Take 10 mg by mouth nightly as needed        mirtazapine (REMERON) 15 MG tablet Take 26.25 mg by mouth At Bedtime        mirtazapine (REMERON) 7.5 MG tablet Take 2 tablets by mouth At Bedtime        Omega-3 Fatty Acids (FISH OIL PO) Take by mouth 2 times daily 360 mg in AM and 600 mg in PM       Oxymetazoline HCl (NASAL SPRAY) 0.05 % SOLN Spray 1 spray in nostril 2 times daily       butalbital-acetaminophen-caffeine (ESGIC) -40 MG tablet Take 1 tablet by mouth every 4 hours as needed       triprolidine-pseudoePHEDrine (APRODINE) 2.5-60 MG TABS per tablet Take 0.5 tablets by mouth every 6 hours as needed         Family History  Family History   Problem Relation Age of Onset     Cancer Mother      Diabetes Father      Hypertension Father      Pulmonary Embolism Daughter      Anesthesia Reaction No family hx of          Past Medical History:   Diagnosis Date     Anxiety      Degenerative arthritis      Depression      Fibromyalgia      Hypertension      Migraines      Occipital neuralgia     b/l     Other chronic pain       "Seasonal allergies       Past Surgical History:   Procedure Laterality Date     C LIGATE FALLOPIAN TUBE      Description: Tubal Ligation;  Recorded: 2012;     CERVICAL FUSION       INSERT STIMULATOR OCCIPITAL Bilateral 2021    Procedure: Externalized trial of percutaneous occipital nerve stimulator bilateral Percutaneous implantation of neurostimulator electrode array; cranial nerve CPT 94004;  Surgeon: Kobi Shrestha MD;  Location: [facility] OR     ORTHOPEDIC SURGERY      s/p right EMELINA; left TKA and s/p cervical fusions      Allergies   Allergen Reactions     Amlodipine Other (See Comments)     Constipation, \"liver pain\".  Constipation, \"liver pain\".       Clonidine      Other reaction(s): Dizziness     Escitalopram Muscle Pain (Myalgia)     Other reaction(s): Myalgias     Mold Nausea     Ringing in ears, racing heart     Paroxetine Other (See Comments)     Palpitations/racing heart  Palpitations/racing heart       Penicillins Unknown     Does not remember reaction, mother said she allergic to mold; patient refuses Pcn or any derivative  Does not remember reaction, mother said she allergic to mold; patient refuses Pcn or any derivative       Sulfa Drugs Unknown     Tramadol      Other reaction(s): Runny Nose  Facial pain, sinus swelling  Facial pain, sinus swelling       Carvedilol Other (See Comments)     Worsening depression  Worsening depression       Codeine Headache and Nausea and Vomiting     Cortisone Other (See Comments)     Patient reports and possibly all steroids \"makes me to feel sick\", get fungal infections       Duloxetine Headache     Oxycodone Headache      Social History     Tobacco Use     Smoking status: Former Smoker     Packs/day: 1.00     Years: 15.00     Pack years: 15.00     Quit date: 10/10/2014     Years since quittin.9     Smokeless tobacco: Never Used   Substance Use Topics     Alcohol use: Not Currently     Comment: Last drink 10/2014      Wt Readings from Last 1 Encounters: "   09/07/21 61.8 kg (136 lb 3.2 oz)        Anesthesia Evaluation   Pt has had prior anesthetic. Type: General and Regional.    History of anesthetic complications  - PONV.      ROS/MED History  The complete review of systems is negative other than noted in the HPI or here.   ENT/Pulmonary:     (+) ALINE risk factors, snores loudly, allergic rhinitis, tobacco use, Past use,  (-) recent URI   Neurologic: Comment: Bilateral occipital neuralgia and facial pain    (+) migraines,     Cardiovascular:     (+) hypertension-----Previous cardiac testing   Echo: Date: Results:    Stress Test: Date: Results:    ECG Reviewed: Date: 11/2020 Results:    Cath: Date: Results:      METS/Exercise Tolerance: 3 - Able to walk 1-2 blocks without stopping    Hematologic:  - neg hematologic  ROS  (-) history of blood clots and history of blood transfusion   Musculoskeletal: Comment: Fibromyalgia, DJD, chronic neck, back and multiple joint pain  (+) arthritis,     GI/Hepatic:  - neg GI/hepatic ROS     Renal/Genitourinary:  - neg Renal ROS     Endo:  - neg endo ROS     Psychiatric/Substance Use:     (+) psychiatric history anxiety and depression  (-) alcohol abuse history   Infectious Disease:  - neg infectious disease ROS  (-) Recent Fever   Malignancy:   (+) Malignancy, History of Skin.    Other:  - neg other ROS    (+) , H/O Chronic Pain, (-) Any chance pregnant            PAC Discussion and Assessment    ASA Classification: 3  Case is suitable for: [place]  Anesthetic techniques and relevant risks discussed: GA            Virtual visit -  No vitals were obtained       Physical Exam  Constitutional: Awake, alert, cooperative, no apparent distress, and appears stated age.  Eyes: Pupils equal  HENT: Normocephalic  Respiratory: non labored breathing   Neurologic: Awake, alert, oriented to name, place and time.   Neuropsychiatric: Calm, cooperative. Normal affect.          LABS/DIAGNOSTIC STUDIES:   All labs and imaging personally reviewed        EKG  11/2020  Sinus rhythm   Septal infarct vs lead placement (V2)   Abnormal ECG     Labs:      Component      Latest Ref Rng & Units 10/19/2021   Sodium      133 - 144 mmol/L 136   Potassium      3.4 - 5.3 mmol/L 4.2   Chloride      94 - 109 mmol/L 103   Carbon Dioxide      20 - 32 mmol/L 28   Anion Gap      3 - 14 mmol/L 5   Urea Nitrogen      7 - 30 mg/dL 12   Creatinine      0.52 - 1.04 mg/dL 0.64   Calcium      8.5 - 10.1 mg/dL 8.9   Glucose      70 - 99 mg/dL 90   GFR Estimate      >60 mL/min/1.73m2 >90   WBC      4.0 - 11.0 10e3/uL 6.5   RBC Count      3.80 - 5.20 10e6/uL 4.09   Hemoglobin      11.7 - 15.7 g/dL 13.3   Hematocrit      35.0 - 47.0 % 39.1   MCV      78 - 100 fL 96   MCH      26.5 - 33.0 pg 32.5   MCHC      31.5 - 36.5 g/dL 34.0   RDW      10.0 - 15.0 % 11.8   Platelet Count      150 - 450 10e3/uL 302   N-Terminal Pro Bnp      0 - 125 pg/mL 137 (H)   PTT      22 - 38 Seconds 26   INR      0.85 - 1.15 0.96         The patient's records and results personally reviewed by this provider.     Outside records reviewed from: care everywhere      ASSESSMENT and PLAN    Martha Chun 61 year old female scheduled for Phase II occipital nerve stimulator placement, bilateral occipital electrodes and bilateral trigeminal electrodes, Implantation of bilateral subcutaneous neurostimulator  Percutaneous implantation of neurostimulator electrode array; cranial nerve Implantable neurostimulator electrode, each CPT 54763 Implantable neurostimulator pulse generator, dual array, nonrechargeable, includes extension on 10/22/21 by Dr. Shrestha in treatment of bilateral occipital neuralgia and facial pain.  PAC referral for risk assessment and optimization for anesthesia with comorbid conditions of: hypertension, allergic rhinitis, history of smoking, fibromyalgia, TMJ, migraines, arthritis, depression and anxiety.     Pre-operative considerations:  1.  Cardiac:  Functional status- METS 3.  A little over a year  ago, prior to having a cervical fusion, she was using a nordic track and doing yoga for exercise.   Now she notes that she isn't able to exercise due to pain, but can walk the distance of a block or two in the store with no cardiopulmonary complications.  Hypertension is managed with diltiazema nd tekturna.  Intermediate risk surgery with 0.4% risk of major adverse cardiac event.   2.  Pulm:  Airway- MORRIS.  Neck ROM limited s/p cervical fusion.  ALINE risk: intermediate.  Quit smoking in 2014.  3.  GI:  Risk of PONV score = 4.  If > 2, anti-emetic intervention recommended.  She isn't quite sure, but thinks she has had a history of PONV.  PONV prevention measures as per anesthesia DOS.  4. Musculoskeletal:  She has chronic back, neck and multiple joint pain.  Consider cautious positioning.   5. Neuro:  Hold fiorinal 7 days prior to surgery due to aspirin content.  6. Other:  She requests that any IV push medications be given very slowly as with the last procedure something was pushed too fast and caused severe burning.  7. ENT:  Has a mouthpiece that she wears for TMJ.    VTE risk: 1.8% due to a family history of her daughter having a PE secondary to depo-provera.    Virtual visit - Please refer to the physical examination documented by the anesthesiologist in the anesthesia record on the day of surgery      Patient is optimized and is acceptable candidate for the proposed procedure.  No further diagnostic evaluation is needed.               Sherri Castellon DNP, RN, APRN      Video-Visit Details    Type of service:  Video Visit    Patient verbally consented to video service today: YES      Video Start Time: 1308  Video End Time (time video stopped): 1333    Originating Location (pt. Location): Home    Distant Location (provider location):  provider's home    Mode of Communication:  Doximity      On the day of service:     Prep time:  5 minutes (majority of prep completed day prior to DOS)  Visit time: 25  minutes  Documentation time: 15 minutes  ------------------------------------------  Total time: 45 minutes      TRICIA Avila CNP  Preoperative Assessment Center  [facility]  Phone: [phone]  Fax: [phone]

## 2021-10-08 ENCOUNTER — TELEPHONE (OUTPATIENT)
Dept: NEUROSURGERY | Facility: CLINIC | Age: 61
End: 2021-10-08

## 2021-10-08 NOTE — TELEPHONE ENCOUNTER
Spoke with pt to let her know that her preop lab appointment is scheduled for 10/19 at 12:15 pm following her 12:00 pm covid test. Pt expressed understanding. No further questions at this time.

## 2021-10-14 ENCOUNTER — TELEPHONE (OUTPATIENT)
Dept: NEUROSURGERY | Facility: CLINIC | Age: 61
End: 2021-10-14

## 2021-10-14 NOTE — CONFIDENTIAL NOTE
Spoke with pt after talking with Dr. Shrestha to confirm that her surgery is a same day procedure; she will be discharged on the day of surgery. No further questions at this time.

## 2021-10-19 ENCOUNTER — LAB (OUTPATIENT)
Dept: LAB | Facility: CLINIC | Age: 61
End: 2021-10-19
Payer: COMMERCIAL

## 2021-10-19 ENCOUNTER — APPOINTMENT (OUTPATIENT)
Dept: LAB | Facility: CLINIC | Age: 61
End: 2021-10-19
Payer: COMMERCIAL

## 2021-10-19 DIAGNOSIS — Z11.59 ENCOUNTER FOR SCREENING FOR OTHER VIRAL DISEASES: ICD-10-CM

## 2021-10-19 DIAGNOSIS — Z01.818 PREOP EXAMINATION: ICD-10-CM

## 2021-10-19 LAB
ANION GAP SERPL CALCULATED.3IONS-SCNC: 5 MMOL/L (ref 3–14)
APTT PPP: 26 SECONDS (ref 22–38)
BUN SERPL-MCNC: 12 MG/DL (ref 7–30)
CALCIUM SERPL-MCNC: 8.9 MG/DL (ref 8.5–10.1)
CHLORIDE BLD-SCNC: 103 MMOL/L (ref 94–109)
CO2 SERPL-SCNC: 28 MMOL/L (ref 20–32)
CREAT SERPL-MCNC: 0.64 MG/DL (ref 0.52–1.04)
ERYTHROCYTE [DISTWIDTH] IN BLOOD BY AUTOMATED COUNT: 11.8 % (ref 10–15)
GFR SERPL CREATININE-BSD FRML MDRD: >90 ML/MIN/1.73M2
GLUCOSE BLD-MCNC: 90 MG/DL (ref 70–99)
HCT VFR BLD AUTO: 39.1 % (ref 35–47)
HGB BLD-MCNC: 13.3 G/DL (ref 11.7–15.7)
INR PPP: 0.96 (ref 0.85–1.15)
MCH RBC QN AUTO: 32.5 PG (ref 26.5–33)
MCHC RBC AUTO-ENTMCNC: 34 G/DL (ref 31.5–36.5)
MCV RBC AUTO: 96 FL (ref 78–100)
NT-PROBNP SERPL-MCNC: 137 PG/ML (ref 0–125)
PLATELET # BLD AUTO: 302 10E3/UL (ref 150–450)
POTASSIUM BLD-SCNC: 4.2 MMOL/L (ref 3.4–5.3)
RBC # BLD AUTO: 4.09 10E6/UL (ref 3.8–5.2)
SODIUM SERPL-SCNC: 136 MMOL/L (ref 133–144)
WBC # BLD AUTO: 6.5 10E3/UL (ref 4–11)

## 2021-10-19 PROCEDURE — 80048 BASIC METABOLIC PNL TOTAL CA: CPT

## 2021-10-19 PROCEDURE — 36415 COLL VENOUS BLD VENIPUNCTURE: CPT

## 2021-10-19 PROCEDURE — U0005 INFEC AGEN DETEC AMPLI PROBE: HCPCS

## 2021-10-19 PROCEDURE — 85027 COMPLETE CBC AUTOMATED: CPT

## 2021-10-19 PROCEDURE — 83880 ASSAY OF NATRIURETIC PEPTIDE: CPT

## 2021-10-19 PROCEDURE — U0003 INFECTIOUS AGENT DETECTION BY NUCLEIC ACID (DNA OR RNA); SEVERE ACUTE RESPIRATORY SYNDROME CORONAVIRUS 2 (SARS-COV-2) (CORONAVIRUS DISEASE [COVID-19]), AMPLIFIED PROBE TECHNIQUE, MAKING USE OF HIGH THROUGHPUT TECHNOLOGIES AS DESCRIBED BY CMS-2020-01-R: HCPCS

## 2021-10-19 PROCEDURE — 85610 PROTHROMBIN TIME: CPT

## 2021-10-19 PROCEDURE — 85730 THROMBOPLASTIN TIME PARTIAL: CPT

## 2021-10-20 LAB — SARS-COV-2 RNA RESP QL NAA+PROBE: NEGATIVE

## 2021-10-21 ENCOUNTER — ANESTHESIA EVENT (OUTPATIENT)
Dept: SURGERY | Facility: CLINIC | Age: 61
End: 2021-10-21
Payer: COMMERCIAL

## 2021-10-22 ENCOUNTER — APPOINTMENT (OUTPATIENT)
Dept: GENERAL RADIOLOGY | Facility: CLINIC | Age: 61
End: 2021-10-22
Attending: NEUROLOGICAL SURGERY
Payer: COMMERCIAL

## 2021-10-22 ENCOUNTER — HOSPITAL ENCOUNTER (OUTPATIENT)
Facility: CLINIC | Age: 61
Discharge: HOME | End: 2021-10-22
Attending: NEUROLOGICAL SURGERY | Admitting: NEUROLOGICAL SURGERY
Payer: COMMERCIAL

## 2021-10-22 ENCOUNTER — ANESTHESIA (OUTPATIENT)
Dept: SURGERY | Facility: CLINIC | Age: 61
End: 2021-10-22
Payer: COMMERCIAL

## 2021-10-22 VITALS
DIASTOLIC BLOOD PRESSURE: 72 MMHG | WEIGHT: 137.35 LBS | TEMPERATURE: 98.1 F | SYSTOLIC BLOOD PRESSURE: 160 MMHG | HEIGHT: 66 IN | OXYGEN SATURATION: 98 % | HEART RATE: 92 BPM | BODY MASS INDEX: 22.07 KG/M2 | RESPIRATION RATE: 14 BRPM

## 2021-10-22 DIAGNOSIS — M54.81 BILATERAL OCCIPITAL NEURALGIA: Primary | ICD-10-CM

## 2021-10-22 DIAGNOSIS — R51.9 FACIAL PAIN: ICD-10-CM

## 2021-10-22 LAB — GLUCOSE BLDC GLUCOMTR-MCNC: 79 MG/DL (ref 70–99)

## 2021-10-22 PROCEDURE — 272N000002 HC OR SUPPLY OTHER OPNP: Performed by: NEUROLOGICAL SURGERY

## 2021-10-22 PROCEDURE — 278N000051 HC OR IMPLANT GENERAL: Performed by: NEUROLOGICAL SURGERY

## 2021-10-22 PROCEDURE — 2894A VOIDCORRECT: CPT | Mod: 58 | Performed by: NEUROLOGICAL SURGERY

## 2021-10-22 PROCEDURE — 70250 X-RAY EXAM OF SKULL: CPT | Mod: 26 | Performed by: RADIOLOGY

## 2021-10-22 PROCEDURE — 250N000009 HC RX 250: Performed by: NEUROLOGICAL SURGERY

## 2021-10-22 PROCEDURE — 250N000025 HC SEVOFLURANE, PER MIN: Performed by: NEUROLOGICAL SURGERY

## 2021-10-22 PROCEDURE — 250N000011 HC RX IP 250 OP 636

## 2021-10-22 PROCEDURE — 258N000003 HC RX IP 258 OP 636: Performed by: ANESTHESIOLOGY

## 2021-10-22 PROCEDURE — 360N000076 HC SURGERY LEVEL 3, PER MIN: Performed by: NEUROLOGICAL SURGERY

## 2021-10-22 PROCEDURE — 250N000011 HC RX IP 250 OP 636: Performed by: ANESTHESIOLOGY

## 2021-10-22 PROCEDURE — 710N000010 HC RECOVERY PHASE 1, LEVEL 2, PER MIN: Performed by: NEUROLOGICAL SURGERY

## 2021-10-22 PROCEDURE — 999N000065 XR SKULL 1/3 VIEWS

## 2021-10-22 PROCEDURE — 82962 GLUCOSE BLOOD TEST: CPT

## 2021-10-22 PROCEDURE — 999N000141 HC STATISTIC PRE-PROCEDURE NURSING ASSESSMENT: Performed by: NEUROLOGICAL SURGERY

## 2021-10-22 PROCEDURE — 64553 IMPLANT NEUROELECTRODES: CPT | Mod: 58 | Performed by: NEUROLOGICAL SURGERY

## 2021-10-22 PROCEDURE — C1787 PATIENT PROGR, NEUROSTIM: HCPCS | Performed by: NEUROLOGICAL SURGERY

## 2021-10-22 PROCEDURE — 61885 INSRT/REDO NEUROSTIM 1 ARRAY: CPT | Mod: 58 | Performed by: NEUROLOGICAL SURGERY

## 2021-10-22 PROCEDURE — 250N000009 HC RX 250: Performed by: ANESTHESIOLOGY

## 2021-10-22 PROCEDURE — 272N000001 HC OR GENERAL SUPPLY STERILE: Performed by: NEUROLOGICAL SURGERY

## 2021-10-22 PROCEDURE — 370N000017 HC ANESTHESIA TECHNICAL FEE, PER MIN: Performed by: NEUROLOGICAL SURGERY

## 2021-10-22 PROCEDURE — 999N000179 XR SURGERY CARM FLUORO LESS THAN 5 MIN W STILLS: Mod: TC

## 2021-10-22 PROCEDURE — C1778 LEAD, NEUROSTIMULATOR: HCPCS | Performed by: NEUROLOGICAL SURGERY

## 2021-10-22 PROCEDURE — 710N000012 HC RECOVERY PHASE 2, PER MINUTE: Performed by: NEUROLOGICAL SURGERY

## 2021-10-22 PROCEDURE — 250N000011 HC RX IP 250 OP 636: Performed by: NEUROLOGICAL SURGERY

## 2021-10-22 DEVICE — IMPLANTABLE DEVICE: Type: IMPLANTABLE DEVICE | Site: CRANIAL | Status: FUNCTIONAL

## 2021-10-22 RX ORDER — MEPERIDINE HYDROCHLORIDE 25 MG/ML
12.5 INJECTION INTRAMUSCULAR; INTRAVENOUS; SUBCUTANEOUS
Status: DISCONTINUED | OUTPATIENT
Start: 2021-10-22 | End: 2021-10-22 | Stop reason: HOSPADM

## 2021-10-22 RX ORDER — ONDANSETRON 2 MG/ML
INJECTION INTRAMUSCULAR; INTRAVENOUS PRN
Status: DISCONTINUED | OUTPATIENT
Start: 2021-10-22 | End: 2021-10-22

## 2021-10-22 RX ORDER — ONDANSETRON 2 MG/ML
4 INJECTION INTRAMUSCULAR; INTRAVENOUS EVERY 30 MIN PRN
Status: DISCONTINUED | OUTPATIENT
Start: 2021-10-22 | End: 2021-10-22 | Stop reason: HOSPADM

## 2021-10-22 RX ORDER — MAGNESIUM HYDROXIDE 1200 MG/15ML
LIQUID ORAL PRN
Status: DISCONTINUED | OUTPATIENT
Start: 2021-10-22 | End: 2021-10-22 | Stop reason: HOSPADM

## 2021-10-22 RX ORDER — LORAZEPAM 0.5 MG/1
0.5 TABLET ORAL 2 TIMES DAILY PRN
Qty: 60 TABLET | Refills: 0 | Status: SHIPPED | OUTPATIENT
Start: 2021-10-22 | End: 2021-12-09

## 2021-10-22 RX ORDER — ONDANSETRON 4 MG/1
4 TABLET, ORALLY DISINTEGRATING ORAL EVERY 30 MIN PRN
Status: DISCONTINUED | OUTPATIENT
Start: 2021-10-22 | End: 2021-10-22 | Stop reason: HOSPADM

## 2021-10-22 RX ORDER — SODIUM CHLORIDE, SODIUM LACTATE, POTASSIUM CHLORIDE, CALCIUM CHLORIDE 600; 310; 30; 20 MG/100ML; MG/100ML; MG/100ML; MG/100ML
INJECTION, SOLUTION INTRAVENOUS CONTINUOUS PRN
Status: DISCONTINUED | OUTPATIENT
Start: 2021-10-22 | End: 2021-10-22

## 2021-10-22 RX ORDER — FENTANYL CITRATE 50 UG/ML
INJECTION, SOLUTION INTRAMUSCULAR; INTRAVENOUS PRN
Status: DISCONTINUED | OUTPATIENT
Start: 2021-10-22 | End: 2021-10-22

## 2021-10-22 RX ORDER — ACETAMINOPHEN 325 MG/1
975 TABLET ORAL ONCE
Status: DISCONTINUED | OUTPATIENT
Start: 2021-10-22 | End: 2021-10-22 | Stop reason: HOSPADM

## 2021-10-22 RX ORDER — LORAZEPAM 0.5 MG/1
0.5 TABLET ORAL 2 TIMES DAILY PRN
Status: DISCONTINUED | OUTPATIENT
Start: 2021-10-22 | End: 2021-10-22 | Stop reason: HOSPADM

## 2021-10-22 RX ORDER — DEXAMETHASONE SODIUM PHOSPHATE 4 MG/ML
INJECTION, SOLUTION INTRA-ARTICULAR; INTRALESIONAL; INTRAMUSCULAR; INTRAVENOUS; SOFT TISSUE PRN
Status: DISCONTINUED | OUTPATIENT
Start: 2021-10-22 | End: 2021-10-22

## 2021-10-22 RX ORDER — LIDOCAINE HYDROCHLORIDE 20 MG/ML
INJECTION, SOLUTION INFILTRATION; PERINEURAL PRN
Status: DISCONTINUED | OUTPATIENT
Start: 2021-10-22 | End: 2021-10-22

## 2021-10-22 RX ORDER — OXYCODONE HYDROCHLORIDE 5 MG/1
5 TABLET ORAL EVERY 6 HOURS PRN
Qty: 20 TABLET | Refills: 0 | Status: SHIPPED | OUTPATIENT
Start: 2021-10-22 | End: 2021-11-04

## 2021-10-22 RX ORDER — CLINDAMYCIN HCL 300 MG
300 CAPSULE ORAL 4 TIMES DAILY
Qty: 28 CAPSULE | Refills: 0 | Status: SHIPPED | OUTPATIENT
Start: 2021-10-22 | End: 2021-10-29

## 2021-10-22 RX ORDER — FENTANYL CITRATE 50 UG/ML
25 INJECTION, SOLUTION INTRAMUSCULAR; INTRAVENOUS EVERY 5 MIN PRN
Status: DISCONTINUED | OUTPATIENT
Start: 2021-10-22 | End: 2021-10-22 | Stop reason: HOSPADM

## 2021-10-22 RX ORDER — LIDOCAINE 40 MG/G
CREAM TOPICAL
Status: DISCONTINUED | OUTPATIENT
Start: 2021-10-22 | End: 2021-10-22 | Stop reason: HOSPADM

## 2021-10-22 RX ORDER — CLINDAMYCIN PHOSPHATE 900 MG/50ML
900 INJECTION, SOLUTION INTRAVENOUS SEE ADMIN INSTRUCTIONS
Status: DISCONTINUED | OUTPATIENT
Start: 2021-10-22 | End: 2021-10-22 | Stop reason: HOSPADM

## 2021-10-22 RX ORDER — BUTALBITAL, ASPIRIN, AND CAFFEINE 325; 50; 40 MG/1; MG/1; MG/1
1 CAPSULE ORAL EVERY 4 HOURS PRN
Qty: 60 CAPSULE | Refills: 0 | Status: SHIPPED | OUTPATIENT
Start: 2021-10-25 | End: 2022-03-10

## 2021-10-22 RX ORDER — FENTANYL CITRATE 50 UG/ML
25 INJECTION, SOLUTION INTRAMUSCULAR; INTRAVENOUS
Status: DISCONTINUED | OUTPATIENT
Start: 2021-10-22 | End: 2021-10-22 | Stop reason: HOSPADM

## 2021-10-22 RX ORDER — EPHEDRINE SULFATE 50 MG/ML
INJECTION, SOLUTION INTRAMUSCULAR; INTRAVENOUS; SUBCUTANEOUS PRN
Status: DISCONTINUED | OUTPATIENT
Start: 2021-10-22 | End: 2021-10-22

## 2021-10-22 RX ORDER — CLINDAMYCIN PHOSPHATE 900 MG/50ML
900 INJECTION, SOLUTION INTRAVENOUS
Status: COMPLETED | OUTPATIENT
Start: 2021-10-22 | End: 2021-10-22

## 2021-10-22 RX ORDER — SODIUM CHLORIDE, SODIUM LACTATE, POTASSIUM CHLORIDE, CALCIUM CHLORIDE 600; 310; 30; 20 MG/100ML; MG/100ML; MG/100ML; MG/100ML
INJECTION, SOLUTION INTRAVENOUS CONTINUOUS
Status: DISCONTINUED | OUTPATIENT
Start: 2021-10-22 | End: 2021-10-22 | Stop reason: HOSPADM

## 2021-10-22 RX ORDER — PROPOFOL 10 MG/ML
INJECTION, EMULSION INTRAVENOUS PRN
Status: DISCONTINUED | OUTPATIENT
Start: 2021-10-22 | End: 2021-10-22

## 2021-10-22 RX ORDER — BUTALBITAL, ASPIRIN, AND CAFFEINE 325; 50; 40 MG/1; MG/1; MG/1
1 CAPSULE ORAL EVERY 4 HOURS PRN
Status: DISCONTINUED | OUTPATIENT
Start: 2021-10-25 | End: 2021-10-22 | Stop reason: HOSPADM

## 2021-10-22 RX ADMIN — FENTANYL CITRATE 25 MCG: 50 INJECTION INTRAMUSCULAR; INTRAVENOUS at 17:15

## 2021-10-22 RX ADMIN — PHENYLEPHRINE HYDROCHLORIDE 100 MCG: 10 INJECTION INTRAVENOUS at 13:44

## 2021-10-22 RX ADMIN — MIDAZOLAM 2 MG: 1 INJECTION INTRAMUSCULAR; INTRAVENOUS at 12:53

## 2021-10-22 RX ADMIN — FENTANYL CITRATE 50 MCG: 50 INJECTION, SOLUTION INTRAMUSCULAR; INTRAVENOUS at 15:24

## 2021-10-22 RX ADMIN — ROCURONIUM BROMIDE 20 MG: 50 INJECTION, SOLUTION INTRAVENOUS at 13:57

## 2021-10-22 RX ADMIN — CLINDAMYCIN PHOSPHATE 900 MG: 900 INJECTION, SOLUTION INTRAVENOUS at 13:07

## 2021-10-22 RX ADMIN — PHENYLEPHRINE HYDROCHLORIDE 100 MCG: 10 INJECTION INTRAVENOUS at 15:58

## 2021-10-22 RX ADMIN — PHENYLEPHRINE HYDROCHLORIDE 100 MCG: 10 INJECTION INTRAVENOUS at 13:14

## 2021-10-22 RX ADMIN — ROCURONIUM BROMIDE 10 MG: 50 INJECTION, SOLUTION INTRAVENOUS at 15:30

## 2021-10-22 RX ADMIN — PHENYLEPHRINE HYDROCHLORIDE 100 MCG: 10 INJECTION INTRAVENOUS at 13:27

## 2021-10-22 RX ADMIN — FENTANYL CITRATE 100 MCG: 50 INJECTION, SOLUTION INTRAMUSCULAR; INTRAVENOUS at 13:00

## 2021-10-22 RX ADMIN — PHENYLEPHRINE HYDROCHLORIDE 200 MCG: 10 INJECTION INTRAVENOUS at 13:50

## 2021-10-22 RX ADMIN — SUGAMMADEX 200 MG: 100 INJECTION, SOLUTION INTRAVENOUS at 16:26

## 2021-10-22 RX ADMIN — ROCURONIUM BROMIDE 10 MG: 50 INJECTION, SOLUTION INTRAVENOUS at 14:50

## 2021-10-22 RX ADMIN — SODIUM CHLORIDE, POTASSIUM CHLORIDE, SODIUM LACTATE AND CALCIUM CHLORIDE: 600; 310; 30; 20 INJECTION, SOLUTION INTRAVENOUS at 12:54

## 2021-10-22 RX ADMIN — ROCURONIUM BROMIDE 15 MG: 50 INJECTION, SOLUTION INTRAVENOUS at 13:35

## 2021-10-22 RX ADMIN — PHENYLEPHRINE HYDROCHLORIDE 100 MCG: 10 INJECTION INTRAVENOUS at 16:06

## 2021-10-22 RX ADMIN — PHENYLEPHRINE HYDROCHLORIDE 100 MCG: 10 INJECTION INTRAVENOUS at 14:50

## 2021-10-22 RX ADMIN — PHENYLEPHRINE HYDROCHLORIDE 100 MCG: 10 INJECTION INTRAVENOUS at 13:33

## 2021-10-22 RX ADMIN — ONDANSETRON 4 MG: 2 INJECTION INTRAMUSCULAR; INTRAVENOUS at 16:34

## 2021-10-22 RX ADMIN — Medication 5 MG: at 13:35

## 2021-10-22 RX ADMIN — FENTANYL CITRATE 50 MCG: 50 INJECTION, SOLUTION INTRAMUSCULAR; INTRAVENOUS at 13:57

## 2021-10-22 RX ADMIN — PHENYLEPHRINE HYDROCHLORIDE 100 MCG: 10 INJECTION INTRAVENOUS at 16:13

## 2021-10-22 RX ADMIN — PHENYLEPHRINE HYDROCHLORIDE 50 MCG: 10 INJECTION INTRAVENOUS at 14:39

## 2021-10-22 RX ADMIN — ROCURONIUM BROMIDE 10 MG: 50 INJECTION, SOLUTION INTRAVENOUS at 15:11

## 2021-10-22 RX ADMIN — ONDANSETRON 4 MG: 2 INJECTION INTRAMUSCULAR; INTRAVENOUS at 15:57

## 2021-10-22 RX ADMIN — PROCHLORPERAZINE EDISYLATE 10 MG: 5 INJECTION INTRAMUSCULAR; INTRAVENOUS at 16:54

## 2021-10-22 RX ADMIN — DEXAMETHASONE SODIUM PHOSPHATE 6 MG: 4 INJECTION, SOLUTION INTRA-ARTICULAR; INTRALESIONAL; INTRAMUSCULAR; INTRAVENOUS; SOFT TISSUE at 13:15

## 2021-10-22 RX ADMIN — PHENYLEPHRINE HYDROCHLORIDE 100 MCG: 10 INJECTION INTRAVENOUS at 15:38

## 2021-10-22 RX ADMIN — ROCURONIUM BROMIDE 10 MG: 50 INJECTION, SOLUTION INTRAVENOUS at 14:28

## 2021-10-22 RX ADMIN — PHENYLEPHRINE HYDROCHLORIDE 100 MCG: 10 INJECTION INTRAVENOUS at 15:29

## 2021-10-22 RX ADMIN — SODIUM CHLORIDE, POTASSIUM CHLORIDE, SODIUM LACTATE AND CALCIUM CHLORIDE: 600; 310; 30; 20 INJECTION, SOLUTION INTRAVENOUS at 16:10

## 2021-10-22 RX ADMIN — Medication 10 MG: at 13:41

## 2021-10-22 RX ADMIN — PHENYLEPHRINE HYDROCHLORIDE 100 MCG: 10 INJECTION INTRAVENOUS at 13:35

## 2021-10-22 RX ADMIN — PROPOFOL 100 MG: 10 INJECTION, EMULSION INTRAVENOUS at 13:02

## 2021-10-22 RX ADMIN — ROCURONIUM BROMIDE 35 MG: 50 INJECTION, SOLUTION INTRAVENOUS at 13:04

## 2021-10-22 RX ADMIN — PHENYLEPHRINE HYDROCHLORIDE 50 MCG: 10 INJECTION INTRAVENOUS at 14:44

## 2021-10-22 RX ADMIN — PROPOFOL 30 MG: 10 INJECTION, EMULSION INTRAVENOUS at 13:03

## 2021-10-22 RX ADMIN — LIDOCAINE HYDROCHLORIDE 100 MG: 20 INJECTION, SOLUTION INFILTRATION; PERINEURAL at 13:00

## 2021-10-22 ASSESSMENT — MIFFLIN-ST. JEOR: SCORE: 1214.75

## 2021-10-22 NOTE — ANESTHESIA CARE TRANSFER NOTE
Patient: Martha Chun    Procedure: Procedure(s):  Phase II occipital nerve stimulator placement, bilateral occipital electrodes and bilateral trigeminal electrodes, Implantation of bilateral subcutaneous neurostimulator Percutaneous implantation of neurostimulator electrode array; cranial nerve Implantable neurostimulator electrode, each CPT 65023 Implantable neurostimulator pulse generator, dual array, nonrechargeable, includes extension CPT        Diagnosis: Bilateral occipital neuralgia [M54.81]  Facial pain [R51.9]  Diagnosis Additional Information: No value filed.    Anesthesia Type:   No value filed.     Note:    Oropharynx: oropharynx clear of all foreign objects and spontaneously breathing    Oxygen Supplementation: nasal cannula    Independent Airway: airway patency satisfactory and stable  Dentition: dentition unchanged  Vital Signs Stable: post-procedure vital signs reviewed and stable  Report to RN Given: handoff report given  Patient transferred to: PACU  Comments: Patient transferred to PACU ins table condition, breathing spontaneously on NC, VSS.  Report given to RN.  Handoff Report: Identifed the Patient, Identified the Reponsible Provider, Reviewed the pertinent medical history, Discussed the surgical course, Reviewed Intra-OP anesthesia mangement and issues during anesthesia, Set expectations for post-procedure period and Allowed opportunity for questions and acknowledgement of understanding      Vitals:  Vitals Value Taken Time   BP     Temp     Pulse     Resp     SpO2         Electronically Signed By: TRICIA Schmitz CRNA  October 22, 2021  4:34 PM

## 2021-10-22 NOTE — ANESTHESIA PREPROCEDURE EVALUATION
"Anesthesia Pre-Procedure Evaluation    Patient: Martha Chun   MRN: [id number] : 1960        Preoperative Diagnosis: Bilateral occipital neuralgia [M54.81]  Facial pain [R51.9]    Procedure : Procedure(s):  Phase II occipital nerve stimulator placement, bilateral occipital electrodes and bilateral trigeminal electrodes, Implantation of bilateral subcutaneous neurostimulator Percutaneous implantation of neurostimulator electrode array; cranial nerve Implantable neurostimulator electrode, each CPT 89386 Implantable neurostimulator pulse generator, dual array, nonrechargeable, includes extension CPT           Past Medical History:   Diagnosis Date     Anxiety      Degenerative arthritis      Depression      Fibromyalgia      Hypertension      Migraines      Occipital neuralgia     b/l     Other chronic pain      Seasonal allergies       Past Surgical History:   Procedure Laterality Date     C LIGATE FALLOPIAN TUBE      Description: Tubal Ligation;  Recorded: 2012;     CERVICAL FUSION  2020 and Dec 2020     INSERT STIMULATOR OCCIPITAL Bilateral 2021    Procedure: Externalized trial of percutaneous occipital nerve stimulator bilateral Percutaneous implantation of neurostimulator electrode array; cranial nerve CPT 85279;  Surgeon: Kobi Shrestha MD;  Location: [facility] OR     ORTHOPEDIC SURGERY      s/p right EMELINA; left TKA and s/p cervical fusions      Allergies   Allergen Reactions     Amlodipine Other (See Comments)     Constipation, \"liver pain\".         Clonidine      Other reaction(s): Dizziness     Escitalopram Muscle Pain (Myalgia)     Other reaction(s): Myalgias     Mold Nausea     Ringing in ears, racing heart     Paroxetine Other (See Comments)     Palpitations/racing heart         Penicillins Unknown     Does not remember reaction, mother said she allergic to mold; patient refuses Pcn or any derivative       Sulfa Drugs Unknown     Tramadol      Other reaction(s): Runny " "Nose  Facial pain, sinus swelling       Carvedilol Other (See Comments)     Worsening depression         Codeine Headache and Nausea and Vomiting     Cortisone Other (See Comments)     Patient reports and possibly all steroids \"makes me to feel sick\", get fungal infections       Duloxetine Headache     Oxycodone Headache      Social History     Tobacco Use     Smoking status: Former Smoker     Packs/day: 1.00     Years: 15.00     Pack years: 15.00     Quit date: 10/10/2014     Years since quittin.0     Smokeless tobacco: Never Used   Substance Use Topics     Alcohol use: Not Currently     Comment: Last drink 10/2014      Wt Readings from Last 1 Encounters:   10/22/21 62.3 kg (137 lb 5.6 oz)        Anesthesia Evaluation   Pt has had prior anesthetic.         ROS/MED HX  ENT/Pulmonary:  - neg pulmonary ROS     Neurologic:     (+) migraines,     Cardiovascular:  - neg cardiovascular ROS     METS/Exercise Tolerance:     Hematologic:  - neg hematologic  ROS     Musculoskeletal:       GI/Hepatic:  - neg GI/hepatic ROS     Renal/Genitourinary:  - neg Renal ROS     Endo:  - neg endo ROS     Psychiatric/Substance Use:     (+) psychiatric history     Infectious Disease:  - neg infectious disease ROS     Malignancy:       Other:  - neg other ROS    (+) , H/O Chronic Pain,        Physical Exam    Airway  airway exam normal           Respiratory Devices and Support         Dental  no notable dental history         Cardiovascular   cardiovascular exam normal          Pulmonary   pulmonary exam normal                OUTSIDE LABS:  CBC:   Lab Results   Component Value Date    WBC 6.5 10/19/2021    WBC 8.0 2021    HGB 13.3 10/19/2021    HGB 13.7 2021    HCT 39.1 10/19/2021    HCT 41.6 2021     10/19/2021     2021     BMP:   Lab Results   Component Value Date     10/19/2021     2021    POTASSIUM 4.2 10/19/2021    POTASSIUM 3.7 2021    CHLORIDE 103 10/19/2021    " CHLORIDE 104 08/25/2021    CO2 28 10/19/2021    CO2 32 08/25/2021    BUN 12 10/19/2021    BUN 15 08/25/2021    CR 0.64 10/19/2021    CR 0.71 08/25/2021    GLC 79 10/22/2021    GLC 90 10/19/2021     COAGS:   Lab Results   Component Value Date    PTT 26 10/19/2021    INR 0.96 10/19/2021     POC: No results found for: BGM, HCG, HCGS  HEPATIC:   Lab Results   Component Value Date    ALBUMIN 4.3 03/19/2020    PROTTOTAL 7.4 03/19/2020    ALT 21 03/19/2020    AST 19 03/19/2020    GGT 18 03/19/2020    ALKPHOS 92 03/19/2020    BILITOTAL 0.4 03/19/2020     OTHER:   Lab Results   Component Value Date    ALYSE 8.9 10/19/2021    PHOS 3.8 03/19/2020    MAG 2.0 03/19/2020    TSH 2.24 03/19/2020       Anesthesia Plan    ASA Status:  2      Anesthesia Type: General.     - Airway: ETT              Consents    Anesthesia Plan(s) and associated risks, benefits, and realistic alternatives discussed. Questions answered and patient/representative(s) expressed understanding.     - Discussed with:  Patient      - Extended Intubation/Ventilatory Support Discussed: No.      - Patient is DNR/DNI Status: No    Use of blood products discussed: No .     Postoperative Care    Pain management: IV analgesics.   PONV prophylaxis: Ondansetron (or other 5HT-3), Promethazine or metoclopramide     Comments:                Fox Gramajo MD

## 2021-10-22 NOTE — ANESTHESIA POSTPROCEDURE EVALUATION
Patient: Martha Chun    Procedure: Procedure(s):  Phase II occipital nerve stimulator placement, bilateral occipital electrodes and bilateral trigeminal electrodes, Implantation of bilateral subcutaneous neurostimulator Percutaneous implantation of neurostimulator electrode array; cranial nerve Implantable neurostimulator electrode, each CPT 80787 Implantable neurostimulator pulse generator, dual array, nonrechargeable, includes extension CPT        Diagnosis:Bilateral occipital neuralgia [M54.81]  Facial pain [R51.9]  Diagnosis Additional Information: No value filed.    Anesthesia Type:  General    Note:  Disposition: Outpatient   Postop Pain Control: Challenging            Challenges/Interventions: Exacerbation of chronic pain   PONV: Yes            Symptoms: Nausea only            Sign Out: PONV/POV resolved with treatment   Neuro/Psych:    Airway/Respiratory: Uneventful            Sign Out: Acceptable/Baseline resp. status   CV/Hemodynamics: Uneventful            Sign Out: Acceptable CV status; No obvious hypovolemia; No obvious fluid overload   Other NRE: NONE   DID A NON-ROUTINE EVENT OCCUR? No    Event details/Postop Comments:  Complaints of widespread discomfort, pain, feeling hot, nausea.  Given chronic nature of her problems not entirely surprising.  Rx pain meds and multiple antiemetics.             Last vitals:  Vitals Value Taken Time   /88 10/22/21 1730   Temp 37.2  C (98.9  F) 10/22/21 1634   Pulse 92 10/22/21 1741   Resp 12 10/22/21 1700   SpO2 88 % 10/22/21 1741   Vitals shown include unvalidated device data.    Electronically Signed By: Fox Gramajo MD  October 22, 2021  5:42 PM

## 2021-10-22 NOTE — BRIEF OP NOTE
[facility]    Brief Operative Note    Pre-operative diagnosis: Bilateral occipital neuralgia [M54.81]  Facial pain [R51.9]  Post-operative diagnosis Same as pre-operative diagnosis    Procedure: Procedure(s):  Phase II occipital nerve stimulator placement, bilateral occipital electrodes and bilateral trigeminal electrodes, Implantation of bilateral subcutaneous neurostimulator Percutaneous implantation of neurostimulator electrode array; cranial nerve Implantable neurostimulator electrode, each CPT 64833 Implantable neurostimulator pulse generator, dual array, nonrechargeable, includes extension CPT   Surgeon: Surgeon(s) and Role:     * Kobi Shrestha MD - Primary   Fatou Randolph MD- Resident- Assisting  Anesthesia: General   Estimated Blood Loss: 20 mL  Drains:  None  Specimens: None  Findings:  None .  Complications: None  Implants:   Implant Name Type Inv. Item Serial No.  Lot No. LRB No. Used Action   OCTRODE LEAD KIT, 60CM    [id number] [employer]WIN  Right 1 Implanted   OCTRODE LEAD KIT, 60 CM   [id number] [employer]WIN  Right 1 Implanted   PROCLAIM XR5 BATTERY   [id number] [employer]  Right 1 Implanted    Octrode lead kit Neurology device  [id number]  Left 1 Implanted   OCTRODE LEAD KIT, 60CM   [id number]   Left 1 Implanted   PROCLAIM XR 5   [id number]   Left 1 Implanted     Fatou Randolph MD  Neurosurgery PGY-1

## 2021-10-23 NOTE — PROVIDER NOTIFICATION
Paged Dr Shrestha, patient verbalized speaking to him regarding a prescription for more Lorazepam.  No page returned.  Patient verbalizes she has enough to get through the weekend and wants to go home without the prescription.  Before patient left, a team member from Neuro surgery stopped by patient room and filled out the Rx to her home pharmacy.

## 2021-10-25 ENCOUNTER — PATIENT OUTREACH (OUTPATIENT)
Dept: NEUROSURGERY | Facility: CLINIC | Age: 61
End: 2021-10-25

## 2021-10-25 NOTE — PROGRESS NOTES
[facility]: Post-Discharge Note  SITUATION                                                      Admission:    Admission Date: 10/22/21   Reason for Admission: Phase II occipital nerve stimulator placement, bilateral occipital electrodes and bilateral trigeminal electrodes, Implantation of bilateral subcutaneous neurostimulator Percutaneous implantation of neurostimulator electrode array; cranial nerve Implantable neurostimulator electrode, each CPT 07560 Implantable neurostimulator pulse generator, dual array, nonrechargeable, includes extension  Discharge:   Discharge Date: 10/22/21  Discharge Diagnosis: occipital neuralgia    BACKGROUND                                                      Neurosurgery Discharge Coordination Note     Attending physician: Dr. Shrestha  Discharge to: Home     Current status: Patient states she is doing well. Postoperative pain is manageable. She noted some swelling at the generator sites over the weekend and had a temp of 99F, but swelling has improved and pt now afebrile. Denies redness, swelling, increased tenderness, drainage, incisions opening or elevated temp. Reports Incision CDI without signs of infection.  Denies current bowel or bladder issues.    Discharge instructions and medications reviewed with patient.  Follow up appointments/imaging/tests needed: 2 week post op with Ashley Wilson on 11/4/21. Will notify [employer] rep.      RN triage/on call number given: [phone]/ [phone]        ASSESSMENT           Discharge Assessment  How are your symptoms? (Red Flag symptoms escalate to triage hotline per guidelines): Improved  Does the patient have their discharge instructions? : Yes  Do you have all of your needed medical supplies or equipment (DME)?  (i.e. oxygen tank, CPAP, cane, etc.): Yes  Discharge follow-up appointment scheduled within 14 calendar days? : Yes  Discharge Follow Up Appointment Date: 11/04/21  Discharge Follow Up Appointment Scheduled with?:  Specialty Care Provider         Post-op (Clinicians Only)  Did the patient have surgery or a procedure: Yes  Incision: closed;healing  Drainage: No  Bleeding: none  Fever: No  Chills: No  Redness: No  Warmth: No  Swelling: Yes (improved)  Incision site pain: Yes  Closure: suture  Eating & Drinking: eating and drinking without complaints/concerns  PO Intake: regular diet  Bowel Function: normal  Urinary Status: voiding without complaint/concerns        PLAN                                                      Outpatient Plan:  Routine postop follow-up      Future Appointments   Date Time Provider Department Center   11/4/2021 11:30 AM Ashley Wilson APRN [facility]         For any urgent concerns, please contact our 24 hour nurse triage line: [phone] ([phone])         FARHAN RSOAS RN

## 2021-10-26 ENCOUNTER — TELEPHONE (OUTPATIENT)
Dept: NEUROSURGERY | Facility: CLINIC | Age: 61
End: 2021-10-26

## 2021-10-26 NOTE — TELEPHONE ENCOUNTER
Because of the recent TIA, we will try pravastatin 10mg every supper time.     See back in about 6 weeks.     No other medication changes at this time .   Returned pt's call. She said she is not getting the occipital stimulation she was expecting and [employer] has not been able to find a programming solution. Pt would like me to follow-up with [employer]. I will do this and will also notify Dr. Shrestha when he returns to the office next week.     Pt in agreement with plan. No further questions at this time.

## 2021-11-03 NOTE — OP NOTE
Procedure Date: 10/22/2021     ATTENDING SURGEON:  Kobi Shrestha MD     :  Fatou Randolph MD, Ph.D     PREOPERATIVE DIAGNOSIS:  Bilateral occipital neuralgia.     POSTOPERATIVE DIAGNOSIS:  Bilateral occipital neuralgia.     PROCEDURE PERFORMED:    1.  Phase II occipital nerve stimulator implant bilateral  2.  Use of intraoperative fluoroscopy.  3. Electronic interrogation of neurostimulator     ANESTHESIA:  General endotracheal.     ESTIMATED BLOOD LOSS:  10 mL     DRAINS:  None.     SPECIMENS:  Nil.     FINDINGS:  excellent impedances     COMPLICATIONS:  Nil.     IMPLANTS:    Implant Name Type Inv. Item Serial No.  Lot No. LRB No. Used Action   OCTRODE LEAD KIT, 60CM    [id number] IRWIN  Right 1 Implanted   OCTRODE LEAD KIT, 60 CM   [id number] IRWIN  Right 1 Implanted   PROCLAIM XR5 BATTERY   [id number] ABBOTT  Right 1 Implanted    Octrode lead kit Neurology device  [id number] IRWIN NA Left 1 Implanted   OCTRODE LEAD KIT, 60CM   [id number]   Left 1 Implanted   PROCLAIM XR 5   [id number]   Left 1 Implanted        INDICATIONS FOR OPERATION:  Martha Chun is a 61-year-old female who has a history of occipital neuralgia and complex facial pain, who was then seen by Dr. Shrestha at the [facility] to discuss an occipital nerve stimulation trial for treatment of her longstanding occipital neuralgia, significant occipital pain, and had a successful trial of occipital nerve block; however, that did not provide any durable relief.  She underwent percutaneous placement of bilateral occipital nerve stimulators externalized trial that was successful. She had significant pain reduction but wanted more coverage laterally. We had a significant discussion about the risks, benefits and alternatives.     DESCRIPTION OF PROCEDURE:  The patient was identified in the preoperative holding area using 2 unique identifiers.  Informed consent was signed and verified.     Our Anesthesia  colleagues brought the patient back to the operating room.  There, general anesthesia was induced.  The patient was successfully endotracheally intubated.  Adequate IV access was obtained. She was placed supine and turned lateral, and we began on the Right side. We planned bilateral postauricular incisions and bilateral chest incisions for the IPGs.    We cleaned, prepped, and draped the Right side with her turned to the Left. A timeout was performed. Both sites were infiltrated with local anesthetic with epinephrine.  After waiting sufficient amount of time for maximal effect of the anesthetic and epinephrine, a 10 blade scalpel was used to make the post-auricular incision. Using intraoperative fluoroscopy, we used curved Tuohy needles to place electrodes across the occipital nerves on the Right and anteriorly towards V1 for temporal coverage. Xrays confirmed appropriate placement and electrodes were left in each spot. Anchors were secured for both electrode and secured to the underlying fascia using ethibond sutures.  We then made horizontal incisions over the preplanned incisions of the chest. Subcutaneous dissection was performed to make a pocket. The wound was irrigated and hemostasis obtained. A shunt tunneler was used to pass the two electrodes from the post-auricular incision to the chest wall pocket. The two wires were identified and connected to the implantable pulse generator. At this time we electronically interrogated the device and found excellent impedances across both electrodes consisting of a total of 16 contacts. We again repeated an xray to confirm appropriate locations. Both wounds were irrigated and dried. Both were carefully inspected for hemostasis. The subcutaneous layers were reapproximated with 2-0 and 3-0 vicryl sutures. The postauricular skin incision was closed with a running 3-0 monocryl and the chest wall incision was reapproximated with a subcuticular 4-0 monocryl. Both incisions  were cleaned, dried, prepped, and covered with exofin.    We then turned our attention to the Left side. We cleaned, prepped, and draped the Left side with her turned to the Right. A timeout was performed. Both sites were infiltrated with local anesthetic with epinephrine.  After waiting sufficient amount of time for maximal effect of the anesthetic and epinephrine, a 10 blade scalpel was used to make the post-auricular incision. Using intraoperative fluoroscopy, we used curved Tuohy needles to place electrodes across the occipital nerves on the Left and anteriorly towards V1 for temporal coverage. Xrays confirmed appropriate placement and electrodes were left in each spot. Anchors were secured for both electrode and secured to the underlying fascia using ethibond sutures.  We then made horizontal incisions over the preplanned incisions of the chest. Subcutaneous dissection was performed to make a pocket. The wound was irrigated and hemostasis obtained. A shunt tunneler was used to pass the two electrodes from the post-auricular incision to the chest wall pocket. The two wires were identified and connected to the implantable pulse generator. At this time we electronically interrogated the device and found excellent impedances across both electrodes consisting of a total of 16 contacts. We again repeated an xray to confirm appropriate locations. Both wounds were irrigated and dried. Both were carefully inspected for hemostasis. The subcutaneous layers were reapproximated with 2-0 and 3-0 vicryl sutures. The postauricular skin incision was closed with a running 3-0 monocryl and the chest wall incision was reapproximated with a subcuticular 4-0 monocryl. Both incisions were cleaned, dried, prepped, and covered with exofin.     All counts were correct at the end of the case x2    I was present and scrubbed for all portions of the procedure.     Kobi Shrestha MD

## 2021-11-04 ENCOUNTER — OFFICE VISIT (OUTPATIENT)
Dept: NEUROSURGERY | Facility: CLINIC | Age: 61
End: 2021-11-04
Payer: MEDICAID

## 2021-11-04 ENCOUNTER — VIRTUAL VISIT (OUTPATIENT)
Dept: BEHAVIORAL HEALTH | Facility: CLINIC | Age: 61
End: 2021-11-04
Payer: MEDICAID

## 2021-11-04 VITALS
HEART RATE: 86 BPM | RESPIRATION RATE: 16 BRPM | DIASTOLIC BLOOD PRESSURE: 72 MMHG | OXYGEN SATURATION: 100 % | SYSTOLIC BLOOD PRESSURE: 185 MMHG

## 2021-11-04 DIAGNOSIS — F33.1 MODERATE EPISODE OF RECURRENT MAJOR DEPRESSIVE DISORDER (H): Primary | ICD-10-CM

## 2021-11-04 DIAGNOSIS — M79.7 FIBROMYALGIA: ICD-10-CM

## 2021-11-04 DIAGNOSIS — M54.81 BILATERAL OCCIPITAL NEURALGIA: Primary | ICD-10-CM

## 2021-11-04 DIAGNOSIS — Z45.42 ENCOUNTER FOR ADJUSTMENT AND MANAGEMENT OF NEUROSTIMULATOR: ICD-10-CM

## 2021-11-04 DIAGNOSIS — R51.9 FACIAL PAIN: ICD-10-CM

## 2021-11-04 PROCEDURE — 99024 POSTOP FOLLOW-UP VISIT: CPT | Performed by: NURSE PRACTITIONER

## 2021-11-04 ASSESSMENT — COLUMBIA-SUICIDE SEVERITY RATING SCALE - C-SSRS
1. IN THE PAST MONTH, HAVE YOU WISHED YOU WERE DEAD OR WISHED YOU COULD GO TO SLEEP AND NOT WAKE UP?: YES
1. IN THE PAST MONTH, HAVE YOU WISHED YOU WERE DEAD OR WISHED YOU COULD GO TO SLEEP AND NOT WAKE UP?: YES

## 2021-11-04 ASSESSMENT — PAIN SCALES - GENERAL: PAINLEVEL: EXTREME PAIN (8)

## 2021-11-04 NOTE — PROGRESS NOTES
[facility] Phone Encounter    Encounter Activities (Refresh/Reselect every encounter): NEW, [facility] Only, Warm-handoff  and Phone Encounter  Type of Contact Today: Phone call (patient / identified key support person reached)  Date of phone call: November 5, 2021    Length of Call: about 7 minutes                   Presenting Issue: [facility] was requested today to speak with Martha about reported concerns by her neurology care team about endorsed thoughts of suicide. Her neurology care team expressed Martha was struggling today because her implanted spinal cord stimulator is not working effectively and will not likely further assist in her management of chronic pain. [facility] spoke with Martha to provide support and evaluate her reported thoughts of suicide. Martha provided some background about her concern, how she has dealt with chronic pain for many years and lacks social support. She described how she feels lonely and doesn't have many people to help her right now. When asked about her thoughts of suicide, Martha stated she is experiencing thoughts similar to  being better off dead, however she denied having a plan, identified method of self-harm, and denied intent to act on her thoughts. She readily cited her daughter getting  and wanting to be there for her as her main protective factor right now. She denied history of previous suicide attempts or gestures. Martha denied having personal safety concerns about herself today, but is feeling upset/hopeless about her situation managing pain.     [facility] expressed to Martha concern about her SI and requested we complete the C-SSR and develop a safety plan, however Martha declined to complete these steps and wanted to end the call. She reportedly had another appointment to get to. [facility] encouraged her to remain on the line so that a full evaluation could be completed and so she could receive information about behavioral health treatment options. Martha stated she is interested in counseling, though noted  she needed to go and inquired if this writer would call her back. [facility] offered to call her back later today to review resources. Martha agreed and hung up the call.     [facility] attempted to call Martha at approximately 4:20 pm today to further evaluate SI concerns and review behavioral health treatment options. Martha did not answer the call. [facility] left a VM with call-back information.     Based on the available information, this writer did not determine it clinically necessary to have Martha evaluated further in the ED and/or complete a welfare check. Rationale for this determination included her thoughts of being better off dead today appearing understandably reactionary to her setback with her SCS, the absence of an identified method of self harm, and her denying having intent for self harm. Protective factors also appeared to include her absence of past suicide attempts, future orientation (she voiced needing to be somewhere, another appointment), and voiced commitment to her daughter and family. Risk factors considered included her age, evidence of chronic pain, and lack of social support. These risk factors did not appear to meet clinical threshold for a 72-hour hold and appeared appropriate for an outpatient level intervention.    Estimated risk of self-harm, based on interview, appeared low.     Martha would ideally benefit from brief psychological intervention. If she is unable to be reached, her care team would do well to encourage [facility] services further to help stabilize mood and help her establish with mental health resources. This referral can be found through [facility] integrated behavioral health.     [facility] Patient: No  MI Intervention: Expressed Empathy/Understanding, Supported Autonomy, Collaboration, Evocation, Permission to raise concern or advise, Reflections: simple and complex and Change talk (evoked)     [place] Suicide Severity Rating Scale (Short Version)  [place] Suicide Severity Rating (Short Version) 8/27/2021  10/22/2021   Over the past 2 weeks have you felt down, depressed, or hopeless? no no   Over the past 2 weeks have you had thoughts of killing yourself? no no   Have you ever attempted to kill yourself? no no       Safety Concerns:  Risk status (Self / Other harm or suicidal ideation)  Patient denies current fears or concerns for personal safety.  Patient denies current or recent suicidal ideation or behaviors.  Patient denies current or recent homicidal ideation or behaviors.  Patient denies current or recent self injurious behavior or ideation.  Patient denies other safety concerns.    Disposition:   Recommended that patient call 911 or go to the local ED should there be a change in any of these risk factors. [facility] was unable to complete a safety plan due to the patient ending the call.     Ion Haywood Psy.D, ALVAREZ   Behavioral Health Clinician   [facility]     November 4, 2021

## 2021-11-04 NOTE — LETTER
----- Message from Karen Orosco MD sent at 12/10/2018  8:10 AM CST -----  Pt attempted to refill carafate.  Last seen by Kasandra one year ago, scope negative by AA. Please give pt next available f/u appt with Kasandra.   11/4/2021      RE: Martha Chun  [address]  [place] [address]       NEUROSURGERY CLINIC NOTE       Reason for Visit:              Post Operative Evaluation and Wound Assessment       PROCEDURE PERFORMED:  10/22/2021           Dr. Kobi Shrestha   1.  Phase II occipital nerve stimulator implant bilateral  2.  Use of intraoperative fluoroscopy.  3. Electronic interrogation of neurostimulator        IMPLANTS:    Implant Name Type Inv. Item Serial No.  Lot No. LRB No. Used Action   OCTRODE LEAD KIT, 60CM      [id number] [employer]   Right 1 Implanted   OCTRODE LEAD KIT, 60 CM     [id number] [employer]   Right 1 Implanted   PROCLAIM XR5 BATTERY     [id number] [employer]   Right 1 Implanted    Octrode lead kit Neurology device   [id number] [employer] NA Left 1 Implanted   OCTRODE LEAD KIT, 60CM     [id number]     Left 1 Implanted   PROCLAIM XR 5     [id number]     Left 1 Implanted         HISTORY OF PRESENT ILLNESS:  Martha Chun is a 61-year-old female who has a history of occipital neuralgia and complex facial pain, who was seen by Dr. Shrestha at the [facility] to discuss an occipital nerve stimulation trial for treatment of her longstanding occipital neuralgia, significant occipital pain, and had a successful trial of occipital nerve block; however, that did not provide any durable relief.  She underwent percutaneous placement of bilateral occipital nerve stimulators externalized trial that was successful. She had significant pain reduction but wanted more coverage laterally. We had a significant discussion about the risks, benefits and alternatives.    Today,   She is having difficulty finding an optimal program at this time.   She is tearful at times during the exam, and discouraged.   She is meeting with the [employer] representative today, and continuing to work with programming towards adequate coverage.    She would also like a referral to pain management for head/neck pain, and  TMJ   She is interested in botox for neck pain   She did have some bruising at the site of the stimulator placement  -primarily on the right.   Some swelling - right, but improved.   After patient visited w/me, the [employer] representative came out of the room and stated   That the patient was citing severe depression, and having suicidal ideation.   She has been evaluated in the past by [facility] Freed in Mental Health and was recommended  to have follow up, but she has not followed up.   Today, we were able to reach out to psychology/mental health and the patient spoke directly to   Ion Haywood. (see separate dictation and progress note dated today)   She also spoke directly to Dr. Shrestha via conference telephone call.     Current Outpatient Medications   Medication     acetaminophen (TYLENOL) 500 MG tablet     aliskiren (TEKTURNA) 150 MG tablet     Butalbital-Acetaminophen (PHRENILIN)  MG TABS per tablet     butalbital-acetaminophen-caffeine (ESGIC) -40 MG tablet     butalbital-aspirin-caffeine (FIORINAL) -40 MG capsule     carboxymethylcellulose (REFRESH PLUS) 0.5 % SOLN ophthalmic solution     cholecalciferol 50 MCG (2000 UT) tablet     diltiazem ER (DILT-XR) 120 MG 24 hr capsule     diphenhydrAMINE-acetaminophen (TYLENOL PM)  MG tablet     fluticasone (FLONASE) 50 MCG/ACT nasal spray     gabapentin (NEURONTIN) 100 MG capsule     LORazepam (ATIVAN) 0.5 MG tablet     Melatonin 10 MG TABS tablet     mirtazapine (REMERON) 15 MG tablet     mirtazapine (REMERON) 7.5 MG tablet     Omega-3 Fatty Acids (FISH OIL PO)     Oxymetazoline HCl (NASAL SPRAY) 0.05 % SOLN     triprolidine-pseudoePHEDrine (APRODINE) 2.5-60 MG TABS per tablet     No current facility-administered medications for this visit.       Examination:   There were no vitals taken for this visit.      Neurological Assessment:      General    Alert, cooperative.  In mild distress, tearful at times   Pulmonary:   Breathing  comfortably on room air. No cough, or shortness of breath  Skin:    Visible skin without lesions or obvious rash  Speech is fluent  Maintains eye contact  Musculoskeletal:    Moving extremities freely with good strength  Incision:    Clean, dry, and intact.  No open drainage.   Some swelling of surgical site on the right.   She has some ecchymosis, and bruising R> L  Closed w/surgical skin glue   She has some skin tightness on the right- but on exam- skin is pliable   And palpable tubing, but does not elicit pain response.    Cranial nerve exam   Maintains eye contact, extra ocular movements grossly intact   Able to raise eyebrows  Puff out checks  Smile/show teeth with no facial droop  Facial features symmetrical  Tongue protrusion midline- no deviation  Able to shrug shoulders   Finger-to-nose movement intact  No pronator drift     Assessment:    S/p   1.  Phase II occipital nerve stimulator implant bilateral  2.  Use of intraoperative fluoroscopy.  3. Electronic interrogation of neurostimulator    Plan:   ~Patient will follow up w/mental health/psychology provider  ~Patient will give authorization for remote access for programming.  ~Dr. Shrestha reassured the patient that he will then work together w/the patient to see if she  can obtain optimal programming and coverage.   ~Referral placed for pain management per patient request.   ~Dr Shrestha will follow up with patient as discussed.       At the end of the visit, all the patient's questions and concerns had been addressed and the patient was agreeable with the plan of care as outlined above. The patient has our office contact information at [phone], and knows to call with any questions, concerns, or changes in condition.       Ashley Wilson DNP  Neurosurgery Nurse Practitioner  [facility]  [phone]

## 2021-11-04 NOTE — LETTER
11/4/2021       RE: Martha Chun  [address]  [address]  [place] [address]     Dear Colleague,    Thank you for referring your patient, Martha Chun, to the [facility] [place] at [facility]. Please see a copy of my visit note below.    NEUROSURGERY CLINIC NOTE       Reason for Visit:              Post Operative Evaluation and Wound Assessment       PROCEDURE PERFORMED:  10/22/2021           Dr. Kobi Shrestha   1.  Phase II occipital nerve stimulator implant bilateral  2.  Use of intraoperative fluoroscopy.  3. Electronic interrogation of neurostimulator        IMPLANTS:    Implant Name Type Inv. Item Serial No.  Lot No. LRB No. Used Action   OCTRODE LEAD KIT, 60CM      [id number] [employer]WIN   Right 1 Implanted   OCTRODE LEAD KIT, 60 CM     [id number] [employer]WIN   Right 1 Implanted   PROCLAIM XR5 BATTERY     [id number] [employer]BBOTT   Right 1 Implanted    Octrode lead kit Neurology device   [id number] [employer] NA Left 1 Implanted   OCTRODE LEAD KIT, 60CM     [id number]     Left 1 Implanted   PROCLAIM XR 5     [id number]     Left 1 Implanted         HISTORY OF PRESENT ILLNESS:  Martha Chun is a 61-year-old female who has a history of occipital neuralgia and complex facial pain, who was seen by Dr. Shrestha at the [facility] to discuss an occipital nerve stimulation trial for treatment of her longstanding occipital neuralgia, significant occipital pain, and had a successful trial of occipital nerve block; however, that did not provide any durable relief.  She underwent percutaneous placement of bilateral occipital nerve stimulators externalized trial that was successful. She had significant pain reduction but wanted more coverage laterally. We had a significant discussion about the risks, benefits and alternatives.    Today,   She is having difficulty finding an optimal program at this time.   She is tearful at times  during the exam, and discouraged.   She is meeting with the [employer] representative today, and continuing to work with programming towards adequate coverage.    She would also like a referral to pain management for head/neck pain, and TMJ   She is interested in botox for neck pain   She did have some bruising at the site of the stimulator placement  -primarily on the right.   Some swelling - right, but improved.   After patient visited w/me, the [employer] representative came out of the room and stated   That the patient was citing severe depression, and having suicidal ideation.   She has been evaluated in the past by [facility] Freed in Mental Health and was recommended  to have follow up, but she has not followed up.   Today, we were able to reach out to psychology/mental health and the patient spoke directly to   Ion Haywood. (see separate dictation and progress note dated today)   She also spoke directly to Dr. Shrestha via conference telephone call.     Current Outpatient Medications   Medication     acetaminophen (TYLENOL) 500 MG tablet     aliskiren (TEKTURNA) 150 MG tablet     Butalbital-Acetaminophen (PHRENILIN)  MG TABS per tablet     butalbital-acetaminophen-caffeine (ESGIC) -40 MG tablet     butalbital-aspirin-caffeine (FIORINAL) -40 MG capsule     carboxymethylcellulose (REFRESH PLUS) 0.5 % SOLN ophthalmic solution     cholecalciferol 50 MCG (2000 UT) tablet     diltiazem ER (DILT-XR) 120 MG 24 hr capsule     diphenhydrAMINE-acetaminophen (TYLENOL PM)  MG tablet     fluticasone (FLONASE) 50 MCG/ACT nasal spray     gabapentin (NEURONTIN) 100 MG capsule     LORazepam (ATIVAN) 0.5 MG tablet     Melatonin 10 MG TABS tablet     mirtazapine (REMERON) 15 MG tablet     mirtazapine (REMERON) 7.5 MG tablet     Omega-3 Fatty Acids (FISH OIL PO)     Oxymetazoline HCl (NASAL SPRAY) 0.05 % SOLN     triprolidine-pseudoePHEDrine (APRODINE) 2.5-60 MG TABS per tablet     No current  facility-administered medications for this visit.       Examination:   There were no vitals taken for this visit.      Neurological Assessment:      General    Alert, cooperative.  In mild distress, tearful at times   Pulmonary:   Breathing comfortably on room air. No cough, or shortness of breath  Skin:    Visible skin without lesions or obvious rash  Speech is fluent  Maintains eye contact  Musculoskeletal:    Moving extremities freely with good strength  Incision:    Clean, dry, and intact.  No open drainage.   Some swelling of surgical site on the right.   She has some ecchymosis, and bruising R> L  Closed w/surgical skin glue   She has some skin tightness on the right- but on exam- skin is pliable   And palpable tubing, but does not elicit pain response.    Cranial nerve exam   Maintains eye contact, extra ocular movements grossly intact   Able to raise eyebrows  Puff out checks  Smile/show teeth with no facial droop  Facial features symmetrical  Tongue protrusion midline- no deviation  Able to shrug shoulders   Finger-to-nose movement intact  No pronator drift     Assessment:    S/p   1.  Phase II occipital nerve stimulator implant bilateral  2.  Use of intraoperative fluoroscopy.  3. Electronic interrogation of neurostimulator    Plan:   ~Patient will follow up w/mental health/psychology provider  ~Patient will give authorization for remote access for programming.  ~Dr. Shrestha reassured the patient that he will then work together w/the patient to see if she  can obtain optimal programming and coverage.   ~Referral placed for pain management per patient request.   ~Dr Shrestha will follow up with patient as discussed.       At the end of the visit, all the patient's questions and concerns had been addressed and the patient was agreeable with the plan of care as outlined above. The patient has our office contact information at [phone], and knows to call with any questions, concerns, or changes in condition.        Ashley Wilson DNP  Neurosurgery Nurse Practitioner  [facility]  [phone]        Again, thank you for allowing me to participate in the care of your patient.      Sincerely,    TRICIA Gillette CNP

## 2021-11-04 NOTE — NURSING NOTE
Chief Complaint   Patient presents with     Surgical Followup     [facility] Return - 2 wk post-op     [facility]

## 2021-11-05 ENCOUNTER — TELEPHONE (OUTPATIENT)
Dept: NEUROSURGERY | Facility: CLINIC | Age: 61
End: 2021-11-05

## 2021-11-05 NOTE — TELEPHONE ENCOUNTER
Returned pt's call to discuss next steps for programming. Vick boyd did some programming at pt's postop wound check appt yesterday, but pt said she needs further programming as she is not getting enough pain relief. She spoke with [employer] rep AJ today and they have an appt on 11/9 at noon to set up remote programming. I will follow-up with pt about an appt with Dr. Shrestha once I hear back from him (I messaged him earlier today about plan for programming). Pt in agreement with plan. No further questions at this time.

## 2021-11-08 ENCOUNTER — TELEPHONE (OUTPATIENT)
Dept: BEHAVIORAL HEALTH | Facility: CLINIC | Age: 61
End: 2021-11-08
Payer: MEDICAID

## 2021-11-08 ENCOUNTER — TELEPHONE (OUTPATIENT)
Dept: NEUROSURGERY | Facility: CLINIC | Age: 61
End: 2021-11-08
Payer: MEDICAID

## 2021-11-08 ENCOUNTER — DOCUMENTATION ONLY (OUTPATIENT)
Dept: BEHAVIORAL HEALTH | Facility: CLINIC | Age: 61
End: 2021-11-08
Payer: MEDICAID

## 2021-11-08 DIAGNOSIS — F33.1 MODERATE EPISODE OF RECURRENT MAJOR DEPRESSIVE DISORDER (H): Primary | ICD-10-CM

## 2021-11-08 NOTE — CONFIDENTIAL NOTE
Called pt to let her know that we will find a time for remote programming. Confirmed that she is meeting with the [employer] rep TYRESE to enable remote programming tomorrow afternoon. Pt is dismayed that she is not getting the same pain relief that she got during the trial. She would like to know how the placement of the leads for the permanent implant differ from the trial placement. Pt also concerned about how the wires will effect getting botox. Will follow-up with Dr. Shrestha tomorrow about all of the above and call pt back. She is in agreement with plan. No further questions at this time.

## 2021-11-08 NOTE — TELEPHONE ENCOUNTER
[facility] Phone Encounter    Encounter Activities (Refresh/Reselect every encounter): [facility] Only and Phone Encounter  Type of Contact Today: Phone call (patient / identified key support person reached)  Date of phone call: November 8, 2021                   Presenting Issue:     Day treatment  Health Psych  Psychiatry  No immediate safety concerns      [facility] Patient: No  MI Intervention: Expressed Empathy/Understanding, Supported Autonomy, Collaboration, Evocation, Open-ended questions, Reflections: simple and complex and Change talk (evoked)     Safety Concerns:  Risk status (Self / Other harm or suicidal ideation)  Patient denies current fears or concerns for personal safety.  Patient reports the following current or recent suicidal ideation or behaviors: chronic passive thoughts of ending her life.  Patient denies current or recent homicidal ideation or behaviors.  Patient denies current or recent self injurious behavior or ideation.  Patient denies other safety concerns.     [place] Suicide Severity Rating Scale (Short Version)  [place] Suicide Severity Rating (Short Version) 8/27/2021 10/22/2021 11/8/2021   Over the past 2 weeks have you felt down, depressed, or hopeless? no no yes   Over the past 2 weeks have you had thoughts of killing yourself? no no yes   Have you ever attempted to kill yourself? no no yes   When did this last happen? - - more than 6 months ago   Q1 Wished to be Dead (Past Month) - - yes   Q2 Suicidal Thoughts (Past Month) - - yes   Q3 Suicidal Thought Method - - no   Q4 Suicidal Intent without Specific Plan - - no   Q5 Suicide Intent with Specific Plan - - no   Q6 Suicide Behavior (Lifetime) - - yes       Disposition:   A safety and risk management plan has been developed including: Patient consented to co-developed safety plan.  Safety and risk management plan was completed.  Patient agreed to use safety plan should any safety concerns arise.  A copy was given to the patient.  Reviewed safety  "planning steps, including when/how to call 911 and visit the ED if needed. She expressed knowing how to call crisis lines if needed.       Ion DENISSEAddison Haywood    ______________________________________________________________________________      [facility]                                      Patient's Name: Martha Chun  November 8, 2021    SAFETY PLAN:  Step 1: Warning signs / cues (Thoughts, images, mood, situation, behavior) that a crisis may be developing:    Thoughts: \"I can't do this anymore\", \"I just want this to end\" and \"Nothing makes it better\"    Images: NA    Thinking Processes: highly critical and negative thoughts: such as about my relationships with my children    Mood: worsening depression, hopelessness and helplessness    Behaviors: isolating/withdrawing     Situations: pain and medical condition / diagnosis: my spinal cord stimulator not working   Step 2: Coping strategies - Things I can do to take my mind off of my problems without contacting another person (relaxation technique, physical activity):    Distress Tolerance Strategies:  relaxation activities: talk to other people, sensory based activities/self-soothe with five senses: guided imagery, pray, read a book: [employer]  and paced breathing/progressive muscle relaxation    Physical Activities: deep breathing and Guided Imagery Exercises    Focus on helpful thoughts:  God will help me make this better  Step 3: People and social settings that provide distraction:    Yarsani - I could go to my new [employer]   Step 4: Remind myself of people and things that are important to me and worth living for:  Family, Tawanna, and my children    Step 5: When I am in crisis, I can ask these people to help me use my safety plan:  My friend Tawanna and I can call a crisis line  Step 6: Making the environment safe:     be around others  Step 7: Professionals or agencies I can contact during a crisis:    Suicide Prevention " Lifeline: 9-886-768-TALK (4099)    Crisis Text Line Service: Text   [place]  to 331967.    Call 911 or go to my nearest emergency department.   I helped develop this safety plan and agree to use it when needed.  I have been given a copy of this plan.      Patient signature: _______________________________________________________________  Today s date:  November 8, 2021  Adapted from Safety Plan Template 2008 Jerri Lao and Pierre Luke is reprinted with the express permission of the authors.  No portion of the Safety Plan Template may be reproduced without the express, written permission.  You can contact the authors at bhs@[email or web address] or [email or web address].  Crisis Resources    Refer to the resources below as needed.    Steps to care for yourself    1. If you are currently in counseling, call your counselor for an appointment  2. Call the local crisis resources below if needed.  3. Contact friends or family for support.  4. Get more exercise.  5. Do activities you enjoy.  6. Eat a well-balanced diet and drink plenty of fluids.  7. Rest as needed.  8. Limit alcohol and recreational drugs. These can worsen depression.    When to contact your primary care provider       You have thoughts of harming or killing yourself but have not made a plan to carry it out.    Your depression gets in the way of daily activities.    You are often unable to sleep.    You need help cutting back on alcohol or recreational drugs.    When to call 911 or go to the Emergency Room     Get emergency help right away if you have any of the following:    You are planning to harm or kill yourself and you have a way to carry out the plan.     You have injured yourself or others. Or, you think you will.    You feel confused or are having trouble thinking or remembering.    You are having delusions (false beliefs).    You are hearing voices or seeing things that aren t there.    You are feeling psychotic (paranoid, fearful, restless,  agitated, nervous, racing thoughts or speech)    Crisis Resources     These hotlines are for both adults and children. The and are open 24 hours a day, 7 days a week unless noted otherwise.    National Suicide Prevention Lifeline   1-802-363-TALK (1783)    Crisis Text Line    www.crisistextline.org  Text HOME to 801665 from anywhere in the [place], anytime, about any type of crisis. A live, trained crisis counselor will receive the text and respond quickly.    [place] Lifeline for LGBTQ Youth  A national crisis intervention and suicide lifeline for LGBTQ youth under 25. Provides a safe place to talk without judgement.   Call 1-288.788.2523; text START to 214457 or visit www.thetrevorproject.org to talk to a trained counselor.  For county crisis numbers, visit the [place] DHS website at:  https://[place].gov/dhs/people-we-serve/adults/health-care/mental-health/resources/crisis-contacts.jsp

## 2021-11-08 NOTE — PROGRESS NOTES
[place] Suicide Severity Rating Scale (Short Version)  [place] Suicide Severity Rating (Short Version) 8/27/2021 10/22/2021 11/8/2021   Over the past 2 weeks have you felt down, depressed, or hopeless? no no yes   Over the past 2 weeks have you had thoughts of killing yourself? no no yes   Have you ever attempted to kill yourself? no no yes   When did this last happen? - - more than 6 months ago   Q1 Wished to be Dead (Past Month) - - yes   Q2 Suicidal Thoughts (Past Month) - - yes   Q3 Suicidal Thought Method - - no   Q4 Suicidal Intent without Specific Plan - - no   Q5 Suicide Intent with Specific Plan - - no   Q6 Suicide Behavior (Lifetime) - - yes

## 2021-11-09 ENCOUNTER — TELEPHONE (OUTPATIENT)
Dept: NEUROSURGERY | Facility: CLINIC | Age: 61
End: 2021-11-09
Payer: MEDICAID

## 2021-11-09 NOTE — CONFIDENTIAL NOTE
I let pt know that Dr. Shrestha can do remote programming with her on Monday, 11/15/21 at 1:00 pm. Pt would like to be called on her landline, [phone]. This number is not currently included in her chart; pt would like me to add it. This will not be a formal scheduled appointment; pt expressed understanding.      I spoke with Dr. Shrestha about trial lead placement v implanted lead placement and Dr. Shrestha said location is essentially the same. Pt said she will talk about this further when she meets with him on 11/15. I let her know that her neck spasms should improve over time. Pt will  ask him about whether she can get botox injections during the call next week.

## 2021-11-18 ENCOUNTER — TELEPHONE (OUTPATIENT)
Dept: NEUROSURGERY | Facility: CLINIC | Age: 61
End: 2021-11-18
Payer: MEDICAID

## 2021-11-18 NOTE — TELEPHONE ENCOUNTER
Writer IVÁN for pt confirmed that a virtual visit with Dr. Shrestha has been scheduled for 11/23 at 4:30pm.    Chante Costa

## 2021-11-18 NOTE — CONFIDENTIAL NOTE
Left pt a ANNE MARIE saying we can schedule a telephone appt with Dr. Shrestha at 4:30 on 11/23/21. I have asked our  to call pt to confirm with her. Pt welcome to call back with any questions or concerns.

## 2021-11-23 ENCOUNTER — HOSPITAL ENCOUNTER (OUTPATIENT)
Facility: CLINIC | Age: 61
End: 2021-11-23
Attending: ORTHOPAEDIC SURGERY | Admitting: ORTHOPAEDIC SURGERY
Payer: COMMERCIAL

## 2021-11-23 ENCOUNTER — VIRTUAL VISIT (OUTPATIENT)
Dept: NEUROSURGERY | Facility: CLINIC | Age: 61
End: 2021-11-23
Payer: MEDICAID

## 2021-11-23 DIAGNOSIS — M54.81 BILATERAL OCCIPITAL NEURALGIA: Primary | ICD-10-CM

## 2021-11-23 DIAGNOSIS — R51.9 FACIAL PAIN: ICD-10-CM

## 2021-11-23 PROCEDURE — 99215 OFFICE O/P EST HI 40 MIN: CPT | Mod: 95 | Performed by: NEUROLOGICAL SURGERY

## 2021-11-23 RX ORDER — DILTIAZEM HYDROCHLORIDE 120 MG/1
120 CAPSULE, COATED, EXTENDED RELEASE ORAL DAILY
COMMUNITY
Start: 2021-11-16 | End: 2022-06-21

## 2021-11-23 RX ORDER — LORAZEPAM 0.5 MG/1
.125-.25 TABLET ORAL 2 TIMES DAILY
Qty: 60 TABLET | Refills: 0 | Status: SHIPPED | OUTPATIENT
Start: 2021-11-23 | End: 2021-12-21

## 2021-11-23 RX ORDER — MELOXICAM 7.5 MG/1
1 TABLET ORAL DAILY
COMMUNITY
Start: 2021-11-19 | End: 2023-01-10

## 2021-11-23 RX ORDER — BUTALBITAL, ASPIRIN, AND CAFFEINE 325; 50; 40 MG/1; MG/1; MG/1
1 CAPSULE ORAL EVERY 4 HOURS PRN
Qty: 60 CAPSULE | Refills: 3 | Status: SHIPPED | OUTPATIENT
Start: 2021-11-23 | End: 2022-05-13

## 2021-11-23 NOTE — LETTER
"11/23/2021       RE: Martha Chun  [address]  [place] [address]     Dear Colleague,    Thank you for referring your patient, Martha Chun, to the [facility] NEUROSURGERY CLINIC [place] [facility]. Please see a copy of my visit note below.    Neurosurgery Clinic Note    Reason for Visit: occipital neuralgia    History of Present Illness  Martha Chun is a 61 year old woman with complex history including occipital neuralgia bilaterally and facial pain for which he is now status post bilateral ONS and facial stimulation. She has been struggling with coverage since our last programming appointment last week. In addition, she is feeling some hopelessness but will not expand on her symptoms, since feeling some hopeful improvement during programming.    She also complains of tightness in her neck since surgery that she feels is potentially exacerbated by the electrodes. She denies any problems with her incisions.            Allergies   Allergen Reactions     Amlodipine Other (See Comments)     Constipation, \"liver pain\".         Clonidine      Other reaction(s): Dizziness     Escitalopram Muscle Pain (Myalgia)     Other reaction(s): Myalgias     Mold Nausea     Ringing in ears, racing heart     Paroxetine Other (See Comments)     Palpitations/racing heart         Penicillins Unknown     Does not remember reaction, mother said she allergic to mold; patient refuses Pcn or any derivative       Sulfa Drugs Unknown     Tramadol      Other reaction(s): Runny Nose  Facial pain, sinus swelling       Carvedilol Other (See Comments)     Worsening depression         Codeine Headache and Nausea and Vomiting     Cortisone Other (See Comments)     Patient reports and possibly all steroids \"makes me to feel sick\", get fungal infections       Duloxetine Headache     Oxycodone Headache       Current Outpatient Medications   Medication     acetaminophen " (TYLENOL) 500 MG tablet     aliskiren (TEKTURNA) 150 MG tablet     Butalbital-Acetaminophen (PHRENILIN)  MG TABS per tablet     butalbital-acetaminophen-caffeine (ESGIC) -40 MG tablet     butalbital-aspirin-caffeine (FIORINAL) -40 MG capsule     butalbital-aspirin-caffeine (FIORINAL) -40 MG capsule     carboxymethylcellulose (REFRESH PLUS) 0.5 % SOLN ophthalmic solution     cholecalciferol 50 MCG (2000 UT) tablet     diltiazem ER (DILT-XR) 120 MG 24 hr capsule     diltiazem ER COATED BEADS (CARDIZEM CD/CARTIA XT) 120 MG 24 hr capsule     diphenhydrAMINE-acetaminophen (TYLENOL PM)  MG tablet     fluticasone (FLONASE) 50 MCG/ACT nasal spray     gabapentin (NEURONTIN) 100 MG capsule     LORazepam (ATIVAN) 0.5 MG tablet     LORazepam (ATIVAN) 0.5 MG tablet     Melatonin 10 MG TABS tablet     meloxicam (MOBIC) 7.5 MG tablet     mirtazapine (REMERON) 15 MG tablet     mirtazapine (REMERON) 7.5 MG tablet     Omega-3 Fatty Acids (FISH OIL PO)     Oxymetazoline HCl (NASAL SPRAY) 0.05 % SOLN     triprolidine-pseudoePHEDrine (APRODINE) 2.5-60 MG TABS per tablet     No current facility-administered medications for this visit.     ROS: 10 point ROS neg other than the symptoms noted above in the HPI.          Assessment and Plan   Martha Chun is a 61 year old female with ONS for which we performed significant programming today. We programmed the right side and then programmed the left side with good efficacy. In addition, we will refill her medications today for her ongoing headache and neck pain.      Follow up remotely in clinic next week.       Kobi Shrestha MD  Department of Neurosurgery  [facility]          Again, thank you for allowing me to participate in the care of your patient.      Sincerely,    Kobi Shrestha MD

## 2021-11-23 NOTE — PROGRESS NOTES
"Martha is a 61 year old who is being evaluated via a billable video visit.      How would you like to obtain your AVS? Mail  If the video visit is dropped, the invitation should be resent by: [phone]      Video Start Time: 4:00 PM  Video-Visit Details    Type of service:  Video Visit    Video End Time:5:00 PM    Originating Location (pt. Location): Home    Distant Location (provider location):  [facility] NEUROSURGERY [facility]     Platform used for Video Visit: [facility]    Neurosurgery [facility] Note    Reason for Visit: occipital neuralgia    History of Present Illness  Martha Chun is a 61 year old woman with complex history including occipital neuralgia bilaterally and facial pain for which he is now status post bilateral ONS and facial stimulation. She has been struggling with coverage since our last programming appointment last week. In addition, she is feeling some hopelessness but will not expand on her symptoms, since feeling some hopeful improvement during programming.    She also complains of tightness in her neck since surgery that she feels is potentially exacerbated by the electrodes. She denies any problems with her incisions.            Allergies   Allergen Reactions     Amlodipine Other (See Comments)     Constipation, \"liver pain\".         Clonidine      Other reaction(s): Dizziness     Escitalopram Muscle Pain (Myalgia)     Other reaction(s): Myalgias     Mold Nausea     Ringing in ears, racing heart     Paroxetine Other (See Comments)     Palpitations/racing heart         Penicillins Unknown     Does not remember reaction, mother said she allergic to mold; patient refuses Pcn or any derivative       Sulfa Drugs Unknown     Tramadol      Other reaction(s): Runny Nose  Facial pain, sinus swelling       Carvedilol Other (See Comments)     Worsening depression         Codeine Headache and Nausea and Vomiting     Cortisone Other (See Comments)     Patient reports and possibly all " "steroids \"makes me to feel sick\", get fungal infections       Duloxetine Headache     Oxycodone Headache       Current Outpatient Medications   Medication     acetaminophen (TYLENOL) 500 MG tablet     aliskiren (TEKTURNA) 150 MG tablet     Butalbital-Acetaminophen (PHRENILIN)  MG TABS per tablet     butalbital-acetaminophen-caffeine (ESGIC) -40 MG tablet     butalbital-aspirin-caffeine (FIORINAL) -40 MG capsule     butalbital-aspirin-caffeine (FIORINAL) -40 MG capsule     carboxymethylcellulose (REFRESH PLUS) 0.5 % SOLN ophthalmic solution     cholecalciferol 50 MCG (2000 UT) tablet     diltiazem ER (DILT-XR) 120 MG 24 hr capsule     diltiazem ER COATED BEADS (CARDIZEM CD/CARTIA XT) 120 MG 24 hr capsule     diphenhydrAMINE-acetaminophen (TYLENOL PM)  MG tablet     fluticasone (FLONASE) 50 MCG/ACT nasal spray     gabapentin (NEURONTIN) 100 MG capsule     LORazepam (ATIVAN) 0.5 MG tablet     LORazepam (ATIVAN) 0.5 MG tablet     Melatonin 10 MG TABS tablet     meloxicam (MOBIC) 7.5 MG tablet     mirtazapine (REMERON) 15 MG tablet     mirtazapine (REMERON) 7.5 MG tablet     Omega-3 Fatty Acids (FISH OIL PO)     Oxymetazoline HCl (NASAL SPRAY) 0.05 % SOLN     triprolidine-pseudoePHEDrine (APRODINE) 2.5-60 MG TABS per tablet     No current facility-administered medications for this visit.     ROS: 10 point ROS neg other than the symptoms noted above in the HPI.          Assessment and Plan   Martha Chun is a 61 year old female with ONS for which we performed significant programming today. We programmed the right side and then programmed the left side with good efficacy. In addition, we will refill her medications today for her ongoing headache and neck pain.      Follow up remotely in clinic next week.       Kobi Shrestha MD  Department of Neurosurgery  [facility]      "

## 2021-11-24 ENCOUNTER — TELEPHONE (OUTPATIENT)
Dept: NEUROSURGERY | Facility: CLINIC | Age: 61
End: 2021-11-24
Payer: MEDICAID

## 2021-11-24 NOTE — PROCEDURES
Neurostimulator Reprogramming and Analysis    Connected to Right then Left implantable pulse generator  Impedances for all 16 electrodes normal    Reprogrammed Right side with new settings  Reprogrammed Left side with new settings.    Kobi Shrestha MD

## 2021-11-30 ENCOUNTER — VIRTUAL VISIT (OUTPATIENT)
Dept: NEUROSURGERY | Facility: CLINIC | Age: 61
End: 2021-11-30
Payer: MEDICAID

## 2021-11-30 DIAGNOSIS — F33.1 MODERATE EPISODE OF RECURRENT MAJOR DEPRESSIVE DISORDER (H): Primary | ICD-10-CM

## 2021-11-30 DIAGNOSIS — M54.81 BILATERAL OCCIPITAL NEURALGIA: Primary | ICD-10-CM

## 2021-11-30 PROCEDURE — 99024 POSTOP FOLLOW-UP VISIT: CPT | Mod: 95 | Performed by: NEUROLOGICAL SURGERY

## 2021-11-30 NOTE — LETTER
"11/30/2021       RE: Martha Chun  [address]  [place] [address]     Dear Colleague,    Thank you for referring your patient, Martha Chun, to the [facility] NEUROSURGERY CLINIC [place] [facility]. Please see a copy of my visit note below.    Martha is a 61 year old who is being evaluated via a billable video visit.      How would you like to obtain your AVS? MyChart  If the video visit is dropped, the invitation should be resent by: Send to e-mail at: [email or web address]  Will anyone else be joining your video visit? No      Video Start Time: 5:57 PM  Video-Visit Details    Type of service:  Video Visit    Video End Time:5:57 PM    Originating Location (pt. Location): Home    Distant Location (provider location):  [facility] NEUROSURGERY [facility]     Platform used for Video Visit: [facility] [place] [facility]      Neurosurgery [facility] Note    Reason for Visit: occipital nerve stimulator programming    History of Present Illness    Martha Chun is a 61 year old woman with complex history including occipital neuralgia bilaterally and facial pain for which he is now status post bilateral ONS and facial stimulation. She has been struggling with coverage since our last programming appointment. Generally we are now working on the Left side to see if we can get improved coverage. Today she says that the right side is ok but not strong enough. She is late on her botox injection so much of her pain is potentiated by that.   She has been having some chills and pains in her neck. She is worried about infection. She has not seen a doctor yet.        Allergies   Allergen Reactions     Amlodipine Other (See Comments)     Constipation, \"liver pain\".         Clonidine      Other reaction(s): Dizziness     Escitalopram Muscle Pain (Myalgia)     Other reaction(s): Myalgias     Mold Nausea     Ringing in ears, racing heart     Paroxetine Other (See " "Comments)     Palpitations/racing heart         Penicillins Unknown     Does not remember reaction, mother said she allergic to mold; patient refuses Pcn or any derivative       Sulfa Drugs Unknown     Tramadol      Other reaction(s): Runny Nose  Facial pain, sinus swelling       Carvedilol Other (See Comments)     Worsening depression         Codeine Headache and Nausea and Vomiting     Cortisone Other (See Comments)     Patient reports and possibly all steroids \"makes me to feel sick\", get fungal infections       Duloxetine Headache     Oxycodone Headache       Current Outpatient Medications   Medication     acetaminophen (TYLENOL) 500 MG tablet     aliskiren (TEKTURNA) 150 MG tablet     Butalbital-Acetaminophen (PHRENILIN)  MG TABS per tablet     butalbital-acetaminophen-caffeine (ESGIC) -40 MG tablet     butalbital-aspirin-caffeine (FIORINAL) -40 MG capsule     butalbital-aspirin-caffeine (FIORINAL) -40 MG capsule     carboxymethylcellulose (REFRESH PLUS) 0.5 % SOLN ophthalmic solution     cholecalciferol 50 MCG (2000 UT) tablet     diltiazem ER (DILT-XR) 120 MG 24 hr capsule     diltiazem ER COATED BEADS (CARDIZEM CD/CARTIA XT) 120 MG 24 hr capsule     diphenhydrAMINE-acetaminophen (TYLENOL PM)  MG tablet     fluticasone (FLONASE) 50 MCG/ACT nasal spray     gabapentin (NEURONTIN) 100 MG capsule     LORazepam (ATIVAN) 0.5 MG tablet     Melatonin 10 MG TABS tablet     meloxicam (MOBIC) 7.5 MG tablet     mirtazapine (REMERON) 7.5 MG tablet     Omega-3 Fatty Acids (FISH OIL PO)     Oxymetazoline HCl (NASAL SPRAY) 0.05 % SOLN     triprolidine-pseudoePHEDrine (APRODINE) 2.5-60 MG TABS per tablet     No current facility-administered medications for this visit.       ROS: 10 point ROS neg other than the symptoms noted above in the HPI.      Assessment and Plan   Martha Chun is a 61 year old female with bilateral occipital nerve stimulators. Overall complex pain condition. We worked on " the Left side today without much success. She wants stimulation lower down in her neck, which we have achieved on the right. We discussed revising her lead but will evaluate the benefit of the leads over the temple.       Follow up in one week for reprogramming.      Kobi Shrestha MD  Department of Neurosurgery  [facility]

## 2021-11-30 NOTE — PROGRESS NOTES
"Martha is a 61 year old who is being evaluated via a billable video visit.      How would you like to obtain your AVS? MyChart  If the video visit is dropped, the invitation should be resent by: Send to e-mail at: [email or web address]  Will anyone else be joining your video visit? No      Video Start Time: 5:57 PM  Video-Visit Details    Type of service:  Video Visit    Video End Time:5:57 PM    Originating Location (pt. Location): Home    Distant Location (provider location):  [facility]     Platform used for Video Visit: [facility]      Neurosurgery [facility] Note    Reason for Visit: occipital nerve stimulator programming    History of Present Illness    Martha Chun is a 61 year old woman with complex history including occipital neuralgia bilaterally and facial pain for which he is now status post bilateral ONS and facial stimulation. She has been struggling with coverage since our last programming appointment. Generally we are now working on the Left side to see if we can get improved coverage. Today she says that the right side is ok but not strong enough. She is late on her botox injection so much of her pain is potentiated by that.   She has been having some chills and pains in her neck. She is worried about infection. She has not seen a doctor yet.        Allergies   Allergen Reactions     Amlodipine Other (See Comments)     Constipation, \"liver pain\".         Clonidine      Other reaction(s): Dizziness     Escitalopram Muscle Pain (Myalgia)     Other reaction(s): Myalgias     Mold Nausea     Ringing in ears, racing heart     Paroxetine Other (See Comments)     Palpitations/racing heart         Penicillins Unknown     Does not remember reaction, mother said she allergic to mold; patient refuses Pcn or any derivative       Sulfa Drugs Unknown     Tramadol      Other reaction(s): Runny Nose  Facial pain, sinus swelling       Carvedilol Other (See Comments)     Worsening " "depression         Codeine Headache and Nausea and Vomiting     Cortisone Other (See Comments)     Patient reports and possibly all steroids \"makes me to feel sick\", get fungal infections       Duloxetine Headache     Oxycodone Headache       Current Outpatient Medications   Medication     acetaminophen (TYLENOL) 500 MG tablet     aliskiren (TEKTURNA) 150 MG tablet     Butalbital-Acetaminophen (PHRENILIN)  MG TABS per tablet     butalbital-acetaminophen-caffeine (ESGIC) -40 MG tablet     butalbital-aspirin-caffeine (FIORINAL) -40 MG capsule     butalbital-aspirin-caffeine (FIORINAL) -40 MG capsule     carboxymethylcellulose (REFRESH PLUS) 0.5 % SOLN ophthalmic solution     cholecalciferol 50 MCG (2000 UT) tablet     diltiazem ER (DILT-XR) 120 MG 24 hr capsule     diltiazem ER COATED BEADS (CARDIZEM CD/CARTIA XT) 120 MG 24 hr capsule     diphenhydrAMINE-acetaminophen (TYLENOL PM)  MG tablet     fluticasone (FLONASE) 50 MCG/ACT nasal spray     gabapentin (NEURONTIN) 100 MG capsule     LORazepam (ATIVAN) 0.5 MG tablet     Melatonin 10 MG TABS tablet     meloxicam (MOBIC) 7.5 MG tablet     mirtazapine (REMERON) 7.5 MG tablet     Omega-3 Fatty Acids (FISH OIL PO)     Oxymetazoline HCl (NASAL SPRAY) 0.05 % SOLN     triprolidine-pseudoePHEDrine (APRODINE) 2.5-60 MG TABS per tablet     No current facility-administered medications for this visit.       ROS: 10 point ROS neg other than the symptoms noted above in the HPI.      Assessment and Plan   Martha Chun is a 61 year old female with bilateral occipital nerve stimulators. Overall complex pain condition. We worked on the Left side today without much success. She wants stimulation lower down in her neck, which we have achieved on the right. We discussed revising her lead but will evaluate the benefit of the leads over the temple.       Follow up in one week for reprogramming.      Kobi Shrestha MD  Department of Neurosurgery  [facility]" [place]

## 2021-11-30 NOTE — PROGRESS NOTES
[facility] Phone Encounter    Encounter Activities (Refresh/Reselect every encounter): Phone Encounter  Type of Contact Today: Phone call (patient / identified key support person reached)  Date of phone call: November 30, 2021                   Presenting Issue: Returned patient call. Patient requesting help connecting with pain psychologist for 1:1 counseling. She indicates not being contacted by intake for an appointment. Placed referral for help connecting to community network.    [facility] Patient: No      Safety Concerns:  Risk status (Self / Other harm or suicidal ideation)  Patient denies current fears or concerns for personal safety.  Patient denies current or recent suicidal ideation or behaviors.  Patient denies current or recent homicidal ideation or behaviors.  Patient denies current or recent self injurious behavior or ideation.  Patient denies other safety concerns.    Disposition:   Recommended that patient call 911 or go to the local ED should there be a change in any of these risk factors.      Ion Haywood

## 2021-12-01 ENCOUNTER — TELEPHONE (OUTPATIENT)
Dept: NEUROSURGERY | Facility: CLINIC | Age: 61
End: 2021-12-01
Payer: COMMERCIAL

## 2021-12-01 NOTE — CONFIDENTIAL NOTE
Spoke with the pharmacist at [facility], who confirmed that they did receive the fiorinal prescription and it is ready. Pharmacist said she would call pt. Nothing further at this time.

## 2021-12-01 NOTE — CONFIDENTIAL NOTE
Returned pt's call. I let her know that Dr. Shrestha has not entered lab orders yet, so I'm not able to fax anything to [facility]. I will follow-up with her when I do have labs to confirm where she would like to get them done. She will be closer to [facility] tomorrow and near [place] on Friday.     Pt said that that [facility] did not receive the prescription for fiorinal written on 11/23/21. I let pt know I would follow-up with Rand Sauceda, as our chart shows that receipt of the prescription was confirmed on 11/23.     Pt says that her insurance should switch back to [employer] as of today. Will follow-up about updating her insurance in our system.     No further questions at this time.

## 2021-12-02 ENCOUNTER — DOCUMENTATION ONLY (OUTPATIENT)
Dept: NEUROSURGERY | Facility: CLINIC | Age: 61
End: 2021-12-02
Payer: COMMERCIAL

## 2021-12-02 ENCOUNTER — TELEPHONE (OUTPATIENT)
Dept: NEUROSURGERY | Facility: CLINIC | Age: 61
End: 2021-12-02
Payer: COMMERCIAL

## 2021-12-02 ENCOUNTER — TELEPHONE (OUTPATIENT)
Dept: PSYCHOLOGY | Facility: CLINIC | Age: 61
End: 2021-12-02
Payer: COMMERCIAL

## 2021-12-02 DIAGNOSIS — M79.18 MYALGIA OF MUSCLE OF NECK: ICD-10-CM

## 2021-12-02 DIAGNOSIS — Z11.59 ENCOUNTER FOR SCREENING FOR OTHER VIRAL DISEASES: Primary | ICD-10-CM

## 2021-12-02 NOTE — CONFIDENTIAL NOTE
Spoke with pt to let her know that [facility]. Pt expressed understanding and will call to schedule an appt. She would also like a follow-up appt with Dr. Shrestha on 12/7. I will ask our  to reach out to her to schedule. She is in agreement with plan. Nothing further at this time.

## 2021-12-02 NOTE — CONFIDENTIAL NOTE
Received referral from Dr. Haywood, called patient and scheduled for 2/21 3:00.    Sumi Walker, PhD,   Health Psychology  [phone]

## 2021-12-02 NOTE — TELEPHONE ENCOUNTER
Writer IVÁN for pt to call back and schedule a new visit     Please schedule a new, virtual visit with Dr. Shrestha on 12/7    Chante Costa

## 2021-12-03 ENCOUNTER — TELEPHONE (OUTPATIENT)
Dept: NEUROSURGERY | Facility: CLINIC | Age: 61
End: 2021-12-03
Payer: COMMERCIAL

## 2021-12-03 ENCOUNTER — DOCUMENTATION ONLY (OUTPATIENT)
Dept: NEUROSURGERY | Facility: CLINIC | Age: 61
End: 2021-12-03
Payer: COMMERCIAL

## 2021-12-03 NOTE — PROGRESS NOTES
Refaxed lab orders to [facility] at [phone], as pt left a VM late yesterday saying they were not received by lab. Return fax and my contact information included.

## 2021-12-03 NOTE — CONFIDENTIAL NOTE
Left VM letting pt know I refaxed the lab orders to [facility] at [phone], which is the number the clinic gave me. Requested pt call me if lab has not received the orders when she calls for the appt.

## 2021-12-03 NOTE — CONFIDENTIAL NOTE
Pt left me a VM with a different fax number for the labs to be sent: [phone]. I called pt to let her know that I faxed the orders to this number. She will call me if there are any issues.

## 2021-12-09 ENCOUNTER — VIRTUAL VISIT (OUTPATIENT)
Dept: PSYCHIATRY | Facility: CLINIC | Age: 61
End: 2021-12-09
Payer: COMMERCIAL

## 2021-12-09 DIAGNOSIS — F45.1 SOMATIC SYMPTOM DISORDER, MODERATE, WITH PREDOMINANT PAIN: Primary | Chronic | ICD-10-CM

## 2021-12-09 DIAGNOSIS — F33.1 MAJOR DEPRESSIVE DISORDER, RECURRENT EPISODE, MODERATE (H): Chronic | ICD-10-CM

## 2021-12-09 PROCEDURE — 99205 OFFICE O/P NEW HI 60 MIN: CPT | Mod: TEL | Performed by: STUDENT IN AN ORGANIZED HEALTH CARE EDUCATION/TRAINING PROGRAM

## 2021-12-09 ASSESSMENT — PATIENT HEALTH QUESTIONNAIRE - PHQ9
SUM OF ALL RESPONSES TO PHQ QUESTIONS 1-9: 18
10. IF YOU CHECKED OFF ANY PROBLEMS, HOW DIFFICULT HAVE THESE PROBLEMS MADE IT FOR YOU TO DO YOUR WORK, TAKE CARE OF THINGS AT HOME, OR GET ALONG WITH OTHER PEOPLE: EXTREMELY DIFFICULT
SUM OF ALL RESPONSES TO PHQ QUESTIONS 1-9: 18
SUM OF ALL RESPONSES TO PHQ QUESTIONS 1-9: 18

## 2021-12-09 NOTE — PROGRESS NOTES
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 treatments (including medications) per primary care provider and/or specialists.   2. To schedule individual or family therapy, call [facility] at [phone].   3. Follow up with primary care provider as planned or for acute medical concerns.  4. Call the psychiatric nurse line with medication questions or concerns at [phone].  5. [employer] may be used to communicate with your care team, but this is not intended to be used for emergencies.    Crisis Resources:    1. Present to the Emergency Department as needed or call after hours crisis line at [phone] or [phone].   2. [place] Crisis Text Line: Text [place] to 483694.  3. Suicide LifeLine Chat: suicidepreventionlifeline.org/chat/.  4. National Suicide Prevention Lifeline: 500.442.4004 (TTY: 525.680.9649). Call anytime for help.  (www.suicidepreventionlifeline.org)  5. National Victor on Mental Illness (www.evelina.org): 993-278-3088 or 007-789-1450.  6. Mental Health Association (www.mentalhealth.org): 434.853.6266 or 889-935-4215.      Administrative Billing:     Video/Phone Start Time:  1034  Video/Phone End Time:  7923    90 minutes spent on date of encounter reviewing pt record, performing history and exam, documenting " clinical information in EMR, providing education to pt, and ordering prescriptions, medications, and referrals as indicated above.     Patient Status:  Patient will continue to be seen for ongoing consultation and stabilization.    Signed:   Marcelo Andrew DNP, PMLASHAUNP-BC  [facility] ([facility])

## 2021-12-09 NOTE — PROGRESS NOTES
Martha is a 61 year old who is being evaluated via a billable video visit.      How would you like to obtain your AVS? Mail a copy  If the video visit is dropped, the invitation should be resent by: Text to cell phone: [phone]  Will anyone else be joining your video visit? No

## 2021-12-09 NOTE — PATIENT INSTRUCTIONS
1. Continue mirtazapine 22.5mg at bedtime  2. Continue lorazepam 0.25-0.5mg up to 4 times daily if needed for pain or anxiety (this is how you reported you take the medication, not how it is prescribed).  3. Call [phone] if you have not received a call about your pain psychology referral within 3-5 days.  4. Call [phone] to schedule follow up with me in 4 weeks.

## 2021-12-10 ASSESSMENT — PATIENT HEALTH QUESTIONNAIRE - PHQ9: SUM OF ALL RESPONSES TO PHQ QUESTIONS 1-9: 18

## 2021-12-13 NOTE — PROCEDURES
Impedances were checked and verified for Left-sided ONS. All normal    Reprogrammed over a one hour period.

## 2021-12-21 DIAGNOSIS — M54.81 BILATERAL OCCIPITAL NEURALGIA: ICD-10-CM

## 2021-12-21 RX ORDER — LORAZEPAM 0.5 MG/1
.125-.25 TABLET ORAL 2 TIMES DAILY
Qty: 60 TABLET | Refills: 0
Start: 2021-12-21 | End: 2021-12-23

## 2021-12-23 ENCOUNTER — TELEPHONE (OUTPATIENT)
Dept: NEUROSURGERY | Facility: CLINIC | Age: 61
End: 2021-12-23
Payer: COMMERCIAL

## 2021-12-23 DIAGNOSIS — M54.81 BILATERAL OCCIPITAL NEURALGIA: ICD-10-CM

## 2021-12-23 RX ORDER — LORAZEPAM 0.5 MG/1
.5-.75 TABLET ORAL 2 TIMES DAILY PRN
Qty: 90 TABLET | Refills: 1 | Status: SHIPPED | OUTPATIENT
Start: 2021-12-23 | End: 2022-02-21

## 2021-12-23 NOTE — TELEPHONE ENCOUNTER
"Notified pt that Dr. Shrestha revised the ativan prescription to reflect how she is taking it and sent it electronically to [facility].     Offered to schedule a follow-up appt with Dr. Shrestha and pt declined, stating she \"needed a break\" from programming, as it gives her a headache. She said she would call me when she is ready to schedule.     No further questions at this time.   "

## 2021-12-23 NOTE — CONFIDENTIAL NOTE
Returned pt's call about refill of ativan 0.5 mg. She is taking 1.5 tabs per day, not 0.25-0.5 tabs per day. I let her know that the pharmacy cannot refill it as written b/c it's too soon for a refill, so I am waiting for decision from Dr. Shrestha about rewriting the prescription. Will follow-up with pt when I hear back from Dr. Shrestha. She is in agreement with plan. Nothing further at this time.

## 2022-01-03 ENCOUNTER — TELEPHONE (OUTPATIENT)
Dept: PSYCHIATRY | Facility: CLINIC | Age: 62
End: 2022-01-03
Payer: COMMERCIAL

## 2022-01-03 ENCOUNTER — TELEPHONE (OUTPATIENT)
Dept: PSYCHOLOGY | Facility: CLINIC | Age: 62
End: 2022-01-03
Payer: COMMERCIAL

## 2022-01-03 NOTE — TELEPHONE ENCOUNTER
Returned call to patient. Patient wanted to know if Gene Sight test results were in her chart. Verified this with the patient.

## 2022-01-03 NOTE — TELEPHONE ENCOUNTER
Patient is wondering if any information regarding her Genes[employer] testing has been resulted?    Patient reports she has called their company as well and left messages.    Please call patient to discuss.    Next appt: 1/18

## 2022-01-03 NOTE — CONFIDENTIAL NOTE
Received message from patient wishing to cancel intake appointment.     Sumi Walker, PhD,   Health Psychology  [phone]

## 2022-01-06 ENCOUNTER — OFFICE VISIT (OUTPATIENT)
Dept: ANESTHESIOLOGY | Facility: CLINIC | Age: 62
End: 2022-01-06
Payer: COMMERCIAL

## 2022-01-06 VITALS — SYSTOLIC BLOOD PRESSURE: 172 MMHG | HEART RATE: 88 BPM | DIASTOLIC BLOOD PRESSURE: 99 MMHG | OXYGEN SATURATION: 96 %

## 2022-01-06 DIAGNOSIS — M79.7 FIBROMYALGIA: ICD-10-CM

## 2022-01-06 DIAGNOSIS — R51.9 FACIAL PAIN: ICD-10-CM

## 2022-01-06 DIAGNOSIS — M54.81 BILATERAL OCCIPITAL NEURALGIA: ICD-10-CM

## 2022-01-06 DIAGNOSIS — M26.629 TMJ PAIN DYSFUNCTION SYNDROME: Primary | ICD-10-CM

## 2022-01-06 PROCEDURE — 99202 OFFICE O/P NEW SF 15 MIN: CPT | Mod: 24 | Performed by: ANESTHESIOLOGY

## 2022-01-06 ASSESSMENT — PAIN SCALES - GENERAL: PAINLEVEL: EXTREME PAIN (8)

## 2022-01-06 NOTE — PROGRESS NOTES
Martha Chun is a 61 year old female with recent occipital and V1 stimulator implant with Dr. Shrestha. She presents to the clinic requesting removal of the current stimulator system. She feels that the permanent implants was not completed in exactly the same fashion of the trial. The patient reports that the system is not covering C1-2 area of pain. She feels that the trial was more helpful for her pain in upper neck.     She is presenting to the pain clinic asking for extraction of the current stimulation system. I informed her that I will not be able to remove her current system.   She is also asking for a spinal cord stimulator system for axial neck pain. I explained to her that cervical spinal cord stimulation if for extremity pain and I do not perform those for upper cervical, axial pain.   She is also asking for a referral for tmj specialist. Referral was placed as per her request.     The patient will return to the clinic as needed.

## 2022-01-06 NOTE — NURSING NOTE
Patient presents with:  Consult: UMP NEW,RM 14, patient reports 8/10 pain in her head and neck      Extreme Pain (8)     Pain Medications     Analgesics Other Refills Start End     acetaminophen (TYLENOL) 500 MG tablet     12/4/2019     Sig - Route: Take 1,000 mg by mouth every 8 hours as needed  - Oral    Class: Historical    Analgesic Combinations Refills Start End     Butalbital-Acetaminophen (PHRENILIN)  MG TABS per tablet     12/3/2020     Sig - Route: Take 1 tablet by mouth every 4 hours as needed - Oral    Class: Historical     butalbital-acetaminophen-caffeine (ESGIC) -40 MG tablet     7/2/2021     Sig - Route: Take 1 tablet by mouth every 4 hours as needed - Oral    Class: Historical     butalbital-aspirin-caffeine (FIORINAL) -40 MG capsule    3 11/23/2021     Sig - Route: Take 1 capsule by mouth every 4 hours as needed for headaches - Oral    Class: E-Prescribe     butalbital-aspirin-caffeine (FIORINAL) -40 MG capsule    0 10/25/2021     Sig - Route: Take 1 capsule by mouth every 4 hours as needed for headaches - Oral    Class: E-Prescribe          What medications are you using for pain?   FIORINOL, lazapam acetaminophen    (New patients only) Have you been seen by another pain clinic/ provider?       Patient had botox at [employer],.    Patient is here for a consult    Chito Tolbert, EMT

## 2022-01-06 NOTE — LETTER
1/6/2022       RE: Martha Chun  [address]  [address]  [place] [address]     Dear Colleague,    Thank you for referring your patient, Martha Chun, to the [facility] [place] at [facility]. Please see a copy of my visit note below.    Martha Chun is a 61 year old female with recent occipital and V1 stimulator implant with Dr. Shrestha. She presents to the clinic requesting removal of the current stimulator system. She feels that the permanent implants was not completed in exactly the same fashion of the trial. The patient reports that the system is not covering C1-2 area of pain. She feels that the trial was more helpful for her pain in upper neck.     She is presenting to the pain clinic asking for extraction of the current stimulation system. I informed her that I will not be able to remove her current system.   She is also asking for a spinal cord stimulator system for axial neck pain. I explained to her that cervical spinal cord stimulation if for extremity pain and I do not perform those for upper cervical, axial pain.   She is also asking for a referral for tmj specialist. Referral was placed as per her request.     The patient will return to the clinic as needed.       Again, thank you for allowing me to participate in the care of your patient.      Sincerely,    Parul Denson MD

## 2022-01-06 NOTE — PATIENT INSTRUCTIONS
Referrals:    Referral placed to:     [facility]   [address]   [address]   [place] [address]   Phone: [phone]   Fax: [phone]     Please schedule with Dr. Degroot.     Recommended Follow up:      As needed with Dr. Denson.        To speak with a nurse, schedule/reschedule/cancel a clinic appointment  [phone], option #1.    You can also reach us by [employer]: [email or web address]

## 2022-01-10 ENCOUNTER — TELEPHONE (OUTPATIENT)
Dept: NEUROSURGERY | Facility: CLINIC | Age: 62
End: 2022-01-10
Payer: COMMERCIAL

## 2022-01-10 NOTE — CONFIDENTIAL NOTE
Pt left me a VM saying she needs a refill of butalbital, as she will run out in 3 days. She would like it refilled at [facility]. Our records show she should have refills left on this medication and I called [facility] to confirm. Requested pt call me back to clarify if butalbital-aspirin-caffeine (Fiorinal) is what she meant to request.

## 2022-01-18 ENCOUNTER — VIRTUAL VISIT (OUTPATIENT)
Dept: PSYCHIATRY | Facility: CLINIC | Age: 62
End: 2022-01-18
Payer: COMMERCIAL

## 2022-01-18 DIAGNOSIS — F45.1 SOMATIC SYMPTOM DISORDER, MODERATE, WITH PREDOMINANT PAIN: Primary | ICD-10-CM

## 2022-01-18 DIAGNOSIS — F33.1 MAJOR DEPRESSIVE DISORDER, RECURRENT EPISODE, MODERATE (H): ICD-10-CM

## 2022-01-18 PROCEDURE — 99215 OFFICE O/P EST HI 40 MIN: CPT | Mod: TEL | Performed by: STUDENT IN AN ORGANIZED HEALTH CARE EDUCATION/TRAINING PROGRAM

## 2022-01-18 RX ORDER — MIRTAZAPINE 7.5 MG/1
TABLET, FILM COATED ORAL
Qty: 21 TABLET | Refills: 0 | Status: SHIPPED | OUTPATIENT
Start: 2022-01-18 | End: 2022-02-17

## 2022-01-18 ASSESSMENT — PATIENT HEALTH QUESTIONNAIRE - PHQ9
10. IF YOU CHECKED OFF ANY PROBLEMS, HOW DIFFICULT HAVE THESE PROBLEMS MADE IT FOR YOU TO DO YOUR WORK, TAKE CARE OF THINGS AT HOME, OR GET ALONG WITH OTHER PEOPLE: VERY DIFFICULT
SUM OF ALL RESPONSES TO PHQ QUESTIONS 1-9: 22
SUM OF ALL RESPONSES TO PHQ QUESTIONS 1-9: 22

## 2022-01-18 NOTE — PROGRESS NOTES
Martha is a 61 year old who is being evaluated via a billable telephone visit.      What phone number would you like to be contacted at? [phone]  How would you like to obtain your AVS? Mail a copy    PSYCHIATRIC MEDICATION FOLLOW UP APPOINTMENT     Name:  Martha Chun  : 1960     Telemedicine Visit: The patient's condition can be safely assessed and treated via synchronous audio/visual telemedicine encounter.      Reason for Telemedicine Visit: COVID 19 pandemic and the social and physical recommendations by the CDC and MD.      Originating Site (Patient Location): Patient's home; pt verified their location for the duration of this appointment as address on record.    Distant Site (Provider Location): Provider Remote Setting    Consent:  The patient/guardian has verbally consented to: the potential risks and benefits of telemedicine (video or phone) versus in-person care; bill insurance or make self-payment for services provided; and responsibility for payment of non-covered services.     Mode of Communication:  Doximity phone call    As the treating provider, I attest to compliance with applicable laws and regulations related to telemedicine.  Chart documentation may have been completed with Dragon Voice Recognition software.     IDENTIFICATION     Patient is a 61 year old year old White, female  who presents for follow-up medication management with [facility].  Patient was initially referred by their PCP. Patient attended the session alone.   [facility] act as a specialty service for Primary Care Providers in the [facility] who seek to optimize medications for unstable patients.  Once medications have been optimized, [facility] providers discharge the patient back to the referring Primary Care Provider for ongoing medication management.  This type of system allows [facility] to serve a high volume of patients.      Patient care team: Patient Care Team:  Fox Vieira MD as PCP -  "General (Family Medicine)  Parvin Johnston MD as Assigned Surgical Provider  Kobi Shrestha MD as Assigned Neuroscience Provider  Sherri Castellon APRN CNP as Assigned PCP  Ion Haywood as Assigned Behavioral Health Provider  Kobi Shrestha MD as MD (Neurological Surgery)  Therapist: cancelled health psych intake 3 Cal 22    INTERIM HISTORY     Pt was last seen in [facility] Consultation on 9 Dec 22. At that time, the plan included continue mirtazapine, lorazepam.    Interim pt communication:  none    Available records were reviewed prior to visit.    HISTORY OF PRESENT ILLNESS     Currently: Pt recently dx with COVID and has \"water damage in my apartment.\" Discussed Genesight results. \"I can't go up on that drug. Every time I go up it makes me want to commit suicide.\" She went to a [employer] meeting where she learned about \"all these spirits\" from her  that is causing all sorts of physical health problems.   Pt c/o ongoing neck pain problems, missing botox, and having more anxiety. Pt is with her son right now as she is concerned that she has mold in her current apartment and will need to move again.     Mood/anxiety: Pt reports worsening depression/anxiety in the past month, partially due to COVID and pain. She has been using 3 lorazepam \"so that I keep going because I have no interest in anything. I look around and wonder why I'm here.\"   Suicidal ideation:  Yes feels depressed, suicidal thoughts without plan due to pain, concerns of mold; no plan/intent  PHQ9 score is 22 indicating severe depression.   GAD7 score is not assessed today    Medications: \"I don't know how we're going to get a medicine to work but we have to. I don't want to live like this but we have to.\"     Medical: Pt verbalizes wanting to get the pin in her neck removed and replaced with \"the right one.\" She is hopeful this may happen in the future but right now is feeling too ill from COVID to keep working on that.     SUBSTANCE " USE HISTORY    Tobacco use: Former; quit 2014  Caffeine:  Yes  no caffeine, except in medication  Current alcohol:  None  Current substance use: None  Past use alcohol/substance use: None    MEDICATIONS                                                                                              Current medications reviewed today and are noted below.   Current psychotropic medications:   Mirtazapine 22.5mg at bedtime  Lorazepam 0.25-0.5mg up to 4x daily PRN (rx by neuro for neuralgia)    Prior meds:  Carbamazepine  Paroxetine  Duloxetine  Clonidine  Escitalopram  Fluoxetine  Clonazepam  Nortriptyline  Venlafaxine  Bupropion    Supplements:   None    [place] Prescription Monitoring Program [] was checked today:  indicates last loraz rx 12/23/21 by Dr. Shrestha..    Current Outpatient Medications   Medication Sig     acetaminophen (TYLENOL) 500 MG tablet Take 1,000 mg by mouth every 8 hours as needed      aliskiren (TEKTURNA) 150 MG tablet Take 150 mg by mouth At Bedtime      Butalbital-Acetaminophen (PHRENILIN)  MG TABS per tablet Take 1 tablet by mouth every 4 hours as needed     butalbital-acetaminophen-caffeine (ESGIC) -40 MG tablet Take 1 tablet by mouth every 4 hours as needed     butalbital-aspirin-caffeine (FIORINAL) -40 MG capsule Take 1 capsule by mouth every 4 hours as needed for headaches     butalbital-aspirin-caffeine (FIORINAL) -40 MG capsule Take 1 capsule by mouth every 4 hours as needed for headaches     carboxymethylcellulose (REFRESH PLUS) 0.5 % SOLN ophthalmic solution Apply 1-2 drops to eye daily as needed     cholecalciferol 50 MCG (2000 UT) tablet Take 2 tablets by mouth every morning      diltiazem ER (DILT-XR) 120 MG 24 hr capsule Take 120 mg by mouth At Bedtime      diltiazem ER COATED BEADS (CARDIZEM CD/CARTIA XT) 120 MG 24 hr capsule Take 120 mg by mouth daily     diphenhydrAMINE-acetaminophen (TYLENOL PM)  MG tablet Take 1 tablet by mouth nightly as needed for  "sleep     fluticasone (FLONASE) 50 MCG/ACT nasal spray Spray 1 spray into both nostrils every evening      gabapentin (NEURONTIN) 100 MG capsule Take 100 mg by mouth At Bedtime      LORazepam (ATIVAN) 0.5 MG tablet Take 1-1.5 tablets (0.5-0.75 mg) by mouth 2 times daily as needed for anxiety or pain     Melatonin 10 MG TABS tablet Take 10 mg by mouth nightly as needed      meloxicam (MOBIC) 7.5 MG tablet Take 1 tablet by mouth daily     mirtazapine (REMERON) 7.5 MG tablet Take 2 tablets by mouth At Bedtime PT TAKING 22.5MG AT BEDTIME     Omega-3 Fatty Acids (FISH OIL PO) Take by mouth 2 times daily 360 mg in AM and 600 mg in PM     Oxymetazoline HCl (NASAL SPRAY) 0.05 % SOLN Spray 1 spray in nostril 2 times daily     triprolidine-pseudoePHEDrine (APRODINE) 2.5-60 MG TABS per tablet Take 0.5 tablets by mouth every 6 hours as needed     No current facility-administered medications for this visit.      PAST MEDICAL AND SURGICAL HISTORY   Reviewed past medical and surgical history today.   PREGNANT: No      Past Medical History:   Diagnosis Date     Anxiety      Degenerative arthritis      Depression      Fibromyalgia      Hypertension      Migraines      Occipital neuralgia     b/l     Other chronic pain      Seasonal allergies        VITALS     BP Readings from Last 1 Encounters:   01/06/22 (!) 172/99     Pulse Readings from Last 1 Encounters:   01/06/22 88     Wt Readings from Last 1 Encounters:   10/22/21 62.3 kg (137 lb 5.6 oz)     Ht Readings from Last 1 Encounters:   10/22/21 1.676 m (5' 6\")     Estimated body mass index is 22.17 kg/m  as calculated from the following:    Height as of 10/22/21: 1.676 m (5' 6\").    Weight as of 10/22/21: 62.3 kg (137 lb 5.6 oz).    LABS  & IMAGING                                                                                                                Recent available labs were reviewed today.    Recent Labs   Lab Test 10/19/21  1206   WBC 6.5   HGB 13.3   HCT 39.1   MCV 96 " "       Recent Labs   Lab Test 10/22/21  0930 10/19/21  1206 08/25/21  0823 03/19/20  0930   NA  --  136   < > 139   POTASSIUM  --  4.2   < > 3.8   CHLORIDE  --  103   < > 101   CO2  --  28   < > 26   GLC 79 90   < > 94   ALYSE  --  8.9   < > 9.7   MAG  --   --   --  2.0   BUN  --  12   < > 14   CR  --  0.64   < > 0.71   GFRESTIMATED  --  >90   < > >60   ALBUMIN  --   --   --  4.3   PROTTOTAL  --   --   --  7.4   AST  --   --   --  19   ALT  --   --   --  21   ALKPHOS  --   --   --  92   BILITOTAL  --   --   --  0.4    < > = values in this interval not displayed.     Recent Labs   Lab Test 03/19/20  0930   CHOL 259*  259*   *   HDL 91   TRIG 49  49     Recent Labs   Lab Test 03/19/20  0930   TSH 2.24     No results found for: YHG946, IYGG195, [id number], VITD3, D2VIT, D3VIT, DTOT, [id number], [id number], [id number], [id number], [id number], [id number]     ALLERGY & IMMUNIZATIONS       Allergies   Allergen Reactions     Amlodipine Other (See Comments)     Constipation, \"liver pain\".         Clonidine      Other reaction(s): Dizziness     Escitalopram Muscle Pain (Myalgia)     Other reaction(s): Myalgias     Mold Nausea     Ringing in ears, racing heart     Paroxetine Other (See Comments)     Palpitations/racing heart         Penicillins Unknown     Does not remember reaction, mother said she allergic to mold; patient refuses Pcn or any derivative       Sulfa Drugs Unknown     Tramadol      Other reaction(s): Runny Nose  Facial pain, sinus swelling       Carvedilol Other (See Comments)     Worsening depression         Codeine Headache and Nausea and Vomiting     Cortisone Other (See Comments)     Patient reports and possibly all steroids \"makes me to feel sick\", get fungal infections       Duloxetine Headache     Oxycodone Headache       MENTAL STATUS EXAM:     General/Constitutional:  Appearance: awake, alert and MORRIS   Attitude: cooperative   Eye Contact:  MORRIS  Musculoskeletal:  Muscle Strength and Tone: " MORRIS  Psychomotor Behavior:  MORRIS  Gait and Station: MORRIS  Psychiatric:  Speech:  clear, coherent, normal rate, rhythm, volume, tone, prosody   Associations:  no loose associations  Thought Process:  logical, linear and goal oriented   Thought Content:  endorses intermittent SI, no plan/intent; no evidence of homicidal ideation, no evidence of psychotic thought, no auditory hallucinations present and no visual hallucinations present   Mood:  depressed  Affect:  appropriate and in normal range and mood congruent  Insight:  fair  Judgment:  fair, adequate for safety  Impulse Control:  intact  Neurological:  Oriented to: time, person, and place  Attention Span and Concentration: Normal  Language: intact  Recent and Remote Memory:  Intact to interview. Not formally assessed. No amnesia.  Fund of Knowledge: appropriate     RISK AND PROTECTIVE FACTORS     Static Risk Factors: prior attempts, history of MH diagnoses and/or treatment, history of hospitalization and impulsivity     Dynamic Risk Factors: emotional distress, anxiety, feelings of guilt, chronic pain and mental health stigma     Protective Factors: medical compliance, problem solving ability, restricted access to means, access to care as needed and displaying help seeking behavior     SAFETY ASSESSMENT      Based on review of above risk and protective factors and today's exam, pt is at low elevated risk of harm to self or others. they do not meet criteria for a 72 hr hold and remains appropriate for ongoing outpatient care. The patient convincingly denies suicidality today though noted feeling suicidal in the past 1-2 weeks due to pain and feeling hopeless about pain interventions. There was no deceit detected, and the patient presented in a manner that was believable. Local community safety resources printed and reviewed for patient to use if needed.     Recommended that patient call 911 or go to the local ED should there be a change in any of these risk  factors.     DSM 5 DIAGNOSIS      296.32 (F33.1) Major Depressive Disorder, Recurrent Episode, Moderate _ and With anxious distress  300.82 (F45.1) Somatic Symptom Disorder  With predominant pain     Differential diagnoses include: consider relationship between depression, anxiety, somatic symptoms, chronic pain, and recurrent physical health concerns.    ASSESSMENT AND PLAN      Assessment:  Martha Chun is a 61 year old White, female who presents for follow-up visit with [facility] ([facility]) for medication management.   9 Dec 21: Pt with hx of depression, anxiety, chronic pain, suicide attempts by overdose, and historical dx of borderline personality and somatic symptom disorder who presents requesting Genesight testing due to multiple ineffective med trials and treatment of depression. We reviewed criteria for borderline personality and somatic sx disorder today and pt does meet criteria for and actually identify with somatic symptom disorder, but does not meet criteria for BPD. Her pain and belief about mold illness causing other physical health problems appear to be the primary stressor/concern in her presentation. She voices these things have also contributed to strained family relationships and loss of friendships, furthering depressed mood. She does appear to meet criteria for MDD, as well. She has tried a handful of medications that all cause intolerable headaches +/- other side effects. I think Genesight is a reasonable start with medication management. Pt does verbalize an interest in ECT or other non-pharm intervention for mood, which we will discuss further at future appointments.   I strongly recommended therapy for her presenting concerns and she is only willing to see a pain psychologist in person and wasn't able to find anyone with availability. I will place another referral specifically for health psychology/pain psychology.   Pt denies SI/plan/intent today but endorsed  recent SI due to pain and frustration about pain interventions.  Today, 18 Jan 22: Pt recently dx with COVID. She is reports feeling very sick and fatigued today with worsening depression in the past month. She has been having intermittent SI thoughts without plan/intent and wants to find a medication for her depression. She is not currently interested in discussing therapy because she feels ill. We reviewed Genesight testing. Pt has trialed several SSRIs, SNRI, NDRI with intolerable SEs. She is concerned abou the histamine drop r/t all psychotropics. We reviewed newer meds that may require prior auth but might have more tolerable SE profiles. Discussed R/B/SE of vortioxetine; pt agreeable to trial.     Treatment Plan: Medication side effects and alternatives reviewed. Health promotion activities recommended and reviewed. All questions addressed. Education and counseling completed regarding risks and benefits of medications and psychotherapy options. Collabor[facility] Care Psychiatry Service model reviewed today. Recommend therapy for additional support. Safety plan reviewed as indicated.     Medications:   -decrease MIRTAZAPINE to 15mg at bedtime x 7 nights, then to 7.5mg at bedtime x 7 nights, then stop  -start VORTIOXETINE 5mg daily x 7 days, then increase to 10mg daily x 7 days, then increase to 20mg daily    Labs:   -Not indicated today    Psychosocial:   - will continue to discuss therapy when pt is feeling better    Follow-up: Follow up in 3-4 weeks    1. Continue all other treatments (including medications) per primary care provider and/or specialists.   2. To schedule individual or family therapy, call [facility] at [phone].   3. Follow up with primary care provider as planned or for acute medical concerns.  4. Call the psychiatric nurse line with medication questions or concerns at [phone].  5. [employer] may be used to communicate with your care team, but this is not intended to be  used for emergencies.    Crisis Resources:    1. Present to the Emergency Department as needed or call after hours crisis line at [phone] or [phone].   2. [place] Crisis Text Line: Text [place] to 811451.  3. Suicide LifeLine Chat: suicidepreHarrow Sports.org/chat/.  4. National Suicide Prevention Lifeline: 158.431.9791 (TTY: 367.171.2659). Call anytime for help.  (www.suicidepreventionlifeline.org)  5. National [place] on Mental Illness (www.[place].org): 301-887-8173 or 114-414-6495.  6. Mental Health Association (www.mentalhealth.org): 876.617.3997 or 447-957-6564.      Administrative Billing:     Video/Phone Start Time:  1200  Video/Phone End Time:  1223    45 minutes spent on date of encounter reviewing pt record, performing history and exam, documenting clinical information in EMR, providing education to pt, and ordering prescriptions, medications, and referrals as indicated above.     Patient Status:  Patient will continue to be seen for ongoing consultation and stabilization.    Signed:   Marcelo Andrew DNP, PMHNP-BC  [facility] ([facility])

## 2022-01-19 ENCOUNTER — TELEPHONE (OUTPATIENT)
Dept: PSYCHIATRY | Facility: CLINIC | Age: 62
End: 2022-01-19
Payer: COMMERCIAL

## 2022-01-19 ASSESSMENT — PATIENT HEALTH QUESTIONNAIRE - PHQ9: SUM OF ALL RESPONSES TO PHQ QUESTIONS 1-9: 22

## 2022-01-19 NOTE — TELEPHONE ENCOUNTER
"RN called and updated patient with Marcelo Andrew's information below.   Patient would like to continue to see Marcelo \"for my psych med's\"   RN will route pain clinic scheduling team to reach out and schedule with their team.    Emily Upotn RN on 1/19/2022 at 8:47 AM        Marcelo Andrew, NP  P Psych Rn - [facility]  Martha doesn't have MyChart set up. We talked about the health psychologist yesterday as she canceled her intake earlier in Jan because she was under the impression that the psychologist (Dr. Walker) did weight management work in therapy, which is where Dr. Walker specializes.     Can you call Martha and let her know that there are other health psychologists with experience in pain in [facility] who can do therapy with her? She would just need to call the scheduling line to set that up with her referral.     Thank you!!   TRICIA Romero   [facility] Care Psychiatry Service   [facility]     "

## 2022-01-19 NOTE — TELEPHONE ENCOUNTER
Routed back to Emily Upton RN.  This is the wrong routing pool.  We don't manage psych meds and pt has never been seen in our clinic.      LAURA Del Valle-BSN  [facility]

## 2022-01-20 ENCOUNTER — TELEPHONE (OUTPATIENT)
Dept: PSYCHIATRY | Facility: CLINIC | Age: 62
End: 2022-01-20
Payer: COMMERCIAL

## 2022-01-21 ENCOUNTER — TELEPHONE (OUTPATIENT)
Dept: BEHAVIORAL HEALTH | Facility: CLINIC | Age: 62
End: 2022-01-21
Payer: COMMERCIAL

## 2022-01-21 NOTE — TELEPHONE ENCOUNTER
PA Initiation    Medication:   Insurance Company: [employer] - Phone [phone] Fax [phone]  Pharmacy Filling the Rx: [id number] - [place], [place] - [address] [place] [address]  Filling Pharmacy Phone: [phone]  Filling Pharmacy Fax:    Start Date: 1/21/2022    CENTRAL PRIOR AUTHORIZATION TEAM  Ph:[phone]       slipping, stumbling. tripping

## 2022-01-21 NOTE — TELEPHONE ENCOUNTER
Reason for call:  Other   Patient called regarding (reason for call): call back  Additional comments:   Pt is having a hard time getting her meds refilled.  Needs [place] to authorize. Pt states she has made several requests and still doesn't have her meds.    Phone number to reach patient:  [phone]    Best Time:  anytime    Can we leave a detailed message on this number?  YES    Travel screening: Not Applicable

## 2022-01-21 NOTE — TELEPHONE ENCOUNTER
Patient called to follow up on her trintellix Prior auth request. States that she really wanted to start the med over the weekend. RN informed her that the PA team had not responded or sent back reply. RN will route to the PA time, attempt 2 as high priority. Reminded her of possible staffs are busy and have not been able to get to her request yet. She verbalized understanding,

## 2022-01-25 ENCOUNTER — TELEPHONE (OUTPATIENT)
Dept: PSYCHIATRY | Facility: CLINIC | Age: 62
End: 2022-01-25
Payer: COMMERCIAL

## 2022-01-25 NOTE — TELEPHONE ENCOUNTER
Prior Authorization Approval    Authorization Effective Date: 12/25/2021  Authorization Expiration Date: 1/25/2023  Medication:   Approved Dose/Quantity: 77  Reference #:     Insurance Company: [employer] - Phone [phone] Fax [phone]  Expected CoPay:       CoPay Card Available:      Foundation Assistance Needed:    Which Pharmacy is filling the prescription (Not needed for infusion/clinic administered): [id number] - [place], [place] - [address] [place] [address]  Pharmacy Notified: Yes  Patient Notified:

## 2022-01-25 NOTE — TELEPHONE ENCOUNTER
Valentin Golden routed conversation to Psych Rn - [facility] 14 hours ago (5:36 PM)     Valentin Golden 14 hours ago (5:35 PM)     TK       Reason for call:  Other   Patient called regarding (reason for call): prescription  Additional comments: Pt says that she needs a prior authorization for a med called Trintellix. She said that she is not feeling well, and needs this med as soon as possible.     Phone number to reach patient:  Home number on file [phone] (home)     Best Time:  ASAP     Can we leave a detailed message on this number?  YES     Travel screening: Not Applicable            Any response or approval for the patient's Trintellix? Routing to [facility] to follow up.

## 2022-01-26 ENCOUNTER — TELEPHONE (OUTPATIENT)
Dept: PSYCHIATRY | Facility: CLINIC | Age: 62
End: 2022-01-26
Payer: COMMERCIAL

## 2022-01-26 NOTE — TELEPHONE ENCOUNTER
Prior Authorization Retail Medication Request    Medication/Dose: vortioxetine (TRINTELLIX) 5 MG tablet  ICD code (if different than what is on RX):  Somatic symptom disorder, moderate, with predominant pain [F45.1] - Major depressive disorder, recurrent episode, moderate with anxious distress [F33.1]   Previously Tried and Failed:    Rationale:      Insurance Name:  "[employer]  Insurance ID:  [id number]    Pharmacy Information (if different than what is on RX)  Name:  [id number] [address]  Phone:  [phone]

## 2022-01-27 ENCOUNTER — TELEPHONE (OUTPATIENT)
Dept: PSYCHIATRY | Facility: CLINIC | Age: 62
End: 2022-01-27
Payer: COMMERCIAL

## 2022-01-27 NOTE — TELEPHONE ENCOUNTER
Returned call to number below and left detailed VM as permission given below.  Advised pt that all SSRIs can increase bleeding risk which varies from easy bruising to significant hemorrhage or bleeding. Current recommendations include close monitoring. I advised pt continue vortioxetine unless uncomfortable with this risk in which case I would like to get her scheduled for sooner follow-up so we can discuss other options outside of SSRI class.

## 2022-01-27 NOTE — TELEPHONE ENCOUNTER
Returned call to patient and she said I already listened to her (FAITH Andrew) voicemail. Thank you.

## 2022-01-27 NOTE — TELEPHONE ENCOUNTER
Reason for call:  Other   Patient called regarding (reason for call): call back  Additional comments: pt requesting callback    Phone number to reach patient:  Cell number on file:    Telephone Information:   Mobile [phone]       Best Time:  asap    Can we leave a detailed message on this number?  YES    Travel screening: Not Applicable

## 2022-01-27 NOTE — TELEPHONE ENCOUNTER
Reason for Call: Medication question     Detailed comments: Received call from patient, stated that she has been prescribed Trintellix, and have read the medication should not be taken with Asprin. She stated, she takes Eutal/ASA/CAF twice daily, and Advil for headaches, and would like to know if she can continue to take the prescribed medication.      Phone Number Patient can be reached [phone]    Best Time: Anytime    Can we leave a detailed message on this number? Yes    Call taken on 1/27/2022 at 10:23 AM by Beka Solano

## 2022-02-01 NOTE — TELEPHONE ENCOUNTER
PA Initiation    Medication: vortioxetine (TRINTELLIX) 5 MG tablet  Insurance Company: [employer] - Phone [phone] Fax [phone]  Pharmacy Filling the Rx: [id number] - [place], [place] - [address] [address]  Filling Pharmacy Phone: [phone]  Filling Pharmacy Fax: [phone]  Start Date: 2/1/2022    FARZAD DUDLEY Aldana: [id number] - Rx #: [id number]

## 2022-02-01 NOTE — TELEPHONE ENCOUNTER
Prior Authorization Not Needed per Insurance    Medication: vortioxetine (TRINTELLIX) 5 MG tablet  Insurance Company: [employer] - Phone [phone] Fax [phone]  Expected CoPay:      Pharmacy Filling the Rx: [id number] - [place], [place] - [address] [place] [address]  Pharmacy Notified: Yes  Patient Notified: Yes    Prior Authorization Not Required  Spoke with pharmacy- they received paid claim.

## 2022-02-03 ENCOUNTER — TELEPHONE (OUTPATIENT)
Dept: PSYCHIATRY | Facility: CLINIC | Age: 62
End: 2022-02-03

## 2022-02-03 ENCOUNTER — TELEPHONE (OUTPATIENT)
Dept: PSYCHIATRY | Facility: CLINIC | Age: 62
End: 2022-02-03
Payer: COMMERCIAL

## 2022-02-03 DIAGNOSIS — F33.1 MAJOR DEPRESSIVE DISORDER, RECURRENT EPISODE, MODERATE (H): Primary | ICD-10-CM

## 2022-02-03 DIAGNOSIS — F45.1 SOMATIC SYMPTOM DISORDER, MODERATE, WITH PREDOMINANT PAIN: ICD-10-CM

## 2022-02-03 PROCEDURE — 93000 ELECTROCARDIOGRAM COMPLETE: CPT | Performed by: STUDENT IN AN ORGANIZED HEALTH CARE EDUCATION/TRAINING PROGRAM

## 2022-02-03 NOTE — TELEPHONE ENCOUNTER
"Pt called reporting she needs her \"lab orders and all of that\" sent to [facility] @ [phone]  "

## 2022-02-03 NOTE — TELEPHONE ENCOUNTER
Pt called intake and requested to speak with Elsy Andrew regarding her vortioxetine (TRINTELLIX)  prescription. Pt can be reached back at [phone]. Ok to leave detailed message if need be.

## 2022-02-03 NOTE — TELEPHONE ENCOUNTER
Pt cannot tolerate vortioxetine. She has been doing research on TCAs and thinks these might be helpful due to sedating effects. Discussed openness to trialing TCAs but advised I would like pt to get a set of vitals, EKG, and labs done beforehand. Advised pt schedule follow-up with me next week to discuss other med options. Discussed referral to TRD today as pt is interested in non-pharm interventions and feels her depression is chronic and difficult to treat. Will place referral today.

## 2022-02-03 NOTE — TELEPHONE ENCOUNTER
"Returned call to patient and she reported the following:    I have had medication before when it seems like a histamine release, I thought so, but did not verify it until the med was increased. The side of my face and head are numb and my sinus and temple feel pressured. She (FAITH Chin) has to look into the ones that don't release histamine.     Tell her I am going back up to 15 mg of the mirtazapine and I am going to stop taking the Trintellix.       RN encouraged patient to seek care / evaluation at the nearest ER, urgent care or walk in into the EMPATH unit and she said \"I will if I feel like I need to but right now I am not leaving my apartment\" \"just tell her I feel hopeless and like nothing will work for me after trying so many things. Tell her to call me okay.\"    "

## 2022-02-04 NOTE — TELEPHONE ENCOUNTER
This RN faxed lab orders to requested number [phone]    Emily Upton RN on 2/4/2022 at 11:57 AM

## 2022-02-07 ENCOUNTER — TELEPHONE (OUTPATIENT)
Dept: NEUROSURGERY | Facility: CLINIC | Age: 62
End: 2022-02-07
Payer: COMMERCIAL

## 2022-02-08 ENCOUNTER — TELEPHONE (OUTPATIENT)
Dept: NEUROSURGERY | Facility: CLINIC | Age: 62
End: 2022-02-08

## 2022-02-08 ENCOUNTER — VIRTUAL VISIT (OUTPATIENT)
Dept: NEUROSURGERY | Facility: CLINIC | Age: 62
End: 2022-02-08
Payer: COMMERCIAL

## 2022-02-08 DIAGNOSIS — M54.81 BILATERAL OCCIPITAL NEURALGIA: ICD-10-CM

## 2022-02-08 DIAGNOSIS — M79.18 MYALGIA OF MUSCLE OF NECK: ICD-10-CM

## 2022-02-08 DIAGNOSIS — F33.1 MODERATE EPISODE OF RECURRENT MAJOR DEPRESSIVE DISORDER (H): Primary | ICD-10-CM

## 2022-02-08 PROCEDURE — 99214 OFFICE O/P EST MOD 30 MIN: CPT | Mod: TEL | Performed by: NEUROLOGICAL SURGERY

## 2022-02-08 NOTE — CONFIDENTIAL NOTE
"Pt had a consultation today with Dr. Shrestha to discuss removing the occipital nerve stimulator. She meant to ask him if the other surgery they discussed could be done at the same time (per Dr. Shrestha's note, they briefly discussed a possible trial of high cervical spinal cord stimulation). If so, she would like to know if the \"incision will be in the location of C1 and C2\" \"near the facet joints.\"     I will follow-up with Dr. Shrestha to get more information about the plan of care and will then follow-up with pt. No further questions at this time.        "
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
## 2022-02-09 NOTE — CONFIDENTIAL NOTE
Called pt to address the questions she had about removing the occipital nerve stimulator. Dr. Shrestha said he would probably not want to do the trial stimulator at the time he removes the current stimulator. The incision would be at C1 and C2. Pt is concerned about her her facet joints and has additional questions for Dr. Shrestha surrounding the surgery. Pt mentioned meeting again with Dr. Shrestha before pursuing surgery, so additional questions can be addressed then.     Gave pt the scheduling number for the mental health [facility] referral that Dr. Shrestha entered yesterday.     No further questions at this time.

## 2022-02-11 ENCOUNTER — TELEPHONE (OUTPATIENT)
Dept: PSYCHIATRY | Facility: CLINIC | Age: 62
End: 2022-02-11
Payer: COMMERCIAL

## 2022-02-11 ENCOUNTER — TELEPHONE (OUTPATIENT)
Dept: BEHAVIORAL HEALTH | Facility: CLINIC | Age: 62
End: 2022-02-11
Payer: COMMERCIAL

## 2022-02-11 NOTE — TELEPHONE ENCOUNTER
This RN sent the order for LIPID panel and BMP to [facility] which is [phone].     Emily Upton RN on 2/11/2022 at 3:26 PM

## 2022-02-11 NOTE — TELEPHONE ENCOUNTER
Patient called stating that to get an EKG at [facility] wouldn't be until March 16th. She states that this is too far out and would like everything sent to [facility] so she can get it done there.

## 2022-02-11 NOTE — TELEPHONE ENCOUNTER
Reason for call:  Other   Patient called regarding (reason for call): call back  Additional comments:     wants to take 10mg of Nortriptyline this weekend because she gets botox Monday and wondering if you can prescribe it    Phone number to reach patient:  Cell number on file:    Telephone Information:   Mobile [phone]       Best Time:  asap    Can we leave a detailed message on this number?  YES    Travel screening: Not Applicable

## 2022-02-11 NOTE — TELEPHONE ENCOUNTER
Loreto Marrero routed conversation to Psych Rn - Fmg 31 minutes ago (11:50 AM)     Loreto Marrero 31 minutes ago (11:50 AM)     SR       Reason for call:  Other   Patient called regarding (reason for call): returning call  Additional comments:   Pt is returning a call from the nursing team to give the fax number for the [facility] clinic which is [phone].  To call the clinic directly the number is [phone].  Pt sees [employer]     Phone number to reach patient:  Cell number on file:        Telephone Information:   Mobile [phone]         Best Time:  anytime     Can we leave a detailed message on this number?  YES     Travel screening: Not Appli

## 2022-02-11 NOTE — TELEPHONE ENCOUNTER
This RN sent the EKG order to number listed below [facility] clinic which is [phone].     Emily Upton RN on 2/11/2022 at 3:59 PM

## 2022-02-11 NOTE — TELEPHONE ENCOUNTER
Called patient and told her to get the fax number for the clinic she wants the orders sent to.     Routing this to [facility] staff to help with faxing the labs to patient's clinic.

## 2022-02-11 NOTE — TELEPHONE ENCOUNTER
What is the concern that needs to be addressed by a nurse? Referral from Elsy Andrew on 2/3/22 For trd    May a detailed message be left on voicemail? yes    Date of last office visit: na    Message routed to: [facility] new referrals

## 2022-02-11 NOTE — TELEPHONE ENCOUNTER
Reason for call:  Other   Patient called regarding (reason for call): returning call  Additional comments:   Pt is returning a call from the nursing team to give the fax number for the [facility] clinic which is [phone].  To call the clinic directly the number is [phone].  Pt sees [employer]    Phone number to reach patient:  Cell number on file:    Telephone Information:   Mobile [phone]       Best Time:  anytime    Can we leave a detailed message on this number?  YES    Travel screening: Not Applicable

## 2022-02-14 ENCOUNTER — TELEPHONE (OUTPATIENT)
Dept: PSYCHIATRY | Facility: CLINIC | Age: 62
End: 2022-02-14
Payer: COMMERCIAL

## 2022-02-14 NOTE — TELEPHONE ENCOUNTER
Reason for Call:  Other Fax#    Detailed comments: Lab fax #[phone], For health [facility] was given incorrect#. Need to send EKG and labs to this fax#. Pt requesting call back once sent, ok to leave voicemail.     Phone Number Patient can be reached at: Cell number on file:    Telephone Information:   Mobile [phone]       Best Time: any    Can we leave a detailed message on this number? YES    Call taken on 2/14/2022 at 12:43 PM by Kobi Carpio

## 2022-02-15 NOTE — TELEPHONE ENCOUNTER
This RN re-faxed Labs and EKG to: Lab fax #[phone], For health [facility].    RN left message for patient to update status    Emily Upton RN on 2/15/2022 at 7:48 AM

## 2022-02-16 ENCOUNTER — TRANSFERRED RECORDS (OUTPATIENT)
Dept: HEALTH INFORMATION MANAGEMENT | Facility: CLINIC | Age: 62
End: 2022-02-16
Payer: COMMERCIAL

## 2022-02-16 NOTE — TELEPHONE ENCOUNTER
Reason for call:  Other   Patient called regarding (reason for call): Completed services  Additional comments: pt wanted to send a FYI that she completed these services    Phone number to reach patient:  Home number on file [phone] (home)    Best Time:  asap    Can we leave a detailed message on this number?  YES    Travel screening: Not Applicable'

## 2022-02-16 NOTE — TELEPHONE ENCOUNTER
Labs results received from [facility], faxed to Medical records and placed in bin at [facility]

## 2022-02-17 ENCOUNTER — VIRTUAL VISIT (OUTPATIENT)
Dept: PSYCHIATRY | Facility: CLINIC | Age: 62
End: 2022-02-17
Payer: COMMERCIAL

## 2022-02-17 DIAGNOSIS — F33.1 MAJOR DEPRESSIVE DISORDER, RECURRENT EPISODE, MODERATE (H): ICD-10-CM

## 2022-02-17 DIAGNOSIS — F33.1 MAJOR DEPRESSIVE DISORDER, RECURRENT EPISODE, MODERATE (H): Primary | ICD-10-CM

## 2022-02-17 DIAGNOSIS — F45.1 SOMATIC SYMPTOM DISORDER, MODERATE, WITH PREDOMINANT PAIN: ICD-10-CM

## 2022-02-17 PROCEDURE — 99215 OFFICE O/P EST HI 40 MIN: CPT | Mod: GT | Performed by: STUDENT IN AN ORGANIZED HEALTH CARE EDUCATION/TRAINING PROGRAM

## 2022-02-17 PROCEDURE — 99417 PROLNG OP E/M EACH 15 MIN: CPT | Mod: GT | Performed by: STUDENT IN AN ORGANIZED HEALTH CARE EDUCATION/TRAINING PROGRAM

## 2022-02-17 RX ORDER — NORTRIPTYLINE HYDROCHLORIDE 10 MG/5ML
SOLUTION ORAL
Qty: 185 ML | Refills: 0 | Status: SHIPPED | OUTPATIENT
Start: 2022-02-17 | End: 2022-06-06

## 2022-02-17 RX ORDER — MIRTAZAPINE 15 MG/1
15 TABLET, FILM COATED ORAL AT BEDTIME
Qty: 30 TABLET | Refills: 1 | Status: SHIPPED | OUTPATIENT
Start: 2022-02-17 | End: 2022-06-06

## 2022-02-17 NOTE — PATIENT INSTRUCTIONS
1. Continue mirtazapine 15mg at bedtime for mood  2. Start nortriptyline 5mg (2.5mL) at bedtime x 2 weeks then increase to 10mg (5mL) at bedtime  3. Call [phone] to schedule follow up with me in 4 weeks  4. Call the Treatment Resistant Depression clinic to get on their list for possible ketamine therapy for depression - [phone]

## 2022-02-17 NOTE — TELEPHONE ENCOUNTER
Elsy Andrew, NP  Psych Rn - [facility] 49 minutes ago (10:27 AM)     MK    Can you call the [facility] and find out if they are sending the EKG over too? Hoping to have that read before our appt today.     TRICIA Romero   Collaborative Care Psychiatry Service   [facility]    Message text          Called patient, gave her fax number for [facility] to have her pcp office sent the EKG results through fax. Informed patient that provider is hoping to see them before she meets with patient today. Patient verbalized understanding and said she will connect with [facility].

## 2022-02-17 NOTE — PROGRESS NOTES
Martha is a 62 year old who is being evaluated via a billable video visit.      How would you like to obtain your AVS? MyChart  If the video visit is dropped, the invitation should be resent by: Text to cell phone: [phone]  Will anyone else be joining your video visit? No        PSYCHIATRIC MEDICATION FOLLOW UP APPOINTMENT     Name:  Martha Chun  : 1960     Telemedicine Visit: The patient's condition can be safely assessed and treated via synchronous audio/visual telemedicine encounter.      Reason for Telemedicine Visit: COVID 19 pandemic and the social and physical recommendations by the CDC and MD.      Originating Site (Patient Location): Patient's home; pt verified their location for the duration of this appointment as address on record.    Distant Site (Provider Location): Provider Remote Setting    Consent:  The patient/guardian has verbally consented to: the potential risks and benefits of telemedicine (video or phone) versus in-person care; bill insurance or make self-payment for services provided; and responsibility for payment of non-covered services.     Mode of Communication: attempted amwell, attempted Cubito video, moved to Cubito phone as pt states her video wouldn't work    As the treating provider, I attest to compliance with applicable laws and regulations related to telemedicine.  Chart documentation may have been completed with Dragon Voice Recognition software.     IDENTIFICATION     Patient is a 62 year old year old White, female  who presents [facility].  Patient was initially referred by their PCP. Patient attended the session alone.   The [facility] psychiatry providers act as a specialty service for Primary Care Providers in the [facility] who seek to optimize medications for unstable patients.  Once medications have been optimized, [facility] providers discharge the patient back to the referring Primary Care Provider for ongoing  "medication management.  This type of system allows [facility] to serve a high volume of patients.      Patient care team: Patient Care Team:  oFx Vieira MD as PCP - General (Family Medicine)  Parvin Johnston MD as Assigned Surgical Provider  Kobi Shrestha MD as Assigned Neuroscience Provider  Sherri Castellon APRN CNP as Assigned PCP  Ion Haywood as Assigned Behavioral Health Provider  Kobi Shrestha MD as MD (Neurological Surgery)  Therapist: none    INTERIM HISTORY     [facility] Jan [facility]. At that time, the plan included start vortioxetine.    Interim pt communication:  vortioxetine intolerable, requesting to start tca; completed labs at [facility], awaiting EKG results     Available records were reviewed prior to visit.    HISTORY OF PRESENT ILLNESS     Currently: Pt has been staying in a short-term apartment suite while she sends mold results to her landlord. She expects them to move her to a new apartment but notes \"it's gonna be a big ordeal.\"   She is very interested in trying ketamine through the [facility] but states she hasn't been called.     Mood/anxiety: \"Acupuncture changes my mood for a time.\" In the past few weeks pt doesn't feel \"as heavily laden.\" She was previously feeling quite hopeless but now feels more hopeful overall. Sometimes she feels lonely and sad sometimes because she feels her chronic pain makes her isolated.  Suicidal ideation:  \"No, I've been trying really hard to fight them.\" Pt tearful. She denies plan/intent.    PHQ9 score is Not completed today; pt declined  GAD7 score is Not completed today; pt declined    Sleep: \"I sleep ok.\"    Medications: Pt stopped her mirtazapine taper because she felt excessive tearfulness at 7.5mg so she went back to 15mg at bedtime. She does not wish to discontinue this.    Medical: Pt was at [facility] this week for facial pain problems. They recommended she try nortriptyline for her facial pain/neuralgia " "problems. \"I know it's a big deal drug\" but is interested in trying it.   Her surgeon will be removing the neurostimulator per pt's request.     SUBSTANCE USE HISTORY      Tobacco use: Former; quit 2014  Caffeine:  Yes  no caffeine, except in medication  Current alcohol:  None  Current substance use: None  Past use alcohol/substance use: None    MEDICATIONS                                                                                              Current medications reviewed today and are noted below.   Current psychotropic medications:   Lorazepam 0.25-0.5mg up to 4x daily PRN (rx by neuro for neuralgia)  Mirtazapine 15mg at bedtime     Past psychotropic medications:  Carbamazepine  Paroxetine  Duloxetine  Clonidine  Escitalopram  Fluoxetine  Clonazepam  Nortriptyline - [facility] she [facility] \"[facility] [place] and he had me take 25mg during the day.\"  Venlafaxine  Bupropion  Mirtazapine  Vortioxetine    Supplements:   See below      1/24/22 Lorazepam 0.5mg #90   2/3/22 Fiorinal -40mg #60    Current Outpatient Medications   Medication Sig     acetaminophen (TYLENOL) 500 MG tablet Take 1,000 mg by mouth every 8 hours as needed      aliskiren (TEKTURNA) 150 MG tablet Take 150 mg by mouth At Bedtime      Butalbital-Acetaminophen (PHRENILIN)  MG TABS per tablet Take 1 tablet by mouth every 4 hours as needed     butalbital-acetaminophen-caffeine (ESGIC) -40 MG tablet Take 1 tablet by mouth every 4 hours as needed     butalbital-aspirin-caffeine (FIORINAL) -40 MG capsule Take 1 capsule by mouth every 4 hours as needed for headaches     butalbital-aspirin-caffeine (FIORINAL) -40 MG capsule Take 1 capsule by mouth every 4 hours as needed for headaches     carboxymethylcellulose (REFRESH PLUS) 0.5 % SOLN ophthalmic solution Apply 1-2 drops to eye daily as needed     cholecalciferol 50 MCG (2000 UT) tablet Take 2 tablets by mouth every morning      diltiazem ER (DILT-XR) " 120 MG 24 hr capsule Take 120 mg by mouth At Bedtime  (Patient not taking: Reported on 2/8/2022)     diltiazem ER COATED BEADS (CARDIZEM CD/CARTIA XT) 120 MG 24 hr capsule Take 120 mg by mouth daily (Patient not taking: Reported on 2/8/2022)     diphenhydrAMINE-acetaminophen (TYLENOL PM)  MG tablet Take 1 tablet by mouth nightly as needed for sleep     fluticasone (FLONASE) 50 MCG/ACT nasal spray Spray 1 spray into both nostrils every evening      gabapentin (NEURONTIN) 100 MG capsule Take 100 mg by mouth At Bedtime      LORazepam (ATIVAN) 0.5 MG tablet Take 1-1.5 tablets (0.5-0.75 mg) by mouth 2 times daily as needed for anxiety or pain     Melatonin 10 MG TABS tablet Take 10 mg by mouth nightly as needed      meloxicam (MOBIC) 7.5 MG tablet Take 1 tablet by mouth daily     mirtazapine (REMERON) 7.5 MG tablet Take 2 tablets (15 mg) by mouth At Bedtime for 7 days, THEN 1 tablet (7.5 mg) At Bedtime for 7 days.     Omega-3 Fatty Acids (FISH OIL PO) Take by mouth 2 times daily 360 mg in AM and 600 mg in PM     Oxymetazoline HCl (NASAL SPRAY) 0.05 % SOLN Spray 1 spray in nostril 2 times daily     triprolidine-pseudoePHEDrine (APRODINE) 2.5-60 MG TABS per tablet Take 0.5 tablets by mouth every 6 hours as needed     No current facility-administered medications for this visit.        PAST MEDICAL AND SURGICAL HISTORY   Reviewed past medical and surgical history today. Pregnant - NA    Past Medical History:   Diagnosis Date     Anxiety      Degenerative arthritis      Depression      Fibromyalgia      Hypertension      Migraines      Occipital neuralgia     b/l     Other chronic pain      Seasonal allergies        VITALS   There were no vitals taken for this visit. Will continue to ask pt to monitor BP as last encounters are showing HTN    Pulse Readings from Last 5 Encounters:   01/06/22 88   11/04/21 86   10/22/21 92   09/14/21 85   09/07/21 79     Wt Readings from Last 5 Encounters:   10/22/21 62.3 kg (137 lb 5.6  "oz)   09/07/21 61.8 kg (136 lb 3.2 oz)   08/27/21 62.2 kg (137 lb 2 oz)   03/15/21 59.9 kg (132 lb)   12/04/14 58.5 kg (129 lb)     BP Readings from Last 5 Encounters:   01/06/22 (!) 172/99   11/04/21 (!) 185/72   10/22/21 (!) 160/72   09/14/21 (!) 151/74   09/07/21 (!) 167/83       LABS  & IMAGING                                                                                                                Most recent labs reviewed today.    - 16 Feb 22    Recent Labs   Lab Test 10/19/21  1206 08/25/21  0823 03/19/20  0930   CR 0.64 0.71 0.71   GFRESTIMATED >90 >90 >60     Recent Labs   Lab Test 03/19/20  0930   AST 19   ALT 21   ALKPHOS 92     Recent Labs   Lab Test 03/19/20  0930   TSH 2.24         ALLERGY & IMMUNIZATIONS       Allergies   Allergen Reactions     Amlodipine Other (See Comments)     Constipation, \"liver pain\".         Clonidine      Other reaction(s): Dizziness     Escitalopram Muscle Pain (Myalgia)     Other reaction(s): Myalgias     Mold Nausea     Ringing in ears, racing heart     Paroxetine Other (See Comments)     Palpitations/racing heart         Penicillins Unknown     Does not remember reaction, mother said she allergic to mold; patient refuses Pcn or any derivative       Sulfa Drugs Unknown     Tramadol      Other reaction(s): Runny Nose  Facial pain, sinus swelling       Carvedilol Other (See Comments)     Worsening depression         Codeine Headache and Nausea and Vomiting     Cortisone Other (See Comments)     Patient reports and possibly all steroids \"makes me to feel sick\", get fungal infections       Duloxetine Headache     Oxycodone Headache       MENTAL STATUS EXAM:     General/Constitutional:  Appearance: awake, alert and MORRIS   Attitude: cooperative   Eye Contact:  MORRIS  Musculoskeletal:  Muscle Strength and Tone: MORRIS  Psychomotor Behavior:  MORRIS  Gait and Station: MORRIS  Psychiatric:  Speech:  clear, coherent, normal rate, rhythm, volume, tone, prosody   Associations:  no " loose associations  Thought Process:  logical, linear and goal oriented   Thought Content:  denies SI/plan/intent today; recent SI thoughts but no plan/intent; no evidence of homicidal ideation, no evidence of psychotic thought, no auditory hallucinations present and no visual hallucinations present   Mood:  depressed  Affect:  appropriate and in normal range and mood congruent  Insight:  fair  Judgment:  fair, adequate for safety  Impulse Control:  intact  Neurological:  Oriented to: time, person, and place  Attention Span and Concentration: Normal  Language: intact  Recent and Remote Memory:  Intact to interview. Not formally assessed. No amnesia.  Fund of Knowledge: appropriate     RISK AND PROTECTIVE FACTORS     Static Risk Factors: prior attempts, history of MH diagnoses and/or treatment, history of hospitalization and impulsivity     Dynamic Risk Factors: emotional distress, anxiety, feelings of guilt, chronic pain and mental health stigma     Protective Factors: medical compliance, problem solving ability, restricted access to means, access to care as needed and displaying help seeking behavior     SAFETY ASSESSMENT      Based on review of above risk and protective factors and today's exam, pt is at low elevated risk of harm to self or others. they do not meet criteria for a 72 hr hold and remains appropriate for ongoing outpatient care. The patient convincingly denies suicidality today though noted recent SI without plan/intent. There was no deceit detected, and the patient presented in a manner that was believable. Local community safety resources printed and reviewed for patient to use if needed.     Recommended that patient call 911 or go to the local ED should there be a change in any of these risk factors.     DSM 5 DIAGNOSIS      296.32 (F33.1) Major Depressive Disorder, Recurrent Episode, Moderate _ and With anxious distress  300.82 (F45.1) Somatic Symptom Disorder  With predominant  pain     Differential diagnoses include: consider relationship between depression, anxiety, somatic symptoms, chronic pain, and recurrent physical health concerns.    ASSESSMENT AND PLAN      Assessment:  Martha Chun is a 62 year old White, female who presents for follow-up visit with [facility] ([facility]) for medication management. Initial, 9 Dec 21: Pt with hx of depression, anxiety, chronic pain, suicide attempts by overdose, and historical dx of borderline personality and somatic symptom disorder who presents requesting Genesight testing due to multiple ineffective med trials and treatment of depression. We reviewed criteria for borderline personality and somatic sx disorder today and pt does meet criteria for and actually identify with somatic symptom disorder, but does not meet criteria for BPD. Her pain and belief about mold illness causing other physical health problems appear to be the primary stressor/concern in her presentation. She voices these things have also contributed to strained family relationships and loss of friendships, furthering depressed mood. She does appear to meet criteria for MDD, as well. She has tried a handful of medications that all cause intolerable headaches +/- other side effects. I think Genesight is a reasonable start with medication management. Pt does verbalize an interest in ECT or other non-pharm intervention for mood, which we will discuss further at future appointments.   I strongly recommended therapy for her presenting concerns and she is only willing to see a pain psychologist in person and wasn't able to find anyone with availability. I will place another referral specifically for health psychology/pain psychology.   Pt denies SI/plan/intent today but endorsed recent SI due to pain and frustration about pain interventions.  18 Jan 22: Pt recently dx with COVID. She is reports feeling very sick and fatigued today with worsening depression in the  "past month. She has been having intermittent SI thoughts without plan/intent and wants to find a medication for her depression. She is not currently interested in discussing therapy because she feels ill. We reviewed [place] CK1ight testing. Pt has trialed several SSRIs, SNRI, NDRI with intolerable SEs. She is concerned abou the \"histamine drop\" r/t all psychotropics. We reviewed newer meds that may require prior auth but might have more tolerable SE profiles. Discussed R/B/SE of vortioxetine; pt agreeable to trial.   Today, 17 Feb 22: Pt with complex presentation due to chronic pain, somatic perseverations, and chronic dep/anx who returns to f/u to discuss poss TCA initiation. I am concerned about [facility] as she reports unable to engage via video visit and instead has only been assessed and treated via phone appointments. We have referred to [facility], which is several months out. Pt completed labs outside of [facility] so waiting on records to be uploaded though initial review is not contributory to her  presentation. We discussed nortriptyline today as it can be helpful for both depression and her neuralgia pain. Recommended starting at sub-therapeutic dose of 5mg as she is sensitive to SEs; pt agreeable to try solution if available for low initial dosing. Pt denies SI today and reports feeling a bit more hopeful overall. She did not stop mirtazapine as discussed last appointment as she felt the lower dose affected her mood negatively so she returned to 15mg at bedtime and wishes to continue. We will not adjust mirtazapine at this time.  She would benefit from long-term psychiatry engagement through [facility] though I will continue to provide bridge services in the interim.    Treatment Plan: Medication side effects and alternatives reviewed. Health promotion activities recommended and reviewed. All questions addressed. Education and counseling completed regarding risks and benefits of medications and " psychotherapy options. Collaborative Care Psychiatry Service model reviewed today. Recommend therapy for additional support. Safety plan reviewed as indicated.     Medications:   -continue MIRTAZAPINE 15mg at bedtime  -start NORTRIPTYLINE 5mg/2.5mL solution at bedtime x 14 nights then increase to 10mg/5mL solution nightly    Labs:   -Not indicated today  - waiting for records to be scanned into chart from [place]  -reviewed EKG from [place], - will be scanned into record    Psychosocial:   - f/u on [facility] referral - number given to call    Follow-up: Follow up in 4 weeks    1. Continue all other treatments (including medications) per primary care provider and/or specialists.   2. To schedule individual or family therapy, call [facility] at [phone].   3. Follow up with primary care provider as planned or for acute medical concerns.  4. Call the psychiatric nurse line with medication questions or concerns at [phone].  5. 2Checkout may be used to communicate with your care team, but this is not intended to be used for emergencies.    Crisis Resources:    1. Present to the Emergency Department as needed or call after hours crisis line at [phone] or [phone].   2. [place] Crisis Text Line: Text [place] to 318489.  3. Suicide LifeLine Chat: suicidepreventionlifeline.org/chat/.  4. National Suicide Prevention Lifeline: 675.641.9427 (TTY: 264.508.2191). Call anytime for help.  (www.suicidepreventionlifeline.org)  5. National [place] on Mental Illness (www.evelina.org): 102.543.4311 or 966-643-7768.  6. Mental Health Association (www.mentalhealth.org): 964.956.2191 or 155-832-3801.    Patient Status:    The patient is being referred to long term community psychiatry care and provider will provide bridging until patient is established with new community provider.     Administrative Billing:     Video/Phone Start Time:  9974  Video/Phone End Time:  5597    77 minutes spent on date of encounter  reviewing pt record, performing history and exam, documenting clinical information in EMR, providing education to pt, and ordering prescriptions, medications, and referrals as indicated above.     Signed:   Marcelo Andrew DNP, PMHNP-BC  Collaborative Care Psychiatry Service ([facility])

## 2022-02-21 ENCOUNTER — TELEPHONE (OUTPATIENT)
Dept: PSYCHIATRY | Facility: CLINIC | Age: 62
End: 2022-02-21
Payer: COMMERCIAL

## 2022-02-21 DIAGNOSIS — M54.81 BILATERAL OCCIPITAL NEURALGIA: ICD-10-CM

## 2022-02-21 RX ORDER — LORAZEPAM 0.5 MG/1
.5-.75 TABLET ORAL 2 TIMES DAILY PRN
Qty: 40 TABLET | Refills: 0 | Status: SHIPPED | OUTPATIENT
Start: 2022-02-21 | End: 2022-03-02

## 2022-02-21 NOTE — TELEPHONE ENCOUNTER
Central Prior Authorization Team   Phone: [phone]      Prior Authorization Retail Medication Request    Medication/Dose: nortriptyline (PAMELOR) 10 MG/5ML solution  ICD code (if different than what is on RX):  Major depressive disorder, recurrent episode, moderate (H) [F33.1]  - Primary   Previously Tried and Failed:    Rationale:      Insurance Name:  [facility]  Insurance ID:  [id number]      Pharmacy Information (if different than what is on RX)  Name:  [id number] - [place], [place] - [address] [place] [address]  Phone:  [phone]

## 2022-02-21 NOTE — TELEPHONE ENCOUNTER
Reason for call:  Other   Patient called regarding (reason for call): prescription  Additional comments: Pt called and stated that insurance needed prior authorization for Nortriptyline medication from . Pt would like to speak to  as well, in regards to symptoms that she is experiencing from one of her meds that have been decreased.    Phone number to reach patient: [phone]    Best Time:  ASAP    Can we leave a detailed message on this number?  YES    Travel screening: Not Applicable

## 2022-02-21 NOTE — TELEPHONE ENCOUNTER
Called patient and she reported the following:  I really feel like I am in depressed mode in a way that I cannot get out of the bed in the morning and I cannot stay on task or stay focus. I wonder if we should go back up on the mirtazapine-add extra 7.5 back on or what are other options?

## 2022-02-21 NOTE — TELEPHONE ENCOUNTER
PA Initiation    Medication: nortriptyline (PAMELOR) 10 MG/5ML solution  Insurance Company: [employer] - Phone [phone] Fax [phone]  Pharmacy Filling the Rx: [id number] - [place], [place] - [address] [place] [address]  Filling Pharmacy Phone: [phone]  Filling Pharmacy Fax: [phone]  Start Date: 2/21/2022

## 2022-02-22 ENCOUNTER — TELEPHONE (OUTPATIENT)
Dept: PSYCHIATRY | Facility: CLINIC | Age: 62
End: 2022-02-22
Payer: COMMERCIAL

## 2022-02-22 NOTE — TELEPHONE ENCOUNTER
Reason for call:  Other   Patient called regarding (reason for call): call back  Additional comments:     msg: [facility] does not do ketamine , will call insurance and connect back    Phone number to reach patient:  Home number on file [phone] (home)    Best Time:  asap    Can we leave a detailed message on this number?  YES    Travel screening: Not Applicable

## 2022-02-22 NOTE — TELEPHONE ENCOUNTER
Prior Authorization Approval    Authorization Effective Date: 1/21/2022  Authorization Expiration Date: 2/21/2023  Medication: nortriptyline (PAMELOR) 10 MG/5ML solution  Approved Dose/Quantity:   Reference #: [id number]   Insurance Company: [employer] - Phone [phone] Fax [phone]  Which Pharmacy is filling the prescription (Not needed for infusion/clinic administered): [id number] - [address]  Pharmacy Notified: Yes  Patient Notified: Yes

## 2022-02-22 NOTE — TELEPHONE ENCOUNTER
"RN went through recommendation below.  She is hesitant to go to this location because the outside building looks \"Yucky\" reports that she does not know about the air quality of location.   RN suggested going to see it and check things out and to make a decision based off of experience at [facility]  She stated she would rather speak to Provider Marcelo about this before starting the infusions. RN stated that she may not be able to call patient today. Patient agreed and will wait for this call. She would like to know why you recommend this location?     Emily Upton RN on 2/22/2022 at 2:09 PM    "

## 2022-02-22 NOTE — TELEPHONE ENCOUNTER
Patient called back and agrees to waiting on Mirtazapine.       She checked insurance for Ketamine - The one you recommended was not open ([facility]) is on a 12 week waiting period. The insurance recommended the below listed ones. They have less of a wait to get in too.    Patient requesting that you please recommend one of the following:   [facility] ([place])   [facility] ([place])   [facility] and [facility] ([place])     Patient would also like to know when she should schedule for the Ketamine infusions. Does she just go ahead when she feels ready?     Emily Upton RN on 2/22/2022 at 12:26 PM

## 2022-02-22 NOTE — TELEPHONE ENCOUNTER
RN attempted to call patient with provider recommendations. Unsuccessful. Pending call back    Emily Upton RN on 2/22/2022 at 8:21 AM

## 2022-02-28 ENCOUNTER — TELEPHONE (OUTPATIENT)
Dept: NEUROSURGERY | Facility: CLINIC | Age: 62
End: 2022-02-28
Payer: COMMERCIAL

## 2022-02-28 NOTE — TELEPHONE ENCOUNTER
Pt left me a VM saying that if her headaches are bad she will take one 0.5 mg ativan tablet; if she is just stressed she may take a half tablet. Taking 3-4 tablets total daily. She would like a refill of 90 tablets instead of 40 as the prescription is currently written and would like it to go to [facility].     I have messaged Dr. Shrestha and will get back to pt.

## 2022-02-28 NOTE — TELEPHONE ENCOUNTER
Called pt back and left a VM. Pt left me a message on 2/27/22 requesting a refill of ativan needed this week. Would like to discuss how many tabs pt is taking per day, what pharmacy she would like to use. Left my direct number and requested pt call back at earliest convenience. Will follow-up with Dr. Shrestha after speaking with pt.

## 2022-03-02 DIAGNOSIS — M54.81 BILATERAL OCCIPITAL NEURALGIA: ICD-10-CM

## 2022-03-02 RX ORDER — LORAZEPAM 0.5 MG/1
.5-.75 TABLET ORAL 2 TIMES DAILY PRN
Qty: 90 TABLET | Refills: 0 | Status: SHIPPED | OUTPATIENT
Start: 2022-03-02 | End: 2022-04-05

## 2022-03-02 NOTE — CONFIDENTIAL NOTE
Spoke with pt to let her know ativan has been refilled at [facility]. Pt says she is dealing with a number of issues, including black mold at her home. States she needs to get some of the nerve pain in her face under control before she can think about surgery again. Pt welcome to call with any questions. Nothing further at this time.

## 2022-03-08 ENCOUNTER — TELEPHONE (OUTPATIENT)
Dept: NEUROSURGERY | Facility: CLINIC | Age: 62
End: 2022-03-08
Payer: COMMERCIAL

## 2022-03-08 NOTE — TELEPHONE ENCOUNTER
Writer routed call to Neurosurgery Nurse Pool.   Attn: Keya Abdalla, RNCC for Dr. Shrestha.   *RX refill request.     Yamel Kong LPN  Neurosurgery

## 2022-03-08 NOTE — TELEPHONE ENCOUNTER
[facility] Call Center    Phone Message    May a detailed message be left on voicemail: yes     Reason for Call: Medication Refill Request    Has the patient contacted the pharmacy for the refill? Yes   Name of medication being requested: butalbital-aspirin-caffeine (FIORINAL) -40 MG capsule    Provider who prescribed the medication: Kobi Shrestha MD    Pharmacy: [facility] [address] [place] [address]    Date medication is needed: ASAP, pt will run out of medication on 3/11/22      Action Taken: Message routed to:  [facility] neurosurgery    Travel Screening: Not Applicable

## 2022-03-09 NOTE — TELEPHONE ENCOUNTER
Called pt to let her know I have messaged Dr. Shrestha about the fiorinal refill, letting him know that she will run out on 3/11. I cannot send in the refill without his permission to do so. I will follow-up with pt when I hear back from Dr. Shrestha. Left my direct number should pt have questions.

## 2022-03-10 DIAGNOSIS — M54.81 BILATERAL OCCIPITAL NEURALGIA: ICD-10-CM

## 2022-03-10 PROCEDURE — 99207 PR NO BILLABLE SERVICE THIS VISIT: CPT | Performed by: NEUROLOGICAL SURGERY

## 2022-03-10 RX ORDER — BUTALBITAL, ASPIRIN, AND CAFFEINE 325; 50; 40 MG/1; MG/1; MG/1
1 CAPSULE ORAL EVERY 4 HOURS PRN
Qty: 60 CAPSULE | Refills: 0 | Status: SHIPPED | OUTPATIENT
Start: 2022-03-10 | End: 2022-04-05

## 2022-03-10 NOTE — TELEPHONE ENCOUNTER
Let pt know that Dr. Shrestha refilled the fiorinal prescription, sent to [facility]. No further questions at this time.

## 2022-03-15 ENCOUNTER — TELEPHONE (OUTPATIENT)
Dept: NEUROSURGERY | Facility: CLINIC | Age: 62
End: 2022-03-15

## 2022-03-15 NOTE — CONFIDENTIAL NOTE
Pt said she would like a copy of her medical records to be sent to [facility], as she is seeking ketamine treatment. I let her know that a records request must go through our medical records dept. Gave her the phone and fax numbers. Nothing further at this time.

## 2022-04-01 ENCOUNTER — TELEPHONE (OUTPATIENT)
Dept: NEUROSURGERY | Facility: CLINIC | Age: 62
End: 2022-04-01
Payer: COMMERCIAL

## 2022-04-01 NOTE — CONFIDENTIAL NOTE
Pt calling in advance for ativan and fiorinal refills, to be sent to [facility].     Pt also wants to know more about recover time once implant is removed. She did not seem like she had time to talk, so will follow-up with her about surgical plan and recovery.     Pt is meeting with anesthesiology next week in anticipation of starting ketamine treatment.     Will follow-up with pt about refills and recovery time. Nothing further at this time.

## 2022-04-05 DIAGNOSIS — M54.81 BILATERAL OCCIPITAL NEURALGIA: ICD-10-CM

## 2022-04-05 DIAGNOSIS — R51.9 FACIAL PAIN: Primary | ICD-10-CM

## 2022-04-05 RX ORDER — LORAZEPAM 0.5 MG/1
.5-.75 TABLET ORAL 2 TIMES DAILY PRN
Qty: 90 TABLET | Refills: 0 | Status: SHIPPED | OUTPATIENT
Start: 2022-04-05 | End: 2022-05-13

## 2022-04-05 RX ORDER — BUTALBITAL, ASPIRIN, AND CAFFEINE 325; 50; 40 MG/1; MG/1; MG/1
1 CAPSULE ORAL EVERY 4 HOURS PRN
Qty: 60 CAPSULE | Refills: 0 | Status: SHIPPED | OUTPATIENT
Start: 2022-04-05 | End: 2022-06-14

## 2022-04-08 ENCOUNTER — TELEPHONE (OUTPATIENT)
Dept: NEUROSURGERY | Facility: CLINIC | Age: 62
End: 2022-04-08
Payer: COMMERCIAL

## 2022-04-08 NOTE — CONFIDENTIAL NOTE
Let pt know that Dr. Shrestha sent refill orders to her pharmacy for ativan and fiorinal. Pt would like more information about surgery recovery if she decides to move forward. She would also like to know how far out Dr. Shrestha is scheduling. I will follow-up with him about recovery. He is scheduling out several months right now.     No further questions at this time. Will call pt back when I have more information about recovery.

## 2022-04-19 ENCOUNTER — VIRTUAL VISIT (OUTPATIENT)
Dept: NEUROSURGERY | Facility: CLINIC | Age: 62
End: 2022-04-19
Payer: COMMERCIAL

## 2022-04-19 DIAGNOSIS — M54.81 BILATERAL OCCIPITAL NEURALGIA: Primary | ICD-10-CM

## 2022-04-19 PROCEDURE — 99214 OFFICE O/P EST MOD 30 MIN: CPT | Mod: GT | Performed by: NEUROLOGICAL SURGERY

## 2022-04-19 RX ORDER — DILTIAZEM HYDROCHLORIDE 120 MG/1
120 CAPSULE, EXTENDED RELEASE ORAL AT BEDTIME
COMMUNITY
Start: 2022-03-14 | End: 2023-01-10

## 2022-04-19 RX ORDER — POLYETHYLENE GLYCOL 3350 17 G/17G
17 POWDER, FOR SOLUTION ORAL PRN
COMMUNITY
Start: 2022-03-17 | End: 2022-11-08

## 2022-04-19 RX ORDER — MELOXICAM 7.5 MG/1
7.5 TABLET ORAL DAILY
COMMUNITY
Start: 2022-04-19 | End: 2022-11-08

## 2022-04-19 RX ORDER — MIRTAZAPINE 15 MG/1
15 TABLET, FILM COATED ORAL AT BEDTIME
COMMUNITY
Start: 2022-04-19 | End: 2022-06-06

## 2022-04-19 NOTE — PROGRESS NOTES
"Martha Chun  is being evaluated via a billable video visit.      How would you like to obtain your AVS? Mail a copy  For the video visit, send the invitation by: Text to cell phone: [phone]  Will anyone else be joining your video visit? No      Pt states there are no changes to their allergy and medication list since last reviewed on 22.    No chief complaint on file.    Tiara Griffith,     Neurosurgery Clinic Note    Reason for Visit: Occipital nerve stimulator follow-up    History of Present Illness  Martha Chun is a 62-year-old woman with a history of spinal fusion for neck pain who is now status post bilateral occipital nerve stimulator implant for occipital neuralgia and headaches.  Despite multiple programming attempts we have not been able to get the stimulator providing optimal pain relief.  Martha tells me that she continues to have significant environmental issues including water damage to her new apartment in January from which she feels like she is suffering from mold.  Unfortunately her brother  and she continues to struggle with her overall quality of life.  She tells me and reiterates that she feels like her neck will not stay in place and she feels like C2 continues to slip on C3 despite having multiple evaluations that showed no changes.  She states that she feels lots of symptoms and feels like because of the simulators her neck is unable to be pulled back.  She tells me that since we last met she tried some medications but has not been able to manage them.  She is currently on a waiting list for ketamine infusions.  She feels like she would like to remove the occipital nerve stimulator is now and potentially proceed in the future with a spinal cord stimulation at sometime in the future to address her deeper neck pain.           Allergies   Allergen Reactions     Amlodipine Other (See Comments)     Constipation, \"liver pain\".         Clonidine      Other reaction(s): Dizziness     " "Escitalopram Muscle Pain (Myalgia)     Other reaction(s): Myalgias     Mold Nausea     Ringing in ears, racing heart     Paroxetine Other (See Comments)     Palpitations/racing heart         Penicillins Unknown     Does not remember reaction, mother said she allergic to mold; patient refuses Pcn or any derivative       Sulfa Drugs Unknown     Tramadol      Other reaction(s): Runny Nose  Facial pain, sinus swelling       Carvedilol Other (See Comments)     Worsening depression         Codeine Headache and Nausea and Vomiting     Cortisone Other (See Comments)     Patient reports and possibly all steroids \"makes me to feel sick\", get fungal infections       Duloxetine Headache     Oxycodone Headache       Current Outpatient Medications   Medication     acetaminophen (TYLENOL) 500 MG tablet     aliskiren (TEKTURNA) 150 MG tablet     Butalbital-Acetaminophen (PHRENILIN)  MG TABS per tablet     butalbital-acetaminophen-caffeine (ESGIC) -40 MG tablet     butalbital-aspirin-caffeine (FIORINAL) -40 MG capsule     butalbital-aspirin-caffeine (FIORINAL) -40 MG capsule     carboxymethylcellulose (REFRESH PLUS) 0.5 % SOLN ophthalmic solution     cholecalciferol 50 MCG (2000 UT) tablet     diltiazem ER (DILT-XR) 120 MG 24 hr capsule     diltiazem ER (TIAZAC) 120 MG 24 hr ER beaded capsule     diltiazem ER COATED BEADS (CARDIZEM CD/CARTIA XT) 120 MG 24 hr capsule     diphenhydrAMINE-acetaminophen (TYLENOL PM)  MG tablet     fluticasone (FLONASE) 50 MCG/ACT nasal spray     gabapentin (NEURONTIN) 100 MG capsule     LORazepam (ATIVAN) 0.5 MG tablet     Melatonin 10 MG TABS tablet     meloxicam (MOBIC) 7.5 MG tablet     meloxicam (MOBIC) 7.5 MG tablet     mirtazapine (REMERON) 15 MG tablet     Omega-3 Fatty Acids (FISH OIL PO)     Oxymetazoline HCl (NASAL SPRAY) 0.05 % SOLN     polyethylene glycol (MIRALAX) 17 GM/Dose powder     triprolidine-pseudoePHEDrine (APRODINE) 2.5-60 MG TABS per tablet     " mirtazapine (REMERON) 15 MG tablet     nortriptyline (PAMELOR) 10 MG/5ML solution     No current facility-administered medications for this visit.               ROS: 10 point ROS neg other than the symptoms noted above in the HPI.        Assessment and Plan   Martha Chun is a 62 year old female with a complex history of headaches and occipital neuralgia.  Martha still has significant environmental contributions to her health.  She continues to advocate for prolotherapy through the [facility] system which she said she found on a website.  I have reiterated that I cannot refer her for this type of therapy which does not have evidence for her diagnosis.  She has been asking about cash pay stem cell therapy through private clinics which I also reiterated has no evidence and discouraged her from pursuing.  Because she is not getting benefit from the stimulators and feels like her neck is restricted by the tunneled wires I discussed the potential alternatives, potential benefits, and potential risks of removing the stimulators which she wants to move forward with.  I will also refer her to my colleague Nida Taylor in the headache clinic for further help with managing her complex headache syndrome.    Removal of stimulators bilateral  Refer to Nida Taylor for headaches      Kobi Shrestha MD  Department of Neurosurgery  [facility]

## 2022-04-19 NOTE — LETTER
"2022       RE: Martha Chun  [address]  [address]  [place] [address]     Dear Colleague,    Thank you for referring your patient, Martha Chun, to the [facility] [place] at [facility]. Please see a copy of my visit note below.      Neurosurgery Clinic Note    Reason for Visit: Occipital nerve stimulator follow-up    History of Present Illness  Martha Chun is a 62-year-old woman with a history of spinal fusion for neck pain who is now status post bilateral occipital nerve stimulator implant for occipital neuralgia and headaches.  Despite multiple programming attempts we have not been able to get the stimulator providing optimal pain relief.  Martha tells me that she continues to have significant environmental issues including water damage to her new apartment in January from which she feels like she is suffering from mold.  Unfortunately her brother  and she continues to struggle with her overall quality of life.  She tells me and reiterates that she feels like her neck will not stay in place and she feels like C2 continues to slip on C3 despite having multiple evaluations that showed no changes.  She states that she feels lots of symptoms and feels like because of the simulators her neck is unable to be pulled back.  She tells me that since we last met she tried some medications but has not been able to manage them.  She is currently on a waiting list for ketamine infusions.  She feels like she would like to remove the occipital nerve stimulator is now and potentially proceed in the future with a spinal cord stimulation at sometime in the future to address her deeper neck pain.           Allergies   Allergen Reactions     Amlodipine Other (See Comments)     Constipation, \"liver pain\".         Clonidine      Other reaction(s): Dizziness     Escitalopram Muscle Pain (Myalgia)     Other reaction(s): Myalgias     Mold " "Nausea     Ringing in ears, racing heart     Paroxetine Other (See Comments)     Palpitations/racing heart         Penicillins Unknown     Does not remember reaction, mother said she allergic to mold; patient refuses Pcn or any derivative       Sulfa Drugs Unknown     Tramadol      Other reaction(s): Runny Nose  Facial pain, sinus swelling       Carvedilol Other (See Comments)     Worsening depression         Codeine Headache and Nausea and Vomiting     Cortisone Other (See Comments)     Patient reports and possibly all steroids \"makes me to feel sick\", get fungal infections       Duloxetine Headache     Oxycodone Headache       Current Outpatient Medications   Medication     acetaminophen (TYLENOL) 500 MG tablet     aliskiren (TEKTURNA) 150 MG tablet     Butalbital-Acetaminophen (PHRENILIN)  MG TABS per tablet     butalbital-acetaminophen-caffeine (ESGIC) -40 MG tablet     butalbital-aspirin-caffeine (FIORINAL) -40 MG capsule     butalbital-aspirin-caffeine (FIORINAL) -40 MG capsule     carboxymethylcellulose (REFRESH PLUS) 0.5 % SOLN ophthalmic solution     cholecalciferol 50 MCG (2000 UT) tablet     diltiazem ER (DILT-XR) 120 MG 24 hr capsule     diltiazem ER (TIAZAC) 120 MG 24 hr ER beaded capsule     diltiazem ER COATED BEADS (CARDIZEM CD/CARTIA XT) 120 MG 24 hr capsule     diphenhydrAMINE-acetaminophen (TYLENOL PM)  MG tablet     fluticasone (FLONASE) 50 MCG/ACT nasal spray     gabapentin (NEURONTIN) 100 MG capsule     LORazepam (ATIVAN) 0.5 MG tablet     Melatonin 10 MG TABS tablet     meloxicam (MOBIC) 7.5 MG tablet     meloxicam (MOBIC) 7.5 MG tablet     mirtazapine (REMERON) 15 MG tablet     Omega-3 Fatty Acids (FISH OIL PO)     Oxymetazoline HCl (NASAL SPRAY) 0.05 % SOLN     polyethylene glycol (MIRALAX) 17 GM/Dose powder     triprolidine-pseudoePHEDrine (APRODINE) 2.5-60 MG TABS per tablet     mirtazapine (REMERON) 15 MG tablet     nortriptyline (PAMELOR) 10 MG/5ML solution "     No current facility-administered medications for this visit.         ROS: 10 point ROS neg other than the symptoms noted above in the HPI.        Assessment and Plan   Martha Chun is a 62 year old female with a complex history of headaches and occipital neuralgia.  Martha still has significant environmental contributions to her health.  She continues to advocate for prolotherapy through the [facility] system which she said she found on a website.  I have reiterated that I cannot refer her for this type of therapy which does not have evidence for her diagnosis.  She has been asking about cash pay stem cell therapy through private clinics which I also reiterated has no evidence and discouraged her from pursuing.  Because she is not getting benefit from the stimulators and feels like her neck is restricted by the tunneled wires I discussed the potential alternatives, potential benefits, and potential risks of removing the stimulators which she wants to move forward with.  I will also refer her to my colleague Nida Taylor in the headache clinic for further help with managing her complex headache syndrome.    Removal of stimulators bilateral  Refer to Nida Taylor for headaches      Kobi Shrestha MD  Department of Neurosurgery  [facility]

## 2022-04-20 ENCOUNTER — HOSPITAL ENCOUNTER (OUTPATIENT)
Facility: CLINIC | Age: 62
End: 2022-04-20
Attending: NEUROLOGICAL SURGERY | Admitting: NEUROLOGICAL SURGERY
Payer: COMMERCIAL

## 2022-04-20 DIAGNOSIS — M54.81 BILATERAL OCCIPITAL NEURALGIA: Primary | ICD-10-CM

## 2022-04-22 ENCOUNTER — TELEPHONE (OUTPATIENT)
Dept: NEUROSURGERY | Facility: CLINIC | Age: 62
End: 2022-04-22
Payer: COMMERCIAL

## 2022-04-22 NOTE — TELEPHONE ENCOUNTER
Called patient in regards to scheduling surgery with Dr. Shrestha, but patient was under the impression that surgery would be scheduled out by 3 months. I did find a surgery date in May, but patient would like to think it over before committing.        Blair Yap on 4/22/2022 at 1:11 PM

## 2022-04-25 ENCOUNTER — TELEPHONE (OUTPATIENT)
Dept: NEUROSURGERY | Facility: CLINIC | Age: 62
End: 2022-04-25
Payer: COMMERCIAL

## 2022-04-25 DIAGNOSIS — Z11.59 ENCOUNTER FOR SCREENING FOR OTHER VIRAL DISEASES: Primary | ICD-10-CM

## 2022-04-25 NOTE — TELEPHONE ENCOUNTER
I called the patient in regards to scheduling surgery with Dr. Shrestha. Patient has a covid test scheduled at [facility]  and pre op has been taken care of with PAC via video. Patient is aware that the nursing team will be reaching out within the next few days. I did tell patient that a nurse will reach out within 2-3 days prior to surgery with arrival time and instructions.    Surgeon: Dr. Shrestha  Date of Surgery: 5/13/2022  Location of surgery: [facility]  Pre-Op H&P: 5/10/2022  Post-Op Appt Date: 5/23/2022   Imaging needed:  No  Discussed COVID-19 testing:  Yes  Pre-cert/Authorization completed:  Yes  Patient aware that pre-op RN will call 2-3 days prior to surgery with arrival time and instructions Yes  Packet sent out: No 04/25/22  Patient was instructed to review packet and call back with any questions or concerns.           Blair Yap on 4/25/2022 at 11:27 AM

## 2022-04-26 NOTE — TELEPHONE ENCOUNTER
FUTURE VISIT INFORMATION      SURGERY INFORMATION:    Date: 22    Location: [facility] or    Surgeon:  Kobi Shrestha MD    Anesthesia Type:  Combined MAC with Local    Procedure: Removal of bilateral occipital nerve stimulators    Consult: Virtual visit     RECORDS REQUESTED FROM:       Primary Care Provider: Fox Vieira MD  - [facility]    Most recent EKG+ [id number]

## 2022-05-05 ENCOUNTER — TELEPHONE (OUTPATIENT)
Dept: NEUROSURGERY | Facility: CLINIC | Age: 62
End: 2022-05-05
Payer: COMMERCIAL

## 2022-05-05 NOTE — TELEPHONE ENCOUNTER
Left VM for pt letting her know I have a letter with preop information in the mail, but since her surgery is a week from tomorrow, I wanted to discuss meds she will need to stop in advance.  7 days before surgery, pt should stop Fiorinal (contains aspirin), other aspirin products, fish oil, and meloxicam (Mobic). 4 days before surgery, stop naproxen; 24 hours before surgery, stop ibuprofen.     Requested pt call me to confirm she received the message and to discuss other information about preparing for surgery. Left my direct number.

## 2022-05-06 NOTE — TELEPHONE ENCOUNTER
Called pt to confirm she received my VM yesterday about what medications to stop and when prior to surgery. Pt did received the message and expressed understanding. The preop information should arrive in the mail in the next day or so.     Pt requested changing the time of her PAC appt on 5/10. Gave pt the number for PAC scheduling, as she is more familiar with what will work for her schedule.     No further questions at this time.

## 2022-05-10 ENCOUNTER — PRE VISIT (OUTPATIENT)
Dept: SURGERY | Facility: CLINIC | Age: 62
End: 2022-05-10
Payer: COMMERCIAL

## 2022-05-10 ENCOUNTER — VIRTUAL VISIT (OUTPATIENT)
Dept: SURGERY | Facility: CLINIC | Age: 62
End: 2022-05-10
Payer: COMMERCIAL

## 2022-05-10 DIAGNOSIS — M54.81 BILATERAL OCCIPITAL NEURALGIA: ICD-10-CM

## 2022-05-10 DIAGNOSIS — Z01.818 PRE-OP EXAMINATION: Primary | ICD-10-CM

## 2022-05-10 PROCEDURE — 99214 OFFICE O/P EST MOD 30 MIN: CPT | Mod: GT | Performed by: PHYSICIAN ASSISTANT

## 2022-05-10 ASSESSMENT — LIFESTYLE VARIABLES: TOBACCO_USE: 1

## 2022-05-10 ASSESSMENT — PAIN SCALES - GENERAL: PAINLEVEL: EXTREME PAIN (9)

## 2022-05-10 ASSESSMENT — ENCOUNTER SYMPTOMS: SEIZURES: 0

## 2022-05-10 NOTE — PROGRESS NOTES
Martha is a 62 year old who is being evaluated via a billable video visit.      How would you like to obtain your AVS? [email or web address]  If the video visit is dropped, the invitation should be resent by: Text to cell phone: [phone]       HPI         Review of Systems         Objective    Vitals - Patient Reported  Pain Score: Extreme Pain (9) (Left side of head)        Physical Exam

## 2022-05-10 NOTE — H&P
Pre-Operative H & P     CC:  Preoperative exam to assess for increased cardiopulmonary risk while undergoing surgery and anesthesia.    Date of Encounter: 5/10/2022  Primary Care Physician:  Fox Vieira     Reason for visit:   Encounter Diagnoses   Name Primary?     Pre-op examination Yes     Bilateral occipital neuralgia        HPI  Martha Chun is a 62 year old female who presents for pre-operative H & P in preparation for  Procedure Information     Case: [id number] Date/Time: 05/13/22 1210    Procedure: Removal of bilateral occipital nerve stimulators (Bilateral Spine)    Anesthesia type: Combined MAC with Local    Diagnosis: Bilateral occipital neuralgia [M54.81]    Pre-op diagnosis: Bilateral occipital neuralgia [M54.81]    Location:  OR [address] [place] [address] OR    Providers: Kobi Shrestha MD          The patient is a 62-year-old woman with a past medical history significant for hypertension, seasonal allergies, former smoker, TMJ, migraines, chronic pain syndrome, fibromyalgia, anxiety, depression, insomnia, history of cervical fusion, osteoarthritis and occipital neuralgia with headaches.  The patient underwent occipital nerve stimulator placement on 8/27/2021 and 10/22/2021.  She was seen by Dr. Frias on 4/19/2022 and continued to have ongoing neck pain.  During her visit they discussed treatment options and the patient has now been scheduled for the procedure as above.      History is obtained from the patient and chart review    Hx of abnormal bleeding or anti-platelet use: none    Menstrual history: No LMP recorded. Patient is postmenopausal.:      Past Medical History  Past Medical History:   Diagnosis Date     Anxiety      Degenerative arthritis      Depression      Fibromyalgia      Hypertension      Migraines      Occipital neuralgia     b/l     Other chronic pain      Seasonal allergies        Past Surgical History  Past Surgical History:   Procedure Laterality Date     CERVICAL FUSION  11/2020     November and Dec 2020     IMPLANT PULSE GENERATOR SUBCUTANEOUS Bilateral 10/22/2021    Procedure: Phase II occipital nerve stimulator placement, bilateral occipital electrodes and bilateral trigeminal electrodes, Implantation of bilateral subcutaneous neurostimulator Percutaneous implantation of neurostimulator electrode array; cranial nerve Implantable neurostimulator electrode, each CPT 33022 Implantable neurostimulator pulse generator, dual array, nonrechargeable, includes extensi     INSERT STIMULATOR OCCIPITAL Bilateral 08/27/2021    Procedure: Externalized trial of percutaneous occipital nerve stimulator bilateral Percutaneous implantation of neurostimulator electrode array; cranial nerve CPT 53846;  Surgeon: Kobi Shrestha MD;  Location: [facility] OR     ORTHOPEDIC SURGERY      s/p right EMELINA; left TKA and s/p cervical fusions     ZZC LIGATE FALLOPIAN TUBE      Description: Tubal Ligation;  Recorded: 01/16/2012;       Prior to Admission Medications  Current Outpatient Medications   Medication Sig Dispense Refill     acetaminophen (TYLENOL) 500 MG tablet Take 1,000 mg by mouth as needed       aliskiren (TEKTURNA) 150 MG tablet Take 300 mg by mouth At Bedtime       Butalbital-Acetaminophen (PHRENILIN)  MG TABS per tablet Take 1 tablet by mouth every 4 hours as needed       cholecalciferol 50 MCG (2000 UT) tablet Take 2 tablets by mouth every morning        diltiazem ER (DILT-XR) 120 MG 24 hr capsule Take 120 mg by mouth At Bedtime        diphenhydrAMINE-acetaminophen (TYLENOL PM)  MG tablet Take 1 tablet by mouth nightly as needed for sleep       fluticasone (FLONASE) 50 MCG/ACT nasal spray Spray 1 spray into both nostrils every evening        gabapentin (NEURONTIN) 100 MG capsule Take 100 mg by mouth At Bedtime        LORazepam (ATIVAN) 0.5 MG tablet Take 1-1.5 tablets (0.5-0.75 mg) by mouth 2 times daily as needed for anxiety or pain 90 tablet 0     Melatonin 10 MG TABS tablet Take 10 mg by mouth  nightly as needed        mirtazapine (REMERON) 15 MG tablet Take 15 mg by mouth At Bedtime       Omega-3 Fatty Acids (FISH OIL PO) Take by mouth 2 times daily 360 mg in AM and 600 mg in PM       polyethylene glycol (MIRALAX) 17 GM/Dose powder Take 17 g by mouth as needed       butalbital-acetaminophen-caffeine (ESGIC) -40 MG tablet Take 1 tablet by mouth every 4 hours as needed (Patient not taking: Reported on 5/10/2022)       butalbital-aspirin-caffeine (FIORINAL) -40 MG capsule Take 1 capsule by mouth every 4 hours as needed for headaches (Patient not taking: Reported on 5/10/2022) 60 capsule 0     butalbital-aspirin-caffeine (FIORINAL) -40 MG capsule Take 1 capsule by mouth every 4 hours as needed for headaches (Patient not taking: Reported on 5/10/2022) 60 capsule 3     carboxymethylcellulose (REFRESH PLUS) 0.5 % SOLN ophthalmic solution Apply 1-2 drops to eye daily as needed       diltiazem ER (TIAZAC) 120 MG 24 hr ER beaded capsule Take 120 mg by mouth daily       diltiazem ER COATED BEADS (CARDIZEM CD/CARTIA XT) 120 MG 24 hr capsule Take 120 mg by mouth daily        meloxicam (MOBIC) 7.5 MG tablet Take 7.5 mg by mouth daily (Patient not taking: Reported on 5/10/2022)       meloxicam (MOBIC) 7.5 MG tablet Take 1 tablet by mouth daily (Patient not taking: Reported on 5/10/2022)       mirtazapine (REMERON) 15 MG tablet Take 1 tablet (15 mg) by mouth At Bedtime 30 tablet 1     nortriptyline (PAMELOR) 10 MG/5ML solution Take 2.5 mLs (5 mg) by mouth At Bedtime for 14 days, THEN 5 mLs (10 mg) At Bedtime. (Patient not taking: Reported on 5/10/2022) 185 mL 0     Oxymetazoline HCl (NASAL SPRAY) 0.05 % SOLN Spray 1 spray in nostril 2 times daily (Patient not taking: Reported on 5/10/2022)       triprolidine-pseudoePHEDrine (APRODINE) 2.5-60 MG TABS per tablet Take 0.5 tablets by mouth every 6 hours as needed (Patient not taking: Reported on 5/10/2022)         Allergies  Allergies   Allergen Reactions  "    Amlodipine Other (See Comments)     Constipation, \"liver pain\".         Clonidine      Other reaction(s): Dizziness     Escitalopram Muscle Pain (Myalgia)     Other reaction(s): Myalgias     Mold Nausea     Ringing in ears, racing heart     Paroxetine Other (See Comments)     Palpitations/racing heart         Penicillins Unknown     Does not remember reaction, mother said she allergic to mold; patient refuses Pcn or any derivative       Sulfa Drugs Unknown     Tramadol      Other reaction(s): Runny Nose  Facial pain, sinus swelling       Carvedilol Other (See Comments)     Worsening depression         Codeine Headache and Nausea and Vomiting     Cortisone Other (See Comments)     Patient reports and possibly all steroids \"makes me to feel sick\", get fungal infections       Duloxetine Headache     Oxycodone Headache       Social History  Social History     Socioeconomic History     Marital status: Legally      Spouse name: Not on file     Number of children: 2     Years of education: Not on file     Highest education level: Not on file   Occupational History     Occupation: on disability   Tobacco Use     Smoking status: Former Smoker     Packs/day: 1.00     Years: 15.00     Pack years: 15.00     Quit date: 10/10/2014     Years since quittin.5     Smokeless tobacco: Never Used   Substance and Sexual Activity     Alcohol use: Not Currently     Comment: Last drink 10/2014     Drug use: Not Currently     Sexual activity: Not on file   Other Topics Concern     Parent/sibling w/ CABG, MI or angioplasty before 65F 55M? Not Asked   Social History Narrative     Not on file     Social Determinants of Health     Financial Resource Strain: Not on file   Food Insecurity: Not on file   Transportation Needs: Not on file   Physical Activity: Not on file   Stress: Not on file   Social Connections: Not on file   Intimate Partner Violence: Not on file   Housing Stability: Not on file       Family History  Family " History   Problem Relation Age of Onset     Cancer Mother      Diabetes Father      Hypertension Father      Pulmonary Embolism Daughter      Anesthesia Reaction No family hx of        Review of Systems  The complete review of systems is negative other than noted in the HPI or here.   Anesthesia Evaluation   Pt has had prior anesthetic. Type: General.    History of anesthetic complications  - PONV.      ROS/MED HX  ENT/Pulmonary: Comment: TMJ    (+) allergic rhinitis, tobacco use, Past use,     Neurologic: Comment: Occipital neuralgia/ headache      (+) migraines,  (-) no seizures, no CVA and no TIA   Cardiovascular:     (+) hypertension-----Previous cardiac testing   Echo: Date: Results:    Stress Test: Date: Results:    ECG Reviewed: Date: 2/16/22 Results:  SR  Cath: Date: Results:      METS/Exercise Tolerance: 4 - Raking leaves, gardening    Hematologic:  - neg hematologic  ROS     Musculoskeletal: Comment: Fibromyalgia    S/p right EMELINA  S/p left TKA  S/p cervical fusion      GI/Hepatic:  - neg GI/hepatic ROS     Renal/Genitourinary:  - neg Renal ROS     Endo:  - neg endo ROS     Psychiatric/Substance Use:     (+) psychiatric history anxiety, depression and other (comment) (insomnia)     Infectious Disease:  - neg infectious disease ROS     Malignancy:  - neg malignancy ROS     Other:  - neg other ROS    (+) , H/O Chronic Pain,        Virtual visit -  No vitals were obtained    Physical Exam  Constitutional: Awake, alert, cooperative, no apparent distress, and appears stated age.  Eyes: Pupils equal  HENT: Normocephalic  Respiratory: non labored breathing   Neurologic: Awake, alert, oriented to name, place and time.   Neuropsychiatric: Calm, cooperative. Normal affect.      Prior Labs/Diagnostic Studies   All labs and imaging personally reviewed     Basic Metabolic Panel  Specimen:  Blood   Ref Range & Units 1 mo ago Comments   Sodium 136 - 145 mmol/L 136     Potassium 3.5 - 5.1 mmol/L 4.4     Chloride 98 - 109  mmol/L 97 Low      CO2 20 - 29 mmol/L 26     Anion Gap 7 - 16 mmol/L 13     Calcium 8.4 - 10.4 mg/dL 10.0     BUN 7 - 26 mg/dL 13     Creatinine 0.55 - 1.02 mg/dL 0.75     GFR, Estimated >60 mL/min/1.73m2 >60     Glucose 70 - 100 mg/dL 79  The given reference range is for the fasting state. Non-fasting reference range for glucose is 70 - 180 mg/dL.   Hours Fasting  1     Resulting Agency  [facility]    Specimen Collected: 04/08/22  1:10 PM Last Resulted: 04/08/22  1:37 PM     Complete Blood Count-No Diff  Specimen:  Blood   Ref Range & Units 5 mo ago   WBC 3.5 - 10.5 x10(9)/L 9.7    RBC 3.90 - 5.03 x10(12)/L 4.21    Hemoglobin 12.0 - 15.5 g/dL 13.6    HCT 34.9 - 44.5 % 40.2    MCV 80.0 - 100.0 fL 95.5    MCH 27.6 - 33.3 pg 32.3    MCHC 31.5 - 35.2 g/dL 33.8    RDW 11.9 - 15.5 % 12.2    Platelets 150 - 450 x10(9)/L 327    Automated NRBC <=0 /100 WBC 0    Resulting [facility]   Specimen Collected: 12/03/21  1:43 PM Last Resulted: 12/03/21  1:58 PM       EKG/ stress test - if available please see in ROS above   No results found.  No flowsheet data found.      The patient's records and results personally reviewed by this provider.     Outside records reviewed from: Care Everywhere      Assessment      Martha Chun is a 62 year old female seen as a PAC referral for risk assessment and optimization for anesthesia.    Plan/Recommendations  Pt will be optimized for the proposed procedure.  See below for details on the assessment, risk, and preoperative recommendations    NEUROLOGY  - occipital neuralgia/ headaches - s/p occipital nerve stimulator - procedure as above   ~ Migraines - has received botox in the past. Was recently seen by neurologist, Dr. Diaz on 4/12/22. Patient reports this visit was not helpful.   ~ The patient will continue phrenilin.   - Chronic Pain syndrome/ fibromyalgia - the patient will continue neurontin and tylenol.   -Post Op delirium risk factors:  No risk  "identified    ENT  - No current airway concerns.  Will need to be reassessed day of surgery.  Mallampati: Unable to assess  TM: Unable to assess   ~ TMJ    CARDIAC  - Hypertension - the patient will continue on tekturna at night and diltiazem.   - METS (Metabolic Equivalents)  Patient performs 4 or more METS exercise without symptoms            Total Score: 0      RCRI-Very low risk: Class 1 0.4% complication rate            Total Score: 0    ~ The patient reports she can go up multiple flights of stairs without issues    PULMONARY  - Obstructive Sleep Apnea  No current risk of obstructive sleep apnea   ALINE Low Risk            Total Score: 2    ALINE: Hypertension    ALINE: Over 50 ys old      - allergies - the patient will continue on nasal spray   ~ She currently is having issues with allergies due to water damage and mold. This causes bad headaches for her.   - Tobacco History  {Refresh the note if updates are made Link to Tobacco History :[id number]}    History   Smoking Status     Former Smoker     Packs/day: 1.00     Years: 15.00     Quit date: 10/10/2014   Smokeless Tobacco     Never Used       GI  PONV High Risk  Total Score: 4           1 AN PONV: Pt is Female    1 AN PONV: Patient is not a current smoker    1 AN PONV: Patient has history of PONV    1 AN PONV: Intended Post Op Opioids        /RENAL  - Baseline Creatinine  0.75    ENDOCRINE    - BMI: Estimated body mass index is 22.17 kg/m  as calculated from the following:    Height as of 10/22/21: 1.676 m (5' 6\").    Weight as of 10/22/21: 62.3 kg (137 lb 5.6 oz).  Healthy Weight (BMI 18.5-24.9)  - No history of Diabetes Mellitus    HEME  VTE Medium Risk 1.8%            Total Score: 5    VTE: Greater than 59 yrs old    VTE: Family Hx of VTE      - No history of abnormal bleeding or antiplatelet use.    MSK  ~ The patient has had prior cervical discectomy and fusion C3-C7. She is s/p right EMELINA and left TKA. She has ongoing left shoulder and hip issues. " Consideration for careful positioning of the patient's left shoulder and left him    PSYCH  - anxiety, depression and insomnia - the patient will continue ativan, melatonin, remeron.     The patient is optimized for their procedure. AVS with information on surgery time/arrival time, meds and NPO status given by nursing staff. No further diagnostic testing indicated.    Please refer to the physical examination documented by the anesthesiologist in the anesthesia record on the day of surgery.    Video-Visit Details    Type of service:  Video Visit    Patient verbally consented to video service today: YES    Video Start Time: 12:19  Video End Time (time video stopped): 12:26    Originating Location (pt. Location): Home    Distant Location (provider location):  [facility] PREOPERATIVE ASSESSMENT CENTER     Mode of Communication:  Video Conference via Dox[employer]ity  On the day of service:     Prep time: 15 minutes  Visit time: 7 minutes  Documentation time: 11 minutes  ------------------------------------------  Total time: 33 minutes      Lisa Roberts PA-C  Preoperative Assessment Center  [facility]  Clinic and Surgery Center  Phone: [phone]  Fax: [phone]

## 2022-05-10 NOTE — PATIENT INSTRUCTIONS
Preparing for Your Surgery      Name:  Martha Chun   MRN:  [id number]   :  1960   Today's Date:  5/10/2022       Arriving for surgery:  Surgery date:  22  Arrival time:  10:10AM    Restrictions due to COVID 19       Effective 22 [facility] is implementing the following visitor policy:     1 person may accompany the patient through the Pre-Op process.      That same person may wait in the Surgery Waiting room, provided there is enough room to social distance         Inpatients are allowed 2 visitors per day for the duration of their stay.        Visitors must wear a mask.      Visitors must not be ill.      Visiting hours are 8 am to 8 pm.    [employer] parking is available for anyone with mobility limitations or disabilities.  ([place]  24 hours/ 7 days a week; [facility]  7 am- 3:30 pm, Mon- Fri)    Please come to:     [facility] [address]  [address] [place] [address]    -   Parking is available in the Patient Visitor Ramp on [facility].     -   When entering the hospital you will be asked COVID screening questions, you will then be directed to Registration.  Registration will direct you to the 3rd floor Surgery waiting room.     -   Please ask if you need an escort or a wheelchair to the Surgery Waiting Room.  Preop number- [phone]      What can I eat or drink?  -  You may eat and drink normally for up to 8 hours before your surgery. (Until 22, 4:10AM)  -  You may have clear liquids until 2 hours before surgery. (Until 22, 10:10AM)    Examples of clear liquids:  Water  Clear broth  Juices (apple, white grape, white cranberry  and cider) without pulp  Noncarbonated, powder based beverages  (lemonade and Samy-Aid)  Sodas (Sprite, 7-Up, ginger ale and seltzer)  Coffee or tea (without milk or cream)  Gatorade    -  No Alcohol for at least 24 hours before surgery     Which medicines can I  take?    Hold Aspirin for 7 days before surgery.   Hold Multivitamins for 7 days before surgery.  Hold Supplements, Fish Oil for 7 days before surgery.  Hold Ibuprofen (Advil, Motrin) for 1 day before surgery--unless otherwise directed by surgeon.  Hold Naproxen (Aleve) for 4 days before surgery.    -  DO NOT take these medications the day of surgery:    Butalbital-Acetaminophen(Phrenilin)    Vitamin D    Miralax    -  PLEASE TAKE these medications the day of surgery:   Acetaminophen(Tylenol) as needed    Refresh eye drops as needed    Lorazepam(Ativan) as needed    How do I prepare myself?  - Please take 2 showers before surgery using Scrubcare or Hibiclens soap.    Use this soap only from the neck to your toes.     Leave the soap on your skin for one minute--then rinse thoroughly.      You may use your own shampoo and conditioner; no other hair products.   - Please remove all jewelry and body piercings.  - No lotions, deodorants or fragrance.  - No makeup or fingernail polish.   - Bring your ID and insurance card.    -If you have a Deep Brain Stimulator, Spinal Cord Stimulator or any neuro stimulator device---you must bring the remote control to the hospital     - All patients are required to have a Covid-19 test within 4 days of surgery/procedure.      -Patients will be contacted by the [facility] scheduling team within 1 week of surgery to make an appointment.      - Patients may call the Scheduling team at [phone] if they have not been scheduled within 4 days of  surgery.      ALL PATIENTS GOING HOME THE SAME DAY OF SURGERY ARE REQUIRED TO HAVE A RESPONSIBLE ADULT TO DRIVE AND BE IN ATTENDANCE WITH THEM FOR 24 HOURS FOLLOWING SURGERY.      Questions or Concerns:    - For any questions regarding the day of surgery or your hospital stay, please contact the Pre Admission Nursing Office at [phone].       - If you have health changes between today and your surgery please call your surgeon.       For  questions after surgery please call your surgeons office.

## 2022-05-11 ENCOUNTER — TELEPHONE (OUTPATIENT)
Dept: NEUROSURGERY | Facility: CLINIC | Age: 62
End: 2022-05-11
Payer: COMMERCIAL

## 2022-05-11 ENCOUNTER — CARE COORDINATION (OUTPATIENT)
Dept: NEUROSURGERY | Facility: CLINIC | Age: 62
End: 2022-05-11
Payer: COMMERCIAL

## 2022-05-11 NOTE — TELEPHONE ENCOUNTER
Writer routed to Neurosurgery Surgery Scheduler and Neurosurgery Nurse Pool Attn:  Keya Abdalla, RNCC for Dr. Shrestha.   High Priority   Patient canceling surgery     Yamel Kong LPN  Neurosurgery

## 2022-05-11 NOTE — TELEPHONE ENCOUNTER
[facility] Call Center    Phone Message    May a detailed message be left on voicemail: yes     Reason for Call:  Patient called to cancel her procedure on 5/13/22, per patient she is feeling sick right now and will reschedule at a later time.    Action Taken: Message routed to:  [facility] ([facility] [facility] [place]    Travel Screening: Not Applicable

## 2022-05-11 NOTE — TELEPHONE ENCOUNTER
I called the OR to cancel surgery with Dr. Shrestha, and have spoken with the patient.      Blair Yap on 5/11/2022 at 9:20 AM

## 2022-05-11 NOTE — PROGRESS NOTES
Writer cancelled patient Covid Test and post op appointment.   Patient not having surgery; appointments not needed at this time.     Yamel Kong LPN  Neurosurgery

## 2022-05-12 ENCOUNTER — TELEPHONE (OUTPATIENT)
Dept: NEUROSURGERY | Facility: CLINIC | Age: 62
End: 2022-05-12
Payer: COMMERCIAL

## 2022-05-12 NOTE — CONFIDENTIAL NOTE
Pt left VM requested refills of ativan and fiorinal. She said she had to cancel surgery (scheduled for 5/13) b/c she has to find another place to live and she cannot be off NSAID right now.     Messaged Dr. Shrestha re refill request. Will follow-up with pt re refill status.

## 2022-05-13 DIAGNOSIS — M54.81 BILATERAL OCCIPITAL NEURALGIA: ICD-10-CM

## 2022-05-13 RX ORDER — BUTALBITAL, ASPIRIN, AND CAFFEINE 325; 50; 40 MG/1; MG/1; MG/1
1 CAPSULE ORAL EVERY 4 HOURS PRN
Qty: 60 CAPSULE | Refills: 3 | Status: SHIPPED | OUTPATIENT
Start: 2022-05-13

## 2022-05-13 RX ORDER — LORAZEPAM 0.5 MG/1
.5-.75 TABLET ORAL 2 TIMES DAILY PRN
Qty: 90 TABLET | Refills: 0 | Status: SHIPPED | OUTPATIENT
Start: 2022-05-13 | End: 2022-06-14

## 2022-05-13 NOTE — TELEPHONE ENCOUNTER
Spoke with pt to let her know that I messaged Dr. Shrestha yesterday and today about the refill requests but have not heard back from him. I will follow-up with her as soon as I know more.     Pt also said she would like to request that she be permitted to stay on her to stay on meloxicam prior to surgery. We generally require that pts stop this medication 7 days before surgery. I explained that NSAIDs can cause blood thinning, which is why pts are requested to stop them. She would like an exception b/c she needs the medication for pain control.     Pt does not know when she wants to reschedule her surgery as she said she needs to find a new place to live and she wants to start ketamine treatment first, so this is not an urgent concern.     Nothing further at this time.

## 2022-06-03 ENCOUNTER — TELEPHONE (OUTPATIENT)
Dept: NEUROSURGERY | Facility: CLINIC | Age: 62
End: 2022-06-03
Payer: COMMERCIAL

## 2022-06-03 NOTE — TELEPHONE ENCOUNTER
Discussed potential surgery dates with patient and she is leaning towards 7/6/22. I will follow-up with our  to confirm and schedule. Discussed that pt will need to be off aspirin products and meloxicam for 7 days and ibuprofen for 24 hours. Pt says she doesn't really use meloxicam anymore.     Once procedure is scheduled, I will send out a preop packet with surgery information. No further questions at this time.

## 2022-06-06 ENCOUNTER — VIRTUAL VISIT (OUTPATIENT)
Dept: PSYCHIATRY | Facility: CLINIC | Age: 62
End: 2022-06-06
Payer: COMMERCIAL

## 2022-06-06 ENCOUNTER — VIRTUAL VISIT (OUTPATIENT)
Dept: BEHAVIORAL HEALTH | Facility: CLINIC | Age: 62
End: 2022-06-06
Payer: COMMERCIAL

## 2022-06-06 ENCOUNTER — TELEPHONE (OUTPATIENT)
Dept: NEUROSURGERY | Facility: CLINIC | Age: 62
End: 2022-06-06
Payer: COMMERCIAL

## 2022-06-06 DIAGNOSIS — F45.1 SOMATIC SYMPTOM DISORDER, MODERATE, WITH PREDOMINANT PAIN: ICD-10-CM

## 2022-06-06 DIAGNOSIS — F33.1 MAJOR DEPRESSIVE DISORDER, RECURRENT EPISODE, MODERATE (H): ICD-10-CM

## 2022-06-06 DIAGNOSIS — F33.1 MAJOR DEPRESSIVE DISORDER, RECURRENT EPISODE, MODERATE (H): Primary | ICD-10-CM

## 2022-06-06 DIAGNOSIS — F33.1 MODERATE EPISODE OF RECURRENT MAJOR DEPRESSIVE DISORDER (H): Primary | ICD-10-CM

## 2022-06-06 PROCEDURE — 90832 PSYTX W PT 30 MINUTES: CPT | Mod: GT | Performed by: SOCIAL WORKER

## 2022-06-06 PROCEDURE — 99214 OFFICE O/P EST MOD 30 MIN: CPT | Mod: GT | Performed by: STUDENT IN AN ORGANIZED HEALTH CARE EDUCATION/TRAINING PROGRAM

## 2022-06-06 RX ORDER — MIRTAZAPINE 15 MG/1
15 TABLET, FILM COATED ORAL AT BEDTIME
Qty: 90 TABLET | Refills: 0 | Status: SHIPPED | OUTPATIENT
Start: 2022-06-06 | End: 2022-11-08

## 2022-06-06 NOTE — PATIENT INSTRUCTIONS
Continue mirtazapine 15mg until you are able to meet with a nautropath to discuss alternative options for depression.   As we discussed, you can work on tapering with Dr. Vieira by cutting the dose in half to 7.5mg nightly x 7 days, then taking 7.5mg every other night x 7 days, then stopping. This will help reduce the potential that you experience withdrawal side effects from stopping mirtazapine.  I referred you to Integrative Medicine as an augmenting service for managing your pain. You should be called within a week to schedule but if you aren't please call the [facility] main line to follow up.  Look into [facility] and [facility] for therapy services in a modality that might be a better fit for you.  Thank you for our work together in the Psychiatry Collaborative Care Model at [facility]. This is our last visit and I am returning your care back to your Primary Care Provider Dr. Vieira . You should schedule an appointment to check in with them within 90 days of this transition. You may begin requesting refills from your PCP. If you are not doing well, please contact your Primary Care Provider's office.

## 2022-06-06 NOTE — CONFIDENTIAL NOTE
Pt would like to schedule surgery if possible on 6/29 instead of on 7/6 as we previously discussed. I will follow-up with our surgery scheduler, as I'm not sure if 6/29 is still available. Surgery scheduler to follow-up with pt. She is in agreement with plan. Nothing further at this time.

## 2022-06-06 NOTE — PROGRESS NOTES
Martha Chun  is being evaluated via a billable video visit.      How would you like to obtain your AVS? Referral[employer] MAIL COPY   For the video visit, send the invitation by: Text to cell phone: [phone]   Will anyone else be joining your video visit? Sarai Jonas [facility] PSYCHIATRY SERVICE FOLLOW-UP     Name:  Martha Chun  : 1960     Telemedicine Visit: The patient's condition can be safely assessed and treated via synchronous audio/visual telemedicine encounter.    Consent:  The patient/guardian has verbally consented to: the potential risks and benefits of telemedicine (video or phone) versus in-person care; bill insurance or make self-payment for services provided; and responsibility for payment of non-covered services.     Reason for Telemedicine Visit: COVID 19 pandemic and the social and physical recommendations by the CDC and [facility].      Originating Site (Patient Location): Patient's home; pt verified their location for the duration of this appointment as address on record.    Distant Site (Provider Location): Provider Remote Setting    Mode of Communication:  Doximity phone    As the treating provider, I attest to compliance with applicable laws and regulations related to telemedicine.  Chart documentation may have been completed with Dragon Voice Recognition software.     IDENTIFICATION     Patient is a 62 year old year old White, female  who presents for follow-up medication management with [facility].  Patient was initially referred by their PCP. Patient attended the session alone.     Patient care team: Patient Care Team:  Fox Vieira MD as PCP - General (Family Medicine)  Kobi Shrestha MD as Assigned Neuroscience Provider  Sherri Castellon APRN CNP as Assigned PCP  Ion Haywood as Assigned Behavioral Health Provider  Kobi Shrestha MD as MD (Neurological Surgery)  Lisa Roberts PA-C as Assigned Surgical Provider    INTERIM HISTORY   [facility] Feb [facility]. At that time, the plan included start nortriptyline solution.    Interim pt communication:  none    Available records were reviewed prior to visit.    HISTORY OF PRESENT ILLNESS     Per [facility] Caterina Ramos during today's team-based visit: \"MH Update: \"really bad.\" \"I figured out that the previous surgery and stimulator were making the neck pain worse.\" Patient has a plan to have the stimulator removed in the next month. There is a provider in [place] that could help but unable to get there to see this provider. Pain is causing her to not be able to do anything or even leave the apartment. Frustrated that most treatments do not work and the ones that do only offer temporary relief. Connected with Ketamine treatment and not able to receive treatments due to needing anxiety medication prior to service and blood pressure too high. [place] that clinic did not understand needs or medication history. Body feels weak, hard time doing exercise. Feeling isolated from others because not able to go anywhere or do anything. Too many \"traumas\" to deal with due to mold, living options, feeling physically sick, etc. Needs Lorazepam dfor emotional ddays and tryuing to cut back to only hald a pill.  SI: \"not want to be alive.\" Feel that physical pain and sadness pushes to thoughts of suicide but not make a plan or intent. Not do because of her daughters and knowing the negative impact on her. Has safety plan and aware of crisis resources. Feels they are not helpful.  [facility] validated distress and encouraged PT and connecting with resources to manage pain.  Stressors: chronic pain and limited supports. Finding places to go for specialized services. Divorce pending. Applying for social security.  Tx: tried to get connected to therapy and not work out for many reasons. Selective with needing in-person but close to her house or phone sessions. Not able to do virtual due to getting headaches and not " "being able to drive far. [facility] [place] will place referral for therapeutic needs.   Etoh: denies  Substance: denies  Nicotine: denies  Caffeine: just medication  Most Important: not wanting to put medications in body so wanting \"natural\" remedies. \"    ---Psychiatry Update---  Ketamine: Pt was set up for a ketamine treatment on Friday but was unable to take lorazepam before the treatment. \"I realized when I was there that the last thing I want to do is pump my body full of medication for three hours.\" She perceived the staff wasn't qualified. \"It didn't work.\"  Mood/anxiety: Pt has been feeling more depressed since the ketamine treatment didn't work out. \"I'm feeling like doors are shutting and not opening.\" She is also sad about her divorce coming up.   \"The reason I'm calling you or wanted to talk to you is I'm trying to find natural alternatives for things.\"   Suicidal ideation:  \"not want to be alive.\" Feel that physical pain and sadness pushes to thoughts of suicide but not make a plan or intent. Not do because of her daughters and knowing the negative impact on her. Has safety plan and aware of crisis resources. Feels they are not helpful.   PHQ9 score is Not completed today  GAD7 score is Not completed today    Medications: Pt has started taking a supplement with collagen called morning-ah (sp?) that she thinks is helpful for mental health and physical health symptoms. She reviews that medications and supplements with histamine are not tolerable. She describes a histamine allergy.   Pt is wondering about tapering off mirtazapine but doesn't want to do that until she finds a naturopath. She is interested in transition    Medical: Pt is getting the neurostimulator removed soon. She also discusses cervical spine instability and needing a specialty therapy. \"I can feel that my neck is getting worse.\"     Care planning: Pt is interested in finding a therapist to talk to. She can't tolerate telehealth via video because it " "causes headaches. Pt declines a care coordination referral because she has specific needs for being in cars - her AC needs to be cleaned regularly and she has to be on leather seats, not cloth.     MEDICATIONS                                                                                              Current medications reviewed today and are noted below.   Current psychotropic medications:   Lorazepam 0.25-0.5mg up to 4x daily PRN (rx by neuro for neuralgia)  Mirtazapine 15mg at bedtime     Past psychotropic medications:  Carbamazepine  Paroxetine  Duloxetine  Clonidine  Escitalopram  Fluoxetine  Clonazepam  Nortriptyline - thinks she tried with \"this [facility] psychiatrist in [place] and he had me take 25mg during the day.\"  Venlafaxine  Bupropion  Mirtazapine  Vortioxetine    Supplements:   See below      5/26/22 Gabapentin 100mg #60  5/13/22 Fiorinal -40mg #60  5/13/22 Lorazepam 0.5mg #90    Current Outpatient Medications   Medication Sig     acetaminophen (TYLENOL) 500 MG tablet Take 1,000 mg by mouth as needed     aliskiren (TEKTURNA) 150 MG tablet Take 300 mg by mouth At Bedtime     Butalbital-Acetaminophen (PHRENILIN)  MG TABS per tablet Take 1 tablet by mouth every 4 hours as needed     butalbital-acetaminophen-caffeine (ESGIC) -40 MG tablet Take 1 tablet by mouth every 4 hours as needed (Patient not taking: Reported on 5/10/2022)     butalbital-aspirin-caffeine (FIORINAL) -40 MG capsule Take 1 capsule by mouth every 4 hours as needed for headaches     butalbital-aspirin-caffeine (FIORINAL) -40 MG capsule Take 1 capsule by mouth every 4 hours as needed for headaches (Patient not taking: Reported on 5/10/2022)     carboxymethylcellulose (REFRESH PLUS) 0.5 % SOLN ophthalmic solution Apply 1-2 drops to eye daily as needed     cholecalciferol 50 MCG (2000 UT) tablet Take 2 tablets by mouth every morning      diltiazem ER (DILT-XR) 120 MG 24 hr capsule Take 120 mg by mouth At " Bedtime      diltiazem ER (TIAZAC) 120 MG 24 hr ER beaded capsule Take 120 mg by mouth daily     diltiazem ER COATED BEADS (CARDIZEM CD/CARTIA XT) 120 MG 24 hr capsule Take 120 mg by mouth daily      diphenhydrAMINE-acetaminophen (TYLENOL PM)  MG tablet Take 1 tablet by mouth nightly as needed for sleep     fluticasone (FLONASE) 50 MCG/ACT nasal spray Spray 1 spray into both nostrils every evening      gabapentin (NEURONTIN) 100 MG capsule Take 100 mg by mouth At Bedtime      LORazepam (ATIVAN) 0.5 MG tablet Take 1-1.5 tablets (0.5-0.75 mg) by mouth 2 times daily as needed for anxiety or pain     Melatonin 10 MG TABS tablet Take 10 mg by mouth nightly as needed      meloxicam (MOBIC) 7.5 MG tablet Take 7.5 mg by mouth daily (Patient not taking: Reported on 5/10/2022)     meloxicam (MOBIC) 7.5 MG tablet Take 1 tablet by mouth daily (Patient not taking: Reported on 5/10/2022)     mirtazapine (REMERON) 15 MG tablet Take 15 mg by mouth At Bedtime     mirtazapine (REMERON) 15 MG tablet Take 1 tablet (15 mg) by mouth At Bedtime     nortriptyline (PAMELOR) 10 MG/5ML solution Take 2.5 mLs (5 mg) by mouth At Bedtime for 14 days, THEN 5 mLs (10 mg) At Bedtime. (Patient not taking: Reported on 5/10/2022)     Omega-3 Fatty Acids (FISH OIL PO) Take by mouth 2 times daily 360 mg in AM and 600 mg in PM     Oxymetazoline HCl (NASAL SPRAY) 0.05 % SOLN Spray 1 spray in nostril 2 times daily (Patient not taking: Reported on 5/10/2022)     polyethylene glycol (MIRALAX) 17 GM/Dose powder Take 17 g by mouth as needed     triprolidine-pseudoePHEDrine (APRODINE) 2.5-60 MG TABS per tablet Take 0.5 tablets by mouth every 6 hours as needed (Patient not taking: Reported on 5/10/2022)     No current facility-administered medications for this visit.        VITALS   There were no vitals taken for this visit.    Pulse Readings from Last 5 Encounters:   01/06/22 88   11/04/21 86   10/22/21 92   09/14/21 85   09/07/21 79     Wt Readings from  "Last 5 Encounters:   10/22/21 62.3 kg (137 lb 5.6 oz)   09/07/21 61.8 kg (136 lb 3.2 oz)   08/27/21 62.2 kg (137 lb 2 oz)   03/15/21 59.9 kg (132 lb)   12/04/14 58.5 kg (129 lb)     BP Readings from Last 5 Encounters:   01/06/22 (!) 172/99   11/04/21 (!) 185/72   10/22/21 (!) 160/72   09/14/21 (!) 151/74   09/07/21 (!) 167/83       LABS & IMAGING                                                                                                                Recent available labs reviewed today.    Recent Labs   Lab Test 10/19/21  1206 08/25/21  0823 03/19/20  0930   CR 0.64 0.71 0.71   GFRESTIMATED >90 >90 >60     Recent Labs   Lab Test 03/19/20  0930   AST 19   ALT 21   ALKPHOS 92     Recent Labs   Lab Test 03/19/20  0930   TSH 2.24       ALLERGY & IMMUNIZATIONS       Allergies   Allergen Reactions     Amlodipine Other (See Comments)     Constipation, \"liver pain\".         Clonidine      Other reaction(s): Dizziness     Escitalopram Muscle Pain (Myalgia)     Other reaction(s): Myalgias     Mold Nausea     Ringing in ears, racing heart     Paroxetine Other (See Comments)     Palpitations/racing heart         Penicillins Unknown     Does not remember reaction, mother said she allergic to mold; patient refuses Pcn or any derivative       Sulfa Drugs Unknown     Tramadol      Other reaction(s): Runny Nose  Facial pain, sinus swelling       Carvedilol Other (See Comments)     Worsening depression         Codeine Headache and Nausea and Vomiting     Cortisone Other (See Comments)     Patient reports and possibly all steroids \"makes me to feel sick\", get fungal infections       Duloxetine Headache     Oxycodone Headache       MEDICAL & SURGICAL HISTORY    Reviewed past medical and surgical history today.   Pregnant - Denies.     Past Medical History:   Diagnosis Date     Anxiety      Degenerative arthritis      Depression      Fibromyalgia      Hypertension      Migraines      Occipital neuralgia     b/l     Other chronic " pain      Seasonal allergies        MENTAL STATUS EXAM:     Alertness: alert  and oriented  Appearance: appropriately groomed, wearing glasses  Behavior/Demeanor: cooperative, pleasant and calm, with good eye contact  Speech: normal and regular rate and rhythm  Language: intact and no problems  Psychomotor: WNL  Mood: depressed, anxious and worried  Affect: congruent with mood  Thought Process/Associations: circumstantial and overinclusive   Thought Content:  Reports delusions;  Denies suicidal ideation and violent ideation  Perception:  Reports none;  Denies auditory hallucinations and visual hallucinations  Insight: fair  Judgment: adequate for safety  Cognition: does  appear grossly intact; formal cognitive testing was not done  Gait and Station: MORRIS due to phone appt    RISK AND PROTECTIVE FACTORS     Static Risk Factors: prior attempts, history of MH diagnoses and/or treatment, history of hospitalization and impulsivity     Dynamic Risk Factors: emotional distress, anxiety, feelings of guilt, chronic pain and mental health stigma     Protective Factors: medical compliance, problem solving ability, restricted access to means, access to care as needed and displaying help seeking behavior    SAFETY ASSESSMENT     Based on review of above risk and protective factors and today's exam, pt is at low elevated risk of harm to self or others. She does not meet criteria for a 72 hr hold and remains appropriate for ongoing outpatient care. The patient convincingly denies suicidality today. There was no deceit detected, and the patient presented in a manner that was believable. Local community safety resources printed and reviewed for patient to use if needed.    Recommended that patient call 911 or go to the local ED should there be a change in any of these risk factors.    DSM 5 DIAGNOSIS     296.32 (F33.1) Major Depressive Disorder, Recurrent Episode, Moderate _ and With anxious distress  300.82 (F45.1) Somatic Symptom  Disorder  With predominant pain     Differential diagnoses include: consider relationship between depression, anxiety, somatic symptoms, chronic pain, and recurrent physical health concerns.  R/o delusional disorder    Medical comorbidities impacting or contributing to clinical picture: Chronic pain, occipital neuralgia and recurrent migraines and Fibromyalgia    ASSESSMENT AND PLAN      ASSESSMENT:  Martha Chun is a 62 year old White, female who presents for return visit [facility] for [facility] management.   17 Feb 22: Pt with complex presentation due to chronic pain, somatic perseverations, and chronic dep/anx who returns to f/u to discuss poss TCA initiation. I am concerned about continuing to see this patient in [facility] as she reports unable to engage via video visit and instead has only been assessed and treated via phone appointments. We have referred to [facility], which is several months out. Pt completed labs outside of [facility] so waiting on records to be uploaded though initial review is not contributory to her  presentation. We discussed nortriptyline today as it can be helpful for both depression and her neuralgia pain. Recommended starting at sub-therapeutic dose of 5mg as she is sensitive to SEs; pt agreeable to try solution if available for low initial dosing. Pt denies SI today and reports feeling a bit more hopeful overall. She did not stop mirtazapine as discussed last appointment as she felt the lower dose affected her mood negatively so she returned to 15mg at bedtime and wishes to continue. We will not adjust mirtazapine at this time.  She would benefit from long-term psychiatry engagement through [facility] though I will continue to provide bridge services in the interim.  6 June 22: Pt with complex presentation due to chronic pain, somatic perseverations, and chronic dep/anx who returns to clinic after 3+ months reporting ongoing concerns of depression r/t chronic pain  and physical health issues due to mold in most old buildings that she has a reaction to. Pt asks about tapering mirtazapine in favor of starting LA NENA-e and/or 5HTP but doesn't want to do so until she is able to start seeing a naturopath as we discussed that CAMs are not generally indicated in addition to antidepressants due to risks associated with developing serotonin syndrome. We reviewed posited etiologies of depression (biopsychosocial vs low serotonin alone). While I initially believed pt's presentation was consistent with a somatic symptom disorder, her perseveration on mold causing physical illness in many buildings as well as her very specific needs re: automotive transport lead me to add a delusional disorder to my differential.   Pt is scheduled for a consultation with the [facility] Psychiatry on July 7, 2022. However, pt isn't sure she wants to engage in this type of care and is considering canceling it.   We reviewed my upcoming extended medical leave and I recommended pt follow up with PCP for mirtazapine taper. I advised the taper plan outlined below, which can safely be done with PCP. I do not think pt is appropriate for ongoing [facility] services due to the chronicity and intensity of her symptoms and think she would be best treated under the care of long term psychiatry (vs our brief med stabilization model). She is not interested in psychotropics long term right now so we did not refer to long term psychiatry today. I have provided her with 90 days of mirtazapine as a bridge while she re-engages with PCP for taper. Pt with chronic thoughts of death and suicide without plan/intent. She has crisis resources and a safety plan.   This is our last appointment.     TREATMENT PLAN: Medication side effects and alternatives reviewed. Health promotion activities recommended and reviewed. All questions addressed. Education and counseling completed regarding risks and benefits of medications and  psychotherapy options. [facility] Service model reviewed today. Recommend therapy for additional support. Safety plan reviewed as indicated.     MEDICATIONS:   -continue MIRTAZAPINE 15mg at bedtime  -stop NORTRIPTYLINE 5mg/2.5mL solution at bedtime    Med recs to consider with tapering mirtazapine  -decrease to 7.5mg nightly x 7 nights, then to 7.5mg every other night x 7 nights, then stop    LABS/RADS:   -None at this time    PATIENT STATUS:  [facility] MD/DO/NP/PA providers offer care a specialty service for Primary Care Providers in the [facility] that seek to optimize psychotropic medications for unstable patients.  Once medications have been optimized, our providers discharge the patient back to the referring Primary Care Provider for ongoing medication management.  This type of system allows our providers to serve a high volume of patients.   -Pt is to return to PCP to taper off mirtazapine after she engages with a naturopath. Unable to send message to PCP as he is in another health system. Taper recommendations above and in AVS. 90d medication bridge provided.    PSYCHOSOCIAL:   -Consider therapy  -Follow up with [facility] in July if still interested  -Follow up with primary care provider as planned or for acute medical concerns.    PSYCHOEDUCATION:  Medication side effects and alternatives reviewed. Health promotion activities recommended and reviewed today. All questions addressed. Education and counseling completed regarding risks and benefits of medications and psychotherapy options.  Consent provided by patient/guardian  Call the psychiatric nurse line with medication questions or concerns at [phone].  Poachablehart may be used to communicate with your provider, but this is not intended to be used for emergencies.  Medlineplus.gov is information for patients.  It is run by the [employer] and it contains information about all disorders, diseases and all medications.       FOLLOW-UP: Follow up with PCP within 3 months    1. Continue all other treatments (including medications) per primary care provider and/or specialists.   2. To schedule individual or family therapy, call [facility] at [phone].   3. Follow up with primary care provider as planned or for acute medical concerns.  4. Call the psychiatric nurse line with medication questions or concerns at [phone] or [phone].  5. ONE Changehart may be used to communicate with your care team, but this is not intended to be used for emergencies.    CRISIS RESOURCES:    1. Present to the Emergency Department as needed or call after hours crisis line at [phone] or [phone].   2. [place] Crisis Text Line: Text [place] to 826350.  3. Suicide LifeLine Chat: suicideprePlatinum Software Corporationline.org/chat/.  4. National Suicide Prevention Lifeline: 287.334.1672 (TTY: 417.352.3481). Call anytime for help.  (www.suicidepreventionlifeline.org)  5. National Arp on Mental Illness (www.evelina.org): 347-839-7275 or 755-800-4553.  6. Mental Health Association (www.mentalhealth.org): 266.959.8137 or 354-961-4171.    ADMINISTRATIVE BILLING:    Time spent interviewing patient, reviewing referral documents, obtaining and reviewing outside records, communication with other health specialists, and preparing this report on today's date  Video/Phone Start Time: 1437  Video/Phone End Time: 2553    Signed:   Marcelo Andrew DNP, PMLASHAUNP-BC  [facility] ([facility])

## 2022-06-07 ENCOUNTER — TELEPHONE (OUTPATIENT)
Dept: NEUROSURGERY | Facility: CLINIC | Age: 62
End: 2022-06-07
Payer: COMMERCIAL

## 2022-06-07 ENCOUNTER — HOSPITAL ENCOUNTER (EMERGENCY)
Facility: CLINIC | Age: 62
Discharge: HOME | End: 2022-06-07
Attending: EMERGENCY MEDICINE | Admitting: EMERGENCY MEDICINE
Payer: COMMERCIAL

## 2022-06-07 VITALS
HEART RATE: 91 BPM | HEIGHT: 66 IN | WEIGHT: 140 LBS | OXYGEN SATURATION: 98 % | DIASTOLIC BLOOD PRESSURE: 99 MMHG | SYSTOLIC BLOOD PRESSURE: 179 MMHG | BODY MASS INDEX: 22.5 KG/M2 | TEMPERATURE: 98 F | RESPIRATION RATE: 15 BRPM

## 2022-06-07 DIAGNOSIS — R51.9 CHRONIC NONINTRACTABLE HEADACHE, UNSPECIFIED HEADACHE TYPE: ICD-10-CM

## 2022-06-07 DIAGNOSIS — G89.29 CHRONIC NONINTRACTABLE HEADACHE, UNSPECIFIED HEADACHE TYPE: ICD-10-CM

## 2022-06-07 DIAGNOSIS — R11.0 NAUSEA: ICD-10-CM

## 2022-06-07 DIAGNOSIS — M54.2 CHRONIC NECK PAIN: ICD-10-CM

## 2022-06-07 DIAGNOSIS — G89.29 CHRONIC NECK PAIN: ICD-10-CM

## 2022-06-07 PROCEDURE — 96361 HYDRATE IV INFUSION ADD-ON: CPT

## 2022-06-07 PROCEDURE — 258N000003 HC RX IP 258 OP 636: Performed by: EMERGENCY MEDICINE

## 2022-06-07 PROCEDURE — 99284 EMERGENCY DEPT VISIT MOD MDM: CPT | Mod: 25

## 2022-06-07 PROCEDURE — 96374 THER/PROPH/DIAG INJ IV PUSH: CPT

## 2022-06-07 PROCEDURE — 96375 TX/PRO/DX INJ NEW DRUG ADDON: CPT

## 2022-06-07 PROCEDURE — 250N000011 HC RX IP 250 OP 636: Performed by: EMERGENCY MEDICINE

## 2022-06-07 RX ORDER — DIPHENHYDRAMINE HYDROCHLORIDE 50 MG/ML
25 INJECTION INTRAMUSCULAR; INTRAVENOUS ONCE
Status: COMPLETED | OUTPATIENT
Start: 2022-06-07 | End: 2022-06-07

## 2022-06-07 RX ORDER — METOCLOPRAMIDE HYDROCHLORIDE 5 MG/ML
5 INJECTION INTRAMUSCULAR; INTRAVENOUS ONCE
Status: COMPLETED | OUTPATIENT
Start: 2022-06-07 | End: 2022-06-07

## 2022-06-07 RX ADMIN — METOCLOPRAMIDE 5 MG: 5 INJECTION, SOLUTION INTRAMUSCULAR; INTRAVENOUS at 18:44

## 2022-06-07 RX ADMIN — DIPHENHYDRAMINE HYDROCHLORIDE 25 MG: 50 INJECTION, SOLUTION INTRAMUSCULAR; INTRAVENOUS at 18:44

## 2022-06-07 RX ADMIN — SODIUM CHLORIDE 1000 ML: 9 INJECTION, SOLUTION INTRAVENOUS at 18:43

## 2022-06-07 NOTE — ED PROVIDER NOTES
History     Chief Complaint:    Headache and Nausea       HPI   Martha Chun is a 62 year old female who presents with concern of several symptoms.  The patient states she was driving in an unfamiliar area and was trying to find a specific location as she bought an item on [employer] and was trying to find the seller.  During the course of driving she started having slow onset headache in the back of her head.  This has persisted.  She suffers chronic daily headaches for which she takes butalbital which somewhat helps.  She also suffers chronic neck pain.  She states this headache and the neck pain is not atypical for her however she is complaining largely of significant nausea.  She has had nausea in the past but not to this degree with headaches.  Also complains of photophobia which is a chronic longstanding issue as well.  She denies head or neck trauma although does frequently visit the chiropractor for neck adjustments.  Denies fever.  No cough.  Denies visual change such as blurring or khalida visual loss.  Denies slurred speech or difficulty speaking in any way.  Denies numbness or tingling or symptoms of weakness/paralysis.  Denies ataxia/dizziness.  Due to her symptoms she activated EMS and on the way to the emergency department they administered 4 mg of Zofran and 15 mg of Toradol.  She has not found any relief yet.  She denies using anticoagulants.  She also complains that her chest feels tight but she attributes that to her tight bra strap.  No other complaints.    Allergies:  Amlodipine  Clonidine  Escitalopram  Mold  Paroxetine  Penicillins  Sulfa Drugs  Tramadol  Carvedilol  Codeine  Cortisone  Duloxetine  Oxycodone     Medications:    acetaminophen (TYLENOL) 500 MG tablet  aliskiren (TEKTURNA) 150 MG tablet  Butalbital-Acetaminophen (PHRENILIN)  MG TABS per tablet  butalbital-acetaminophen-caffeine (ESGIC) -40 MG tablet  butalbital-aspirin-caffeine (FIORINAL) -40 MG  capsule  butalbital-aspirin-caffeine (FIORINAL) -40 MG capsule  carboxymethylcellulose (REFRESH PLUS) 0.5 % SOLN ophthalmic solution  cholecalciferol 50 MCG (2000 UT) tablet  diltiazem ER (DILT-XR) 120 MG 24 hr capsule  diltiazem ER (TIAZAC) 120 MG 24 hr ER beaded capsule  diltiazem ER COATED BEADS (CARDIZEM CD/CARTIA XT) 120 MG 24 hr capsule  diphenhydrAMINE-acetaminophen (TYLENOL PM)  MG tablet  fluticasone (FLONASE) 50 MCG/ACT nasal spray  gabapentin (NEURONTIN) 100 MG capsule  LORazepam (ATIVAN) 0.5 MG tablet  Melatonin 10 MG TABS tablet  meloxicam (MOBIC) 7.5 MG tablet  meloxicam (MOBIC) 7.5 MG tablet  mirtazapine (REMERON) 15 MG tablet  Omega-3 Fatty Acids (FISH OIL PO)  Oxymetazoline HCl (NASAL SPRAY) 0.05 % SOLN  polyethylene glycol (MIRALAX) 17 GM/Dose powder  triprolidine-pseudoePHEDrine (APRODINE) 2.5-60 MG TABS per tablet        Past Medical History:    Past Medical History:   Diagnosis Date     Anxiety      Degenerative arthritis      Depression      Fibromyalgia      Hypertension      Migraines      Occipital neuralgia      Other chronic pain      Seasonal allergies        Patient Active Problem List    Diagnosis Date Noted     Bilateral occipital neuralgia 06/15/2021     Priority: Medium     Added automatically from request for surgery [id number]        Past Surgical History:    Past Surgical History:   Procedure Laterality Date     CERVICAL FUSION  11/2020 November and Dec 2020     IMPLANT PULSE GENERATOR SUBCUTANEOUS Bilateral 10/22/2021    Procedure: Phase II occipital nerve stimulator placement, bilateral occipital electrodes and bilateral trigeminal electrodes, Implantation of bilateral subcutaneous neurostimulator Percutaneous implantation of neurostimulator electrode array; cranial nerve Implantable neurostimulator electrode, each CPT 72093 Implantable neurostimulator pulse generator, dual array, nonrechargeable, includes extensi     INSERT STIMULATOR OCCIPITAL Bilateral 08/27/2021  "   Procedure: Externalized trial of percutaneous occipital nerve stimulator bilateral Percutaneous implantation of neurostimulator electrode array; cranial nerve CPT 33168;  Surgeon: Kobi Shrestha MD;  Location: [facility] OR     ORTHOPEDIC SURGERY      s/p right EMELINA; left TKA and s/p cervical fusions     ZZC LIGATE FALLOPIAN TUBE      Description: Tubal Ligation;  Recorded: 01/16/2012;        Family History:    family history includes Cancer in her mother; Diabetes in her father; Hypertension in her father; Pulmonary Embolism in her daughter.    Social History:   reports that she quit smoking about 7 years ago. She has a 15.00 pack-year smoking history. She has never used smokeless tobacco. She reports previous alcohol use. She reports previous drug use.    PCP: Fox Vieira     Review of Systems  All systems otherwise negative or per HPI    Physical Exam     Patient Vitals for the past 24 hrs:   BP Temp Temp src Pulse Resp SpO2 Height Weight   06/07/22 1930 (!) 179/99 -- -- 91 15 -- -- --   06/07/22 1900 (!) 171/81 -- -- 87 9 98 % -- --   06/07/22 1851 -- 98  F (36.7  C) Oral -- 18 -- -- --   06/07/22 1827 (!) 198/99 -- -- 89 -- 100 % 1.676 m (5' 6\") 63.5 kg (140 lb)      Physical Exam  SKIN:  Warm, dry.  HEMATOLOGIC/IMMUNOLOGIC/LYMPHATIC:  No pallor.  HENT: No facial or scalp rash.  Well-tolerated passive range of motion of head and neck.  EYES:  Conjunctivae normal.  Pupils equal round and reactive to light.  Normal extraocular motion.  Visual fields intact.  CARDIOVASCULAR:  Regular rate and rhythm.  No murmur.  RESPIRATORY:  No respiratory distress, breath sounds equal and normal.  GASTROINTESTINAL:  Soft, nontender abdomen.  MUSCULOSKELETAL: Normal body habitus.  Full active range of motion of the extremities.  NEUROLOGIC:  Alert, conversant.  No aphasia.  No dysarthria.  No tactile sensory or motor deficit of the face or limbs.  PSYCHIATRIC:  Normal mood.    Emergency Department Course     Imaging:    No " orders to display        Laboratory:    Labs Ordered and Resulted from Time of ED Arrival to Time of ED Departure - No data to display     Procedures:  None     Interventions:    Medications   metoclopramide (REGLAN) injection 5 mg (5 mg Intravenous Given 6/7/22 1844)   diphenhydrAMINE (BENADRYL) injection 25 mg (25 mg Intravenous Given 6/7/22 1844)   0.9% sodium chloride BOLUS (0 mLs Intravenous Stopped 6/7/22 2024)        Emergency Department Course:  Past medical records, nursing notes, and vitals reviewed.  I performed an exam of the patient and obtained history, as documented above.  I rechecked the patient. Findings and plan explained to the patient. Patient was discharged.    Impression & Plan      Medical Decision Making:  This patient presents complaining of neck pain and occipital headache.  However she suffers these symptoms chronically on a daily basis.  She states they are not per se changed from her baseline.  She was more concerned about the nausea which is more pronounced than what she is used to in the context of her headaches and neck pain.  Based on the chronicity of her symptoms and no distinct different features other than increased nausea I did not think the patient required any testing.  EMS had administered Zofran in addition to Toradol and she experienced marginal relief.  Here we administered Reglan Benadryl and fluids and she did feel relief.  Eventually improved enough to the point where she felt she could be discharged home.  Per medical record review the patient unfortunately suffers significant mental illness which certainly can be complicated by her chronic pain.  Advised follow-up with primary care for further pain management and treatment as needed.    Diagnosis:    ICD-10-CM    1. Chronic neck pain  M54.2     G89.29    2. Chronic nonintractable headache, unspecified headache type  R51.9     G89.29    3. Nausea  R11.0         Discharge Medications:  Discharge Medication List as of  6/7/2022  8:24 PM           6/7/2022   Cassius Keyes MD Moe, James Thomas, MD  06/07/22 [id number]

## 2022-06-07 NOTE — ED TRIAGE NOTES
Pt arrives via EMS due to migraine and nausea. Pt was running errands when she started feeling nauseous and felt a migraine beginning. Pt pulled over and sat in car for 2 hours before calling EMS. Received 15mg Toradol and 4mg Zofran en route.     Triage Assessment     Row Name 06/07/22 [id number]       Triage Assessment (Adult)    Airway WDL WDL       Respiratory WDL    Respiratory WDL WDL       Skin Circulation/Temperature WDL    Skin Circulation/Temperature WDL WDL       Cardiac WDL    Cardiac WDL WDL       Peripheral/Neurovascular WDL    Peripheral Neurovascular WDL WDL       Cognitive/Neuro/Behavioral WDL    Cognitive/Neuro/Behavioral WDL WDL

## 2022-06-07 NOTE — ED NOTES
Bed: ED03  Expected date:   Expected time:   Means of arrival:   Comments:  Christopher Mercado here

## 2022-06-08 NOTE — TELEPHONE ENCOUNTER
FUTURE VISIT INFORMATION      SURGERY INFORMATION:    Date: 22    Location: [facility] or    Surgeon:  Kobi Shrestha MD    Anesthesia Type:  Combined MAC with Local    Procedure: Removal of bilateral occipital nerve stimulators  RECORDS REQUESTED FROM:       Primary Care Provider: Fox Vieira MD- [facility]    Most recent EKG+ [id number]

## 2022-06-13 ENCOUNTER — TELEPHONE (OUTPATIENT)
Dept: NEUROSURGERY | Facility: CLINIC | Age: 62
End: 2022-06-13
Payer: COMMERCIAL

## 2022-06-13 NOTE — TELEPHONE ENCOUNTER
[facility] Call Center    Phone Message    May a detailed message be left on voicemail: yes     Reason for Call: Other: patient is concern that surgery is scheduled for late in the day. Will this be an overnight stay? Please contact patient to advise.     Action Taken: Message routed to:  [facility] neurosurgery    Travel Screening: Not Applicable

## 2022-06-13 NOTE — TELEPHONE ENCOUNTER
Writer routed to Stroke/Neurovascular Nurse Pool   Attn: Eloise Dupree, RNCC     Yamel Kong LPN  Neurosurgery

## 2022-06-13 NOTE — TELEPHONE ENCOUNTER
Returned pt's call. She will be out of Fiorinal and ativan tomorrow. Would like both refilled at [facility]. Message sent to Dr. Shrestha. Will follow-up with pt once medication refilled.

## 2022-06-14 ENCOUNTER — TELEPHONE (OUTPATIENT)
Dept: NEUROSURGERY | Facility: CLINIC | Age: 62
End: 2022-06-14
Payer: COMMERCIAL

## 2022-06-14 DIAGNOSIS — M54.81 BILATERAL OCCIPITAL NEURALGIA: ICD-10-CM

## 2022-06-14 RX ORDER — LORAZEPAM 0.5 MG/1
.5-.75 TABLET ORAL 2 TIMES DAILY PRN
Qty: 90 TABLET | Refills: 0 | Status: SHIPPED | OUTPATIENT
Start: 2022-06-14 | End: 2022-11-08

## 2022-06-14 RX ORDER — BUTALBITAL, ASPIRIN, AND CAFFEINE 325; 50; 40 MG/1; MG/1; MG/1
1 CAPSULE ORAL EVERY 4 HOURS PRN
Qty: 60 CAPSULE | Refills: 0 | Status: SHIPPED | OUTPATIENT
Start: 2022-06-14 | End: 2022-11-08

## 2022-06-14 NOTE — TELEPHONE ENCOUNTER
Received VM from pt and returned her call and got VM. Left message letting pt know that I cannot move her surgery time earlier in the day on 6/29 unless Dr. Shrestha's earlier case gets canceled for some reason. If she doesn't think the afternoon time will work for her, our surgery scheduler can discuss other possible dates. However, there may not be a first case time slot available right away.     Also let pt know that Dr. Shrestha is aware her prescriptions need to be refilled and he said he would do that today.     Requested that pt call me at her earliest convenience to let me know if she would like to reschedule her surgery.

## 2022-06-16 ENCOUNTER — TELEPHONE (OUTPATIENT)
Dept: NEUROSURGERY | Facility: CLINIC | Age: 62
End: 2022-06-16
Payer: COMMERCIAL

## 2022-06-16 NOTE — TELEPHONE ENCOUNTER
Received VM from pt saying she got a letter from [facility] saying that she was referred to outpatient mental health and addiction services and they have been unable to reach her to schedule.     Pt said she isn't sure if this is a clinic that Dr. Shrestha referred her to, but she is concerned that he will not manage her medications (did not specify which medications) once her surgery is complete.     Called pt back to discuss.

## 2022-06-17 ENCOUNTER — TELEPHONE (OUTPATIENT)
Dept: NEUROSURGERY | Facility: CLINIC | Age: 62
End: 2022-06-17
Payer: COMMERCIAL

## 2022-06-17 NOTE — TELEPHONE ENCOUNTER
Returned pt's call and got VM. Left message and requested that pt call back at earliest convenience.

## 2022-06-21 ENCOUNTER — VIRTUAL VISIT (OUTPATIENT)
Dept: SURGERY | Facility: CLINIC | Age: 62
End: 2022-06-21
Payer: COMMERCIAL

## 2022-06-21 ENCOUNTER — PRE VISIT (OUTPATIENT)
Dept: SURGERY | Facility: CLINIC | Age: 62
End: 2022-06-21
Payer: COMMERCIAL

## 2022-06-21 DIAGNOSIS — M54.81 BILATERAL OCCIPITAL NEURALGIA: ICD-10-CM

## 2022-06-21 DIAGNOSIS — Z01.818 PRE-OP EXAMINATION: Primary | ICD-10-CM

## 2022-06-21 PROCEDURE — 99215 OFFICE O/P EST HI 40 MIN: CPT | Mod: GT | Performed by: PHYSICIAN ASSISTANT

## 2022-06-21 ASSESSMENT — PAIN SCALES - GENERAL: PAINLEVEL: SEVERE PAIN (7)

## 2022-06-21 ASSESSMENT — ENCOUNTER SYMPTOMS: SEIZURES: 0

## 2022-06-21 ASSESSMENT — LIFESTYLE VARIABLES: TOBACCO_USE: 1

## 2022-06-21 NOTE — H&P
Pre-Operative H & P     CC:  Preoperative exam to assess for increased cardiopulmonary risk while undergoing surgery and anesthesia.    Date of Encounter: 6/21/2022  Primary Care Physician:  Fox Vieira     Reason for visit:   Encounter Diagnoses   Name Primary?     Pre-op examination Yes     Bilateral occipital neuralgia        HPI  Martha Chun is a 62 year old female who presents for pre-operative H & P in preparation for  Procedure Information     Case: [id number] Date/Time: 06/29/22 1415    Procedure: Removal of bilateral occipital nerve stimulators (Bilateral Spine)    Anesthesia type: Combined MAC with Local    Diagnosis: Bilateral occipital neuralgia [M54.81]    Pre-op diagnosis: Bilateral occipital neuralgia [M54.81]    Location:  OR [address] [place] [address] OR    Providers: Kobi Shrestha MD          The patient is a 62-year-old woman with a past medical history significant for hypertension, seasonal allergies, former smoker, TMJ, migraines, chronic pain syndrome, fibromyalgia, anxiety, depression, insomnia, history of cervical fusion, osteoarthritis and occipital neuralgia with headaches.  The patient underwent occipital nerve stimulator placement on 8/27/2021 and 10/22/2021.  She was seen by Dr. Shrestha on 4/19/2022 and continued to have ongoing neck pain.  During her visit they discussed treatment options and the patient was scheduled for the procedure as above. The patient then contacted her surgical team as she wasn t feeling well as she reports her allergies were severe but she denies any URI symptoms. She then went to the ER on 6/7/22 for worsening headache. She reports since then her allergies have continued to be bad along with her headaches and neck pain. Her surgery was delayed until now.     History is obtained from the patient and chart review    Hx of abnormal bleeding or anti-platelet use: none    Menstrual history: No LMP recorded. Patient is postmenopausal.:      Past Medical History  Past Medical  History:   Diagnosis Date     Anxiety      Degenerative arthritis      Depression      Fibromyalgia      Hypertension      Insomnia      Migraines      Occipital neuralgia     b/l     Other chronic pain      Seasonal allergies        Past Surgical History  Past Surgical History:   Procedure Laterality Date     CERVICAL FUSION  11/2020 November and Dec 2020     IMPLANT PULSE GENERATOR SUBCUTANEOUS Bilateral 10/22/2021    Procedure: Phase II occipital nerve stimulator placement, bilateral occipital electrodes and bilateral trigeminal electrodes, Implantation of bilateral subcutaneous neurostimulator Percutaneous implantation of neurostimulator electrode array; cranial nerve Implantable neurostimulator electrode, each CPT 46289 Implantable neurostimulator pulse generator, dual array, nonrechargeable, includes extensi     INSERT STIMULATOR OCCIPITAL Bilateral 08/27/2021    Procedure: Externalized trial of percutaneous occipital nerve stimulator bilateral Percutaneous implantation of neurostimulator electrode array; cranial nerve CPT 69954;  Surgeon: Kobi Shrestha MD;  Location: [facility] OR     ORTHOPEDIC SURGERY      s/p right EMELINA; left TKA and s/p cervical fusions     ZZC LIGATE FALLOPIAN TUBE      Description: Tubal Ligation;  Recorded: 01/16/2012;       Prior to Admission Medications  Current Outpatient Medications   Medication Sig Dispense Refill     aliskiren (TEKTURNA) 150 MG tablet Take 350 mg by mouth At Bedtime       Butalbital-Acetaminophen (PHRENILIN)  MG TABS per tablet Take 1 tablet by mouth every 4 hours as needed       butalbital-acetaminophen-caffeine (ESGIC) -40 MG tablet Take 1 tablet by mouth every 4 hours as needed       butalbital-aspirin-caffeine (FIORINAL) -40 MG capsule Take 1 capsule by mouth every 4 hours as needed for headaches 60 capsule 3     cholecalciferol 50 MCG (2000 UT) tablet Take 2 tablets by mouth every morning        diphenhydrAMINE-acetaminophen (TYLENOL PM)  " MG tablet Take 1 tablet by mouth nightly as needed for sleep       diphenhydrAMINE-acetaminophen (TYLENOL PM)  MG tablet Take 1 tablet by mouth nightly as needed for sleep       fluticasone (FLONASE) 50 MCG/ACT nasal spray Spray 1 spray into both nostrils every evening        LORazepam (ATIVAN) 0.5 MG tablet Take 1-1.5 tablets (0.5-0.75 mg) by mouth 2 times daily as needed for anxiety or pain (Patient taking differently: Take 0.25 mg by mouth 3 times daily) 90 tablet 0     Melatonin 10 MG TABS tablet Take 10 mg by mouth nightly as needed        mirtazapine (REMERON) 15 MG tablet Take 1 tablet (15 mg) by mouth At Bedtime 90 tablet 0     Omega-3 Fatty Acids (FISH OIL PO) Take by mouth 2 times daily 360 mg in AM and 600 mg in PM       Oxymetazoline HCl (NASAL SPRAY) 0.05 % SOLN Spray 1 spray in nostril 2 times daily       polyethylene glycol (MIRALAX) 17 GM/Dose powder Take 17 g by mouth as needed       triprolidine-pseudoePHEDrine (APRODINE) 2.5-60 MG TABS per tablet Take 0.5 tablets by mouth 2 times daily       acetaminophen (TYLENOL) 500 MG tablet Take 1,000 mg by mouth as needed       butalbital-aspirin-caffeine (FIORINAL) -40 MG capsule Take 1 capsule by mouth every 4 hours as needed for headaches (Patient taking differently: Take 1 capsule by mouth every 4 hours as needed for headaches) 60 capsule 0     carboxymethylcellulose (REFRESH PLUS) 0.5 % SOLN ophthalmic solution Apply 1-2 drops to eye daily as needed       diltiazem ER (TIAZAC) 120 MG 24 hr ER beaded capsule Take 120 mg by mouth At Bedtime       meloxicam (MOBIC) 7.5 MG tablet Take 7.5 mg by mouth daily (Patient not taking: No sig reported)       meloxicam (MOBIC) 7.5 MG tablet Take 1 tablet by mouth daily (Patient not taking: No sig reported)         Allergies  Allergies   Allergen Reactions     Amlodipine Other (See Comments)     Constipation, \"liver pain\".         Clonidine      Other reaction(s): Dizziness     Escitalopram Muscle " "Pain (Myalgia)     Other reaction(s): Myalgias     Mold Nausea     Ringing in ears, racing heart     Paroxetine Other (See Comments)     Palpitations/racing heart         Penicillins Unknown     Does not remember reaction, mother said she allergic to mold; patient refuses Pcn or any derivative       Sulfa Drugs Unknown     Tramadol      Other reaction(s): Runny Nose  Facial pain, sinus swelling       Carvedilol Other (See Comments)     Worsening depression         Codeine Headache and Nausea and Vomiting     Cortisone Other (See Comments)     Patient reports and possibly all steroids \"makes me to feel sick\", get fungal infections       Duloxetine Headache     Oxycodone Headache       Social History  Social History     Socioeconomic History     Marital status: Legally      Spouse name: Not on file     Number of children: 2     Years of education: Not on file     Highest education level: Not on file   Occupational History     Occupation: on disability   Tobacco Use     Smoking status: Former Smoker     Packs/day: 1.00     Years: 15.00     Pack years: 15.00     Quit date: 10/10/2014     Years since quittin.7     Smokeless tobacco: Never Used   Substance and Sexual Activity     Alcohol use: Not Currently     Comment: Last drink 10/2014     Drug use: Not Currently     Sexual activity: Not on file   Other Topics Concern     Parent/sibling w/ CABG, MI or angioplasty before 65F 55M? Not Asked   Social History Narrative     Not on file     Social Determinants of Health     Financial Resource Strain: Not on file   Food Insecurity: Not on file   Transportation Needs: Not on file   Physical Activity: Not on file   Stress: Not on file   Social Connections: Not on file   Intimate Partner Violence: Not on file   Housing Stability: Not on file       Family History  Family History   Problem Relation Age of Onset     Cancer Mother      Diabetes Father      Hypertension Father      Pulmonary Embolism Daughter      " Anesthesia Reaction No family hx of        Review of Systems  The complete review of systems is negative other than noted in the HPI or here.   Anesthesia Evaluation   Pt has had prior anesthetic. Type: General.    History of anesthetic complications  - PONV.      ROS/MED HX  ENT/Pulmonary: Comment: TMJ    (+) allergic rhinitis, tobacco use, Past use,     Neurologic: Comment: Occipital neuralgia/ headache      (+) migraines,  (-) no seizures, no CVA and no TIA   Cardiovascular:     (+) hypertension-range: 160/90/ ----Previous cardiac testing   Echo: Date: Results:    Stress Test: Date: Results:    ECG Reviewed: Date: 2/16/22 Results:  SR  Cath: Date: Results:      METS/Exercise Tolerance: 4 - Raking leaves, gardening    Hematologic:  - neg hematologic  ROS     Musculoskeletal: Comment: Fibromyalgia    S/p right EMELINA  S/p left TKA  S/p cervical fusion      GI/Hepatic:  - neg GI/hepatic ROS  (-) GERD   Renal/Genitourinary:  - neg Renal ROS     Endo:  - neg endo ROS     Psychiatric/Substance Use:     (+) psychiatric history anxiety, depression and other (comment) (insomnia)     Infectious Disease:  - neg infectious disease ROS     Malignancy:  - neg malignancy ROS     Other:  - neg other ROS    (+) , H/O Chronic Pain,        Virtual visit -  No vitals were obtained    Physical Exam  Constitutional: Awake, alert, cooperative, no apparent distress, and appears stated age.  Eyes: Pupils equal  HENT: Normocephalic  Respiratory: non labored breathing   Neurologic: Awake, alert, oriented to name, place and time.   Neuropsychiatric: Calm, cooperative. Normal affect. Tearful at times talking about her pain.      Prior Labs/Diagnostic Studies   All labs and imaging personally reviewed     EKG/ stress test - if available please see in ROS above   No results found.  No flowsheet data found.      The patient's records and results personally reviewed by this provider.     Outside records reviewed from: Care  Everywhere      Assessment      Martha Chun is a 62 year old female seen as a PAC referral for risk assessment and optimization for anesthesia.    Plan/Recommendations  Pt will be optimized for the proposed procedure.  See below for details on the assessment, risk, and preoperative recommendations    NEUROLOGY  ~ Occipital neuralgia - s/p occipital nerve stimulator - procedure as above. The patient has pending labs for Dr. Shrestha for the day of surgery.   ~ Migraines - has received botox in the past. Was recently seen by neurologist, Dr. Diaz on 4/12/22. Patient reports this visit was not helpful.   ~ The patient will hold phrenilin on the day of surgery.    - Chronic Pain syndrome/ fibromyalgia - the patient will continue neurontin and tylenol.   -Post Op delirium risk factors:  No risk identified    ENT  - No current airway concerns.  Will need to be reassessed day of surgery.  Mallampati: Unable to assess  TM: Unable to assess   ~ TMJ    CARDIAC  - Hypertension  Blood pressure elevated. The patient is on diltiazem and tekturna and most recently in the ER was 171/81, 179/99 and 198/99. In clinic reading the last blood pressure was 179/99. The patient reports she takes her blood pressure at home and is normally in the 150-160/80-90 range. Discussed with Dr. Cordon and the patient needs to follow up with her PCP prior to surgery to help better manage her blood pressure as not currently controlled and may be elevated on the day of surgery and would not want the patient's procedure to be canceled. The patient will contact her PCP.   - METS (Metabolic Equivalents)  Patient performs 4 or more METS exercise without symptoms            Total Score: 0      RCRI-Very low risk: Class 1 0.4% complication rate            Total Score: 0      ~ The patient reports she can go up multiple flights of stairs without issues but due to ongoing left hip pain has not been doing it much. She denies any cardiac symptoms of chest pain or  "chest tightness.     PULMONARY  - Obstructive Sleep Apnea  No current risk of obstructive sleep apnea   ALINE Low Risk            Total Score: 2    ALINE: Hypertension    ALINE: Over 50 ys old    - allergies - The patient reports her allergies have continue to be bad from the water damage and mold as well as outside allergens.  This causes bad headaches for her. She denies any URI symptoms. She will continue her nasal spray and hold aprodine on the day of surgery.   - Tobacco History      History   Smoking Status     Former Smoker     Packs/day: 1.00     Years: 15.00     Quit date: 10/10/2014   Smokeless Tobacco     Never Used       GI  PONV High Risk  Total Score: 4           1 AN PONV: Pt is Female    1 AN PONV: Patient is not a current smoker    1 AN PONV: Patient has history of PONV    1 AN PONV: Intended Post Op Opioids        /RENAL  - Baseline Creatinine  0.75    ENDOCRINE    - BMI: Estimated body mass index is 22.6 kg/m  as calculated from the following:    Height as of 6/7/22: 1.676 m (5' 6\").    Weight as of 6/7/22: 63.5 kg (140 lb).  Healthy Weight (BMI 18.5-24.9)  - No history of Diabetes Mellitus    HEME  VTE Medium Risk 1.8%            Total Score: 5    VTE: Greater than 59 yrs old    VTE: Family Hx of VTE      - No history of abnormal bleeding or antiplatelet use.      MSK  ~ The patient has had prior cervical discectomy and fusion C3-C7. She is s/p right EMELINA and left TKA. She has ongoing left shoulder and left hip issues. Consideration for careful positioning of the patient's left shoulder and left him     PSYCH  - anxiety, depression and insomnia - the patient will continue tylenol PM, ativan, melatonin, remeron. She is followed by psychiatry and social work      The patient is optimized for their procedure. AVS with information on surgery time/arrival time, meds and NPO status given by nursing staff. No further diagnostic testing indicated.    Please refer to the physical examination documented by the " anesthesiologist in the anesthesia record on the day of surgery.    Video-Visit Details    Type of service:  Video Visit    Patient verbally consented to video service today: YES    Video Start Time: 10:10  Video End Time (time video stopped): 10:24    Originating Location (pt. Location): Home    Distant Location (provider location):  [facility] PREOPERATIVE ASSESSMENT CENTER     Mode of Communication:  Video Conference via Doximity  On the day of service:     Prep time: 16 minutes  Visit time: 14 minutes  Documentation time: 25 minutes  ------------------------------------------  Total time: 55 minutes      Lisa Roberts PA-C  Preoperative Assessment Center  [facility]  Clinic and Surgery Center  Phone: [phone]  Fax: [phone]

## 2022-06-21 NOTE — PROGRESS NOTES
Martha is a 62 year old who is being evaluated via a billable video visit.      How would you like to obtain your AVS? Mail   If the video visit is dropped, the invitation should be resent by: Text to cell phone: [phone]      HPI         Review of Systems         Objective    Vitals - Patient Reported  Pain Score: Severe Pain (7)  Pain Loc: Neck        Physical Exam       IVANA Capps LPN    .  ..

## 2022-06-21 NOTE — PATIENT INSTRUCTIONS
Preparing for Your Surgery      Name:  Martha Chun   MRN:  [id number]   :  1960   Today's Date:  2022       Arriving for surgery:  Surgery date:  22  Arrival time:  12:15pm    Restrictions due to COVID 19       Effective 22 [facility] is implementing the following visitor policy:     1 person may accompany the patient through the Pre-Op process.      That same person may wait in the Surgery Waiting room, provided there is enough room to social distance         Inpatients are allowed 2 visitors per day for the duration of their stay.        Visitors must wear a mask.      Visitors must not be ill.      Visiting hours are 8 am to 8 pm.    [facility] parking is available for anyone with mobility limitations or disabilities.  ([place]  24 hours/ 7 days a week; [facility]  7 am- 3:30 pm, Mon- Fri)    Please come to:     [facility] [address]  [address] [place] [address]    - ?-   Parking is available in the Patient Visitor Ramp on [facility].     -   When entering the hospital you will be asked COVID screening questions, you will then be directed to Registration.  Registration will direct you to the 3rd floor Surgery waiting room.     -   Please ask if you need an escort or a wheelchair to the Surgery Waiting Room.  Preop number- [phone] ?     What can I eat or drink?  -  You may eat and drink normally for up to 8 hours before your surgery. (Until 6:15am on 22)  -  You may have clear liquids until 2 hours before surgery. (Until 12:15pm on 22)    Examples of clear liquids:  Water  Clear broth  Juices (apple, white grape, white cranberry  and cider) without pulp  Noncarbonated, powder based beverages  (lemonade and Samy-Aid)  Sodas (Sprite, 7-Up, ginger ale and seltzer)  Coffee or tea (without milk or cream)  Gatorade    -  No Alcohol for at least 24 hours before surgery     Which medicines can I  take?    Hold Aspirin for 7 days before surgery. Butalbital-Aspirin-caffeine (Fiorinal)    Hold Supplements for 7 days before surgery. [place] 3-fish oil  Hold Ibuprofen (Advil, Motrin) for 1 day before surgery--unless otherwise directed by surgeon.  Hold Naproxen (Aleve) for 4 days before surgery.    Do not take Meloxicam (mobic) from now until after surgery.     -  DO NOT take these medications the day of surgery:   Butalbital-Acetaminophen (Phrenilin)    Butalbital -acetaminophen -caffeine (Esgic)   Vitamin D   Miralax    Triprolidine-pseudoephedrine (aprodine)     -  PLEASE TAKE these medications the day of surgery:   Flonase nasal spray as needed   Lorazepam (ativan) as needed   Acetaminophen (tylenol) as needed   Eye drops       How do I prepare myself?  - Please take 2 showers before surgery using Scrubcare or Hibiclens soap.    Use this soap only from the neck to your toes.     Leave the soap on your skin for one minute--then rinse thoroughly.      You may use your own shampoo and conditioner; no other hair products.   - Please remove all jewelry and body piercings.  - No lotions, deodorants or fragrance.  - No makeup or fingernail polish.   - Bring your ID and insurance card.    -If you have a Deep Brain Stimulator, Spinal Cord Stimulator or any neuro stimulator device---you must bring the remote control to the hospital         ALL PATIENTS GOING HOME THE SAME DAY OF SURGERY ARE REQUIRED TO HAVE A RESPONSIBLE ADULT TO DRIVE AND BE IN ATTENDANCE WITH THEM FOR 24 HOURS FOLLOWING SURGERY.       Questions or Concerns:    - For any questions regarding the day of surgery or your hospital stay, please contact the Pre Admission Nursing Office at [phone].       - If you have health changes between today and your surgery please call your surgeon.       For questions after surgery please call your surgeons office.

## 2022-06-28 RX ORDER — CLINDAMYCIN PHOSPHATE 900 MG/50ML
900 INJECTION, SOLUTION INTRAVENOUS SEE ADMIN INSTRUCTIONS
Status: CANCELLED | OUTPATIENT
Start: 2022-06-28

## 2022-06-28 RX ORDER — ONDANSETRON 4 MG/1
4 TABLET, ORALLY DISINTEGRATING ORAL EVERY 30 MIN PRN
Status: CANCELLED | OUTPATIENT
Start: 2022-06-28

## 2022-06-28 RX ORDER — LIDOCAINE 40 MG/G
CREAM TOPICAL
Status: CANCELLED | OUTPATIENT
Start: 2022-06-28

## 2022-06-28 RX ORDER — FENTANYL CITRATE 50 UG/ML
50 INJECTION, SOLUTION INTRAMUSCULAR; INTRAVENOUS EVERY 5 MIN PRN
Status: CANCELLED | OUTPATIENT
Start: 2022-06-28

## 2022-06-28 RX ORDER — ACETAMINOPHEN 500 MG
1000 TABLET ORAL ONCE
Status: CANCELLED | OUTPATIENT
Start: 2022-06-28 | End: 2022-06-28

## 2022-06-28 RX ORDER — ONDANSETRON 2 MG/ML
4 INJECTION INTRAMUSCULAR; INTRAVENOUS EVERY 30 MIN PRN
Status: CANCELLED | OUTPATIENT
Start: 2022-06-28

## 2022-06-28 RX ORDER — ACETAMINOPHEN 325 MG/1
975 TABLET ORAL ONCE
Status: CANCELLED | OUTPATIENT
Start: 2022-06-28 | End: 2022-06-28

## 2022-06-28 RX ORDER — HYDRALAZINE HYDROCHLORIDE 20 MG/ML
2.5-5 INJECTION INTRAMUSCULAR; INTRAVENOUS EVERY 10 MIN PRN
Status: CANCELLED | OUTPATIENT
Start: 2022-06-28

## 2022-06-28 RX ORDER — LABETALOL HYDROCHLORIDE 5 MG/ML
10 INJECTION, SOLUTION INTRAVENOUS
Status: CANCELLED | OUTPATIENT
Start: 2022-06-28

## 2022-06-28 RX ORDER — OXYCODONE HYDROCHLORIDE 5 MG/1
5 TABLET ORAL EVERY 4 HOURS PRN
Status: CANCELLED | OUTPATIENT
Start: 2022-06-28

## 2022-06-28 RX ORDER — SODIUM CHLORIDE, SODIUM LACTATE, POTASSIUM CHLORIDE, CALCIUM CHLORIDE 600; 310; 30; 20 MG/100ML; MG/100ML; MG/100ML; MG/100ML
INJECTION, SOLUTION INTRAVENOUS CONTINUOUS
Status: CANCELLED | OUTPATIENT
Start: 2022-06-28

## 2022-06-28 RX ORDER — HYDROMORPHONE HYDROCHLORIDE 1 MG/ML
0.2 INJECTION, SOLUTION INTRAMUSCULAR; INTRAVENOUS; SUBCUTANEOUS EVERY 5 MIN PRN
Status: CANCELLED | OUTPATIENT
Start: 2022-06-28

## 2022-06-28 RX ORDER — CLINDAMYCIN PHOSPHATE 900 MG/50ML
900 INJECTION, SOLUTION INTRAVENOUS
Status: CANCELLED | OUTPATIENT
Start: 2022-06-28

## 2022-06-30 ENCOUNTER — TELEPHONE (OUTPATIENT)
Dept: NEUROSURGERY | Facility: CLINIC | Age: 62
End: 2022-06-30

## 2022-06-30 NOTE — TELEPHONE ENCOUNTER
Pt did not show for her surgery on 6/29/22. Left  checking on patient's status. Requested that she call me at her earliest convenience.

## 2022-07-11 ENCOUNTER — TELEPHONE (OUTPATIENT)
Dept: NEUROSURGERY | Facility: CLINIC | Age: 62
End: 2022-07-11

## 2022-07-11 NOTE — TELEPHONE ENCOUNTER
"Returned pt's call. Pt wanted to explain that she was admitted to [facility], which is why she did not show for her surgery on 6/28. Pt explained that she was prescribed hydrochlorothiazide for elevated BP, but had an allergic reaction to it. She said that after taking it she was \"delirious\" and \"In and out of consciousness.\" She said she was having severe allergic reaction to environmental factors outside and mold in her apartment, and increased pain. She then overdosed on Fiorinal. When she woke up, she called 911 and was admitted to [facility].     Pt said she is now staying at a hotel d/t the mold in her apartment. She said she has a very good  and is scheduled with a psychiatrist and counselors through [facility]. She has an appointment with her PCP on 7/13. Pt confirms she has emergency numbers to call if she has thoughts of self harm or of harming others.     Pt is still experiencing severe pain, but her medication options are not limited d/t the overdose. She is not sure what the next steps for pain management will or can be.     I will notify Dr. Shrestha of the above. No further questions at this time.   "

## 2022-07-12 ENCOUNTER — TELEPHONE (OUTPATIENT)
Dept: NEUROSURGERY | Facility: CLINIC | Age: 62
End: 2022-07-12

## 2022-07-12 NOTE — TELEPHONE ENCOUNTER
Pt called requesting a refill of Fiorinal. She said she can only go out for an hour before her head starts throbbing. This is going to make it hard to attend all of her upcoming appointments. Of note, pt recently overdosed on this medication and was admitted to [facility]. I discussed with pt that Dr. Shrestha will not prescribe this or other medications for her in light of this. Pt expressed understanding but requested that I ask Dr. Shrestha.     After discussing this with Dr. Shrestha, I called pt back and left a VM letting her know that he cannot prescribe Fiorinal or other medications. Pt has an appointment with her PCP tomorrow and I suggested she consult with her provider on this, as she should be followed closely with regard to medications.     Left my direct number should pt have additional questions.

## 2022-08-07 NOTE — TELEPHONE ENCOUNTER
FUTURE VISIT INFORMATION      FUTURE VISIT INFORMATION:    Date: 9/29/2022    Time: 10am    Location: [facility]  REFERRAL INFORMATION:    Referring provider:  Dr. Shrestha     Referring providers clinic:  [facility]     Reason for visit/diagnosis  Headaches     RECORDS REQUESTED FROM:       Clinic name Comments Records Status Imaging Status   Internal Dr. Shrestha-4/19/2022    ED Visit-6/7/2022 Epic No Images          [facility]  MR Brain-2/5/2021 [facility] PACS

## 2022-09-19 ENCOUNTER — TELEPHONE (OUTPATIENT)
Dept: NEUROSURGERY | Facility: CLINIC | Age: 62
End: 2022-09-19

## 2022-09-19 NOTE — TELEPHONE ENCOUNTER
Pt had previously been scheduled for occipital nerve stimulator removal by Dr. Shrestha but surgery was canceled d/t hospital admission at [facility] for overdose of Fiorinal. Pt would like to get back on the surgery schedule and can only do an early morning surgery. Will follow-up with Dr. Shrestha and our surgery scheduler. Pt in agreement with plan. Nothing further at this time.

## 2022-09-21 DIAGNOSIS — M54.81 BILATERAL OCCIPITAL NEURALGIA: Primary | ICD-10-CM

## 2022-09-29 ENCOUNTER — PRE VISIT (OUTPATIENT)
Dept: NEUROLOGY | Facility: CLINIC | Age: 62
End: 2022-09-29

## 2022-09-29 ENCOUNTER — TELEPHONE (OUTPATIENT)
Dept: NEUROSURGERY | Facility: CLINIC | Age: 62
End: 2022-09-29

## 2022-09-29 NOTE — TELEPHONE ENCOUNTER
Pt had questions about the neuropsychological evaluation scheduled for 10/5 with Dr. Saldivar. I explained that this appointment will help Dr. Shrestha determine if she is psychologically able to make the decision to remove the stimulator and understand the implications of doing so. Once Dr. Shrestha reviews Dr. Saldivar's report, we can discuss scheduling surgery.  Pt expressed understanding. No further questions at this time.

## 2022-10-05 ENCOUNTER — VIRTUAL VISIT (OUTPATIENT)
Dept: NEUROPSYCHOLOGY | Facility: CLINIC | Age: 62
End: 2022-10-05
Attending: NEUROLOGICAL SURGERY
Payer: COMMERCIAL

## 2022-10-05 DIAGNOSIS — F54 PSYCHOSOMATIC FACTOR IN PHYSICAL CONDITION: Primary | ICD-10-CM

## 2022-10-05 DIAGNOSIS — M54.81 BILATERAL OCCIPITAL NEURALGIA: ICD-10-CM

## 2022-10-05 PROCEDURE — 96138 PSYCL/NRPSYC TECH 1ST: CPT | Mod: GT

## 2022-10-05 PROCEDURE — 90791 PSYCH DIAGNOSTIC EVALUATION: CPT | Mod: GT

## 2022-10-05 PROCEDURE — 96130 PSYCL TST EVAL PHYS/QHP 1ST: CPT | Mod: GT

## 2022-10-05 PROCEDURE — 96139 PSYCL/NRPSYC TST TECH EA: CPT | Mod: GT
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
## 2022-10-05 NOTE — LETTER
October 6, 2022       TO: Martha Chun  [address]  [address]  [place] [address]       DearMs.Adiel,    We are writing to inform you of your test results.    {[facility] results letter list:[id number]}    No results found from the In Basket message.    ***
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
## 2022-10-05 NOTE — NURSING NOTE
Pt was seen for neuropsychological evaluation at the request of Dr. Kobi Shrestha for the purposes of diagnostic clarification and treatment planning. 61 minutes of video test administration and scoring were provided by this writer. Please see Dr. Liban Saldivar's report for a full interpretation of the findings.    Kevin Parker MA, CSP

## 2022-10-12 ENCOUNTER — TELEPHONE (OUTPATIENT)
Dept: NEUROSURGERY | Facility: CLINIC | Age: 62
End: 2022-10-12

## 2022-10-12 NOTE — TELEPHONE ENCOUNTER
"Pt completed her neuropsych evaluation with Dr. Saldivar and wanted to know if she passed. I let her know there isn't a pass or fail score with this evaluation. It is used to get insight. I let her know that Dr. Saldivar made some specific recommendations, includin. An appt with Dr. Shrestha to discuss the specific risks of removing the occipital nerve stimulator. Pt in agreement.     2. Have a mental health provider who is monitoring her for suicidal ideation and intent. Pt says she has been working with [facility] provider Avtar Baez, and he will be making a psychiatry referral for her. She is participating in [employer] study, as she found a [facility] that doesn't have an apparent black mold problem. She is also working with a health  named Loreto March, who specializes in \"somatic release healing,\" per pt.     Will follow-up with Dr. Shrestha about whether appt should be virtual or in-person. Pt in agreement with plan. Nothing further at this time.   "

## 2022-10-13 ENCOUNTER — TELEPHONE (OUTPATIENT)
Dept: NEUROSURGERY | Facility: CLINIC | Age: 62
End: 2022-10-13

## 2022-10-13 NOTE — TELEPHONE ENCOUNTER
Writer IVÁN for pt to call back and schedule a follow up    Please schedule a return, in person visit, with Dr. Shrestha for next available    Chante Costa

## 2022-10-17 ENCOUNTER — TRANSFERRED RECORDS (OUTPATIENT)
Dept: HEALTH INFORMATION MANAGEMENT | Facility: CLINIC | Age: 62
End: 2022-10-17

## 2022-11-08 ENCOUNTER — OFFICE VISIT (OUTPATIENT)
Dept: NEUROSURGERY | Facility: CLINIC | Age: 62
End: 2022-11-08
Payer: COMMERCIAL

## 2022-11-08 VITALS
OXYGEN SATURATION: 100 % | DIASTOLIC BLOOD PRESSURE: 88 MMHG | SYSTOLIC BLOOD PRESSURE: 155 MMHG | RESPIRATION RATE: 16 BRPM | HEART RATE: 86 BPM

## 2022-11-08 DIAGNOSIS — M54.81 BILATERAL OCCIPITAL NEURALGIA: Primary | ICD-10-CM

## 2022-11-08 PROCEDURE — 99214 OFFICE O/P EST MOD 30 MIN: CPT | Performed by: NEUROLOGICAL SURGERY

## 2022-11-08 RX ORDER — HYDROXYZINE HYDROCHLORIDE 25 MG/1
25-50 TABLET, FILM COATED ORAL EVERY 8 HOURS PRN
Status: ON HOLD | COMMUNITY
Start: 2022-11-03 | End: 2023-01-24

## 2022-11-08 RX ORDER — QUETIAPINE FUMARATE 100 MG/1
200 TABLET, FILM COATED ORAL AT BEDTIME
COMMUNITY
Start: 2022-09-29

## 2022-11-08 ASSESSMENT — PAIN SCALES - GENERAL: PAINLEVEL: SEVERE PAIN (6)

## 2022-11-08 NOTE — LETTER
"11/8/2022       RE: Martha Chun  [address]  [address]  [place] [address]     Dear Colleague,    Thank you for referring your patient, Martha Chun, to the [facility] NEUROSURGERY CLINIC [place] at [facility]. Please see a copy of my visit note below.    Neurosurgery Clinic Note    Reason for Visit: follow up ONS    History of Present Illness  Martha Chun is a 62-year-old woman with a history of spinal fusion for neck pain who is now status post bilateral occipital nerve stimulator implant for occipital neuralgia and headaches.  Despite multiple programming attempts we have not been able to get the stimulator providing optimal pain relief.  We had scheduled removal of the device but she canceled and then was hospitalized for the second.   She tells me that while she is doing better, she continues to struggle with multiple ongoing traumatic events in her life include her divorce, moving, and ongoing issues with her body. She tells me that she has a left hip replacement scheduled for January 21st.     She continues to see two different chiropractors for her neck. She states that she is doing somatic work that is helpful and that she continues to benefit from an online expert.    She tells me and reiterates that she feels like her neck will not stay in place and she feels like C2 continues to slip on C3 despite having multiple evaluations that showed no changes.  She states that she feels lots of symptoms and feels like because of the simulators her neck is unable to be pulled back. She believes that there is a lymphatic drainage problem around the stimulators.     She is hoping to file for disability as soon as her divorce is finished. She does not have any new medical problems or updates to report.       Allergies   Allergen Reactions     Amlodipine Other (See Comments)     Constipation, \"liver pain\".         Clonidine      Other reaction(s): " "Dizziness     Escitalopram Muscle Pain (Myalgia)     Other reaction(s): Myalgias     Hydrochlorothiazide Unknown     Mold Nausea     Ringing in ears, racing heart     Molds & Smuts Headache and Nausea     Ringing in ears, racing heart     Paroxetine Other (See Comments)     Palpitations/racing heart         Penicillins Unknown     Does not remember reaction, mother said she allergic to mold; patient refuses Pcn or any derivative       Sulfa Drugs Unknown     Tramadol      Other reaction(s): Runny Nose  Facial pain, sinus swelling       Carvedilol Other (See Comments)     Worsening depression         Codeine Headache and Nausea and Vomiting     Cortisone Other (See Comments)     Patient reports and possibly all steroids \"makes me to feel sick\", get fungal infections       Duloxetine Headache     Oxycodone Headache       Current Outpatient Medications   Medication     acetaminophen (TYLENOL) 500 MG tablet     aliskiren (TEKTURNA) 150 MG tablet     butalbital-aspirin-caffeine (FIORINAL) -40 MG capsule     carboxymethylcellulose (REFRESH PLUS) 0.5 % SOLN ophthalmic solution     cholecalciferol 50 MCG (2000 UT) tablet     diltiazem ER (TIAZAC) 120 MG 24 hr ER beaded capsule     fluticasone (FLONASE) 50 MCG/ACT nasal spray     hydrOXYzine (ATARAX) 25 MG tablet     Omega-3 Fatty Acids (FISH OIL PO)     Oxymetazoline HCl (NASAL SPRAY) 0.05 % SOLN     QUEtiapine (SEROQUEL) 100 MG tablet     triprolidine-pseudoePHEDrine (APRODINE) 2.5-60 MG TABS per tablet     Butalbital-Acetaminophen (PHRENILIN)  MG TABS per tablet     butalbital-acetaminophen-caffeine (ESGIC) -40 MG tablet     butalbital-aspirin-caffeine (FIORINAL) -40 MG capsule     diphenhydrAMINE-acetaminophen (TYLENOL PM)  MG tablet     diphenhydrAMINE-acetaminophen (TYLENOL PM)  MG tablet     LORazepam (ATIVAN) 0.5 MG tablet     Melatonin 10 MG TABS tablet     meloxicam (MOBIC) 7.5 MG tablet     meloxicam (MOBIC) 7.5 MG tablet     " mirtazapine (REMERON) 15 MG tablet     polyethylene glycol (MIRALAX) 17 GM/Dose powder     No current facility-administered medications for this visit.       Physical Exam  BP (!) 155/88   Pulse 86   Resp 16   SpO2 100%       General: Awake, alert, oriented. Well nourished, well developed, no acute distress.  HEENT: Head normocephalic, atraumatic.    Neurological  Awake, alert and oriented to date, time, place and person. Speech fluent.   Pupils equal, round, reactive to light.  Extraocular movement intact.  Face symmetric.  Tongue midline.  Uvula elevates equally.  ROS: 10 point ROS neg other than the symptoms noted above in the HPI.      Assessment and Plan   Martha Chun is a 62 year old female with a history of chronic pain including ONS but with significant complexity. She did not respond well to chronic ONS stimulation and has pursued several unconventional avenues for treatment. Unfortunately, she has gone through many traumatic challenges over the past year or so and feels that removing the unused stimulators would be benefit her. I had her see our neuropsychologist, Liban Saldivar, who deemed her a fairly marginal candidate for removal of the devices. I have also communicated quite clearly to Martha that I do not believe that removing the devices will improve any of her complaints beyond being annoyed by the presence of the devices. She understands my recommendation to not remove the devices, and we had a discussion about the surgical risks of removing them including having general anesthesia, MI, infection, bleeding, inability to remove all of the devices, pain and scarring. Despite these risks, she has requested that I remove the devices.       Schedule removal of devices for late March or after.      Kobi Shrestha MD  Department of Neurosurgery  [facility]

## 2022-11-08 NOTE — PROGRESS NOTES
"Neurosurgery Clinic Note    Reason for Visit: follow up ONS    History of Present Illness  Martha Chun is a 62-year-old woman with a history of spinal fusion for neck pain who is now status post bilateral occipital nerve stimulator implant for occipital neuralgia and headaches.  Despite multiple programming attempts we have not been able to get the stimulator providing optimal pain relief.  We had scheduled removal of the device but she canceled and then was hospitalized for the second.   She tells me that while she is doing better, she continues to struggle with multiple ongoing traumatic events in her life include her divorce, moving, and ongoing issues with her body. She tells me that she has a left hip replacement scheduled for January 21st.     She continues to see two different chiropractors for her neck. She states that she is doing somatic work that is helpful and that she continues to benefit from an online expert.    She tells me and reiterates that she feels like her neck will not stay in place and she feels like C2 continues to slip on C3 despite having multiple evaluations that showed no changes.  She states that she feels lots of symptoms and feels like because of the simulators her neck is unable to be pulled back. She believes that there is a lymphatic drainage problem around the stimulators.     She is hoping to file for disability as soon as her divorce is finished. She does not have any new medical problems or updates to report.       Allergies   Allergen Reactions     Amlodipine Other (See Comments)     Constipation, \"liver pain\".         Clonidine      Other reaction(s): Dizziness     Escitalopram Muscle Pain (Myalgia)     Other reaction(s): Myalgias     Hydrochlorothiazide Unknown     Mold Nausea     Ringing in ears, racing heart     Molds & Smuts Headache and Nausea     Ringing in ears, racing heart     Paroxetine Other (See Comments)     Palpitations/racing heart         Penicillins Unknown " "    Does not remember reaction, mother said she allergic to mold; patient refuses Pcn or any derivative       Sulfa Drugs Unknown     Tramadol      Other reaction(s): Runny Nose  Facial pain, sinus swelling       Carvedilol Other (See Comments)     Worsening depression         Codeine Headache and Nausea and Vomiting     Cortisone Other (See Comments)     Patient reports and possibly all steroids \"makes me to feel sick\", get fungal infections       Duloxetine Headache     Oxycodone Headache       Current Outpatient Medications   Medication     acetaminophen (TYLENOL) 500 MG tablet     aliskiren (TEKTURNA) 150 MG tablet     butalbital-aspirin-caffeine (FIORINAL) -40 MG capsule     carboxymethylcellulose (REFRESH PLUS) 0.5 % SOLN ophthalmic solution     cholecalciferol 50 MCG (2000 UT) tablet     diltiazem ER (TIAZAC) 120 MG 24 hr ER beaded capsule     fluticasone (FLONASE) 50 MCG/ACT nasal spray     hydrOXYzine (ATARAX) 25 MG tablet     Omega-3 Fatty Acids (FISH OIL PO)     Oxymetazoline HCl (NASAL SPRAY) 0.05 % SOLN     QUEtiapine (SEROQUEL) 100 MG tablet     triprolidine-pseudoePHEDrine (APRODINE) 2.5-60 MG TABS per tablet     Butalbital-Acetaminophen (PHRENILIN)  MG TABS per tablet     butalbital-acetaminophen-caffeine (ESGIC) -40 MG tablet     butalbital-aspirin-caffeine (FIORINAL) -40 MG capsule     diphenhydrAMINE-acetaminophen (TYLENOL PM)  MG tablet     diphenhydrAMINE-acetaminophen (TYLENOL PM)  MG tablet     LORazepam (ATIVAN) 0.5 MG tablet     Melatonin 10 MG TABS tablet     meloxicam (MOBIC) 7.5 MG tablet     meloxicam (MOBIC) 7.5 MG tablet     mirtazapine (REMERON) 15 MG tablet     polyethylene glycol (MIRALAX) 17 GM/Dose powder     No current facility-administered medications for this visit.       Physical Exam  BP (!) 155/88   Pulse 86   Resp 16   SpO2 100%       General: Awake, alert, oriented. Well nourished, well developed, no acute distress.  HEENT: Head " normocephalic, atraumatic.    Neurological  Awake, alert and oriented to date, time, place and person. Speech fluent.   Pupils equal, round, reactive to light.  Extraocular movement intact.  Face symmetric.  Tongue midline.  Uvula elevates equally.  ROS: 10 point ROS neg other than the symptoms noted above in the HPI.      Assessment and Plan   Martha Chun is a 62 year old female with a history of chronic pain including ONS but with significant complexity. She did not respond well to chronic ONS stimulation and has pursued several unconventional avenues for treatment. Unfortunately, she has gone through many traumatic challenges over the past year or so and feels that removing the unused stimulators would be benefit her. I had her see our neuropsychologist, Liban Saldivar, who deemed her a fairly marginal candidate for removal of the devices. I have also communicated quite clearly to Martha that I do not believe that removing the devices will improve any of her complaints beyond being annoyed by the presence of the devices. She understands my recommendation to not remove the devices, and we had a discussion about the surgical risks of removing them including having general anesthesia, MI, infection, bleeding, inability to remove all of the devices, pain and scarring. Despite these risks, she has requested that I remove the devices.       Schedule removal of devices for late March or after.      Kobi Shrestha MD  Department of Neurosurgery  [facility]

## 2022-12-22 ENCOUNTER — TELEPHONE (OUTPATIENT)
Dept: NEUROSURGERY | Facility: CLINIC | Age: 62
End: 2022-12-22

## 2022-12-22 NOTE — TELEPHONE ENCOUNTER
Spoke with pt to let her know that Dr. Shrestha's plan is to do her surgery in late March or later. Pt is scheduled for hip replacement in late January and he wants to make sure she is healed from that before another surgery. Pt expressed concern that if it isn't scheduled now, Dr. Shrestha will be booked past March or April. I will follow-up with him about placing the surgery orders, but I let her know he may still want to wait to do that until she has had the hip surgery. I'll follow-up with her when I have more information.

## 2023-01-10 RX ORDER — MAGNESIUM OXIDE 400 MG/1
400 TABLET ORAL 2 TIMES DAILY
COMMUNITY
End: 2023-01-10

## 2023-01-10 RX ORDER — LANOLIN ALCOHOL/MO/W.PET/CERES
3 CREAM (GRAM) TOPICAL
COMMUNITY
End: 2023-01-10

## 2023-01-10 RX ORDER — IBUPROFEN 200 MG
400 TABLET ORAL EVERY 4 HOURS PRN
COMMUNITY

## 2023-01-10 RX ORDER — POLYETHYLENE GLYCOL 3350 17 G/17G
1 POWDER, FOR SOLUTION ORAL DAILY PRN
COMMUNITY

## 2023-01-10 RX ORDER — DILTIAZEM HYDROCHLORIDE 120 MG/1
120 CAPSULE, EXTENDED RELEASE ORAL EVERY EVENING
COMMUNITY

## 2023-01-10 NOTE — PROGRESS NOTES
Planning to discharge home on POD 1 in the morning with her friend, Lesly, staying with her for 3 days.       01/10/23 5713   Discharge Planning   Patient/Family Anticipates Transition to home   Concerns to be Addressed all concerns addressed in this encounter   Living Arrangements   People in Home alone   Type of Residence Private Residence   Is your private residence a single family home or apartment? Apartment   Number of Stairs, Within Home, Primary none  (elevator)   Once home, are you able to live on one level? Yes   Which level? Main Level   Bathroom Shower/Tub Tub/Shower unit   Equipment Currently Used at Home grab bar, tub/shower  (Has a walker and cane at home)   Support System   Support Systems Friends/Neighbors  (friend, Lesly Nava)   Do you have someone available to stay with you one or two nights once you are home? Yes   Education   Patient attended [facility] pre-op class/received pre-op teaching  email/phone call

## 2023-01-11 ENCOUNTER — TELEPHONE (OUTPATIENT)
Dept: NEUROSURGERY | Facility: CLINIC | Age: 63
End: 2023-01-11

## 2023-01-11 NOTE — TELEPHONE ENCOUNTER
"Returned pt's call. I let her know that Dr. Shrestha said he would place her surgery orders, but wants to delay surgery until about 3+ months after her hip surgery, which is in about 2 weeks. That puts the date at about some point in May. Pt said \"that's allergy season and this isn't a good idea. I'll need to have surgery sooner.\" will follow-up with Dr. Shrestha. Pt in agreement with plan. Nothing further at this time.   "

## 2023-01-18 ENCOUNTER — TRANSFERRED RECORDS (OUTPATIENT)
Dept: HEALTH INFORMATION MANAGEMENT | Facility: CLINIC | Age: 63
End: 2023-01-18
Payer: COMMERCIAL

## 2023-01-18 LAB
CREATININE (EXTERNAL): 0.82 MG/DL (ref 0.55–1.02)
GFR ESTIMATED (EXTERNAL): >60 ML/MIN/1.73M2
GLUCOSE (EXTERNAL): 85 MG/DL (ref 70–100)
POTASSIUM (EXTERNAL): 4.5 MMOL/L (ref 3.5–5.1)

## 2023-01-20 ENCOUNTER — TELEPHONE (OUTPATIENT)
Dept: NEUROSURGERY | Facility: CLINIC | Age: 63
End: 2023-01-20
Payer: COMMERCIAL

## 2023-01-20 NOTE — TELEPHONE ENCOUNTER
Pt is scheduled for hip surgery next week and she wants to give her [employer] device information to her surgeon. Reviewed the implanted devices with her and gave her the name, model and serial numbers. Also gave her [employer] tech support number, which she should give to her surgeons: [phone], option 4.     Pt would like to have device removal surgery in April. Will follow-up with Dr. Shrestha. Nothing further at this time.

## 2023-01-23 ENCOUNTER — ANESTHESIA EVENT (OUTPATIENT)
Dept: SURGERY | Facility: CLINIC | Age: 63
DRG: 940 | End: 2023-01-23
Payer: COMMERCIAL

## 2023-01-23 NOTE — TREATMENT PLAN
Orthopedic Surgery Pre-Op Plan: Martha Chun  pre-op review. This is NOT an H&P   Surgeon: Dr. Woodson    [facility]: [facility]  Name of Surgery: Left Total Hip Arthroplasty   Date of Surgery: 1/24/23  H&P: Completed on 1/18/23 by TRICIA Braxton, RAMIRO, Nurse Practitioner at [facility].   History of ASA, NSAIDS, vitamin and/or herbal supplements within 10 days: Yes- Ibuprofen, Fish Oil, Garlic- patient instructed to hold these medications/supplements for 7 days before surgery.   History of blood thinners: No    Plan:   1) Discharge Plan: Home morning of POD 1 with assist of friend, Lesly, who will be staying with her for 3 days after surgery. Please see Discharge Planning section near bottom of this note for further details.     2) History of Suicide Attempt: Intentional drug overdose and was hospitalized at [facility] 6/2022. Patient denies any current suicidal ideation. States that she is currently seeing a counselor and feeling more optimistic. As a precaution,  I recommend re-evaluation for any suicidal ideation during hospital stay and it may be beneficial to have friend that will be staying with her after surgery manage post-op medications particularly any pain medications.     3) Chronic Pain, Occipital Neuralgia and Headaches: continues to have chronic neck pain s/p cervical spinal fusion. S/P Bilateral Occipital Nerve Stimulator for occipital neuralgia and headaches. Patient reports that nerve stimulator has not helped with pain and has had discussions with her neurosurgeon, Dr. Kobi Shrestah at [facility] in [facility], about possibility of removing nerve stimulator later this spring. Dr. Shrestha wants patient to wait at least 3 months after her hip replacement surgery before considering removal. Given chronic pain history with history of suicide attempt, I will order Inpatient Pain Team Consult.     4) PONV: reports history of nausea with anesthesia and surgery. I  recommend patient discusses this with preop nursing and anesthesia on day of surgery. Would likely benefit from antiemetics or other measures to prevent or minimize nausea/vomiting.      5) Hypertension: on diltiazem and aliskiren. Patient instructed to continue taking these medications on the day of surgery.     Patient appears medically optimized for upcoming surgery. I would recommend Hospitalist Consult to assist with medical management. Please call me below with any questions on this patient.       Review of Systems Notable for: History of Suicide Attempt-hospitalized 6/2022 for intentional drug overdose, Chronic Pain, Occipital Neuralgia, Headaches- has implanted bilateral occipital nerve stimulator, PONV, Hypertension.     Past Medical History:   Past Medical History:   Diagnosis Date     Anxiety      Cervical spine instability      COVID-19 12/22/2022     Degenerative arthritis      Delusional disorder (H)      Depression      Fibromyalgia      Hypertension      Insomnia      Migraines      Occipital neuralgia     b/l     Other chronic pain     Back pain     PONV (postoperative nausea and vomiting)      Seasonal allergies      Suicide attempt (H) 06/2022    from drug overdose     Past Surgical History:   Procedure Laterality Date     CERVICAL FUSION  11/2020 November and Dec 2020     IMPLANT PULSE GENERATOR SUBCUTANEOUS Bilateral 10/22/2021    Procedure: Phase II occipital nerve stimulator placement, bilateral occipital electrodes and bilateral trigeminal electrodes, Implantation of bilateral subcutaneous neurostimulator Percutaneous implantation of neurostimulator electrode array; cranial nerve Implantable neurostimulator electrode, each CPT 55609 Implantable neurostimulator pulse generator, dual array, nonrechargeable, includes extensi     INSERT STIMULATOR OCCIPITAL Bilateral 08/27/2021    Procedure: Externalized trial of percutaneous occipital nerve stimulator bilateral Percutaneous implantation of  neurostimulator electrode array; cranial nerve CPT 38351;  Surgeon: Kobi Shrestha MD;  Location: [facility] OR     TOTAL HIP ARTHROPLASTY Right      TOTAL KNEE ARTHROPLASTY Left 01/2018     ZZC LIGATE FALLOPIAN TUBE      Description: Tubal Ligation;  Recorded: 01/16/2012;       Current Medications:  Patient's Medications   New Prescriptions    No medications on file   Previous Medications    ALISKIREN (TEKTURNA) 150 MG TABLET    Take 300 mg by mouth At Bedtime    B COMPLEX-FOLIC ACID (B COMPLEX FORMULA 1 PO)    Take 1 teaspoonful by mouth daily    BUTALBITAL-ASPIRIN-CAFFEINE (FIORINAL) -40 MG CAPSULE    Take 1 capsule by mouth every 4 hours as needed for headaches    CARBOXYMETHYLCELLULOSE (REFRESH PLUS) 0.5 % SOLN OPHTHALMIC SOLUTION    Apply 1-2 drops to eye daily as needed    DILTIAZEM ER (DILT-XR) 120 MG 24 HR CAPSULE    Take 120 mg by mouth daily    FLUTICASONE (FLONASE) 50 MCG/ACT NASAL SPRAY    Spray 2 sprays into both nostrils every evening    GARLIC 1000 MG CAPS    Take 1 tablet by mouth 3 times daily Stopping 1/17/23 before surgery    HYDROXYZINE (ATARAX) 25 MG TABLET    Take 25-50 mg by mouth every 8 hours as needed    IBUPROFEN (ADVIL/MOTRIN) 200 MG TABLET    Take 400 mg by mouth every 4 hours as needed for pain Stopping 1/17/23 before surgery    MAGNESIUM CITRATE 200 MG TABS    Take 600 mg by mouth 2 times daily    OMEGA-3 FATTY ACIDS (FISH OIL PO)    Take by mouth 2 times daily 360 mg in AM and 600 mg in PM   Stopping 1/17/23 before surgery    OXYMETAZOLINE HCL (NASAL SPRAY) 0.05 % SOLN    Spray 1 spray in nostril 2 times daily as needed    POLYETHYLENE GLYCOL (MIRALAX) 17 GM/DOSE POWDER    Take 1 capful by mouth daily as needed for constipation    PROBIOTIC PRODUCT (PROBIOTIC ADVANCED PO)    Take 1 capsule by mouth At Bedtime    QUERCETIN 250 MG TABS    Take 500 mg by mouth daily Stopping 1/17/23 before surgery    QUETIAPINE (SEROQUEL) 100 MG TABLET    Take 300 mg by mouth At Bedtime     "TRIPROLIDINE-PSEUDOEPHEDRINE (APRODINE) 2.5-60 MG TABS PER TABLET    Take 0.5 tablets by mouth 2 times daily    VITAMIN D3 (CHOLECALCIFEROL) 125 MCG (5000 UT) TABLET    Take 125 mcg by mouth daily   Modified Medications    No medications on file   Discontinued Medications    ACETAMINOPHEN (TYLENOL) 500 MG TABLET    Take 1,000 mg by mouth as needed    CHOLECALCIFEROL 50 MCG (2000 UT) TABLET    Take 2 tablets by mouth every morning     DILTIAZEM ER (TIAZAC) 120 MG 24 HR ER BEADED CAPSULE    Take 120 mg by mouth At Bedtime    DIPHENHYDRAMINE-ACETAMINOPHEN (TYLENOL PM)  MG TABLET    Take 1 tablet by mouth nightly as needed for sleep    MAGNESIUM OXIDE (MAG-OX) 400 MG TABLET    Take 400 mg by mouth 2 times daily    MELATONIN 3 MG TABLET    Take 3 mg by mouth nightly as needed for sleep    MELOXICAM (MOBIC) 7.5 MG TABLET    Take 1 tablet by mouth daily       ALLERGIES:  Allergies   Allergen Reactions     Amlodipine Other (See Comments)     Constipation, \"liver pain\".         Clonidine      Other reaction(s): Dizziness     Escitalopram Muscle Pain (Myalgia)     Other reaction(s): Myalgias     Gluten Meal Headache and GI Disturbance     Hydrochlorothiazide Unknown     Mold Nausea     Ringing in ears, racing heart     Molds & Smuts Headache and Nausea     Ringing in ears, racing heart     Paroxetine Other (See Comments)     Palpitations/racing heart         Penicillins Unknown     Does not remember reaction, mother said she allergic to mold; patient refuses Pcn or any derivative       Sulfa Drugs Unknown     Tramadol      Other reaction(s): Runny Nose  Facial pain, sinus swelling       Carvedilol Other (See Comments)     Worsening depression         Codeine Headache and Nausea and Vomiting     Cortisone Other (See Comments)     Patient reports and possibly all steroids \"makes me to feel sick\", get fungal infections       Duloxetine Headache     Oxycodone Headache       Social History  Social History     Tobacco Use     " Smoking status: Former     Packs/day: 1.00     Years: 15.00     Pack years: 15.00     Types: Cigarettes     Quit date: 10/10/2014     Years since quittin.2     Smokeless tobacco: Never   Substance Use Topics     Alcohol use: Not Currently     Comment: Last drink 10/2014     Drug use: Not Currently       Any Abnormal Recent Diagnostics? No    Discharge Planning:   Planning to discharge home on POD 1 in the morning with her friend, Lesly, staying with her for 3 days.        01/10/23 1875   Discharge Planning   Patient/Family Anticipates Transition to home   Concerns to be Addressed all concerns addressed in this encounter   Living Arrangements   People in Home alone   Type of Residence Private Residence   Is your private residence a single family home or apartment? Apartment   Number of Stairs, Within Home, Primary none  (elevator)   Once home, are you able to live on one level? Yes   Which level? Main Level   Bathroom Shower/Tub Tub/Shower unit   Equipment Currently Used at Home grab bar, tub/shower  (Has a walker and cane at home)   Support System   Support Systems Friends/Neighbors  (friend, Lesly Nava)   Do you have someone available to stay with you one or two nights once you are home? Yes   Education   Patient attended [facility] pre-op class/received pre-op teaching  email/phone call       TRICIA Bahena CNP   Advanced Practice Nurse Navigator- Orthopedics  [facility]   Phone: [phone]

## 2023-01-24 ENCOUNTER — APPOINTMENT (OUTPATIENT)
Dept: RADIOLOGY | Facility: CLINIC | Age: 63
DRG: 940 | End: 2023-01-24
Attending: ORTHOPAEDIC SURGERY
Payer: COMMERCIAL

## 2023-01-24 ENCOUNTER — ANESTHESIA (OUTPATIENT)
Dept: SURGERY | Facility: CLINIC | Age: 63
DRG: 940 | End: 2023-01-24
Payer: COMMERCIAL

## 2023-01-24 ENCOUNTER — HOSPITAL ENCOUNTER (INPATIENT)
Facility: CLINIC | Age: 63
LOS: 3 days | Discharge: HOME HEALTH | DRG: 940 | End: 2023-01-29
Attending: ORTHOPAEDIC SURGERY | Admitting: ORTHOPAEDIC SURGERY
Payer: COMMERCIAL

## 2023-01-24 DIAGNOSIS — M16.12 PRIMARY OSTEOARTHRITIS OF LEFT HIP: Primary | ICD-10-CM

## 2023-01-24 LAB
ALBUMIN SERPL-MCNC: 3.3 G/DL (ref 3.5–5)
ALP SERPL-CCNC: 95 U/L (ref 45–120)
ALT SERPL W P-5'-P-CCNC: 15 U/L (ref 0–45)
ANION GAP SERPL CALCULATED.3IONS-SCNC: 9 MMOL/L (ref 5–18)
AST SERPL W P-5'-P-CCNC: 31 U/L (ref 0–40)
BASOPHILS # BLD AUTO: 0 10E3/UL (ref 0–0.2)
BASOPHILS NFR BLD AUTO: 0 %
BILIRUB SERPL-MCNC: 0.6 MG/DL (ref 0–1)
BUN SERPL-MCNC: 12 MG/DL (ref 8–22)
CALCIUM SERPL-MCNC: 8.8 MG/DL (ref 8.5–10.5)
CHLORIDE BLD-SCNC: 100 MMOL/L (ref 98–107)
CO2 SERPL-SCNC: 24 MMOL/L (ref 22–31)
CREAT SERPL-MCNC: 0.79 MG/DL (ref 0.6–1.1)
EOSINOPHIL # BLD AUTO: 0 10E3/UL (ref 0–0.7)
EOSINOPHIL NFR BLD AUTO: 0 %
ERYTHROCYTE [DISTWIDTH] IN BLOOD BY AUTOMATED COUNT: 13.2 % (ref 10–15)
GFR SERPL CREATININE-BSD FRML MDRD: 85 ML/MIN/1.73M2
GLUCOSE BLD-MCNC: 132 MG/DL (ref 70–125)
HCT VFR BLD AUTO: 26.3 % (ref 35–47)
HGB BLD-MCNC: 9.2 G/DL (ref 11.7–15.7)
HOLD SPECIMEN: NORMAL
IMM GRANULOCYTES # BLD: 0.1 10E3/UL
IMM GRANULOCYTES NFR BLD: 1 %
LIPASE SERPL-CCNC: 14 U/L (ref 0–52)
LYMPHOCYTES # BLD AUTO: 1.7 10E3/UL (ref 0.8–5.3)
LYMPHOCYTES NFR BLD AUTO: 11 %
MAGNESIUM SERPL-MCNC: 2 MG/DL (ref 1.8–2.6)
MCH RBC QN AUTO: 31.2 PG (ref 26.5–33)
MCHC RBC AUTO-ENTMCNC: 35 G/DL (ref 31.5–36.5)
MCV RBC AUTO: 89 FL (ref 78–100)
MONOCYTES # BLD AUTO: 1.8 10E3/UL (ref 0–1.3)
MONOCYTES NFR BLD AUTO: 11 %
NEUTROPHILS # BLD AUTO: 12.5 10E3/UL (ref 1.6–8.3)
NEUTROPHILS NFR BLD AUTO: 77 %
NRBC # BLD AUTO: 0 10E3/UL
NRBC BLD AUTO-RTO: 0 /100
PLATELET # BLD AUTO: 251 10E3/UL (ref 150–450)
POTASSIUM BLD-SCNC: 4.4 MMOL/L (ref 3.5–5)
PROT SERPL-MCNC: 5.7 G/DL (ref 6–8)
RBC # BLD AUTO: 2.95 10E6/UL (ref 3.8–5.2)
SODIUM SERPL-SCNC: 133 MMOL/L (ref 136–145)
TROPONIN I SERPL-MCNC: 0.01 NG/ML (ref 0–0.29)
WBC # BLD AUTO: 16.1 10E3/UL (ref 4–11)

## 2023-01-24 PROCEDURE — 258N000003 HC RX IP 258 OP 636: Performed by: ANESTHESIOLOGY

## 2023-01-24 PROCEDURE — 258N000003 HC RX IP 258 OP 636: Performed by: NURSE ANESTHETIST, CERTIFIED REGISTERED

## 2023-01-24 PROCEDURE — 83690 ASSAY OF LIPASE: CPT | Performed by: INTERNAL MEDICINE

## 2023-01-24 PROCEDURE — 250N000009 HC RX 250: Performed by: PHYSICIAN ASSISTANT

## 2023-01-24 PROCEDURE — 250N000013 HC RX MED GY IP 250 OP 250 PS 637: Performed by: FAMILY MEDICINE

## 2023-01-24 PROCEDURE — 999N000157 HC STATISTIC RCP TIME EA 10 MIN

## 2023-01-24 PROCEDURE — 99222 1ST HOSP IP/OBS MODERATE 55: CPT | Performed by: NURSE PRACTITIONER

## 2023-01-24 PROCEDURE — 250N000011 HC RX IP 250 OP 636: Performed by: ORTHOPAEDIC SURGERY

## 2023-01-24 PROCEDURE — 99205 OFFICE O/P NEW HI 60 MIN: CPT | Performed by: FAMILY MEDICINE

## 2023-01-24 PROCEDURE — 250N000013 HC RX MED GY IP 250 OP 250 PS 637: Performed by: INTERNAL MEDICINE

## 2023-01-24 PROCEDURE — 250N000009 HC RX 250: Performed by: NURSE ANESTHETIST, CERTIFIED REGISTERED

## 2023-01-24 PROCEDURE — 370N000017 HC ANESTHESIA TECHNICAL FEE, PER MIN: Performed by: ORTHOPAEDIC SURGERY

## 2023-01-24 PROCEDURE — 250N000011 HC RX IP 250 OP 636: Performed by: NURSE ANESTHETIST, CERTIFIED REGISTERED

## 2023-01-24 PROCEDURE — 272N000001 HC OR GENERAL SUPPLY STERILE: Performed by: ORTHOPAEDIC SURGERY

## 2023-01-24 PROCEDURE — 250N000009 HC RX 250

## 2023-01-24 PROCEDURE — 999N000054 HC STATISTIC EKG NON-CHARGEABLE

## 2023-01-24 PROCEDURE — 250N000009 HC RX 250: Performed by: ORTHOPAEDIC SURGERY

## 2023-01-24 PROCEDURE — 258N000003 HC RX IP 258 OP 636

## 2023-01-24 PROCEDURE — 258N000003 HC RX IP 258 OP 636: Performed by: FAMILY MEDICINE

## 2023-01-24 PROCEDURE — 258N000001 HC RX 258: Performed by: ORTHOPAEDIC SURGERY

## 2023-01-24 PROCEDURE — 250N000013 HC RX MED GY IP 250 OP 250 PS 637

## 2023-01-24 PROCEDURE — 93005 ELECTROCARDIOGRAM TRACING: CPT | Performed by: STUDENT IN AN ORGANIZED HEALTH CARE EDUCATION/TRAINING PROGRAM

## 2023-01-24 PROCEDURE — 250N000013 HC RX MED GY IP 250 OP 250 PS 637: Performed by: ORTHOPAEDIC SURGERY

## 2023-01-24 PROCEDURE — 999N000141 HC STATISTIC PRE-PROCEDURE NURSING ASSESSMENT: Performed by: ORTHOPAEDIC SURGERY

## 2023-01-24 PROCEDURE — 85025 COMPLETE CBC W/AUTO DIFF WBC: CPT | Performed by: STUDENT IN AN ORGANIZED HEALTH CARE EDUCATION/TRAINING PROGRAM

## 2023-01-24 PROCEDURE — 36415 COLL VENOUS BLD VENIPUNCTURE: CPT | Performed by: STUDENT IN AN ORGANIZED HEALTH CARE EDUCATION/TRAINING PROGRAM

## 2023-01-24 PROCEDURE — 83735 ASSAY OF MAGNESIUM: CPT | Performed by: STUDENT IN AN ORGANIZED HEALTH CARE EDUCATION/TRAINING PROGRAM

## 2023-01-24 PROCEDURE — 0SRB06A REPLACEMENT OF LEFT HIP JOINT WITH OXIDIZED ZIRCONIUM ON POLYETHYLENE SYNTHETIC SUBSTITUTE, UNCEMENTED, OPEN APPROACH: ICD-10-PCS | Performed by: ORTHOPAEDIC SURGERY

## 2023-01-24 PROCEDURE — 80053 COMPREHEN METABOLIC PANEL: CPT | Performed by: STUDENT IN AN ORGANIZED HEALTH CARE EDUCATION/TRAINING PROGRAM

## 2023-01-24 PROCEDURE — 84484 ASSAY OF TROPONIN QUANT: CPT | Performed by: STUDENT IN AN ORGANIZED HEALTH CARE EDUCATION/TRAINING PROGRAM

## 2023-01-24 PROCEDURE — 93005 ELECTROCARDIOGRAM TRACING: CPT

## 2023-01-24 PROCEDURE — 250N000011 HC RX IP 250 OP 636: Performed by: PHYSICIAN ASSISTANT

## 2023-01-24 PROCEDURE — 710N000010 HC RECOVERY PHASE 1, LEVEL 2, PER MIN: Performed by: ORTHOPAEDIC SURGERY

## 2023-01-24 PROCEDURE — 278N000051 HC OR IMPLANT GENERAL: Performed by: ORTHOPAEDIC SURGERY

## 2023-01-24 PROCEDURE — C1776 JOINT DEVICE (IMPLANTABLE): HCPCS | Performed by: ORTHOPAEDIC SURGERY

## 2023-01-24 PROCEDURE — 93010 ELECTROCARDIOGRAM REPORT: CPT | Performed by: INTERNAL MEDICINE

## 2023-01-24 PROCEDURE — C1713 ANCHOR/SCREW BN/BN,TIS/BN: HCPCS | Performed by: ORTHOPAEDIC SURGERY

## 2023-01-24 PROCEDURE — 250N000013 HC RX MED GY IP 250 OP 250 PS 637: Performed by: PHYSICIAN ASSISTANT

## 2023-01-24 PROCEDURE — 258N000003 HC RX IP 258 OP 636: Performed by: ORTHOPAEDIC SURGERY

## 2023-01-24 PROCEDURE — 999N000065 XR PELVIS PORT 1/2 VIEWS

## 2023-01-24 PROCEDURE — 360N000077 HC SURGERY LEVEL 4, PER MIN: Performed by: ORTHOPAEDIC SURGERY

## 2023-01-24 DEVICE — SYNERGY POROUS PLUS HYDROXYAPATITE                                    HIGH OFFSET SIZE 14
Type: IMPLANTABLE DEVICE | Site: HIP | Status: FUNCTIONAL
Brand: SYNERGY

## 2023-01-24 DEVICE — IMP FEMORAL SLEEVE S&N MED MOD +4MM NECK TI 71344248: Type: IMPLANTABLE DEVICE | Site: HIP | Status: FUNCTIONAL

## 2023-01-24 DEVICE — 40MM OXINIUM MODULAR FEMORAL HEAD
Type: IMPLANTABLE DEVICE | Site: HIP | Status: FUNCTIONAL
Brand: OXINIUM

## 2023-01-24 DEVICE — IMP SHELL SNR ACET R3 3H 56MM 71335556: Type: IMPLANTABLE DEVICE | Site: HIP | Status: FUNCTIONAL

## 2023-01-24 DEVICE — R3 20 DEGREE +4 XLPE ACETABULAR                                    LINER 40MM INNER DIAMETER X 56MM                                    OUTER DIAMETER
Type: IMPLANTABLE DEVICE | Site: HIP | Status: FUNCTIONAL
Brand: R3

## 2023-01-24 DEVICE — REFLECTION SPHERICAL HEAD SCREW 15MM
Type: IMPLANTABLE DEVICE | Site: HIP | Status: FUNCTIONAL
Brand: REFLECTION

## 2023-01-24 DEVICE — GUIDE PIN LONG ACETABULAR: Type: IMPLANTABLE DEVICE | Site: HIP | Status: FUNCTIONAL

## 2023-01-24 DEVICE — REFLECTION SPHERICAL HEAD SCREW 25MM
Type: IMPLANTABLE DEVICE | Site: HIP | Status: FUNCTIONAL
Brand: REFLECTION

## 2023-01-24 DEVICE — REFLECTION THREADED HOLE COVER
Type: IMPLANTABLE DEVICE | Site: HIP | Status: FUNCTIONAL
Brand: REFLECTION

## 2023-01-24 RX ORDER — FENTANYL CITRATE 50 UG/ML
100 INJECTION, SOLUTION INTRAMUSCULAR; INTRAVENOUS ONCE
Status: DISCONTINUED | OUTPATIENT
Start: 2023-01-24 | End: 2023-01-24 | Stop reason: HOSPADM

## 2023-01-24 RX ORDER — BUTALBITAL, ACETAMINOPHEN AND CAFFEINE 50; 325; 40 MG/1; MG/1; MG/1
1 TABLET ORAL EVERY 4 HOURS PRN
Status: DISCONTINUED | OUTPATIENT
Start: 2023-01-24 | End: 2023-01-26

## 2023-01-24 RX ORDER — PROPOFOL 10 MG/ML
INJECTION, EMULSION INTRAVENOUS CONTINUOUS PRN
Status: DISCONTINUED | OUTPATIENT
Start: 2023-01-24 | End: 2023-01-24

## 2023-01-24 RX ORDER — HYDROXYZINE HYDROCHLORIDE 25 MG/1
25-50 TABLET, FILM COATED ORAL EVERY 8 HOURS PRN
Status: DISCONTINUED | OUTPATIENT
Start: 2023-01-24 | End: 2023-01-24

## 2023-01-24 RX ORDER — BISACODYL 10 MG
10 SUPPOSITORY, RECTAL RECTAL DAILY PRN
Status: DISCONTINUED | OUTPATIENT
Start: 2023-01-24 | End: 2023-01-29 | Stop reason: HOSPADM

## 2023-01-24 RX ORDER — FENTANYL CITRATE 50 UG/ML
25 INJECTION, SOLUTION INTRAMUSCULAR; INTRAVENOUS EVERY 5 MIN PRN
Status: DISCONTINUED | OUTPATIENT
Start: 2023-01-24 | End: 2023-01-24 | Stop reason: HOSPADM

## 2023-01-24 RX ORDER — SODIUM CHLORIDE, SODIUM LACTATE, POTASSIUM CHLORIDE, CALCIUM CHLORIDE 600; 310; 30; 20 MG/100ML; MG/100ML; MG/100ML; MG/100ML
INJECTION, SOLUTION INTRAVENOUS CONTINUOUS
Status: DISCONTINUED | OUTPATIENT
Start: 2023-01-24 | End: 2023-01-24 | Stop reason: HOSPADM

## 2023-01-24 RX ORDER — NALOXONE HYDROCHLORIDE 0.4 MG/ML
0.4 INJECTION, SOLUTION INTRAMUSCULAR; INTRAVENOUS; SUBCUTANEOUS
Status: DISCONTINUED | OUTPATIENT
Start: 2023-01-24 | End: 2023-01-29 | Stop reason: HOSPADM

## 2023-01-24 RX ORDER — CEFAZOLIN SODIUM 1 G/3ML
1 INJECTION, POWDER, FOR SOLUTION INTRAMUSCULAR; INTRAVENOUS EVERY 8 HOURS
Status: COMPLETED | OUTPATIENT
Start: 2023-01-24 | End: 2023-01-25

## 2023-01-24 RX ORDER — CEFAZOLIN SODIUM/WATER 2 G/20 ML
2 SYRINGE (ML) INTRAVENOUS SEE ADMIN INSTRUCTIONS
Status: DISCONTINUED | OUTPATIENT
Start: 2023-01-24 | End: 2023-01-24 | Stop reason: HOSPADM

## 2023-01-24 RX ORDER — ONDANSETRON 2 MG/ML
INJECTION INTRAMUSCULAR; INTRAVENOUS PRN
Status: DISCONTINUED | OUTPATIENT
Start: 2023-01-24 | End: 2023-01-24

## 2023-01-24 RX ORDER — KETOROLAC TROMETHAMINE 30 MG/ML
15 INJECTION, SOLUTION INTRAMUSCULAR; INTRAVENOUS EVERY 6 HOURS
Status: COMPLETED | OUTPATIENT
Start: 2023-01-24 | End: 2023-01-25

## 2023-01-24 RX ORDER — SODIUM CHLORIDE, SODIUM LACTATE, POTASSIUM CHLORIDE, CALCIUM CHLORIDE 600; 310; 30; 20 MG/100ML; MG/100ML; MG/100ML; MG/100ML
INJECTION, SOLUTION INTRAVENOUS CONTINUOUS
Status: DISCONTINUED | OUTPATIENT
Start: 2023-01-24 | End: 2023-01-24

## 2023-01-24 RX ORDER — DILTIAZEM HYDROCHLORIDE 120 MG/1
120 CAPSULE, EXTENDED RELEASE ORAL EVERY EVENING
Status: DISCONTINUED | OUTPATIENT
Start: 2023-01-24 | End: 2023-01-29 | Stop reason: HOSPADM

## 2023-01-24 RX ORDER — FENTANYL CITRATE 50 UG/ML
50 INJECTION, SOLUTION INTRAMUSCULAR; INTRAVENOUS EVERY 5 MIN PRN
Status: DISCONTINUED | OUTPATIENT
Start: 2023-01-24 | End: 2023-01-24 | Stop reason: HOSPADM

## 2023-01-24 RX ORDER — HYDROMORPHONE HYDROCHLORIDE 2 MG/1
2-4 TABLET ORAL EVERY 4 HOURS PRN
Refills: 0
Start: 2023-01-24 | End: 2023-01-26

## 2023-01-24 RX ORDER — LIDOCAINE HYDROCHLORIDE 10 MG/ML
INJECTION, SOLUTION INFILTRATION; PERINEURAL PRN
Status: DISCONTINUED | OUTPATIENT
Start: 2023-01-24 | End: 2023-01-24

## 2023-01-24 RX ORDER — BUPIVACAINE HYDROCHLORIDE 5 MG/ML
INJECTION, SOLUTION EPIDURAL; INTRACAUDAL
Status: COMPLETED | OUTPATIENT
Start: 2023-01-24 | End: 2023-01-24

## 2023-01-24 RX ORDER — ASPIRIN 81 MG/1
81 TABLET ORAL 2 TIMES DAILY
Status: DISCONTINUED | OUTPATIENT
Start: 2023-01-24 | End: 2023-01-26

## 2023-01-24 RX ORDER — QUETIAPINE FUMARATE 100 MG/1
200 TABLET, FILM COATED ORAL AT BEDTIME
Status: DISCONTINUED | OUTPATIENT
Start: 2023-01-24 | End: 2023-01-29 | Stop reason: HOSPADM

## 2023-01-24 RX ORDER — ACETAMINOPHEN 500 MG
1000 TABLET ORAL EVERY 8 HOURS
COMMUNITY
Start: 2023-01-24

## 2023-01-24 RX ORDER — METHOCARBAMOL 750 MG/1
750 TABLET, FILM COATED ORAL EVERY 6 HOURS PRN
Status: DISCONTINUED | OUTPATIENT
Start: 2023-01-24 | End: 2023-01-27

## 2023-01-24 RX ORDER — LACTOBACILLUS RHAMNOSUS GG 10B CELL
1 CAPSULE ORAL AT BEDTIME
Status: DISCONTINUED | OUTPATIENT
Start: 2023-01-24 | End: 2023-01-29 | Stop reason: HOSPADM

## 2023-01-24 RX ORDER — HYDROMORPHONE HCL IN WATER/PF 6 MG/30 ML
0.2 PATIENT CONTROLLED ANALGESIA SYRINGE INTRAVENOUS
Status: DISCONTINUED | OUTPATIENT
Start: 2023-01-24 | End: 2023-01-25

## 2023-01-24 RX ORDER — HYDROXYZINE HYDROCHLORIDE 25 MG/1
25 TABLET, FILM COATED ORAL EVERY 6 HOURS PRN
Status: DISCONTINUED | OUTPATIENT
Start: 2023-01-24 | End: 2023-01-24

## 2023-01-24 RX ORDER — ONDANSETRON 2 MG/ML
4 INJECTION INTRAMUSCULAR; INTRAVENOUS EVERY 30 MIN PRN
Status: DISCONTINUED | OUTPATIENT
Start: 2023-01-24 | End: 2023-01-24 | Stop reason: HOSPADM

## 2023-01-24 RX ORDER — HYDROXYZINE HYDROCHLORIDE 25 MG/1
50 TABLET, FILM COATED ORAL EVERY 6 HOURS PRN
Status: DISCONTINUED | OUTPATIENT
Start: 2023-01-24 | End: 2023-01-25

## 2023-01-24 RX ORDER — ACETAMINOPHEN 325 MG/1
650 TABLET ORAL EVERY 4 HOURS PRN
Status: DISCONTINUED | OUTPATIENT
Start: 2023-01-27 | End: 2023-01-26

## 2023-01-24 RX ORDER — ACETAMINOPHEN 325 MG/1
975 TABLET ORAL ONCE
Status: COMPLETED | OUTPATIENT
Start: 2023-01-24 | End: 2023-01-24

## 2023-01-24 RX ORDER — TRANEXAMIC ACID 650 MG/1
1950 TABLET ORAL ONCE
Status: COMPLETED | OUTPATIENT
Start: 2023-01-24 | End: 2023-01-24

## 2023-01-24 RX ORDER — HYDROMORPHONE HCL IN WATER/PF 6 MG/30 ML
0.4 PATIENT CONTROLLED ANALGESIA SYRINGE INTRAVENOUS EVERY 5 MIN PRN
Status: DISCONTINUED | OUTPATIENT
Start: 2023-01-24 | End: 2023-01-24 | Stop reason: HOSPADM

## 2023-01-24 RX ORDER — ONDANSETRON 4 MG/1
4 TABLET, ORALLY DISINTEGRATING ORAL EVERY 6 HOURS PRN
Status: DISCONTINUED | OUTPATIENT
Start: 2023-01-24 | End: 2023-01-29 | Stop reason: HOSPADM

## 2023-01-24 RX ORDER — FLUTICASONE PROPIONATE 50 MCG
2 SPRAY, SUSPENSION (ML) NASAL EVERY EVENING
Status: DISCONTINUED | OUTPATIENT
Start: 2023-01-24 | End: 2023-01-29 | Stop reason: HOSPADM

## 2023-01-24 RX ORDER — FENTANYL CITRATE 50 UG/ML
100 INJECTION, SOLUTION INTRAMUSCULAR; INTRAVENOUS
Status: DISCONTINUED | OUTPATIENT
Start: 2023-01-24 | End: 2023-01-24 | Stop reason: HOSPADM

## 2023-01-24 RX ORDER — ONDANSETRON 2 MG/ML
4 INJECTION INTRAMUSCULAR; INTRAVENOUS EVERY 6 HOURS PRN
Status: DISCONTINUED | OUTPATIENT
Start: 2023-01-24 | End: 2023-01-29 | Stop reason: HOSPADM

## 2023-01-24 RX ORDER — NALOXONE HYDROCHLORIDE 0.4 MG/ML
0.2 INJECTION, SOLUTION INTRAMUSCULAR; INTRAVENOUS; SUBCUTANEOUS
Status: DISCONTINUED | OUTPATIENT
Start: 2023-01-24 | End: 2023-01-29 | Stop reason: HOSPADM

## 2023-01-24 RX ORDER — LIDOCAINE 40 MG/G
CREAM TOPICAL
Status: DISCONTINUED | OUTPATIENT
Start: 2023-01-24 | End: 2023-01-29 | Stop reason: HOSPADM

## 2023-01-24 RX ORDER — HYDROMORPHONE HYDROCHLORIDE 2 MG/1
2 TABLET ORAL EVERY 4 HOURS PRN
Status: DISCONTINUED | OUTPATIENT
Start: 2023-01-24 | End: 2023-01-25

## 2023-01-24 RX ORDER — ACETAMINOPHEN 325 MG/1
975 TABLET ORAL EVERY 8 HOURS
Status: DISCONTINUED | OUTPATIENT
Start: 2023-01-24 | End: 2023-01-26

## 2023-01-24 RX ORDER — MAGNESIUM HYDROXIDE 1200 MG/15ML
LIQUID ORAL PRN
Status: DISCONTINUED | OUTPATIENT
Start: 2023-01-24 | End: 2023-01-24 | Stop reason: HOSPADM

## 2023-01-24 RX ORDER — HYDROMORPHONE HCL IN WATER/PF 6 MG/30 ML
0.4 PATIENT CONTROLLED ANALGESIA SYRINGE INTRAVENOUS
Status: DISCONTINUED | OUTPATIENT
Start: 2023-01-24 | End: 2023-01-25

## 2023-01-24 RX ORDER — FENTANYL CITRATE 50 UG/ML
INJECTION, SOLUTION INTRAMUSCULAR; INTRAVENOUS PRN
Status: DISCONTINUED | OUTPATIENT
Start: 2023-01-24 | End: 2023-01-24

## 2023-01-24 RX ORDER — AMOXICILLIN 250 MG
1 CAPSULE ORAL 2 TIMES DAILY
Status: DISCONTINUED | OUTPATIENT
Start: 2023-01-24 | End: 2023-01-29 | Stop reason: HOSPADM

## 2023-01-24 RX ORDER — HYDROXYZINE HYDROCHLORIDE 25 MG/1
25 TABLET, FILM COATED ORAL EVERY 6 HOURS PRN
Start: 2023-01-24

## 2023-01-24 RX ORDER — ONDANSETRON 4 MG/1
4 TABLET, ORALLY DISINTEGRATING ORAL EVERY 30 MIN PRN
Status: DISCONTINUED | OUTPATIENT
Start: 2023-01-24 | End: 2023-01-24 | Stop reason: HOSPADM

## 2023-01-24 RX ORDER — GLYCOPYRROLATE 0.2 MG/ML
INJECTION, SOLUTION INTRAMUSCULAR; INTRAVENOUS PRN
Status: DISCONTINUED | OUTPATIENT
Start: 2023-01-24 | End: 2023-01-24

## 2023-01-24 RX ORDER — ALISKIREN 150 MG/1
300 TABLET, FILM COATED ORAL AT BEDTIME
Status: DISCONTINUED | OUTPATIENT
Start: 2023-01-24 | End: 2023-01-29 | Stop reason: HOSPADM

## 2023-01-24 RX ORDER — CEFAZOLIN SODIUM/WATER 2 G/20 ML
2 SYRINGE (ML) INTRAVENOUS
Status: COMPLETED | OUTPATIENT
Start: 2023-01-24 | End: 2023-01-24

## 2023-01-24 RX ORDER — POLYETHYLENE GLYCOL 3350 17 G/17G
17 POWDER, FOR SOLUTION ORAL DAILY
Status: DISCONTINUED | OUTPATIENT
Start: 2023-01-25 | End: 2023-01-29 | Stop reason: HOSPADM

## 2023-01-24 RX ORDER — KETAMINE HYDROCHLORIDE 10 MG/ML
INJECTION INTRAMUSCULAR; INTRAVENOUS PRN
Status: DISCONTINUED | OUTPATIENT
Start: 2023-01-24 | End: 2023-01-24

## 2023-01-24 RX ORDER — LIDOCAINE 40 MG/G
CREAM TOPICAL
Status: DISCONTINUED | OUTPATIENT
Start: 2023-01-24 | End: 2023-01-24 | Stop reason: HOSPADM

## 2023-01-24 RX ORDER — EPHEDRINE SULFATE 50 MG/ML
INJECTION, SOLUTION INTRAMUSCULAR; INTRAVENOUS; SUBCUTANEOUS PRN
Status: DISCONTINUED | OUTPATIENT
Start: 2023-01-24 | End: 2023-01-24

## 2023-01-24 RX ORDER — HALOPERIDOL 5 MG/ML
1 INJECTION INTRAMUSCULAR
Status: DISCONTINUED | OUTPATIENT
Start: 2023-01-24 | End: 2023-01-24 | Stop reason: HOSPADM

## 2023-01-24 RX ORDER — PROCHLORPERAZINE MALEATE 10 MG
10 TABLET ORAL EVERY 6 HOURS PRN
Status: DISCONTINUED | OUTPATIENT
Start: 2023-01-24 | End: 2023-01-29 | Stop reason: HOSPADM

## 2023-01-24 RX ORDER — HYDROXYZINE HYDROCHLORIDE 25 MG/1
50 TABLET, FILM COATED ORAL ONCE
Status: COMPLETED | OUTPATIENT
Start: 2023-01-24 | End: 2023-01-24

## 2023-01-24 RX ORDER — HYDROMORPHONE HCL IN WATER/PF 6 MG/30 ML
0.2 PATIENT CONTROLLED ANALGESIA SYRINGE INTRAVENOUS EVERY 5 MIN PRN
Status: DISCONTINUED | OUTPATIENT
Start: 2023-01-24 | End: 2023-01-24 | Stop reason: HOSPADM

## 2023-01-24 RX ORDER — HYDROMORPHONE HYDROCHLORIDE 4 MG/1
4 TABLET ORAL EVERY 4 HOURS PRN
Status: DISCONTINUED | OUTPATIENT
Start: 2023-01-24 | End: 2023-01-25

## 2023-01-24 RX ADMIN — ACETAMINOPHEN 975 MG: 325 TABLET ORAL at 12:33

## 2023-01-24 RX ADMIN — BUTALBITAL, ACETAMINOPHEN, AND CAFFEINE 1 TABLET: 50; 325; 40 TABLET ORAL at 12:40

## 2023-01-24 RX ADMIN — Medication 2 G: at 07:26

## 2023-01-24 RX ADMIN — GLYCOPYRROLATE 0.2 MG: 0.2 INJECTION, SOLUTION INTRAMUSCULAR; INTRAVENOUS at 07:26

## 2023-01-24 RX ADMIN — CHOLECALCIFEROL TAB 125 MCG (5000 UNIT) 125 MCG: 125 TAB at 13:37

## 2023-01-24 RX ADMIN — BUTALBITAL, ACETAMINOPHEN, AND CAFFEINE 1 TABLET: 50; 325; 40 TABLET ORAL at 17:16

## 2023-01-24 RX ADMIN — TRANEXAMIC ACID 1950 MG: 650 TABLET ORAL at 06:32

## 2023-01-24 RX ADMIN — HYDROXYZINE HYDROCHLORIDE 25 MG: 25 TABLET, FILM COATED ORAL at 21:33

## 2023-01-24 RX ADMIN — SODIUM CHLORIDE, POTASSIUM CHLORIDE, SODIUM LACTATE AND CALCIUM CHLORIDE: 600; 310; 30; 20 INJECTION, SOLUTION INTRAVENOUS at 09:07

## 2023-01-24 RX ADMIN — PHENYLEPHRINE HYDROCHLORIDE 200 MCG: 10 INJECTION INTRAVENOUS at 08:02

## 2023-01-24 RX ADMIN — ONDANSETRON 4 MG: 2 INJECTION INTRAMUSCULAR; INTRAVENOUS at 21:01

## 2023-01-24 RX ADMIN — SODIUM CHLORIDE 500 ML: 9 INJECTION, SOLUTION INTRAVENOUS at 15:17

## 2023-01-24 RX ADMIN — PHENYLEPHRINE HYDROCHLORIDE 100 MCG: 10 INJECTION INTRAVENOUS at 07:47

## 2023-01-24 RX ADMIN — HYDROXYZINE HYDROCHLORIDE 25 MG: 25 TABLET, FILM COATED ORAL at 12:48

## 2023-01-24 RX ADMIN — QUETIAPINE 200 MG: 100 TABLET ORAL at 22:41

## 2023-01-24 RX ADMIN — ACETAMINOPHEN 975 MG: 325 TABLET ORAL at 06:30

## 2023-01-24 RX ADMIN — ONDANSETRON 4 MG: 2 INJECTION INTRAMUSCULAR; INTRAVENOUS at 14:57

## 2023-01-24 RX ADMIN — FENTANYL CITRATE 50 MCG: 50 INJECTION, SOLUTION INTRAMUSCULAR; INTRAVENOUS at 07:35

## 2023-01-24 RX ADMIN — ONDANSETRON 4 MG: 2 INJECTION INTRAMUSCULAR; INTRAVENOUS at 07:46

## 2023-01-24 RX ADMIN — KETAMINE HYDROCHLORIDE 20 MG: 10 INJECTION, SOLUTION INTRAMUSCULAR; INTRAVENOUS at 08:15

## 2023-01-24 RX ADMIN — FENTANYL CITRATE 50 MCG: 50 INJECTION, SOLUTION INTRAMUSCULAR; INTRAVENOUS at 07:31

## 2023-01-24 RX ADMIN — MIDAZOLAM 2 MG: 1 INJECTION INTRAMUSCULAR; INTRAVENOUS at 07:26

## 2023-01-24 RX ADMIN — PHENYLEPHRINE HYDROCHLORIDE 0.3 MCG/KG/MIN: 10 INJECTION INTRAVENOUS at 08:02

## 2023-01-24 RX ADMIN — Medication 10 MG: at 08:34

## 2023-01-24 RX ADMIN — CEFAZOLIN 1 G: 1 INJECTION, POWDER, FOR SOLUTION INTRAMUSCULAR; INTRAVENOUS at 17:18

## 2023-01-24 RX ADMIN — BUPIVACAINE HYDROCHLORIDE 3 ML: 5 INJECTION, SOLUTION EPIDURAL; INTRACAUDAL; PERINEURAL at 07:34

## 2023-01-24 RX ADMIN — ACETAMINOPHEN 975 MG: 325 TABLET ORAL at 18:59

## 2023-01-24 RX ADMIN — Medication 5 MG: at 08:39

## 2023-01-24 RX ADMIN — SODIUM CHLORIDE, POTASSIUM CHLORIDE, SODIUM LACTATE AND CALCIUM CHLORIDE: 600; 310; 30; 20 INJECTION, SOLUTION INTRAVENOUS at 11:59

## 2023-01-24 RX ADMIN — SENNOSIDES AND DOCUSATE SODIUM 1 TABLET: 50; 8.6 TABLET ORAL at 12:33

## 2023-01-24 RX ADMIN — PROPOFOL 100 MCG/KG/MIN: 10 INJECTION, EMULSION INTRAVENOUS at 07:48

## 2023-01-24 RX ADMIN — ALISKIREN HEMIFUMARATE 300 MG: 150 TABLET, FILM COATED ORAL at 21:53

## 2023-01-24 RX ADMIN — SODIUM CHLORIDE, POTASSIUM CHLORIDE, SODIUM LACTATE AND CALCIUM CHLORIDE: 600; 310; 30; 20 INJECTION, SOLUTION INTRAVENOUS at 06:28

## 2023-01-24 RX ADMIN — LIDOCAINE HYDROCHLORIDE 2 ML: 10 INJECTION, SOLUTION INFILTRATION; PERINEURAL at 07:48

## 2023-01-24 RX ADMIN — DILTIAZEM HYDROCHLORIDE 120 MG: 120 CAPSULE, EXTENDED RELEASE ORAL at 22:41

## 2023-01-24 RX ADMIN — Medication 10 MG: at 08:48

## 2023-01-24 RX ADMIN — SODIUM CHLORIDE, POTASSIUM CHLORIDE, SODIUM LACTATE AND CALCIUM CHLORIDE: 600; 310; 30; 20 INJECTION, SOLUTION INTRAVENOUS at 12:00

## 2023-01-24 RX ADMIN — MAGNESIUM OXIDE TAB 400 MG (241.3 MG ELEMENTAL MG) 200 MG: 400 (241.3 MG) TAB at 17:16

## 2023-01-24 RX ADMIN — METHOCARBAMOL TABLETS 750 MG: 750 TABLET, COATED ORAL at 13:42

## 2023-01-24 RX ADMIN — PHENYLEPHRINE HYDROCHLORIDE 200 MCG: 10 INJECTION INTRAVENOUS at 07:48

## 2023-01-24 RX ADMIN — HYDROXYZINE HYDROCHLORIDE 25 MG: 25 TABLET, FILM COATED ORAL at 19:00

## 2023-01-24 ASSESSMENT — ACTIVITIES OF DAILY LIVING (ADL)
ADLS_ACUITY_SCORE: 31

## 2023-01-24 NOTE — CONSULTS
[facility]  Consult Note - Hospitalist Service  Date of Admission:  1/24/2023  Consult Requested by: Danny Woodson MD  Reason for Consult: Medical management    Assessment & Plan   Martha Chun is a 62 year old female with a past medical history significant for hypertension, ROSIO, MDD, osteoarthritis who was admitted on 1/24/2023 after undergoing left total hip arthroplasty under spinal anesthesia with Dr. Danny Woodson.  ml. [facility] was consulted for management of patients comorbid medical conditions.    Essential hypertension  Tekturna 300 mg daily  Diltiazem  mg daily  Hold antihypertensive if systolic blood pressure is less than 110 as risk of postop hypotension    Generalized anxiety disorder  Major depressive disorder  Quite anxious at present after the procedure  Seroquel 200 mg at bedtime  Hydroxyzine 25 to 50 mg every 8 hours as needed    Migraines  States she has migraine headaches daily, only managed with her Fiorinal  Continue home meds as needed    Osteoarthritis  S/P left total hip arthroplasty on 1/24/2023  Resume routine postoperative care  Physical and Occupational Therapy  Use incentive spirometry frequently  DVT prophylaxis per orthopedics, aspirin 81 mg twice a day  Pain control with Tylenol 975 mg every 8 hours, 650 mg every 4 hours as needed, p.o. Dilaudid 2 mg every 4 hours as needed, IV Dilaudid 0.2-0.4  mg every 2 hours as needed       I agreed and reviewed with Yeyo Garcia, tereza and jonathan. I examined the patient by myself.     MD Yeyo Hanna PA-S2  Hospitalist Service  [facility]  Securely message with [employer] (more info)  Text page via [employer] Paging/Directory     Chief Complaint   Osteoarthritis  S/p left total hip arthroplasty    History is obtained from the patient    History of Present Illness   Martha Chun is a 62 year old female who was admitted on 1/24/2023 after undergoing left total hip  arthroplasty with Dr. Danny Woodson.  ml. [facility] was consulted for management of patients comorbid medical conditions.  On Tekturna 300 mg daily, diltiazem 120 mg daily for hypertension.  Blood pressure is in higher side.  On Seroquel 200 mg at bedtime and hydroxyzine 25 to 50 mg every 8 hours as needed for anxiety.  She is quite anxious at present.  No other concern.  Denies headache, chest pain, breathing difficulty, palpitation, nausea, vomiting, abdominal pain or urinary symptoms.    Patient denies any pain at this time, as well as any chest pain, shortness of breath, abdominal pain, headache, N/V. Denies personal history of VTE, though did note her daughter had one following a depo shot. No history of ALINE or CPAP use.    She does note that her most recent migraine was the morning of surgery, stating she gets them everyday. She requests that her home migraine medication be given while inpatient.      Past Medical History    Past Medical History:   Diagnosis Date     Anxiety      Cervical spine instability      COVID-19 12/22/2022     Degenerative arthritis      Delusional disorder (H)      Depression      Fibromyalgia      Hypertension      Insomnia      Migraines      Occipital neuralgia     b/l     Other chronic pain     Back pain     PONV (postoperative nausea and vomiting)      Seasonal allergies      Suicide attempt (H) 06/2022    from drug overdose       Past Surgical History   Past Surgical History:   Procedure Laterality Date     CERVICAL FUSION  11/2020 November and Dec 2020     IMPLANT PULSE GENERATOR SUBCUTANEOUS Bilateral 10/22/2021    Procedure: Phase II occipital nerve stimulator placement, bilateral occipital electrodes and bilateral trigeminal electrodes, Implantation of bilateral subcutaneous neurostimulator Percutaneous implantation of neurostimulator electrode array; cranial nerve Implantable neurostimulator electrode, each CPT 27480 Implantable neurostimulator pulse generator, dual  array, nonrechargeable, includes extensi     INSERT STIMULATOR OCCIPITAL Bilateral 08/27/2021    Procedure: Externalized trial of percutaneous occipital nerve stimulator bilateral Percutaneous implantation of neurostimulator electrode array; cranial nerve CPT 84425;  Surgeon: Kobi Shrestha MD;  Location: [facility] OR     TOTAL HIP ARTHROPLASTY Right      TOTAL KNEE ARTHROPLASTY Left 01/2018     ZZC LIGATE FALLOPIAN TUBE      Description: Tubal Ligation;  Recorded: 01/16/2012;       Medications   Medications Prior to Admission   Medication Sig Dispense Refill Last Dose     aliskiren (TEKTURNA) 150 MG tablet Take 300 mg by mouth At Bedtime   1/23/2023     B Complex-Folic Acid (B COMPLEX FORMULA 1 PO) Take 1 teaspoonful by mouth daily   1/23/2023 at AM     butalbital-aspirin-caffeine (FIORINAL) -40 MG capsule Take 1 capsule by mouth every 4 hours as needed for headaches 60 capsule 3 1/17/2023     carboxymethylcellulose (REFRESH PLUS) 0.5 % SOLN ophthalmic solution Place 1-2 drops into both eyes 3 times daily as needed   Pt brought     diltiazem ER (DILT-XR) 120 MG 24 hr capsule Take 120 mg by mouth every evening   1/23/2023     fluticasone (FLONASE) 50 MCG/ACT nasal spray Spray 2 sprays into both nostrils every evening   1/23/2023 at Pt brought     Garlic 1000 MG CAPS Take 1 tablet by mouth 3 times daily   1/19/2023     hydrOXYzine (ATARAX) 25 MG tablet Take 25-50 mg by mouth every 8 hours as needed        ibuprofen (ADVIL/MOTRIN) 200 MG tablet Take 400 mg by mouth every 4 hours as needed for pain   1/17/2023     Magnesium Citrate 200 MG TABS Take 300 mg by mouth 2 times daily (Takes at supper and bedtime)   1/23/2023     Omega-3 Fatty Acids (FISH OIL PO) Take 1 capsule by mouth 2 times daily   1/23/2023     Oxymetazoline HCl (NASAL SPRAY) 0.05 % SOLN Spray 1 spray into both nostrils 2 times daily as needed        polyethylene glycol (MIRALAX) 17 GM/Dose powder Take 1 capful by mouth daily as needed for constipation   " 2023     Probiotic Product (PROBIOTIC ADVANCED PO) Take 1 capsule by mouth At Bedtime   2023     Quercetin 250 MG TABS Take 500 mg by mouth daily   2023     QUEtiapine (SEROQUEL) 100 MG tablet Take 200 mg by mouth At Bedtime   2023     triprolidine-pseudoePHEDrine (APRODINE) 2.5-60 MG TABS per tablet Take 0.5 tablets by mouth 2 times daily   2023     Vitamin D3 (CHOLECALCIFEROL) 125 MCG (5000 UT) tablet Take 125 mcg by mouth daily   2023          Review of Systems    The 10 point Review of Systems is negative other than noted in the HPI or here.     Social History   I have reviewed this patient's social history and updated it with pertinent information if needed.  Social History     Tobacco Use     Smoking status: Former     Packs/day: 1.00     Years: 15.00     Pack years: 15.00     Types: Cigarettes     Quit date: 10/10/2014     Years since quittin.2     Smokeless tobacco: Never   Substance Use Topics     Alcohol use: Not Currently     Comment: Last drink 10/2014     Drug use: Not Currently       Family History   I have reviewed this patient's family history and updated it with pertinent information if needed.  Family History   Problem Relation Age of Onset     Cancer Mother      Diabetes Father      Hypertension Father      Pulmonary Embolism Daughter      Anesthesia Reaction No family hx of        Allergies   Allergies   Allergen Reactions     Amlodipine Other (See Comments)     Constipation, \"liver pain\".         Clonidine      Other reaction(s): Dizziness     Escitalopram Muscle Pain (Myalgia)     Other reaction(s): Myalgias     Gluten Meal Headache and GI Disturbance     Hydrochlorothiazide      confusion     Mold Nausea     Ringing in ears, racing heart     Molds & Smuts Headache and Nausea     Ringing in ears, racing heart     Paroxetine Other (See Comments)     Palpitations/racing heart         Penicillins Unknown     Does not remember reaction, mother said she allergic to " "mold; patient refuses Pcn or any derivative       Sulfa Drugs Unknown     Tramadol      Other reaction(s): Runny Nose  Facial pain, sinus swelling       Carvedilol Other (See Comments)     Worsening depression         Codeine Headache and Nausea and Vomiting     Cortisone Other (See Comments)     Patient reports and possibly all steroids \"makes me to feel sick\", get fungal infections       Duloxetine Headache     Oxycodone Headache        Physical Exam   Vital Signs: Temp: 97.8  F (36.6  C) Temp src: Temporal BP: (!) 148/76 Pulse: 93   Resp: 16 SpO2: 99 % O2 Device: None (Room air)    Weight: 146 lbs 14.4 oz    GENERAL:  Alert, appears comfortable, in no acute distress, anxious easily, appears stated age   HEAD:  Normocephalic, without obvious abnormality, atraumatic   EYES:  PERRL, conjunctiva clear,  EOM's intact   NOSE: Nares normal,  mucosa normal, no drainage   THROAT: Lips, mucosa,  gums normal, mouth moist   NECK: Supple, symmetrical, trachea midline. JVP noted   LUNGS:   Clear to auscultation bilaterally, no rales, rhonchi, or wheezing, symmetric chest rise on inhalation, respirations unlabored   CHEST WALL:  No tenderness or deformity   HEART:  Regular rate and rhythm, S1 and S2 normal, no murmur    ABDOMEN:   Soft, non-tender, bowel sounds active , no masses, no organomegaly, no rebound or guarding   EXTREMITIES: Post-op for left hip replacement    SKIN: No rashes   NEURO: Alert, oriented x 3   PSYCH: Cooperative, behavior is appropriate, anxious easily     Medical Decision Making   Total time spent >70 min by me on the date of service doing chart review, history, exam, documentation & further activities per the note.  "

## 2023-01-24 NOTE — ANESTHESIA PROCEDURE NOTES
"Intrathecal injection Procedure Note    Pre-Procedure   Staff -        Anesthesiologist:  Emma Kirk MD       Performed By: anesthesiologist       Location: OR       Procedure Start/Stop Times: 1/24/2023 7:34 AM and 1/24/2023 7:37 AM       Pre-Anesthestic Checklist: patient identified, IV checked, risks and benefits discussed, informed consent, monitors and equipment checked, pre-op evaluation, at physician/surgeon's request and post-op pain management  Timeout:       Correct Patient: Yes        Correct Procedure: Yes        Correct Site: Yes        Correct Position: Yes   Procedure Documentation  Procedure: intrathecal injection       Patient Position: sitting       Patient Prep/Sterile Barriers: sterile gloves, mask, patient draped       Skin prep: Chloraprep       Insertion Site: L3-4. (midline approach).       Needle Gauge: 24.        Needle Length (Inches): 4        Spinal Needle Type: Pencan       Introducer used       # of attempts: 1 and  # of redirects:     Assessment/Narrative         Paresthesias: No.       CSF fluid: clear.    Medication(s) Administered   0.5% Bupivacaine PF (Intrathecal) - Intrathecal   3 mL - 1/24/2023 7:34:00 AM  Medication Administration Time: 1/24/2023 7:34 AM      FOR [facility] ONLY:   Pain Team Contact information: please page the Pain Team Via [employer]. Search \"Pain\". During daytime hours, please page the attending first. At night please page the resident first.    "

## 2023-01-24 NOTE — ANESTHESIA CARE TRANSFER NOTE
Patient: Martha Chun    Procedure: Procedure(s):  LEFT TOTAL HIP ARTHROPLASTY       Diagnosis: Osteoarthritis of left hip [M16.12]  Diagnosis Additional Information: No value filed.    Anesthesia Type:   Spinal     Note:    Oropharynx: oropharynx clear of all foreign objects and spontaneously breathing  Level of Consciousness: awake  Oxygen Supplementation: room air    Independent Airway: airway patency satisfactory and stable  Dentition: dentition unchanged  Vital Signs Stable: post-procedure vital signs reviewed and stable  Report to RN Given: handoff report given  Patient transferred to: PACU    Handoff Report: Identifed the Patient, Identified the Reponsible Provider, Reviewed the pertinent medical history, Discussed the surgical course, Reviewed Intra-OP anesthesia mangement and issues during anesthesia, Set expectations for post-procedure period and Allowed opportunity for questions and acknowledgement of understanding      Vitals:  Vitals Value Taken Time   /64 01/24/23 0926   Temp     Pulse 92 01/24/23 0926   Resp 15 01/24/23 0926   SpO2     Vitals shown include unvalidated device data.    Electronically Signed By: TRICIA Cabrales CRNA  January 24, 2023  9:28 AM

## 2023-01-24 NOTE — PHARMACY-ADMISSION MEDICATION HISTORY
Pharmacy Note - Admission Medication History    Pertinent Provider Information:    ______________________________________________________________________    Prior To Admission (PTA) med list completed and updated in EMR.       PTA Med List   Medication Sig Last Dose     aliskiren (TEKTURNA) 150 MG tablet Take 300 mg by mouth At Bedtime 1/23/2023     B Complex-Folic Acid (B COMPLEX FORMULA 1 PO) Take 1 teaspoonful by mouth daily 1/23/2023 at AM     butalbital-aspirin-caffeine (FIORINAL) -40 MG capsule Take 1 capsule by mouth every 4 hours as needed for headaches 1/17/2023     carboxymethylcellulose (REFRESH PLUS) 0.5 % SOLN ophthalmic solution Place 1-2 drops into both eyes 3 times daily as needed Pt brought     diltiazem ER (DILT-XR) 120 MG 24 hr capsule Take 120 mg by mouth every evening 1/23/2023     fluticasone (FLONASE) 50 MCG/ACT nasal spray Spray 2 sprays into both nostrils every evening 1/23/2023 at Pt brought     Garlic 1000 MG CAPS Take 1 tablet by mouth 3 times daily 1/19/2023     hydrOXYzine (ATARAX) 25 MG tablet Take 25-50 mg by mouth every 8 hours as needed      ibuprofen (ADVIL/MOTRIN) 200 MG tablet Take 400 mg by mouth every 4 hours as needed for pain 1/17/2023     Magnesium Citrate 200 MG TABS Take 300 mg by mouth 2 times daily (Takes at supper and bedtime) 1/23/2023     Omega-3 Fatty Acids (FISH OIL PO) Take 1 capsule by mouth 2 times daily 1/23/2023     Oxymetazoline HCl (NASAL SPRAY) 0.05 % SOLN Spray 1 spray into both nostrils 2 times daily as needed      polyethylene glycol (MIRALAX) 17 GM/Dose powder Take 1 capful by mouth daily as needed for constipation 1/22/2023     Probiotic Product (PROBIOTIC ADVANCED PO) Take 1 capsule by mouth At Bedtime 1/23/2023     Quercetin 250 MG TABS Take 500 mg by mouth daily 1/17/2023     QUEtiapine (SEROQUEL) 100 MG tablet Take 200 mg by mouth At Bedtime 1/23/2023     triprolidine-pseudoePHEDrine (APRODINE) 2.5-60 MG TABS per tablet Take 0.5 tablets by  mouth 2 times daily 1/23/2023     Vitamin D3 (CHOLECALCIFEROL) 125 MCG (5000 UT) tablet Take 125 mcg by mouth daily 1/17/2023       Information source(s): Patient and CareEverywhere/SureScripts    Method of interview communication: in-person    Patient was asked about OTC/herbal products specifically.  PTA med list reflects this.    Based on the pharmacist's assessment, the PTA med list information appears reliable    Medication Affordability:       Allergies were reviewed, assessed, and updated with the patient.      Medications available for use during hospital stay: flonase, eye drops.      Thank you for the opportunity to participate in the care of this patient.      Izzy Castellon RPH     1/24/2023     7:41 AM

## 2023-01-24 NOTE — PROVIDER NOTIFICATION
"Stopped bolus and continuous fluids d/t pt /80. Spoke with pt regarding BP meds and pt states she doesn't want to take anything she is afraid BP will \"drop\" - updated Dr. Lozano via text page.  "

## 2023-01-24 NOTE — PLAN OF CARE
Problem: Hip Arthroplasty  Goal: Absence of Bleeding  Outcome: Progressing  Goal: Fluid and Electrolyte Balance  Outcome: Progressing  Goal: Absence of Infection Signs and Symptoms  Outcome: Progressing  Goal: Intact Neurovascular Status  Outcome: Progressing  Goal: Anesthesia/Sedation Recovery  Outcome: Progressing  Intervention: Optimize Anesthesia Recovery  Recent Flowsheet Documentation  Taken 1/24/2023 1200 by Brii Thacker RN  Safety Promotion/Fall Prevention:    activity supervised    bed alarm on    clutter free environment maintained    fall prevention program maintained    increased rounding and observation    increase visualization of patient    lighting adjusted    mobility aid in reach    nonskid shoes/slippers when out of bed    patient and family education    room door open    room near nurse's station    room organization consistent    safety round/check completed    supervised activity    treat reversible contributory factors    treat underlying cause  Administration (IS):    instruction provided, initial    mouthpiece utilized    proper technique demonstrated    self-administered  Incentive Spirometer Predicted Level (mL): 2300  Number of Repetitions (IS): 3  Patient Tolerance (IS): good  Goal: Acceptable Pain Control  Outcome: Progressing  Intervention: Prevent or Manage Pain  Recent Flowsheet Documentation  Taken 1/24/2023 1233 by Brii Thacker RN  Pain Management Interventions: medication (see MAR)  Taken 1/24/2023 1200 by Brii Thacker RN  Complementary Therapy: music therapy provided  Goal: Nausea and Vomiting Relief  Outcome: Progressing  Goal: Effective Oxygenation and Ventilation  Outcome: Progressing  Intervention: Optimize Oxygenation and Ventilation  Recent Flowsheet Documentation  Taken 1/24/2023 1200 by Brii Thacker RN  Head of Bed (HOB) Positioning: HOB flat    Patient vital signs are at baseline: Yes  Patient able to ambulate as they were prior to admission or with  assist devices provided by therapies during their stay:  No,  Reason:  Due to ambulate  Patient MUST void prior to discharge:  No,  Reason:  Due to void  Patient able to tolerate oral intake:  Yes  Pain has adequate pain control using Oral analgesics:  Yes  Does patient have an identified :  Yes  Has goal D/C date and time been discussed with patient:  Yes     Pt arrived to [address] [place] [address] today at 1155. Pt is A & O x 4 & endorses moderate pain during today's shift. Utilizing scheduled APAP, PRN Atarax & PRN Robaxin w/ effective results. Dressing to surgical incision is CDI. CMS intact x 4. Due to void & due to ambulate. LR running at 100 ml. Tolerating a regular diet. Anticipating discharge tomorrow pending completion of clinical goals. Continuing to monitor.     Pt became hypotensive, diaphoretic, nauseated & dizzy shortly after 1500. Adm PRN IV Zofran & informed [facility]. Awaiting orders; Initiated 500 ml bolus.      ROB Oneill  Shift: 0700 - 1530

## 2023-01-24 NOTE — BRIEF OP NOTE
[facility]    Brief Operative Note    Pre-operative diagnosis: Osteoarthritis of left hip [M16.12]  Post-operative diagnosis Same as pre-operative diagnosis    Procedure: Procedure(s):  LEFT TOTAL HIP ARTHROPLASTY  Surgeon: Surgeon(s) and Role:     * Danny Woodson MD - Primary     * Abisai Rosales PA-C - Assisting  Anesthesia: Spinal   Estimated blood loss: 200 ml  Drains: None  Specimens: None  Findings:   None.  Complications: None.  Implants:   Implant Name Type Inv. Item Serial No.  Lot No. LRB No. Used Action   GUIDE PIN LONG ACETABULAR - [id number] Wire GUIDE PIN LONG ACETABULAR  [employer] [id number] Left 1 Used as a Supply   LINER R3 20 DEG +4 XLPE ACET [id number] - [id number] Total Joint Component/Insert LINER R3 20 DEG +4 XLPE ACET [id number]  [employer] [id number] Left 1 Implanted   IMP SHELL SNR ACET R3 3H 56MM [id number] - [id number] Total Joint Component/Insert IMP SHELL SNR ACET R3 3H 56MM [id number]  [employer] [id number] Left 1 Implanted   HEAD OX MODULAR 40MM - [id number] Total Joint Component/Insert HEAD OX MODULAR 40MM  [employer] [id number] Left 1 Implanted   IMP HOLE COVER SNN ACETAB CUP REFLEC INTERFIT [id number] - [id number] Metallic Hardware/[place] IMP HOLE COVER SNN ACETAB CUP REFLEC INTERFIT [id number]  [employer] [id number] Left 1 Implanted   IMP SCR ACET SNN SPHERICAL HEAD 6.5X25MM [id number] - [id number] Metallic Hardware/[place] IMP SCR ACET SNN SPHERICAL HEAD 6.5X25MM [id number]  [employer] A Left 1 Implanted   IMP SCR ACET SNN SPHERICAL HEAD 6.5X15MM [id number] - [id number] Metallic Hardware/[place] IMP SCR ACET SNN SPHERICAL HEAD 6.5X15MM [id number]  [employer] [id number] Left 1 Implanted   IMP STEM SNN SYNERGY HO POR PLUS SZ 14 160MM [id number] - [id number] Total Joint Component/Insert IMP STEM SNN SYNERGY HO POR PLUS SZ 14 160MM [id number]  [employer] [id number] Left 1 Implanted   IMP FEMORAL  SLEEVE S&N MED MOD +4MM NECK TI [id number] - [id number] Total Joint Component/Insert IMP FEMORAL SLEEVE S&N MED MOD +4MM NECK TI [id number]  [employer] [id number] Left 1 Implanted       Plan:    WBAT  F/U in 2 weeks  Leave Aquacel for 10 days  DVT Prophylaxis:  ASA 81 mg PO BID for 40 days

## 2023-01-24 NOTE — CONSULTS
"[facility] ACUTE PAIN SERVICE CONSULTATION     Date of Admission:  1/24/2023  Date of Consult (When I saw the patient): 01/24/23  Physician requesting consult: Chuck Castellon APRN CNP   Reason for consult: Chronic Pain, Occipital Neuralgia, Headaches-S/P Implanted Bilateral Occipital Nerve Stimulator. History of Suicide Attempt 6/2022 with intentional drug overdose. Now S/P L-EMELINA.  Primary Care Physician: Fox Vieira MD     Assessment/Plan:     Martha Chun is a 62 year old female who was admitted on 1/24/2023.  Pain team was asked to see the patient for chronic pain. Admitted for left EMELINA. History of chronic left hip pain, now s/p EMELINA, spinal fusion for neck pain, chronic pain syndrome, occipital neuralgia and headaches s/p bilateral occipital nerve stimulator implant, fibromyalgia, TCA overdose, HTN, depression/anxiety, suicidal attempt, environmental allergies, allergic rhinitis. Describes pain as \"still with block\"/10 and minimal ache. The patient denies nausea, vomiting, constipation, diarrhea, chest pain, shortness of breath. Diet is regular. The patient quit smoking and denies chemical dependency history.     Post op day: Day of Surgery. (LEFT TOTAL HIP ARTHROPLASTY)     Opioid Induced Respiratory Depression Risk Assessment: Moderate -opioid naive status, post surgical ?   Patient follows neurosurgery clinic. Per recent visit on 11/8/22, patient has requested to remove her bilateral occipital nerve stimulator as her neck is unable to be pulled back. Despite recommendation to not remove the devices and surgical risks like general anesthesia, MI, infection, bleeding, inability to remove all of the devices, pain and scarring, patient still wants to remove her device. Schedule removal of devices for late March or after.    PLAN:   1) Pain is consistent with post op pain. Labs and imaging indicated: I have personally reviewed pertinent notes, labs, tests, and radiologic imaging in patient's " chart. Treatment plan includes multimodal pain approach, Hospital Medicine Service for medical management, Orthopedics, PT, OT, post op cares. Patient educated regarding multimodal pain approach, medications as listed below. Patient is understanding of the plan. All questions and concerns addressed to patient's satisfaction.   2)Multimodal Medication Therapy  Topical: consider   NSAID'S: CrCl=none. Toradol 15mg iv q6h   Steroids: none  Muscle Relaxants: Robaxin 750mg q6hprn   Adjuvants: APAP 650mg q4hprn+975mg q8h, PTA Fioricet q4hprn for HA, hydroxyzine prn  Antidepressants/anxiolytics: PTA Seroquel 200mg hs   Opioids: oral Dilaudid 2-4mg q4hprn*  IV Pain medication: iv Dilaudid 0.2-0.4mg q2hprn  3)Non-medication interventions: ice  4)Constipation Prophylaxis: Scheduled and prn  5) Care Teams: Hospital Medicine Service, Orthopedics     -Opioid prescriber has been none.   -MN  pulled from system on 1/24. This indicates no opioid use.   -Butalbital-Aspirin-Caffeine Cp #60 for 30d (filedl 1/11/23, 12/2, 11/1, 9/29...)   -Aprodine Tablet #90 for 90 days (filled 12/15)   -Lorazepam 0.5mg tab #90 for 30 days (filled on 6/15, 5/13, and 4/5)   -Gabapentin 100mg #60 for 30 days (filled 5/26)   -Butalbital-Acetaminophn  #30 for 8 days (filled 4/19)   Discharge Recommendations - We recommend prescribing the following at the time of discharge: TBD     History of Present Illness (HPI):       Martha Chun is a 62 year old female who presented for planned procedure.  Past medical history as above. The pain is reported to be acute, located in the left hip. She does have chronic neck and headache pain. Current pain is rated at 2-4/10 and goal is 0/10.      Per MN  review, the patient does not have an opioid tolerance. Opioid induced side effects noted and include: none.      Reviewed medical record, labs, imaging, ED note, and care everywhere.     Past pain treatments have included: gabapentin, lorazepam,  butalbital-APAP, APAP    Home pain medications/psych medications/anticoagulation medications include: Fiorinal prn, Atarax prn, Ibuprofen prn, quetiapine every hs    Medical History   PAST MEDICAL HISTORY:   Past Medical History:   Diagnosis Date     Anxiety      Cervical spine instability      COVID-19 12/22/2022     Degenerative arthritis      Delusional disorder (H)      Depression      Fibromyalgia      Hypertension      Insomnia      Migraines      Occipital neuralgia     b/l     Other chronic pain     Back pain     PONV (postoperative nausea and vomiting)      Seasonal allergies      Suicide attempt (H) 06/2022    from drug overdose       PAST SURGICAL HISTORY:   Past Surgical History:   Procedure Laterality Date     CERVICAL FUSION  11/2020 November and Dec 2020     IMPLANT PULSE GENERATOR SUBCUTANEOUS Bilateral 10/22/2021    Procedure: Phase II occipital nerve stimulator placement, bilateral occipital electrodes and bilateral trigeminal electrodes, Implantation of bilateral subcutaneous neurostimulator Percutaneous implantation of neurostimulator electrode array; cranial nerve Implantable neurostimulator electrode, each CPT 58947 Implantable neurostimulator pulse generator, dual array, nonrechargeable, includes extensi     INSERT STIMULATOR OCCIPITAL Bilateral 08/27/2021    Procedure: Externalized trial of percutaneous occipital nerve stimulator bilateral Percutaneous implantation of neurostimulator electrode array; cranial nerve CPT 58913;  Surgeon: Kobi Shrestha MD;  Location: [facility] OR     TOTAL HIP ARTHROPLASTY Right      TOTAL KNEE ARTHROPLASTY Left 01/2018     [facility] LIGATE FALLOPIAN TUBE      Description: Tubal Ligation;  Recorded: 01/16/2012;       FAMILY HISTORY:   Family History   Problem Relation Age of Onset     Cancer Mother      Diabetes Father      Hypertension Father      Pulmonary Embolism Daughter      Anesthesia Reaction No family hx of        SOCIAL HISTORY:   Social History     Tobacco  "Use     Smoking status: Former     Packs/day: 1.00     Years: 15.00     Pack years: 15.00     Types: Cigarettes     Quit date: 10/10/2014     Years since quittin.2     Smokeless tobacco: Never   Substance Use Topics     Alcohol use: Not Currently     Comment: Last drink 10/2014        [employer]  Tobacco:  reports that she quit smoking about 8 years ago. Her smoking use included cigarettes. She has a 15.00 pack-year smoking history. She has never used smokeless tobacco.  Alcohol:  reports that she does not currently use alcohol.  Illicit drugs:  reports that she does not currently use drugs.    Allergies  Allergies   Allergen Reactions     Amlodipine Other (See Comments)     Constipation, \"liver pain\".         Clonidine      Other reaction(s): Dizziness     Escitalopram Muscle Pain (Myalgia)     Other reaction(s): Myalgias     Gluten Meal Headache and GI Disturbance     Hydrochlorothiazide      confusion     Mold Nausea     Ringing in ears, racing heart     Molds & Smuts Headache and Nausea     Ringing in ears, racing heart     Paroxetine Other (See Comments)     Palpitations/racing heart         Penicillins Unknown     Does not remember reaction, mother said she allergic to mold; patient refuses Pcn or any derivative       Sulfa Drugs Unknown     Tramadol      Other reaction(s): Runny Nose  Facial pain, sinus swelling       Carvedilol Other (See Comments)     Worsening depression         Codeine Headache and Nausea and Vomiting     Cortisone Other (See Comments)     Patient reports and possibly all steroids \"makes me to feel sick\", get fungal infections       Duloxetine Headache     Oxycodone Headache       Problem List  Patient Active Problem List    Diagnosis Date Noted     Bilateral occipital neuralgia 06/15/2021     Priority: Medium     Added automatically from request for surgery [id number]         Prior to Admission Medications   Medications Prior to Admission   Medication Sig Dispense " Refill Last Dose     aliskiren (TEKTURNA) 150 MG tablet Take 300 mg by mouth At Bedtime   1/23/2023     B Complex-Folic Acid (B COMPLEX FORMULA 1 PO) Take 1 teaspoonful by mouth daily   1/23/2023 at AM     butalbital-aspirin-caffeine (FIORINAL) -40 MG capsule Take 1 capsule by mouth every 4 hours as needed for headaches 60 capsule 3 1/17/2023     carboxymethylcellulose (REFRESH PLUS) 0.5 % SOLN ophthalmic solution Place 1-2 drops into both eyes 3 times daily as needed   Pt brought     diltiazem ER (DILT-XR) 120 MG 24 hr capsule Take 120 mg by mouth every evening   1/23/2023     fluticasone (FLONASE) 50 MCG/ACT nasal spray Spray 2 sprays into both nostrils every evening   1/23/2023 at Pt brought     Garlic 1000 MG CAPS Take 1 tablet by mouth 3 times daily   1/19/2023     hydrOXYzine (ATARAX) 25 MG tablet Take 25-50 mg by mouth every 8 hours as needed        ibuprofen (ADVIL/MOTRIN) 200 MG tablet Take 400 mg by mouth every 4 hours as needed for pain   1/17/2023     Magnesium Citrate 200 MG TABS Take 300 mg by mouth 2 times daily (Takes at supper and bedtime)   1/23/2023     Omega-3 Fatty Acids (FISH OIL PO) Take 1 capsule by mouth 2 times daily   1/23/2023     Oxymetazoline HCl (NASAL SPRAY) 0.05 % SOLN Spray 1 spray into both nostrils 2 times daily as needed        polyethylene glycol (MIRALAX) 17 GM/Dose powder Take 1 capful by mouth daily as needed for constipation   1/22/2023     Probiotic Product (PROBIOTIC ADVANCED PO) Take 1 capsule by mouth At Bedtime   1/23/2023     Quercetin 250 MG TABS Take 500 mg by mouth daily   1/17/2023     QUEtiapine (SEROQUEL) 100 MG tablet Take 200 mg by mouth At Bedtime   1/23/2023     triprolidine-pseudoePHEDrine (APRODINE) 2.5-60 MG TABS per tablet Take 0.5 tablets by mouth 2 times daily   1/23/2023     Vitamin D3 (CHOLECALCIFEROL) 125 MCG (5000 UT) tablet Take 125 mcg by mouth daily   1/17/2023       Review of Systems  Complete ROS reviewed, unless noted in HPI, all other  "systems reviewed (with patient) and all others found to be negative.      Objective:     Physical Exam:  BP (!) 155/73   Pulse 88   Temp 98  F (36.7  C) (Temporal)   Resp 16   Ht 1.676 m (5' 6\")   Wt 66.6 kg (146 lb 14.4 oz)   SpO2 99%   BMI 23.71 kg/m    Weight:   Vitals:    01/24/23 0636   Weight: 66.6 kg (146 lb 14.4 oz)      Body mass index is 23.71 kg/m .    General Appearance:  Alert, cooperative, no distress   Head:  Normocephalic, without obvious abnormality, atraumatic   Eyes:  PERRL, conjunctiva/corneas clear, EOM's intact   ENT/Throat: Lips, mucosa, and tongue normal; teeth and gums normal   Lymph/Neck: Supple, symmetrical, trachea midline   Lungs:   Clear to auscultation bilaterally, respirations unlabored, room air   Cardiovascular/Heart:  Regular rate and rhythm, S1, S2 normal   Abdomen:   Soft, non-tender, bowel sounds hypoactive all four quadrants   Musculoskeletal: Incision covered    Skin: Skin warm, dry    Neurologic: Alert and oriented X 3, Moves all 4 extremities     Psych: Affect is appropriate      Imaging: Reviewed I have personally reviewed pertinent labs, tests, and radiologic imaging in patient's chart.    Labs: Reviewed I have personally reviewed pertinent labs, tests, and radiologic imaging in patient's chart.      Total time spent 65 minutes with greater than 50% in consultation, education and coordination of care.     Also discussed with RN, pharmacist.     Please see A&P for additional details of medical decision making.    Elements of Medical Decision Making as described above. High risk therapy including opioids, high risk drug therapy including oral and/or parenteral controlled substances    Thank you for this consultation.    Teagan CLARKE, FNP-C  Acute Care Pain Management Program  [facility] ([place], [place], [place])  Monday-Friday 8a-4p   Page via online paging system or call [phone]    "

## 2023-01-24 NOTE — ANESTHESIA POSTPROCEDURE EVALUATION
Patient: Martha Chun    Procedure: Procedure(s):  LEFT TOTAL HIP ARTHROPLASTY       Anesthesia Type:  Spinal    Note:  Disposition: Inpatient   Postop Pain Control: Uneventful            Sign Out: Well controlled pain   PONV: No   Neuro/Psych: Uneventful            Sign Out: Acceptable/Baseline neuro status   Airway/Respiratory: Uneventful            Sign Out: Acceptable/Baseline resp. status   CV/Hemodynamics: Uneventful            Sign Out: Acceptable CV status; No obvious hypovolemia; No obvious fluid overload   Other NRE: NONE   DID A NON-ROUTINE EVENT OCCUR? No           Last vitals:  Vitals Value Taken Time   /78 01/24/23 1030   Temp 36.7  C (98  F) 01/24/23 0925   Pulse 81 01/24/23 1030   Resp 23 01/24/23 1007   SpO2 97 % 01/24/23 1030   Vitals shown include unvalidated device data.    Electronically Signed By: Emma Kirk MD  January 24, 2023  10:32 AM

## 2023-01-24 NOTE — ANESTHESIA PREPROCEDURE EVALUATION
"Anesthesia Pre-Procedure Evaluation    Patient: Martha Chun   MRN: [id number] : 1960        Procedure : Procedure(s):  LEFT TOTAL HIP ARTHROPLASTY          Past Medical History:   Diagnosis Date     Anxiety      Cervical spine instability      COVID-19 2022     Degenerative arthritis      Delusional disorder (H)      Depression      Fibromyalgia      Hypertension      Insomnia      Migraines      Occipital neuralgia     b/l     Other chronic pain     Back pain     PONV (postoperative nausea and vomiting)      Seasonal allergies      Suicide attempt (H) 2022    from drug overdose      Past Surgical History:   Procedure Laterality Date     CERVICAL FUSION  2020 and Dec 2020     IMPLANT PULSE GENERATOR SUBCUTANEOUS Bilateral 10/22/2021    Procedure: Phase II occipital nerve stimulator placement, bilateral occipital electrodes and bilateral trigeminal electrodes, Implantation of bilateral subcutaneous neurostimulator Percutaneous implantation of neurostimulator electrode array; cranial nerve Implantable neurostimulator electrode, each CPT 28092 Implantable neurostimulator pulse generator, dual array, nonrechargeable, includes extensi     INSERT STIMULATOR OCCIPITAL Bilateral 2021    Procedure: Externalized trial of percutaneous occipital nerve stimulator bilateral Percutaneous implantation of neurostimulator electrode array; cranial nerve CPT 15214;  Surgeon: Kobi Shrestha MD;  Location: [facility] OR     TOTAL HIP ARTHROPLASTY Right      TOTAL KNEE ARTHROPLASTY Left 2018     Z LIGATE FALLOPIAN TUBE      Description: Tubal Ligation;  Recorded: 2012;      Allergies   Allergen Reactions     Amlodipine Other (See Comments)     Constipation, \"liver pain\".         Clonidine      Other reaction(s): Dizziness     Escitalopram Muscle Pain (Myalgia)     Other reaction(s): Myalgias     Gluten Meal Headache and GI Disturbance     Hydrochlorothiazide Unknown     Mold Nausea     Ringing " "in ears, racing heart     Molds & Smuts Headache and Nausea     Ringing in ears, racing heart     Paroxetine Other (See Comments)     Palpitations/racing heart         Penicillins Unknown     Does not remember reaction, mother said she allergic to mold; patient refuses Pcn or any derivative       Sulfa Drugs Unknown     Tramadol      Other reaction(s): Runny Nose  Facial pain, sinus swelling       Carvedilol Other (See Comments)     Worsening depression         Codeine Headache and Nausea and Vomiting     Cortisone Other (See Comments)     Patient reports and possibly all steroids \"makes me to feel sick\", get fungal infections       Duloxetine Headache     Oxycodone Headache      Social History     Tobacco Use     Smoking status: Former     Packs/day: 1.00     Years: 15.00     Pack years: 15.00     Types: Cigarettes     Quit date: 10/10/2014     Years since quittin.2     Smokeless tobacco: Never   Substance Use Topics     Alcohol use: Not Currently     Comment: Last drink 10/2014      Wt Readings from Last 1 Encounters:   23 66.6 kg (146 lb 14.4 oz)        Anesthesia Evaluation   Pt has had prior anesthetic.     History of anesthetic complications  - PONV.      ROS/MED HX  ENT/Pulmonary:       Neurologic:  - neg neurologic ROS     Cardiovascular:     (+) hypertension-----    METS/Exercise Tolerance:     Hematologic:  - neg hematologic  ROS     Musculoskeletal: Comment: c-spine fusion      GI/Hepatic:  - neg GI/hepatic ROS     Renal/Genitourinary:  - neg Renal ROS     Endo:  - neg endo ROS     Psychiatric/Substance Use:  - neg psychiatric ROS     Infectious Disease:  - neg infectious disease ROS     Malignancy:       Other:            Physical Exam    Airway        Mallampati: I   TM distance: > 3 FB   Neck ROM: full   Mouth opening: > 3 cm    Respiratory Devices and Support         Dental       (+) Completely normal teeth      Cardiovascular          Rhythm and rate: regular and normal     Pulmonary        "    breath sounds clear to auscultation           OUTSIDE LABS:  CBC:   Lab Results   Component Value Date    WBC 6.5 10/19/2021    WBC 8.0 08/25/2021    HGB 13.3 10/19/2021    HGB 13.7 08/25/2021    HCT 39.1 10/19/2021    HCT 41.6 08/25/2021     10/19/2021     08/25/2021     BMP:   Lab Results   Component Value Date     10/19/2021     08/25/2021    POTASSIUM 4.2 10/19/2021    POTASSIUM 3.7 08/25/2021    CHLORIDE 103 10/19/2021    CHLORIDE 104 08/25/2021    CO2 28 10/19/2021    CO2 32 08/25/2021    BUN 12 10/19/2021    BUN 15 08/25/2021    CR 0.64 10/19/2021    CR 0.71 08/25/2021    GLC 79 10/22/2021    GLC 90 10/19/2021     COAGS:   Lab Results   Component Value Date    PTT 26 10/19/2021    INR 0.96 10/19/2021     POC: No results found for: BGM, HCG, HCGS  HEPATIC:   Lab Results   Component Value Date    ALBUMIN 4.3 03/19/2020    PROTTOTAL 7.4 03/19/2020    ALT 21 03/19/2020    AST 19 03/19/2020    GGT 18 03/19/2020    ALKPHOS 92 03/19/2020    BILITOTAL 0.4 03/19/2020     OTHER:   Lab Results   Component Value Date    ALYSE 8.9 10/19/2021    PHOS 3.8 03/19/2020    MAG 2.0 03/19/2020    TSH 2.24 03/19/2020       Anesthesia Plan    ASA Status:  2      Anesthesia Type: Spinal.              Consents    Anesthesia Plan(s) and associated risks, benefits, and realistic alternatives discussed. Questions answered and patient/representative(s) expressed understanding.     - Discussed: Risks, Benefits and Alternatives for BOTH SEDATION and the PROCEDURE were discussed     - Discussed with:  Patient         Postoperative Care            Comments:    Other Comments: Patient concerned about significant mold allergy and cross-reactivity with penicillin.  Discussed that she will receive ancef which historically had cross-reactivity with penicillins, but that it has been generally deemed safe with more recent studies.  She was agreeable to proceeding with ancef with a test dose.            Emma Kirk,  MD

## 2023-01-24 NOTE — OP NOTE
Procedure Date: 01/24/2023    PREOPERATIVE DIAGNOSIS:  Left hip primary osteoarthritis.    POSTOPERATIVE DIAGNOSIS:  Left hip primary osteoarthritis.    PROCEDURE:  Left MIS total hip arthroplasty.    SURGEON:  Danny Woodson MD    ASSISTANT:  Abisai Rosales PA-C (PA, was medically indicated and necessary to allow for safe and adequate progression of surgery).    ESTIMATED BLOOD LOSS:  150 mL    COMPLICATIONS:  None.    SPECIMENS:  None.    ANESTHETIC:  Spinal.    IMPLANTS USED:  Smith and Nephew size 56 reflection, three, 3-hole cup, a 25 x 6.5 screw, a 15 x 6.5 screw.  A central hole cover +420 degree cross-linked liner, 40 inner diameter, 56 outer diameter, 14 high offset Synergy porous plus HA femoral stem and a +4, 40 mm Oxinium femoral head.    DESCRIPTION OF PROCEDURE:  The patient was brought to the operating room.  After adequate anesthesia was induced, the patient was placed in the right lateral decubitus position.  The left lower extremity was prepped and draped in the usual sterile fashion.  MIS incision made through the skin and subcutaneous tissue mid glute approach.  [place] retractors placed, took down the short external rotators.  T capsulotomy performed.  Capsule saved for later repair.  Got a baseline length and offset using the Smith and Nephew length offset device and then dislocated the hip.  Per preoperative templating, femoral neck cut was made about 12 mm above the lesser trochanter.  Femoral head removed.  Grade 4 chondromalacia noted.  Carefully retracted femur anteriorly, did a labrectomy, foveal ligament excision medialized with a 46 reamer, circumferentially reamed to a 55 reamer, good bleeding subchondral bone, so we impacted a 56 cup.  It seated nicely at the base.  It was 2-3 degrees more anteverted than the transverse acetabular ligament.  Coverage angle estimated at 40-45 degrees.  Two screws were placed in the superior posterior quadrant, each had excellent purchase, central hole  cover placed and turned attention to locking the poly.  The poly was locked with the elevated lip liner in about the 4 to 4:30 position of this left hip.  Once it was locked in place and confirmed to be locked, we irrigated copiously and turned attention to the femur.  Cookie cutter, canal finder, lateralizing reamer, reamers up to a 14 with excellent chatter, broaches up to a 14 with good fit, fill and stability.  Trialed the 14 standard offset and actually we were losing quite a bit of offset with the 14 standard switched to a 14 high and this reproduced her offset between the 0, which was slightly less and the +4, which was perfect as far as restoration of offset.    With the 0 in place, we flexed 90, internally rotate and about 45 degrees impingement began.  Dislocation event occurred somewhere around 60-65 degrees, full extension, external rotation impinged right around 80 degrees.  So clearly that was not adequate for stability.  We got a significant jump when we went to the +4.  Again this reproduced her baseline offset, according to the intraoperative measurements, we flexed 90, internally rotated and we really got about a 20-degree jump,  impingement began somewhere around 65 to 70 degrees and the dislocation event occurred right around 80, maybe 85 degrees, full extension, external rotation, now did not impinge until about 90 degrees.  So felt good about the +4 from a clinical perspective.  It felt like she was clinically 1-2 mm longer than the opposite side.  Measurement wise, we were looking for 13, 14 on the length and we were measuring about a 10-11 as far as her length increase.  So felt like there was no question.  The +4 was more stable and necessary from a stability standpoint with the impingement and dislocation risk both directions.  The +4 was the only acceptable option.  The +8 was clearly too long, even though at  increased stability, length was unacceptable at +8.  So I went ahead and put in  the final implant.  Final implant sat about a millimeter and a half proud.  From total length perspective I was measuring about 11 longer from baseline.  Clinically, she felt 2-3 mm longer than the opposite side clinically, but again that stability was noted to be flexed 90, internally rotate to almost 70 degrees before impingement began.  Clearly 80-85 degrees before the dislocation occurred.  Full extension, external rotation, did not occur until about 90 degrees impingement about 90 degrees and there was no dislocation anteriorly.  So went ahead and irrigated copiously, repaired the posterior capsule side to side with #1 Vicryl, irrigated again, closed the gluteus fascia with #1 Vicryl interrupted figure-of-eight, #1 PDS was used in the small opening in the IT band, 2-0 Vicryl was used in layers in the subcutaneous and a 3-0 Monocryl for a subcuticular closure.  Steri-Strips, sterile bandages were applied.  The patient was awakened and returned to the postanesthesia care unit in excellent condition with no apparent intraoperative complications.    Plan is routine total hip protocol.    Danny Woodson MD        D: 2023   T: 2023   MT: sd    Name:     FARZAD DUDLEY  MRN:      [id number]        Account:        [id number]   :      1960           Procedure Date: 2023     Document: [id number]

## 2023-01-25 ENCOUNTER — APPOINTMENT (OUTPATIENT)
Dept: PHYSICAL THERAPY | Facility: CLINIC | Age: 63
DRG: 940 | End: 2023-01-25
Attending: ORTHOPAEDIC SURGERY
Payer: COMMERCIAL

## 2023-01-25 ENCOUNTER — APPOINTMENT (OUTPATIENT)
Dept: OCCUPATIONAL THERAPY | Facility: CLINIC | Age: 63
DRG: 940 | End: 2023-01-25
Attending: ORTHOPAEDIC SURGERY
Payer: COMMERCIAL

## 2023-01-25 LAB
ANION GAP SERPL CALCULATED.3IONS-SCNC: 7 MMOL/L (ref 5–18)
ATRIAL RATE - MUSE: 102 BPM
BUN SERPL-MCNC: 13 MG/DL (ref 8–22)
CALCIUM SERPL-MCNC: 8.5 MG/DL (ref 8.5–10.5)
CHLORIDE BLD-SCNC: 99 MMOL/L (ref 98–107)
CO2 SERPL-SCNC: 25 MMOL/L (ref 22–31)
CREAT SERPL-MCNC: 0.81 MG/DL (ref 0.6–1.1)
DIASTOLIC BLOOD PRESSURE - MUSE: NORMAL MMHG
ERYTHROCYTE [DISTWIDTH] IN BLOOD BY AUTOMATED COUNT: 13.2 % (ref 10–15)
GFR SERPL CREATININE-BSD FRML MDRD: 83 ML/MIN/1.73M2
GLUCOSE BLD-MCNC: 131 MG/DL (ref 70–125)
HCT VFR BLD AUTO: 24.6 % (ref 35–47)
HGB BLD-MCNC: 8.5 G/DL (ref 11.7–15.7)
HOLD SPECIMEN: NORMAL
INTERPRETATION ECG - MUSE: NORMAL
MCH RBC QN AUTO: 31.5 PG (ref 26.5–33)
MCHC RBC AUTO-ENTMCNC: 34.6 G/DL (ref 31.5–36.5)
MCV RBC AUTO: 91 FL (ref 78–100)
P AXIS - MUSE: 66 DEGREES
PLATELET # BLD AUTO: 219 10E3/UL (ref 150–450)
POTASSIUM BLD-SCNC: 4.7 MMOL/L (ref 3.5–5)
PR INTERVAL - MUSE: 152 MS
QRS DURATION - MUSE: 80 MS
QT - MUSE: 380 MS
QTC - MUSE: 495 MS
R AXIS - MUSE: 24 DEGREES
RBC # BLD AUTO: 2.7 10E6/UL (ref 3.8–5.2)
SODIUM SERPL-SCNC: 131 MMOL/L (ref 136–145)
SYSTOLIC BLOOD PRESSURE - MUSE: NORMAL MMHG
T AXIS - MUSE: 87 DEGREES
TROPONIN I SERPL-MCNC: 0.02 NG/ML (ref 0–0.29)
TROPONIN I SERPL-MCNC: 0.02 NG/ML (ref 0–0.29)
VENTRICULAR RATE- MUSE: 102 BPM
WBC # BLD AUTO: 16 10E3/UL (ref 4–11)

## 2023-01-25 PROCEDURE — 97162 PT EVAL MOD COMPLEX 30 MIN: CPT | Mod: GP

## 2023-01-25 PROCEDURE — 80048 BASIC METABOLIC PNL TOTAL CA: CPT | Performed by: FAMILY MEDICINE

## 2023-01-25 PROCEDURE — 85027 COMPLETE CBC AUTOMATED: CPT | Performed by: FAMILY MEDICINE

## 2023-01-25 PROCEDURE — 250N000013 HC RX MED GY IP 250 OP 250 PS 637: Performed by: ORTHOPAEDIC SURGERY

## 2023-01-25 PROCEDURE — 250N000013 HC RX MED GY IP 250 OP 250 PS 637: Performed by: FAMILY MEDICINE

## 2023-01-25 PROCEDURE — 84484 ASSAY OF TROPONIN QUANT: CPT | Performed by: INTERNAL MEDICINE

## 2023-01-25 PROCEDURE — 97535 SELF CARE MNGMENT TRAINING: CPT | Mod: GO

## 2023-01-25 PROCEDURE — 250N000011 HC RX IP 250 OP 636: Performed by: ORTHOPAEDIC SURGERY

## 2023-01-25 PROCEDURE — 97530 THERAPEUTIC ACTIVITIES: CPT | Mod: GP

## 2023-01-25 PROCEDURE — 99232 SBSQ HOSP IP/OBS MODERATE 35: CPT | Performed by: NURSE PRACTITIONER

## 2023-01-25 PROCEDURE — 99214 OFFICE O/P EST MOD 30 MIN: CPT | Performed by: FAMILY MEDICINE

## 2023-01-25 PROCEDURE — 36415 COLL VENOUS BLD VENIPUNCTURE: CPT | Performed by: INTERNAL MEDICINE

## 2023-01-25 PROCEDURE — 250N000013 HC RX MED GY IP 250 OP 250 PS 637: Performed by: INTERNAL MEDICINE

## 2023-01-25 PROCEDURE — 36415 COLL VENOUS BLD VENIPUNCTURE: CPT | Performed by: FAMILY MEDICINE

## 2023-01-25 PROCEDURE — 96372 THER/PROPH/DIAG INJ SC/IM: CPT | Performed by: ORTHOPAEDIC SURGERY

## 2023-01-25 PROCEDURE — 258N000003 HC RX IP 258 OP 636: Performed by: FAMILY MEDICINE

## 2023-01-25 PROCEDURE — 97166 OT EVAL MOD COMPLEX 45 MIN: CPT | Mod: GO

## 2023-01-25 RX ORDER — HYDROMORPHONE HYDROCHLORIDE 2 MG/1
2 TABLET ORAL EVERY 4 HOURS PRN
Status: DISCONTINUED | OUTPATIENT
Start: 2023-01-25 | End: 2023-01-29 | Stop reason: HOSPADM

## 2023-01-25 RX ORDER — HYDROXYZINE HYDROCHLORIDE 25 MG/1
25 TABLET, FILM COATED ORAL EVERY 6 HOURS PRN
Status: DISCONTINUED | OUTPATIENT
Start: 2023-01-25 | End: 2023-01-29 | Stop reason: HOSPADM

## 2023-01-25 RX ORDER — SODIUM CHLORIDE 9 MG/ML
INJECTION, SOLUTION INTRAVENOUS CONTINUOUS
Status: DISCONTINUED | OUTPATIENT
Start: 2023-01-25 | End: 2023-01-26

## 2023-01-25 RX ORDER — SODIUM CHLORIDE 9 MG/ML
INJECTION, SOLUTION INTRAVENOUS CONTINUOUS
Status: DISCONTINUED | OUTPATIENT
Start: 2023-01-25 | End: 2023-01-25

## 2023-01-25 RX ADMIN — ASPIRIN 81 MG: 81 TABLET, COATED ORAL at 21:03

## 2023-01-25 RX ADMIN — HYDROMORPHONE HYDROCHLORIDE 0.2 MG: 0.2 INJECTION, SOLUTION INTRAMUSCULAR; INTRAVENOUS; SUBCUTANEOUS at 00:39

## 2023-01-25 RX ADMIN — HYDROMORPHONE HYDROCHLORIDE 0.2 MG: 0.2 INJECTION, SOLUTION INTRAMUSCULAR; INTRAVENOUS; SUBCUTANEOUS at 04:00

## 2023-01-25 RX ADMIN — ALISKIREN HEMIFUMARATE 300 MG: 150 TABLET, FILM COATED ORAL at 21:05

## 2023-01-25 RX ADMIN — ACETAMINOPHEN 975 MG: 325 TABLET ORAL at 12:11

## 2023-01-25 RX ADMIN — MAGNESIUM OXIDE TAB 400 MG (241.3 MG ELEMENTAL MG) 200 MG: 400 (241.3 MG) TAB at 18:03

## 2023-01-25 RX ADMIN — HYDROMORPHONE HYDROCHLORIDE 2 MG: 2 TABLET ORAL at 08:08

## 2023-01-25 RX ADMIN — NALOXONE HYDROCHLORIDE 0.4 MG: 0.4 INJECTION, SOLUTION INTRAMUSCULAR; INTRAVENOUS; SUBCUTANEOUS at 10:34

## 2023-01-25 RX ADMIN — ACETAMINOPHEN 975 MG: 325 TABLET ORAL at 18:03

## 2023-01-25 RX ADMIN — SENNOSIDES AND DOCUSATE SODIUM 1 TABLET: 50; 8.6 TABLET ORAL at 12:11

## 2023-01-25 RX ADMIN — DILTIAZEM HYDROCHLORIDE 120 MG: 120 CAPSULE, EXTENDED RELEASE ORAL at 21:01

## 2023-01-25 RX ADMIN — HYDROMORPHONE HYDROCHLORIDE 0.2 MG: 0.2 INJECTION, SOLUTION INTRAMUSCULAR; INTRAVENOUS; SUBCUTANEOUS at 06:46

## 2023-01-25 RX ADMIN — Medication 1 CAPSULE: at 21:01

## 2023-01-25 RX ADMIN — SODIUM CHLORIDE: 9 INJECTION, SOLUTION INTRAVENOUS at 16:02

## 2023-01-25 RX ADMIN — ASPIRIN 81 MG: 81 TABLET, COATED ORAL at 12:11

## 2023-01-25 RX ADMIN — ONDANSETRON 4 MG: 4 TABLET, ORALLY DISINTEGRATING ORAL at 08:06

## 2023-01-25 RX ADMIN — SENNOSIDES AND DOCUSATE SODIUM 1 TABLET: 50; 8.6 TABLET ORAL at 21:03

## 2023-01-25 RX ADMIN — SODIUM CHLORIDE: 9 INJECTION, SOLUTION INTRAVENOUS at 21:08

## 2023-01-25 RX ADMIN — HYDROXYZINE HYDROCHLORIDE 50 MG: 25 TABLET, FILM COATED ORAL at 08:10

## 2023-01-25 RX ADMIN — MAGNESIUM OXIDE TAB 400 MG (241.3 MG ELEMENTAL MG) 200 MG: 400 (241.3 MG) TAB at 21:03

## 2023-01-25 RX ADMIN — SODIUM CHLORIDE 500 ML: 9 INJECTION, SOLUTION INTRAVENOUS at 13:57

## 2023-01-25 ASSESSMENT — ACTIVITIES OF DAILY LIVING (ADL)
ADLS_ACUITY_SCORE: 38
ADLS_ACUITY_SCORE: 31
ADLS_ACUITY_SCORE: 37
ADLS_ACUITY_SCORE: 38
DEPENDENT_IADLS:: INDEPENDENT
ADLS_ACUITY_SCORE: 37

## 2023-01-25 NOTE — PROGRESS NOTES
[facility] MEDICINE PROGRESS NOTE      Identification/Summary: Martha Chun is a 62 year old female with a past medical history of hypertension, generalized anxiety disorder, major depression, osteoarthritis, underwent left total hip arthroplasty due to severe osteoarthritis.  POD 1. Left hip pain is stable.  She has recurrent acute anxiety.  Rapid response called due to chest pressure last night secondary to sinus tachycardia likely from acute anxiety.  Was sedated in a.m., responded with IV Narcan.    Assessment and Plan:  Essential hypertension  Tekturna 300 mg daily  Diltiazem  mg daily  Blood pressure is stable    Sinus tachycardia, secondary to acute anxiety  Heart rate is in 90s now  Continue diltiazem    Generalized anxiety disorder  Major depression   Recurrent acute anxiety  Seroquel 200 mg at bedtime  Hydroxyzine 25 to 50 mg every 8 hours as needed    History of migraine headache  On Fiorinal as needed at home    Acute blood loss anemia  Hemoglobin 8.5 from 13.3    Status post left total hip arthroplasty  Resume routine postoperative care  Physical and Occupational Therapy  Use incentive spirometry frequently  DVT prophylaxis per orthopedics, aspirin 81 mg twice a day  Pain control with Tylenol 975 mg every 8 hours, 650 mg every 4 hours as needed, oxycodone 5 to 10 mg every 4 hours as needed    Somnolence  Required IV Narcan x 1 today.    Zuleima Lozano MD  [facility] Medicine  [facility]  Phone: #[phone]  Securely message with the Vocera Web Console (learn more here)  Text page via [employer] Paging/Directory     Interval History/Subjective:  Laying on bed.  Has nausea.  Not feeling well.  Getting anxious easily.  Left hip pain is stable.  Feeling weak and tired.    Physical Exam/Objective:  Temp:  [97.4  F (36.3  C)-99.7  F (37.6  C)] 98.3  F (36.8  C)  Pulse:  [] 97  Resp:  [16-20] 18  BP: ()/() 122/58  SpO2:  [96  %-100 %] 98 %  Body mass index is 23.71 kg/m .    GENERAL:  Alert, appears comfortable, in no acute distress, anxious easily, appears stated age   HEAD:  Normocephalic, without obvious abnormality, atraumatic   EYES:  PERRL, conjunctiva  clear,   EOM's intact   NOSE: Nares normal,  mucosa normal, no drainage   THROAT: Lips, mucosa,   gums normal, mouth moist   NECK: Supple, symmetrical, trachea midline   BACK:   Symmetric, no curvature, ROM normal   LUNGS:   Clear to auscultation bilaterally, no rales, rhonchi, or wheezing, symmetric chest rise on inhalation, respirations unlabored   CHEST WALL:  No tenderness or deformity   HEART:  Regular rate and rhythm, S1 and S2 normal, no murmur    ABDOMEN:   Soft, non-tender, bowel sounds active , no masses, no organomegaly, no rebound or guarding   EXTREMITIES: Status post left total hip arthroplasty   SKIN: No rashes   NEURO: Alert, oriented x3    PSYCH: Cooperative, behavior is appropriate, anxious easily     Data reviewed today: I personally reviewed all new medications, labs, imaging/diagnostics reports over the past 24 hours. Pertinent findings include:    Imaging:   Left hip xray  Postoperative changes bilateral total hip arthroplasty. Components appear well seated. Postprocedural air surrounding the left hip joint.    Labs:  Most Recent 3 CBC's:Recent Labs   Lab Test 01/25/23  0441 01/24/23  2223 10/19/21  1206   WBC 16.0* 16.1* 6.5   HGB 8.5* 9.2* 13.3   MCV 91 89 96    251 302     Most Recent 3 BMP's:Recent Labs   Lab Test 01/25/23  0441 01/24/23  2223 10/22/21  0930 10/19/21  1206   * 133*  --  136   POTASSIUM 4.7 4.4  --  4.2   CHLORIDE 99 100  --  103   CO2 25 24  --  28   BUN 13 12  --  12   CR 0.81 0.79  --  0.64   ANIONGAP 7 9  --  5   ALYSE 8.5 8.8  --  8.9   * 132* 79 90       Medications:   Personally Reviewed.  Medications       acetaminophen  975 mg Oral Q8H     aliskiren  300 mg Oral At Bedtime     aspirin  81 mg Oral BID     diltiazem  ER  120 mg Oral QPM     fluticasone  2 spray Both Nostrils QPM     lactobacillus rhamnosus (GG)  1 capsule Oral At Bedtime     magnesium oxide  200 mg Oral BID     polyethylene glycol  17 g Oral Daily     QUEtiapine  200 mg Oral At Bedtime     senna-docusate  1 tablet Oral BID     sodium chloride (PF)  3 mL Intracatheter Q8H     Vitamin D3  125 mcg Oral Daily     Total time spent >35 min by me on the date of service doing chart review, history, exam, documentation & further activities per the note.

## 2023-01-25 NOTE — PROGRESS NOTES
01/25/23 1300   Appointment Info   Signing Clinician's Name / Credentials (OT) AGAPITO Hendricks/L, CLT   Appointment Canceled Reason (OT) Fatigue;Unarousable   Quick Adds   Quick Adds Certification   Living Environment   People in Home alone   Current Living Arrangements apartment   Home Accessibility no concerns   Self-Care   Usual Activity Tolerance good   Current Activity Tolerance poor   Activity/Exercise/Self-Care Comment pt states has grab bars in shower and near toilet and states has bath chair but may have cognitive deficits (delirium?) at this time. Pt states indep with all I/ADL typically.   General Information   Onset of Illness/Injury or Date of Surgery 01/24/23   Referring Physician Dr. Danny Woodson   Patient/Family Therapy Goal Statement (OT) ok to sit up   Additional Occupational Profile Info/Pertinent History of Current Problem mod: EMELINA   Existing Precautions/Restrictions no hip ADD past midline;no hip IR;weight bearing   Left Lower Extremity (Weight-bearing Status) weight-bearing as tolerated (WBAT)   Cognitive Status Examination   Orientation Status person   Affect/Mental Status (Cognitive) confused;unable/difficult to assess  (appears to easily fall asleep)   Visual Perception   Visual Impairment/Limitations WFL   Sensory   Sensory Comments appears intact   Pain Assessment   Patient Currently in Pain   (has bouts of pain with mvmt)   Posture   Posture not impaired   Range of Motion Comprehensive   General Range of Motion no range of motion deficits identified   Strength Comprehensive (MMT)   Comment, General Manual Muscle Testing (MMT) Assessment appears decreased   Muscle Tone Assessment   Muscle Tone Quick Adds No deficits were identified   Coordination   Coordination Comments decreased   Bed Mobility   Comment (Bed Mobility) dependent   Transfers   Transfer Comments min A   Balance   Balance Comments decreased   Activities of Daily Living   BADL Assessment/Intervention lower body  dressing;toileting   Lower Body Dressing Assessment/Training   [place] Level (Lower Body Dressing) dependent (less than 25% patient effort)   Toileting   [place] Level (Toileting) dependent (less than 25% patient effort)   Clinical Impression   Criteria for Skilled Therapeutic Interventions Met (OT) Yes, treatment indicated   OT Diagnosis decr ADL indep/safety   OT Problem List-Impairments impacting ADL activity tolerance impaired;balance;cognition;mobility;post-surgical precautions;other (see comments)  (alertness)   Assessment of Occupational Performance 3-5 Performance Deficits   Identified Performance Deficits dressing, home mgmt, bathing, bed mob, func mob; toileting   Planned Therapy Interventions (OT) ADL retraining;balance training;bed mobility training;cognition;transfer training   Clinical Decision Making Complexity (OT) moderate complexity   Anticipated Equipment Needs Upon Discharge (OT) lift device  (leg /ADL equipment for posterior hip precatuions)   Risk & Benefits of therapy have been explained care plan/treatment goals reviewed;patient   Clinical Impression Comments pt appeared to have basic understanding of therapeutic process, but may need reminders/reinforcement.   OT Total Evaluation Time   OT Eval, Moderate Complexity Minutes (31273) 10   Therapy Certification   Medical Diagnosis EMELINA   Start of Care Date 01/25/23   Certification date from 01/25/23   Certification date to 02/25/23   OT Goals   Therapy Frequency (OT) Daily   OT Predicted Duration/Target Date for Goal Attainment 01/31/23   OT Goals Lower Body Dressing;Toilet Transfer/Toileting   OT: Lower Body Dressing Supervision/stand-by assist;within precautions;using adaptive equipment   OT: Toilet Transfer/Toileting Supervision/stand-by assist;toilet transfer;cleaning and garment management;using adaptive equipment;within precautions   Interventions   Interventions Quick Adds Self-Care/Home Management   Self-Care/Home  Management   Self-Care/Home Mgmt/ADL, Compensatory, Meal Prep Minutes (12606) 30   Symptoms Noted During/After Treatment (Meal Preparation/Planning Training) fatigue;dizziness   Treatment Detail/Skilled Intervention Pt encouraged to participate. Educ in importance of changing positions. Supine to sit with max Ax2/HOB elevated/rail/cues for tech. Pt sat EOB with SBA, then sit<>stand from bed to chair with CGA/cues/rail. sit to supine with max Ax2 and max Ax2 for scoot to HOB. Incr time/effort second to pain/decr cog/weakness/fatigue.   OT Discharge Planning   OT Plan any bed mob/transfers/LE dress with AE   OT Discharge Recommendation (DC Rec) (S)  Transitional Care Facility   OT Rationale for DC Rec currently, pt max Ax2 for all bed mobility, and unable to tolerate any other activity and would benefit from strengthening/incr cog for home.   OT Brief overview of current status max Ax2 for all bed mob.   Total Session Time   Timed Code Treatment Minutes 30   Total Session Time (sum of timed and untimed services) 40    [facility]  OUTPATIENT OCCUPATIONAL THERAPY  EVALUATION  PLAN OF TREATMENT FOR OUTPATIENT REHABILITATION  (COMPLETE FOR INITIAL CLAIMS ONLY)  Patient's Last Name, First Name, M.I.  YOB: 1960  Martha Chun                          Provider's Name  [facility] Medical Record No.  [id number]                             Onset Date:  01/24/23   Start of Care Date:  01/25/23   Type:     ___PT   _X_OT   ___SLP Medical Diagnosis:  EMELINA                    OT Diagnosis:  decr ADL indep/safety Visits from SOC:  1     See note for plan of treatment, functional goals and certification details    I CERTIFY THE NEED FOR THESE SERVICES FURNISHED UNDER        THIS PLAN OF TREATMENT AND WHILE UNDER MY CARE     (Physician co-signature of this document indicates review and certification of the therapy plan).

## 2023-01-25 NOTE — DISCHARGE SUMMARY
[facility] Orthopedics Discharge Summary                                       FARZAD DUDLEY [id number]   Age: 62 year old  PCP: Fox Vieira, [phone] 1960     Date of Admission:  1/24/2023  Date of Discharge::  01/29/23   Discharge Physician:  Patricio Hernandez PA-C     Code status:  Full Code    Admission Information:  Admission Diagnosis:  Osteoarthritis of left hip [M16.12]  Primary osteoarthritis of left hip [M16.12]    Post-Operative Day: 5 Day Post-Op     Reason for admission:  The patient was admitted for the following:Procedure(s) (LRB):  LEFT TOTAL HIP ARTHROPLASTY (Left)    Principal Problem:    Primary osteoarthritis of left hip      Allergies:  Amlodipine, Clonidine, Escitalopram, Gluten meal, Hydrochlorothiazide, Mold, Molds & smuts, Paroxetine, Penicillins, Sulfa drugs, Tramadol, Carvedilol, Codeine, Cortisone, Duloxetine, and Oxycodone    Following the procedure noted above the patient was transferred to the post-op floor and started on:    Therapy:  physical therapy and occupational therapy  Anticoagulation Plan: Aspirin 81 mg BID  for 40 days  Pain Management: scopainmedication: dilaudid, tylenol, aspirin and vistaril  Weight bearing status: Weight bearing as tolerated     The patient was followed and co-managed by the hospitalist service during the inpatient treatment course  Complications:  Urinary retention, resolved.    Consultations:  Medicine     Pertinent Labs   Lab Results: personally reviewed.     Recent Labs   Lab Test 01/25/23  0441 01/24/23  2223 10/19/21  1206 08/25/21  0823   INR  --   --  0.96 0.91   HGB 8.5* 9.2* 13.3 13.7   HCT 24.6* 26.3* 39.1 41.6   MCV 91 89 96 98    251 302 303   * 133* 136 137          Discharge Information:  Condition at discharge: Stable  Discharge destination:  Discharged to home     Medications at discharge:  Current Discharge Medication List      START taking these medications    Details   acetaminophen (TYLENOL)  500 MG tablet Take 2 tablets (1,000 mg) by mouth every 8 hours    Associated Diagnoses: Primary osteoarthritis of left hip      HYDROmorphone (DILAUDID) 2 MG tablet Take 1-2 tablets (2-4 mg) by mouth every 4 hours as needed for moderate to severe pain  Refills: 0    Associated Diagnoses: Primary osteoarthritis of left hip         CONTINUE these medications which have CHANGED    Details   hydrOXYzine (ATARAX) 25 MG tablet Take 1 tablet (25 mg) by mouth every 6 hours as needed for other (adjuvant pain)    Associated Diagnoses: Primary osteoarthritis of left hip         CONTINUE these medications which have NOT CHANGED    Details   aliskiren (TEKTURNA) 150 MG tablet Take 300 mg by mouth At Bedtime      B Complex-Folic Acid (B COMPLEX FORMULA 1 PO) Take 1 teaspoonful by mouth daily      butalbital-aspirin-caffeine (FIORINAL) -40 MG capsule Take 1 capsule by mouth every 4 hours as needed for headaches  Qty: 60 capsule, Refills: 3    Associated Diagnoses: Bilateral occipital neuralgia      carboxymethylcellulose (REFRESH PLUS) 0.5 % SOLN ophthalmic solution Place 1-2 drops into both eyes 3 times daily as needed      diltiazem ER (DILT-XR) 120 MG 24 hr capsule Take 120 mg by mouth every evening      fluticasone (FLONASE) 50 MCG/ACT nasal spray Spray 2 sprays into both nostrils every evening      Garlic 1000 MG CAPS Take 1 tablet by mouth 3 times daily      ibuprofen (ADVIL/MOTRIN) 200 MG tablet Take 400 mg by mouth every 4 hours as needed for pain      Magnesium Citrate 200 MG TABS Take 300 mg by mouth 2 times daily (Takes at supper and bedtime)      Omega-3 Fatty Acids (FISH OIL PO) Take 1 capsule by mouth 2 times daily      Oxymetazoline HCl (NASAL SPRAY) 0.05 % SOLN Spray 1 spray into both nostrils 2 times daily as needed      polyethylene glycol (MIRALAX) 17 GM/Dose powder Take 1 capful by mouth daily as needed for constipation      Probiotic Product (PROBIOTIC ADVANCED PO) Take 1 capsule by mouth At Bedtime       Quercetin 250 MG TABS Take 500 mg by mouth daily      QUEtiapine (SEROQUEL) 100 MG tablet Take 200 mg by mouth At Bedtime      triprolidine-pseudoePHEDrine (APRODINE) 2.5-60 MG TABS per tablet Take 0.5 tablets by mouth 2 times daily      Vitamin D3 (CHOLECALCIFEROL) 125 MCG (5000 UT) tablet Take 125 mcg by mouth daily                        Follow-Up Care:  Patient should be seen in a [facility] office in 10-14 days by the patient's Orthopedic Surgeon/Physician Assistant.  Call [phone] for appointment or questions.    Patricio Hernandez PA-C

## 2023-01-25 NOTE — DISCHARGE INSTRUCTIONS
Home care services have been arranged for you:  Home Care Agency: [facility]  Home Care Services: RN, PT and OT  Home Care Phone: [phone]    Instructions: Home care agency will call within 48 hours of discharge to schedule appointments.

## 2023-01-25 NOTE — PLAN OF CARE
"0045- Pt asking for the low dose of dilaudid for 8/10 hip and knee pain. Pt reported a flushed sensation with the IV dose but it resolved. Pt found relief after this medication. Rating her pain a 5/10.  Pt refused IV cefazolin would like to talk to the surgeons about this medication in the morning. Pt also continues to refuse Toradol. The refusing to get out of bed to urinate or even lift the left leg much due to pain. Encouraged the pt to use the purewick and will do another bladder scan. Encouraged ambulation and moving around due help with pain control and muscle stiffness but refusing to even try until the Seroquel \"wears off\". Reports that Seroquel causes \"paralysis\"  And she takes it regularly at home. She is still able to move her arms and legs with positive encouragement. She has repeated requests and asks for help with menial tasks, such as feeding her.     0400- Pt requested IV dilaudid for pain, tolerated well. Pt is calm, more talkative with the writer now, joking. Pureweick in place, pt does no feel any urge to void. Bladder scan read 0. IVF discontinued due to high blood pressures therefore PO fluids encouraged. Also encouraging pt to try at least getting to the side of the bed this morning. Pt understands importance but remains hesitant to move that leg at all.      Patient vital signs are at baseline: Yes  Patient able to ambulate as they were prior to admission or with assist devices provided by therapies during their stay:  No,  Reason:  Pt refusing (See above)  Patient MUST void prior to discharge:  No,  Reason:  DVT, bladder scan 0  Patient able to tolerate oral intake:  Yes  Pain has adequate pain control using Oral analgesics:  No,  Reason:  Got IV dilaudid x2, refusing most pain medications worried about side effects   Does patient have an identified :  Yes  Has goal D/C date and time been discussed with patient:  Yes, pt does not want to discharge today. Sounds like she does not have a " great support at home.

## 2023-01-25 NOTE — SIGNIFICANT EVENT
Significant Event Note    Time of event: 10:12 PM January 24, 2023    Description of event:  Received a phone call from nursing stating that the overnight in-house hospitalist has requested remote cross cover to address the issue of acute chest pain and shortness of breath in a person who is admitted and is in the postop phase    Plan:  Given the fact that this is remote cross cover and the patient is expressing acute chest pain and shortness of breath which could or could not be related to anxiety I recommend calling rapid response    Discussed with: bedside nurse    Yoon King MD

## 2023-01-25 NOTE — PROVIDER NOTIFICATION
Updated Dr. Dent regarding pt acute chest pain and SOB; Dr. Dent stated contact remote cross cover. Sent text page to Dr. Fernando zavaleta with notification of acute chest pain with SOB.

## 2023-01-25 NOTE — CONSULTS
Care Management Initial Consult    General Information  Assessment completed with: Patient,    Type of CM/SW Visit: Initial Assessment    Primary Care Provider verified and updated as needed: Yes   Readmission within the last 30 days:        Reason for Consult: discharge planning  Advance Care Planning:            Communication Assessment  Patient's communication style: spoken language (English or Bilingual)    Hearing Difficulty or Deaf: no   Wear Glasses or Blind: yes    Cognitive  Cognitive/Neuro/Behavioral: WDL                      Living Environment:   People in home: friend(s)     Current living Arrangements: apartment      Able to return to prior arrangements: yes       Family/Social Support:  Care provided by: self  Provides care for: no one  Marital Status:   Other (specify) (Friends)          Description of Support System:           Current Resources:   Patient receiving home care services: No     Community Resources: None  Equipment currently used at home: none  Supplies currently used at home: None    Employment/Financial:  Employment Status:          Financial Concerns:             Lifestyle & Psychosocial Needs:  Social Determinants of Health     Tobacco Use: Medium Risk     Smoking Tobacco Use: Former     Smokeless Tobacco Use: Never     Passive Exposure: Not on file   Alcohol Use: Not on file   Financial Resource Strain: Not on file   Food Insecurity: Not on file   Transportation Needs: Not on file   Physical Activity: Not on file   Stress: Not on file   Social Connections: Not on file   Intimate Partner Violence: Not on file   Depression: Not at risk     PHQ-2 Score: 0   Housing Stability: Not on file       Functional Status:  Prior to admission patient needed assistance:   Dependent ADLs:: Independent  Dependent IADLs:: Independent       Mental Health Status:  Mental Health Status: No Current Concerns       Chemical Dependency Status:  Chemical Dependency Status: No Current Concerns              Values/Beliefs:  Spiritual, Cultural Beliefs, Adventism Practices, Values that affect care:                 Additional Information:  CMSW and SW intern met and introduced selves to pt. Pt lethargic and had difficulty keeping eyes open during assessment  Pt states that she live in an apt with friends. According to therapy notes, Pt lives alone in apt and will have a friend stay with her for 3 days. Pt states that she drives and is independent at baseline. According to nurse, Pt has not walked today or voided. CMSW will follow up tomorrow when Pt more awake. [facility] has accepted HC referrals for RN, PT and OT.         Rita Maria

## 2023-01-25 NOTE — PLAN OF CARE
Problem: Hip Arthroplasty  Goal: Optimal Coping  Outcome: Not Progressing  Goal: Optimal Functional Ability  Outcome: Not Progressing  Intervention: Promote Optimal Functional Status  Recent Flowsheet Documentation  Taken 1/25/2023 1600 by Brii Thacker, RN  Activity Management: activity adjusted per tolerance  Taken 1/25/2023 0930 by Brii Thacker, RN  Activity Management: activity adjusted per tolerance  Taken 1/25/2023 0826 by Brii Thacker, RN  Assistive Device Utilized:    gait belt    walker  Activity Management: (W/ therapy) standing at bedside  Goal: Anesthesia/Sedation Recovery  Outcome: Not Progressing  Intervention: Optimize Anesthesia Recovery  Recent Flowsheet Documentation  Taken 1/25/2023 1600 by Brii Thacker, RN  Safety Promotion/Fall Prevention:    activity supervised    bed alarm on    clutter free environment maintained    fall prevention program maintained    increased rounding and observation    increase visualization of patient    lighting adjusted    mobility aid in reach    nonskid shoes/slippers when out of bed    patient and family education    room door open    room near nurse's station    room organization consistent    safety round/check completed    supervised activity    treat reversible contributory factors    treat underlying cause  Taken 1/25/2023 0930 by Brii Thacker, RN  Safety Promotion/Fall Prevention:    activity supervised    bed alarm on    clutter free environment maintained    fall prevention program maintained    increased rounding and observation    increase visualization of patient    lighting adjusted    mobility aid in reach    nonskid shoes/slippers when out of bed    patient and family education    room door open    room near nurse's station    room organization consistent    safety round/check completed    supervised activity    treat reversible contributory factors    treat underlying cause  Goal: Acceptable Pain Control  Outcome: Not  Progressing  Intervention: Prevent or Manage Pain  Recent Flowsheet Documentation  Taken 1/25/2023 1600 by Brii Thacker RN  Pain Management Interventions: rest  Taken 1/25/2023 1256 by Brii Thacker RN  Pain Management Interventions: rest  Taken 1/25/2023 1211 by Brii Thacker RN  Pain Management Interventions: (APAP) medication (see MAR)  Taken 1/25/2023 0930 by Brii Thacker RN  Pain Management Interventions: rest  Taken 1/25/2023 0853 by Brii Thacker RN  Pain Management Interventions: rest  Taken 1/25/2023 0808 by Brii Thacker RN  Pain Management Interventions:    medication (see MAR)    cold applied    distraction    emotional support    repositioned    rest  Taken 1/25/2023 0700 by Brii Thacker RN  Pain Management Interventions: (Ortho notified; Switch to PO Dilaudid) Provider notified (comment)  Goal: Effective Urinary Elimination  Outcome: Not Progressing     Problem: Anxiety  Goal: Anxiety Reduction or Resolution  Outcome: Not Progressing     Problem: Hip Arthroplasty  Goal: Absence of Bleeding  Outcome: Progressing  Goal: Absence of Infection Signs and Symptoms  Outcome: Progressing  Goal: Intact Neurovascular Status  Outcome: Progressing  Goal: Nausea and Vomiting Relief  Outcome: Progressing  Intervention: Prevent or Manage Nausea and Vomiting  Recent Flowsheet Documentation  Taken 1/25/2023 0806 by Brii Thacker RN  Nausea/Vomiting Interventions: antiemetic  Goal: Effective Oxygenation and Ventilation  Outcome: Progressing  Intervention: Optimize Oxygenation and Ventilation  Recent Flowsheet Documentation  Taken 1/25/2023 1600 by Brii Thacker RN  Head of Bed (HOB) Positioning: HOB at 20-30 degrees  Taken 1/25/2023 0930 by Brii Thacker RN  Head of Bed (HOB) Positioning: HOB at 20-30 degrees     Patient vital signs are at baseline: Yes  Patient able to ambulate as they were prior to admission or with assist devices provided by therapies during their stay:  No,   Reason:  Pt is reluctant to make any attempt to get out of bed   Patient MUST void prior to discharge:  No,  Reason:  Bladder scanning Q4; Initiated continuous fluids; Likely to straight cath  Patient able to tolerate oral intake:  Yes  Pain has adequate pain control using Oral analgesics:  Yes, pt became oversedated after PO  2 mg Dilaudid & PO 50 mg Atarax; Adm PRN 0.4 mg IV Narcan w/ effective results. Pt is still quite drosy but is able to carry on a conversation, follow commands, etc  Does patient have an identified :  Yes  Has goal D/C date and time been discussed with patient:  Yes     Pt is A & O x 4. Pt endorses severe pain this AM. Per Ortho PA, IV Diluadid is not to be given. Per Ortho PA, adm 2 mg PO Dilaudid, PO Zofran & PO Atarax. Pt became oversedated; Un-arousable to sternal rub & physical stimulation. W/ assistance of charge RN, adm PRN IV 0.4 mg Narcan w/ effective results. Pt is still quite drowsy but is able to carry on a conversation & follow commands. She does, however, fall asleep as soon as conversation ceases. Has been like for the majority of the shift. Continuing to monitor respiratory status.    Dressing to surgical incision is CDI. CMS intact x 4. Pt has not voided since 2000 1/24/22. Bladder scanning Q4 today. Initiated NS at 100 ml following a 500 ml bolus per [facility]. Now bladder scanning greater than 400 ml; Will be straight-cathing shortly. Straight cathed for 475 ml at 1828. Pt has also had minimal oral intake today d/t drowsiness. VSS throughout shift w/ tele being discontinued early this AM. Anticipating discharge tomorrow pending improved mobility. Continuing to monitor.     ROB Oneill  Shift: 0700 - 1930

## 2023-01-25 NOTE — PROGRESS NOTES
Adm PRN PO 2 mg Dilaudid & PRN 50 mg Atarax. Pt became un-arousable w/ stable VS. Adm PRN IV 0.4 mg Narcan w/ assistance of Charge RN. Pt responded well; Now arousable, talking but nonsensical. Monitoring per Narcan monitoring instructions. Notified Ortho Team/Pain Team/[facility].

## 2023-01-25 NOTE — PROGRESS NOTES
[facility] ACUTE PAIN SERVICE    Daily PAIN Progress Note    Assessment/Plan:  Martha Chun is a 62 year old female who was admitted on 1/24/2023.  Pain team was asked to see the patient for acute post op pain with history  of chronic neck pain  s/p cervical spinal fusion. S/P Bilateral Occipital Nerve Stimulator for occipital neuralgia and headaches. Patient reports that nerve stimulator has not helped with pain and has had discussions with her neurosurgeon, Dr. Kobi Shrestha at [facility] in [facility], about possibility of removing nerve stimulator later this spring. Also with history of Intentional drug overdose and was hospitalized at [facility] 6/2022. Admitted for left EMELINA. History of chronic left hip pain, now s/p EMELINA, spinal fusion for neck pain, chronic pain syndrome, occipital neuralgia and headaches s/p bilateral occipital nerve stimulator implant, fibromyalgia, TCA overdose, HTN, depression/anxiety, suicidal attempt, environmental allergies, allergic rhinitis.     Post op day: 1. (LEFT TOTAL HIP ARTHROPLASTY)      PLAN:   1) Pain is consistent with post op pain. Labs and imaging indicated: I have personally reviewed pertinent notes, labs, tests, and radiologic imaging in patient's chart. Treatment plan includes multimodal pain approach, Hospital Medicine Service for medical management, Orthopedics, PT, OT, post op cares. Patient educated regarding multimodal pain approach, medications as listed below. Patient is understanding of the plan. All questions and concerns addressed to patient's satisfaction.   2)Multimodal Medication Therapy  Topical: consider   NSAID'S: CrCl 77.7. Toradol 15mg iv q6h x doses complete  Steroids: none  Muscle Relaxants: Robaxin 750mg q6hprn   Adjuvants: APAP 650mg q4hprn+975mg q8h, PTA Fioricet q4hprn for HA, hydroxyzine prn  Antidepressants/anxiolytics: PTA Seroquel 200mg hs   Opioids: oral Dilaudid 2-4mg q4hprn - decrease to 1-2 mg qh prn due to sedation  "this AM  IV Pain medication: iv Dilaudid 0.2-0.4mg q2hprn - discontinue   3)Non-medication interventions: ice  4)Constipation Prophylaxis: Scheduled and prn  5) Care Teams: Hospital Medicine Service, Orthopedics      -Opioid prescriber has been none.   -MN  pulled from system on 1/24. This indicates no opioid use.   -Butalbital-Aspirin-Caffeine Cp #60 for 30d (filedl 1/11/23, 12/2, 11/1, 9/29...)   -Aprodine Tablet #90 for 90 days (filled 12/15)   -Lorazepam 0.5mg tab #90 for 30 days (filled on 6/15, 5/13, and 4/5)   -Gabapentin 100mg #60 for 30 days (filled 5/26)   -Butalbital-Acetaminophn  #30 for 8 days (filled 4/19)   Discharge Recommendations - We recommend prescribing the following at the time of discharge: TBD    Subjective:   Describes pain as 4-6/10 and aching. Patient appears lethargic this AM, falls asleep during visit multiple times. RASS -2. Reviewed nursing notes and Orthopedics follow up note.      Active Problems:    * No active hospital problems. *      Objective:  Vital signs in last 24 hours:  /50 (BP Location: Left arm)   Pulse 105   Temp 97.9  F (36.6  C) (Oral)   Resp 16   Ht 1.676 m (5' 6\")   Wt 66.6 kg (146 lb 14.4 oz)   SpO2 96%   BMI 23.71 kg/m    Weight:   Vitals:    01/24/23 0636   Weight: 66.6 kg (146 lb 14.4 oz)      Weight change:   Body mass index is 23.71 kg/m .    Intake/Output last 3 shifts:  I/O last 3 completed shifts:  In: 1420 [P.O.:120; I.V.:1300]  Out: 2310 [Urine:2160; Blood:150]  Intake/Output this shift:  I/O this shift:  In: 120 [P.O.:120]  Out: -     Review of Systems:   As per subjective, all others negative.    Physical Exam:  General Appearance:  RASS -2, No distress   Head:  Normocephalic, without obvious abnormality, atraumatic   Eyes:  PERRL, conjunctiva/corneas clear, EOM's intact   Nose: Nares normal, septum midline   Throat: Lips, mucosa, and tongue normal; teeth and gums normal   Neck: Supple, symmetrical, trachea midline   Back:   " Symmetric, no curvature, ROM normal   Lungs:   Clear to auscultation bilaterally, respirations unlabored, room air   Heart:  Regular rate and rhythm, S1, S2 normal    Abdomen:   Soft, non-tender, bowel sounds active all four quadrants   Extremities: incision covered    Skin: Skin warm, dry    Neurologic: RASS -2     Imaging: Reviewed I have personally reviewed pertinent labs, tests, and radiologic imaging in patient's chart.      Labs: Reviewed I have personally reviewed pertinent labs, tests, and radiologic imaging in patient's chart.  Recent Results (from the past 24 hour(s))   Extra Red Top Tube    Collection Time: 01/24/23 10:23 PM   Result Value Ref Range    Hold Specimen JIC    Extra Green Top (Lithium Heparin) Tube    Collection Time: 01/24/23 10:23 PM   Result Value Ref Range    Hold Specimen JIC    Extra Purple Top Tube    Collection Time: 01/24/23 10:23 PM   Result Value Ref Range    Hold Specimen     Troponin I    Collection Time: 01/24/23 10:23 PM   Result Value Ref Range    Troponin I 0.01 0.00 - 0.29 ng/mL   Comprehensive metabolic panel    Collection Time: 01/24/23 10:23 PM   Result Value Ref Range    Sodium 133 (L) 136 - 145 mmol/L    Potassium 4.4 3.5 - 5.0 mmol/L    Chloride 100 98 - 107 mmol/L    Carbon Dioxide (CO2) 24 22 - 31 mmol/L    Anion Gap 9 5 - 18 mmol/L    Urea Nitrogen 12 8 - 22 mg/dL    Creatinine 0.79 0.60 - 1.10 mg/dL    Calcium 8.8 8.5 - 10.5 mg/dL    Glucose 132 (H) 70 - 125 mg/dL    Alkaline Phosphatase 95 45 - 120 U/L    AST 31 0 - 40 U/L    ALT 15 0 - 45 U/L    Protein Total 5.7 (L) 6.0 - 8.0 g/dL    Albumin 3.3 (L) 3.5 - 5.0 g/dL    Bilirubin Total 0.6 0.0 - 1.0 mg/dL    GFR Estimate 85 >60 mL/min/1.73m2   Magnesium    Collection Time: 01/24/23 10:23 PM   Result Value Ref Range    Magnesium 2.0 1.8 - 2.6 mg/dL   Extra Blue Top Tube    Collection Time: 01/24/23 10:23 PM   Result Value Ref Range    Hold Specimen JIC    CBC with platelets and differential    Collection Time:  01/24/23 10:23 PM   Result Value Ref Range    WBC Count 16.1 (H) 4.0 - 11.0 10e3/uL    RBC Count 2.95 (L) 3.80 - 5.20 10e6/uL    Hemoglobin 9.2 (L) 11.7 - 15.7 g/dL    Hematocrit 26.3 (L) 35.0 - 47.0 %    MCV 89 78 - 100 fL    MCH 31.2 26.5 - 33.0 pg    MCHC 35.0 31.5 - 36.5 g/dL    RDW 13.2 10.0 - 15.0 %    Platelet Count 251 150 - 450 10e3/uL    % Neutrophils 77 %    % Lymphocytes 11 %    % Monocytes 11 %    % Eosinophils 0 %    % Basophils 0 %    % Immature Granulocytes 1 %    NRBCs per 100 WBC 0 <1 /100    Absolute Neutrophils 12.5 (H) 1.6 - 8.3 10e3/uL    Absolute Lymphocytes 1.7 0.8 - 5.3 10e3/uL    Absolute Monocytes 1.8 (H) 0.0 - 1.3 10e3/uL    Absolute Eosinophils 0.0 0.0 - 0.7 10e3/uL    Absolute Basophils 0.0 0.0 - 0.2 10e3/uL    Absolute Immature Granulocytes 0.1 <=0.4 10e3/uL    Absolute NRBCs 0.0 10e3/uL   Lipase    Collection Time: 01/24/23 10:23 PM   Result Value Ref Range    Lipase 14 0 - 52 U/L   ECG 12-LEAD WITH MUSE (LHE)    Collection Time: 01/24/23 10:26 PM   Result Value Ref Range    Systolic Blood Pressure  mmHg    Diastolic Blood Pressure  mmHg    Ventricular Rate 102 BPM    Atrial Rate 102 BPM    MN Interval 152 ms    QRS Duration 80 ms     ms    QTc 495 ms    P Axis 66 degrees    R AXIS 24 degrees    T Axis 87 degrees    Interpretation ECG       Sinus tachycardia  Nonspecific T wave abnormality  Abnormal ECG  When compared with ECG of 01-NOV-2006 06:51,  No significant change was found  Confirmed by AVEL BROWN MD LOC:WW ([id number]) on 1/25/2023 8:22:04 AM     CBC with platelets    Collection Time: 01/25/23  4:41 AM   Result Value Ref Range    WBC Count 16.0 (H) 4.0 - 11.0 10e3/uL    RBC Count 2.70 (L) 3.80 - 5.20 10e6/uL    Hemoglobin 8.5 (L) 11.7 - 15.7 g/dL    Hematocrit 24.6 (L) 35.0 - 47.0 %    MCV 91 78 - 100 fL    MCH 31.5 26.5 - 33.0 pg    MCHC 34.6 31.5 - 36.5 g/dL    RDW 13.2 10.0 - 15.0 %    Platelet Count 219 150 - 450 10e3/uL   Basic metabolic panel    Collection Time:  01/25/23  4:41 AM   Result Value Ref Range    Sodium 131 (L) 136 - 145 mmol/L    Potassium 4.7 3.5 - 5.0 mmol/L    Chloride 99 98 - 107 mmol/L    Carbon Dioxide (CO2) 25 22 - 31 mmol/L    Anion Gap 7 5 - 18 mmol/L    Urea Nitrogen 13 8 - 22 mg/dL    Creatinine 0.81 0.60 - 1.10 mg/dL    Calcium 8.5 8.5 - 10.5 mg/dL    Glucose 131 (H) 70 - 125 mg/dL    GFR Estimate 83 >60 mL/min/1.73m2   Troponin I    Collection Time: 01/25/23  4:41 AM   Result Value Ref Range    Troponin I 0.02 0.00 - 0.29 ng/mL         Total time spent 35 minutes with greater than 50% in consultation, education and coordination of care.     Also discussed with RN.     Please see A&P for additional details of medical decision making.    Elements of Medical Decision Making as described above. High risk therapy including opioids, high risk drug therapy including oral and/or parenteral controlled substances.       TERESA BennettP-C  Acute Care Pain Management Program  [facility] ([place], [place], [place])  Monday-Friday 8a-4p   Page via online paging system or call [phone]

## 2023-01-25 NOTE — SIGNIFICANT EVENT
"Significant Event Note    Time of event: 10:47 PM January 24, 2023    Description of event:  Paged to attend a rapid response to address acute chest pain in patient who underwent an uncomplicated L EMELINA earlier this morning.      On my arrival, patient is alert. /57, but it was reportedly in 70s systolic about 20 minutes prior.    No tachypnea.   HR <100  EKG showed sinus tachycardia, no ischemic changes  Patient reported retrosternal chest pressure  She was concerned about \"histamine release potential\" of some of the ordered medications, khanh dilaudid  L hip pain is moderate  Takes seroquel 300 mg at bedtime.  Last night took 200 mg.  Worried about paralysis potential overnight.   She is <12hr postop  Declined ASA 81 mg  Declined toradol  Declined OOB  RN straight cathed    Allergy list reviewed        Objective:  BP (!) 186/87 (BP Location: Left arm, Patient Position: Supine)   Pulse 102   Temp 97.8  F (36.6  C) (Oral)   Resp 19   Ht 1.676 m (5' 6\")   Wt 66.6 kg (146 lb 14.4 oz)   SpO2 100%   BMI 23.71 kg/m      Gen:  Resting in bed.  No acute distress.  Washcloth covering eyes.   Resp:  Nonlabored.  No retractations.  Clear in anterior lung fields bilaterally.   CV:   RRR.  No murmur.  Nerve stimulators in upper chest bilaterally.   Extr:  No edema.    Skin:  No bruises on exposed skin.      Meds:  her current medications include acetaminophen, aliskiren, butalbital-acetaminophen, butalbital-acetaminophen-caffeine, carboxymethylcellulose ophthalmic solution, cholecalciferol, diltiazem, diphenhydramine-acetaminophen, fluticasone, lorazepam, melatonin, meloxicam, mirtazapine, omega-3 fatty acids, oxymetazoline, polyethylene glycol, and triprolidine-pseudoephedrine.          Plan:  CMP, CBC with diff, Mg, trop   CBC came back with Hgb 9.2   Na 133   Trop 0.01   Recheck in AM  Telemetry overnight  Dilaudid PRN as ordered.  Allergy list reviewed.  No anaphylactic reactions documented.   Seroquel 200 " mg  Diltiazem as at home  Reassurance   Monitor for SOB, leg swelling, ssx PE          Discussed with: bedside nurse, supervising physician, Dr. Dent, and patient.    Yani Callaahn MD

## 2023-01-25 NOTE — PROGRESS NOTES
Rapid response called. RT present at bedside, EGK performed and was dismissed.    Aster William, RT

## 2023-01-25 NOTE — SIGNIFICANT EVENT
FRANAT called for RRT. Pt developed chest pain and pressure. Pt on monitor, NSR in the 90's. 18# iv placed and labs drawn and sent. EKG done. Pt alert and talking to staff. Worried about medications reactions. Wanting pain medications. Placed on Tele.    LAYNE LIVINGSTON RN

## 2023-01-25 NOTE — PROGRESS NOTES
"                  [facility] Orthopaedics Progress Note      Post-operative Day: 1 Day Post-Op    Procedure(s):  LEFT TOTAL HIP ARTHROPLASTY      Subjective:    Pain: moderate  Chest pain, SOB:  No   Limited mobility due to discomfort and nausea  Urinary retention overnight      Objective:  Blood pressure (!) 142/66, pulse 102, temperature 97.9  F (36.6  C), temperature source Oral, resp. rate 18, height 1.676 m (5' 6\"), weight 66.6 kg (146 lb 14.4 oz), SpO2 99 %, not currently breastfeeding.    Patient Vitals for the past 24 hrs:   BP Temp Temp src Pulse Resp SpO2   01/25/23 0359 (!) 142/66 97.9  F (36.6  C) Oral -- 18 99 %   01/25/23 0018 (!) 149/70 99.7  F (37.6  C) Oral -- -- 99 %   01/24/23 2226 -- -- -- 102 -- --   01/24/23 2218 (!) 186/87 -- -- -- -- --   01/24/23 2215 -- 97.8  F (36.6  C) Oral -- -- --   01/24/23 2152 (!) 170/74 -- -- 91 19 100 %   01/24/23 2125 (!) 175/78 99.3  F (37.4  C) Oral 101 17 100 %   01/24/23 1900 (!) 145/69 98.2  F (36.8  C) Oral 85 17 96 %   01/24/23 1657 (!) 188/80 -- -- -- -- --   01/24/23 1505 (!) 70/37 -- -- -- -- --   01/24/23 1502 93/52 -- -- -- -- --   01/24/23 1500 97/50 97.7  F (36.5  C) Axillary 61 18 98 %   01/24/23 1400 (!) 145/82 97.8  F (36.6  C) Axillary 97 18 99 %   01/24/23 1300 (!) 142/105 97.4  F (36.3  C) Oral 107 18 100 %   01/24/23 1230 (!) 164/70 97.8  F (36.6  C) Oral 100 16 99 %   01/24/23 1200 (!) 143/70 97.8  F (36.6  C) Oral 94 16 100 %   01/24/23 1130 (!) 148/76 97.8  F (36.6  C) Temporal 93 16 99 %   01/24/23 1100 (!) 155/73 -- -- 88 16 99 %   01/24/23 1030 (!) 169/78 -- -- 81 12 97 %   01/24/23 1000 (!) 159/68 -- -- 80 14 99 %   01/24/23 0950 (!) 158/70 -- -- 89 14 98 %   01/24/23 0940 139/65 -- -- 85 19 99 %   01/24/23 0930 129/62 -- -- 90 16 99 %       Wt Readings from Last 4 Encounters:   01/24/23 66.6 kg (146 lb 14.4 oz)   06/07/22 63.5 kg (140 lb)   10/22/21 62.3 kg (137 lb 5.6 oz)   09/07/21 61.8 kg (136 lb 3.2 oz)         Motor function, " sensation, and circulation intact   Yes  Wound status: incisions are clean dry and intact. Yes  Calf tenderness: Bilateral  No    Pertinent Labs   Lab Results: personally reviewed.     Recent Labs   Lab Test 01/25/23  0441 01/24/23  2223 10/19/21  1206 08/25/21  0823   INR  --   --  0.96 0.91   HGB 8.5* 9.2* 13.3 13.7   HCT 24.6* 26.3* 39.1 41.6   MCV 91 89 96 98    251 302 303   * 133* 136 137       Plan: Anticoagulation protocol: Aspirin 81 mg BID  x 40  Days, Patient is on Batalbital with 325mg ASA.  We reviewed she may take this upon discharge instead of 81mg ASA BID            Pain medications:  scopainmedication: dilaudid, tylenol, aspirin and vistaril   We discussed transition to oral dilaudid today as this will be discharge medication.  Nursing will administer Zofran to assist with nausea.     Lengthy discussion today today getting up with therapy and beginning to move to assist with urinary retention.              Weight bearing status:  WBAT            Disposition:  Home today with home therapy per PT recommendation.  Encouraged discharge today with improvement of nausea and urinary retention, PT and medical clearance            Continue cares and rehabilitation     Report completed by:  Abisai Rosales PA-C  Date: 1/25/2023  Time: 9:29 AM

## 2023-01-25 NOTE — PROGRESS NOTES
01/25/23 0800   Appointment Info   Signing Clinician's Name / Credentials (PT) Laurel Llamas, PT, DPT   Quick Adds   Quick Adds Certification   Living Environment   People in Home alone   Current Living Arrangements apartment   Home Accessibility no concerns  (Elevator)   Living Environment Comments Patient reports friend will stay with her for first 3 days at discharge   Self-Care   Equipment Currently Used at Home none   Activity/Exercise/Self-Care Comment Has cane and FWW   General Information   Onset of Illness/Injury or Date of Surgery 01/24/23   Referring Physician Dr. Danny Woodson   Patient/Family Therapy Goals Statement (PT) Walk and move without pain   Pertinent History of Current Problem (include personal factors and/or comorbidities that impact the POC) S/P L EMELINA   Existing Precautions/Restrictions no hip IR;no hip ADD past midline   Weight-Bearing Status - LLE weight-bearing as tolerated   Weight-Bearing Status - RLE full weight-bearing   Bed Mobility   Bed Mobility supine-sit   Supine-Sit [place] (Bed Mobility) minimum assist (75% patient effort);verbal cues   Transfers   Transfers sit-stand transfer   Maintains Weight-bearing Status (Transfers) able to maintain   Sit-Stand Transfer   Sit-Stand [place] (Transfers) minimum assist (75% patient effort);verbal cues   Assistive Device (Sit-Stand Transfers) walker, front-wheeled   Gait/Stairs (Locomotion)   Comment, (Gait/Stairs) Pt unable to ambulate due to pain and nausea   Clinical Impression   Criteria for Skilled Therapeutic Intervention Yes, treatment indicated   PT Diagnosis (PT) Impaired functional mobility   Influenced by the following impairments weakness, pain   Functional limitations due to impairments transfers, gait, bed mobility   Clinical Presentation (PT Evaluation Complexity) Stable/Uncomplicated   Clinical Presentation Rationale Pt presents as medically diagnosed   Clinical Decision Making (Complexity) moderate complexity    Planned Therapy Interventions (PT) gait training;home exercise program;bed mobility training;strengthening;patient/family education;transfer training   Anticipated Equipment Needs at Discharge (PT) walker, rolling   Risk & Benefits of therapy have been explained evaluation/treatment results reviewed;care plan/treatment goals reviewed;participants included;patient   PT Total Evaluation Time   PT Eval, Moderate Complexity Minutes (17409) 10   Therapy Certification   Start of care date 01/25/23   Certification date from 01/25/23   Certification date to 02/15/23   Medical Diagnosis S/P L EMELINA   Physical Therapy Goals   PT Frequency 2x/day   PT Predicted Duration/Target Date for Goal Attainment 01/28/23   PT Goals Transfers;Gait   PT: Transfers Supervision/stand-by assist;Sit to/from stand;Assistive device   PT: Gait Supervision/stand-by assist;Rolling walker;50 feet   Interventions   Interventions Quick Adds Therapeutic Activity   Therapeutic Activity   Therapeutic Activities: dynamic activities to improve functional performance Minutes (53250) 25   Symptoms Noted During/After Treatment Dizziness;Increased pain  (Nausea)   Treatment Detail/Skilled Intervention Supine to sit with bed rail and min assist of 1, increased time needed due to pain and nausea, verbal cueing for safety, technique and use of bed rail. Reviewed hip precautions. Scooting forward to EOB with min assist of 1, increased time, use of draw sheet and bed rail. Sit<>Stand with min assist of 1, FWW, verbal cueing for hand placement, and safety. Able to weight shift laterally with CGA, unable to take steps to chair due to nausea. Sit to supine with min assist of 1 for L LE, verbal cueing for technique.   PT Discharge Planning   PT Plan Transfers, gait, LE exercises   PT Discharge Recommendation (DC Rec) (S)  home with home care physical therapy   PT Rationale for DC Rec Patient unable to ambulate at this time due to nausea and pain, needs min assist for  bed mobility and transfers. Except patient to progress to SBA for mobility and will have friend to assist at home. Home PT to continue to progress with functional mobility   PT Brief overview of current status Sit<>Stand with FWW, min assist of 1     [facility]  OUTPATIENT PHYSICAL THERAPY EVALUATION  PLAN OF TREATMENT FOR OUTPATIENT REHABILITATION  (COMPLETE FOR INITIAL CLAIMS ONLY)  Patient's Last Name, First Name, M.I.  YOB: 1960  Martha Chun                        Provider's Name  [facility] Medical Record No.  [id number]                             Onset Date:  (P) 01/24/23   Start of Care Date:  (P) 01/25/23   Type:     _X_PT   ___OT   ___SLP Medical Diagnosis:  (P) S/P L EMELINA              PT Diagnosis:  (P) Impaired functional mobility Visits from SOC:  1     See note for plan of treatment, functional goals and certification details    I CERTIFY THE NEED FOR THESE SERVICES FURNISHED UNDER        THIS PLAN OF TREATMENT AND WHILE UNDER MY CARE     (Physician co-signature of this document indicates review and certification of the therapy plan).

## 2023-01-25 NOTE — PLAN OF CARE
"Patient vital signs are at baseline: Yes Pt has hx HTN, SBP in 170s-180s this shift.  RRT was called at 2218 d/t acute SOB, acute chest pain. Pt extremely anxious this shift and worried about medications, nausea, pain, despite education and offering of medications. EKG was performed read sinus tach with  bpm. Pt stable, A/O; awaiting labs, placed on tele.  Patient able to ambulate as they were prior to admission or with assist devices provided by therapies during their stay:  No,  Reason:  Pt declines to ambulate, declines to move LLE d/t pain.  Patient MUST void prior to discharge:  No,  Reason:  Was straight cathd x 1.  Patient able to tolerate oral intake:  No pt reports intermittent nausea. Drinking water but declined some of dinner meal. Gave PRN IV zofran x 1; offered PRN compazine but pt reports anxiety and worry regarding \"taking a new medication\" and declined compazine.  Pain has adequate pain control using Oral analgesics:  No,  Reason:  Pt declined available pain meds to control pain, expressing anxiety and worry over \"possible medication reaction related to histamine release.\" Utilized nonpharmacologic interventions including rest, ice, repositioning; pt declines to ambulate, declines to get OOB, declines to utilize available methods of voiding. Was straight cathd x 1 for 810 ml urine out, monitoring. Peppermint oil applied to low abdomen aid in decreasing urine retention.  Does patient have an identified :  Yes  Has goal D/C date and time been discussed with patient:  Yes                              "

## 2023-01-26 ENCOUNTER — APPOINTMENT (OUTPATIENT)
Dept: PHYSICAL THERAPY | Facility: CLINIC | Age: 63
DRG: 940 | End: 2023-01-26
Attending: ORTHOPAEDIC SURGERY
Payer: COMMERCIAL

## 2023-01-26 ENCOUNTER — APPOINTMENT (OUTPATIENT)
Dept: OCCUPATIONAL THERAPY | Facility: CLINIC | Age: 63
DRG: 940 | End: 2023-01-26
Attending: ORTHOPAEDIC SURGERY
Payer: COMMERCIAL

## 2023-01-26 PROBLEM — M16.12 PRIMARY OSTEOARTHRITIS OF LEFT HIP: Status: ACTIVE | Noted: 2023-01-26

## 2023-01-26 PROCEDURE — 97530 THERAPEUTIC ACTIVITIES: CPT | Mod: GP

## 2023-01-26 PROCEDURE — 258N000003 HC RX IP 258 OP 636: Performed by: FAMILY MEDICINE

## 2023-01-26 PROCEDURE — 250N000013 HC RX MED GY IP 250 OP 250 PS 637: Performed by: ORTHOPAEDIC SURGERY

## 2023-01-26 PROCEDURE — 99214 OFFICE O/P EST MOD 30 MIN: CPT | Performed by: FAMILY MEDICINE

## 2023-01-26 PROCEDURE — 97116 GAIT TRAINING THERAPY: CPT | Mod: GP

## 2023-01-26 PROCEDURE — 250N000013 HC RX MED GY IP 250 OP 250 PS 637: Performed by: FAMILY MEDICINE

## 2023-01-26 PROCEDURE — 99232 SBSQ HOSP IP/OBS MODERATE 35: CPT | Performed by: NURSE PRACTITIONER

## 2023-01-26 PROCEDURE — 97535 SELF CARE MNGMENT TRAINING: CPT | Mod: GO

## 2023-01-26 PROCEDURE — 250N000011 HC RX IP 250 OP 636: Performed by: ORTHOPAEDIC SURGERY

## 2023-01-26 PROCEDURE — 120N000001 HC R&B MED SURG/OB

## 2023-01-26 PROCEDURE — 97110 THERAPEUTIC EXERCISES: CPT | Mod: GP

## 2023-01-26 PROCEDURE — 250N000013 HC RX MED GY IP 250 OP 250 PS 637: Performed by: NURSE PRACTITIONER

## 2023-01-26 RX ORDER — ACETAMINOPHEN 325 MG/1
650 TABLET ORAL EVERY 4 HOURS PRN
Status: DISCONTINUED | OUTPATIENT
Start: 2023-01-26 | End: 2023-01-29 | Stop reason: HOSPADM

## 2023-01-26 RX ORDER — IBUPROFEN 400 MG/1
400 TABLET, FILM COATED ORAL EVERY 6 HOURS PRN
Status: DISCONTINUED | OUTPATIENT
Start: 2023-01-26 | End: 2023-01-29 | Stop reason: HOSPADM

## 2023-01-26 RX ORDER — BUTALBITAL, ASPIRIN AND CAFFEINE 50; 325; 40 MG/1; MG/1; MG/1
1 TABLET ORAL EVERY 6 HOURS PRN
Status: DISCONTINUED | OUTPATIENT
Start: 2023-01-26 | End: 2023-01-29 | Stop reason: HOSPADM

## 2023-01-26 RX ORDER — HYDROMORPHONE HYDROCHLORIDE 2 MG/1
1-2 TABLET ORAL EVERY 4 HOURS PRN
Qty: 20 TABLET | Refills: 0 | Status: SHIPPED | OUTPATIENT
Start: 2023-01-26 | End: 2023-01-29

## 2023-01-26 RX ADMIN — SODIUM CHLORIDE: 9 INJECTION, SOLUTION INTRAVENOUS at 08:28

## 2023-01-26 RX ADMIN — ACETAMINOPHEN 650 MG: 325 TABLET ORAL at 10:15

## 2023-01-26 RX ADMIN — DILTIAZEM HYDROCHLORIDE 120 MG: 120 CAPSULE, EXTENDED RELEASE ORAL at 22:18

## 2023-01-26 RX ADMIN — MAGNESIUM OXIDE TAB 400 MG (241.3 MG ELEMENTAL MG) 200 MG: 400 (241.3 MG) TAB at 19:47

## 2023-01-26 RX ADMIN — HYDROXYZINE HYDROCHLORIDE 25 MG: 25 TABLET, FILM COATED ORAL at 17:13

## 2023-01-26 RX ADMIN — ASPIRIN 81 MG: 81 TABLET, COATED ORAL at 10:16

## 2023-01-26 RX ADMIN — MAGNESIUM OXIDE TAB 400 MG (241.3 MG ELEMENTAL MG) 200 MG: 400 (241.3 MG) TAB at 17:08

## 2023-01-26 RX ADMIN — QUETIAPINE 200 MG: 100 TABLET ORAL at 22:19

## 2023-01-26 RX ADMIN — SENNOSIDES AND DOCUSATE SODIUM 1 TABLET: 50; 8.6 TABLET ORAL at 19:47

## 2023-01-26 RX ADMIN — POLYETHYLENE GLYCOL 3350 17 G: 17 POWDER, FOR SOLUTION ORAL at 10:15

## 2023-01-26 RX ADMIN — HYDROMORPHONE HYDROCHLORIDE 1 MG: 2 TABLET ORAL at 19:39

## 2023-01-26 RX ADMIN — ONDANSETRON 4 MG: 2 INJECTION INTRAMUSCULAR; INTRAVENOUS at 17:07

## 2023-01-26 RX ADMIN — CHOLECALCIFEROL TAB 125 MCG (5000 UNIT) 125 MCG: 125 TAB at 10:21

## 2023-01-26 RX ADMIN — BUTALBITAL, ACETAMINOPHEN, AND CAFFEINE 1 TABLET: 50; 325; 40 TABLET ORAL at 10:15

## 2023-01-26 RX ADMIN — ACETAMINOPHEN 975 MG: 325 TABLET ORAL at 03:14

## 2023-01-26 RX ADMIN — SENNOSIDES AND DOCUSATE SODIUM 1 TABLET: 50; 8.6 TABLET ORAL at 10:16

## 2023-01-26 RX ADMIN — ACETAMINOPHEN 650 MG: 325 TABLET ORAL at 17:08

## 2023-01-26 RX ADMIN — ALISKIREN HEMIFUMARATE 300 MG: 150 TABLET, FILM COATED ORAL at 22:18

## 2023-01-26 ASSESSMENT — ACTIVITIES OF DAILY LIVING (ADL)
ADLS_ACUITY_SCORE: 42
ADLS_ACUITY_SCORE: 38
ADLS_ACUITY_SCORE: 42
ADLS_ACUITY_SCORE: 42
ADLS_ACUITY_SCORE: 38
ADLS_ACUITY_SCORE: 42
ADLS_ACUITY_SCORE: 38
ADLS_ACUITY_SCORE: 38
ADLS_ACUITY_SCORE: 42
ADLS_ACUITY_SCORE: 42
ADLS_ACUITY_SCORE: 38
ADLS_ACUITY_SCORE: 42

## 2023-01-26 NOTE — PROGRESS NOTES
Care Management Follow Up    Length of Stay (days): 0    Expected Discharge Date: 01/27/2023     Concerns to be Addressed: all concerns addressed in this encounter     Patient plan of care discussed at interdisciplinary rounds: Yes    Anticipated Discharge Disposition:  [place] with Home Care      Anticipated Discharge Services:  Home PT and OT   Anticipated Discharge DME:  TBD   Patient/family educated on Medicare website which has current facility and service quality ratings:  Yes   Education Provided on the Discharge Plan:  Yes   Patient/Family in Agreement with the Plan:  Yes     Referrals Placed by CM/SW:  Home Care  Private pay costs discussed: Not applicable    Additional Information:  SWCM and SW Intern met with Pt.  Pt is hoping that she can return home with Home Care. [facility] has accepted Pt for PT, OT and RN.  Pt will see Therapy in the morning.  Pt will have a friend stay with her for 3 days.       SATISH Faust

## 2023-01-26 NOTE — PROVIDER NOTIFICATION
Page to remote cross cover    Assumed care of pt 1930. here for L hip arthro. Psych hx, multiple allergies. Rapid called last night for tachycardia and chest pressure, assumed to be anxiety. Report that pt became unresponsive after 2mg dilaudid po and 50mg atarax po at 8am, narcan administered. Report that pt has been sedated all day though VSS (t98.2, rr16, p92, bp137/60, 99% on RA). My initial assessment is that pt CAM is positive for delirium (flux course, inattention, altered LOC). I have called SWAT to assess. Messaging to inform that I will hold seroquel, have not yet assessed if pt can take pills safely.

## 2023-01-26 NOTE — CONSULTS
"ACUPUNCTURIST TREATMENT NOTE    Name: Martha Chun  :  1960  MRN:  [id number]    Acupuncture Treatment  Patient Type: Orthopedic  Intervention Reason: Pain  Pain Location: (L) hip  Patient complaint:: (L) hip pain  Initial insertions: (R)( LI 15, TW 14, LI 14, TW 13); Yin Martinez, (L) scalp x1         \"Risks and benefits of acupuncture were discussed with patient. Consent for treatment was given. We thank you for the referral.\"     Dulce Maria Roa L.Ac.     Date:  2023  Time:  2:32 PM    "

## 2023-01-26 NOTE — PLAN OF CARE
Problem: Hip Arthroplasty  Goal: Optimal Coping  1/26/2023 1358 by Brii Thacker RN  Outcome: Progressing  1/26/2023 1352 by Brii Thacker RN  Outcome: Not Progressing  Goal: Absence of Bleeding  1/26/2023 1358 by Brii Thacker RN  Outcome: Progressing  1/26/2023 1352 by Brii Thacker RN  Outcome: Progressing  Goal: Fluid and Electrolyte Balance  1/26/2023 1358 by Brii Thacker RN  Outcome: Progressing  1/26/2023 1352 by Brii Thacker RN  Outcome: Not Progressing  Goal: Optimal Functional Ability  1/26/2023 1358 by Brii Thacker RN  Outcome: Progressing  1/26/2023 1352 by Brii Thacker RN  Outcome: Not Progressing  Intervention: Promote Optimal Functional Status  Recent Flowsheet Documentation  Taken 1/26/2023 1356 by Brii Thacker RN  Assistive Device Utilized:    gait belt    walker  Activity Management: (W/ therapy) up in chair  Taken 1/26/2023 0820 by Brii Thacker RN  Activity Management: activity adjusted per tolerance  Goal: Absence of Infection Signs and Symptoms  1/26/2023 1358 by Brii Thacker RN  Outcome: Progressing  1/26/2023 1352 by Brii Thacker RN  Outcome: Progressing  Goal: Intact Neurovascular Status  1/26/2023 1358 by Brii Thacker RN  Outcome: Progressing  1/26/2023 1352 by Brii hTacker RN  Outcome: Progressing  Goal: Anesthesia/Sedation Recovery  1/26/2023 1358 by Brii Thacker RN  Outcome: Progressing  1/26/2023 1352 by Brii Thacker RN  Outcome: Progressing  Intervention: Optimize Anesthesia Recovery  Recent Flowsheet Documentation  Taken 1/26/2023 0820 by Brii Thacker RN  Safety Promotion/Fall Prevention:    activity supervised    bed alarm on    clutter free environment maintained    fall prevention program maintained    increased rounding and observation    increase visualization of patient    lighting adjusted    mobility aid in reach    nonskid shoes/slippers when out of bed    patient and family education    room  door open    room near nurse's station    room organization consistent    safety round/check completed    supervised activity    treat reversible contributory factors    treat underlying cause  Goal: Acceptable Pain Control  1/26/2023 1358 by Brii Thacker RN  Outcome: Progressing  1/26/2023 1352 by Brii Thacker RN  Outcome: Progressing  Intervention: Prevent or Manage Pain  Recent Flowsheet Documentation  Taken 1/26/2023 1105 by Brii Thacker RN  Pain Management Interventions: rest  Taken 1/26/2023 1015 by Brii Thacker RN  Pain Management Interventions: medication (see MAR)  Taken 1/26/2023 0820 by Brii Thacker RN  Pain Management Interventions: rest  Goal: Nausea and Vomiting Relief  1/26/2023 1358 by Brii Thacker RN  Outcome: Progressing  1/26/2023 1352 by Brii Thacker RN  Outcome: Progressing  Goal: Effective Urinary Elimination  1/26/2023 1358 by Brii Thacker RN  Outcome: Progressing  1/26/2023 1352 by Brii Thacker RN  Outcome: Not Progressing  Goal: Effective Oxygenation and Ventilation  1/26/2023 1358 by Brii Thacker RN  Outcome: Progressing  1/26/2023 1352 by Brii Thacker RN  Outcome: Progressing  Intervention: Optimize Oxygenation and Ventilation  Recent Flowsheet Documentation  Taken 1/26/2023 0820 by Brii Thacker RN  Head of Bed (HOB) Positioning: HOB at 20-30 degrees    Patient vital signs are at baseline: Yes  Patient able to ambulate as they were prior to admission or with assist devices provided by therapies during their stay:  Yes  Patient MUST void prior to discharge:  Yes  Patient able to tolerate oral intake:  Yes  Pain has adequate pain control using Oral analgesics:  Yes  Does patient have an identified :  Yes  Has goal D/C date and time been discussed with patient:  Yes     Pt is A & O x 4. Pt endorses mild/moderate pain during today's shift. Utilizing scheduled APAP w/ mildly effective results. Utilizing caution w/ any narcotic adm  given oversedation yesterday. Dressing to surgical incision is CDI. CMS intact x 4. Removed murray this AM & was able to void spontaneously during OT session. Also had a medium BM.  Participating in therapies; Up w/ an assist of 1, walker & gait belt; Currently up in the chair. Tolerating a regular diet w/ VSS. Anticipating discharge today pending PT/OT. Continuing to monitor.    Pt's home supply of Fiorinal is barcoded & loaded in the Omni-cell override drawer.     Brii Thacker, ROSSIN  Shift: [id number] - [id number]

## 2023-01-26 NOTE — UTILIZATION REVIEW
Admission Status; Secondary Review Determination   Under the authority of the Utilization Management Committee, the utilization review process indicated a secondary review on Martha Chun. The review outcome is based on review of the medical records, discussions with staff, and applying clinical experience noted on the date of the review.   (x) Inpatient Status Appropriate - This patient's medical care is consistent with medical management for inpatient care and reasonable inpatient medical practice.     RATIONALE FOR DETERMINATION   Martha Chun is a 62 yr old female with chronic pain, ROSIO, MDD who presented for left total hip arthroplasty on 1/24/22.  She has had some postop pain issues.  On 1/25/23, she required Narcan and had ongoing somnolence that prohibited discharge.  Today with some ongoing pain issues.  She has had a delay in progression of therapy due to inability to participate yesterday due to the medical needs for Narcan and ongoing somnolence.  She is now POD#2 and unable to discharge still.  Pain is limiting factor still per pain team. Discussed with Dr. Woodson.    At the time of admission with the information available to the attending physician more than 2 nights Hospital complex care was anticipated, based on patient risk of adverse outcome if treated as outpatient and complex care required. Inpatient admission is appropriate based on the Medicare guidelines.   The information on this document is developed by the utilization review team in order for the business office to ensure compliance. This only denotes the appropriateness of proper admission status and does not reflect the quality of care rendered.   The definitions of Inpatient Status and Observation Status used in making the determination above are those provided in the CMS Coverage Manual, Chapter 1 and Chapter 6, section 70.4.   Sincerely,   Catherine Velez MD  Utilization Review  Physician Advisor  [facility]

## 2023-01-26 NOTE — PROVIDER NOTIFICATION
Bladder scan 877ml at 0418 (was 16 at around 2300). Pt refusing another straight cath, consents to murray. significant mental health history, ok to place murray?

## 2023-01-26 NOTE — PROGRESS NOTES
[facility] ACUTE PAIN SERVICE    Daily PAIN Progress Note    Assessment/Plan:  Martha Chun is a 62 year old female who was admitted on 1/24/2023.  Pain team was asked to see the patient for acute post op pain with history of chronic neck pain s/p cervical spinal fusion. Admitted for left EMELINA. History of S/P Bilateral Occipital Nerve Stimulator for occipital neuralgia and headaches, intentional drug overdose and was hospitalized at [facility] 6/2022, spinal fusion for neck pain, chronic pain syndrome,  fibromyalgia, TCA overdose, HTN, depression/anxiety, suicidal attempt, environmental allergies, allergic rhinitis.     Post op day: 2. (LEFT TOTAL HIP ARTHROPLASTY)      PLAN:   1) Pain is consistent with post op pain. Labs and imaging indicated: I have personally reviewed pertinent notes, labs, tests, and radiologic imaging in patient's chart. Treatment plan includes multimodal pain approach, Hospital Medicine Service for medical management, Orthopedics, PT, OT, post op cares. Patient educated regarding multimodal pain approach, medications as listed below. Patient is understanding of the plan. All questions and concerns addressed to patient's satisfaction.   2)Multimodal Medication Therapy  Topical: consider   NSAID'S: CrCl 77.7. Toradol 15mg iv q6h x doses complete  Steroids: none  Muscle Relaxants: Robaxin 750mg q6hprn   Adjuvants: APAP 650mg q4hprn, PTA Fioricet q4hprn for HA, hydroxyzine 25mg q6h prn  Antidepressants/anxiolytics: PTA Seroquel 200mg hs   Opioids: Dilaudid 1-2 mg q4h prn  IV Pain medication: none  3)Non-medication interventions: ice, integrative therapy, acupuncture   4)Constipation Prophylaxis: Scheduled and prn  5) Care Teams: Hospital Medicine Service, Orthopedics      -Opioid prescriber has been none.   -MN  pulled from system on 1/24. This indicates no opioid use.   -Butalbital-Aspirin-Caffeine Cp #60 for 30d (filedl 1/11/23, 12/2, 11/1, 9/29...)   -Aprodine Tablet #90 for 90 days  "(filled 12/15)   -Lorazepam 0.5mg tab #90 for 30 days (filled on 6/15, 5/13, and 4/5)   -Gabapentin 100mg #60 for 30 days (filled 5/26)   -Butalbital-Acetaminophn  #30 for 8 days (filled 4/19)   Discharge Recommendations - We recommend prescribing the following at the time of discharge: per ortho     Subjective:   Describes pain as 7/10 and aching. She is laying in bed, awake, alert. Reports \"sorry for being crabby, there is so many people in and out of here\". Noted yesterday patient was lethargic and Narcan was administered. Per RN patient remained lethargic until last evening. Dyson was placed yesterday due to urine retention. Patient reports she has not been able to get up or move surgical leg due to pain. Has used 2 mg PO dilaudid in 24 hours and hydroxyzine.  She would like acupuncture and massage       Active Problems:    * No active hospital problems. *      Objective:  Vital signs in last 24 hours:  /65 (BP Location: Left arm)   Pulse 95   Temp 97.8  F (36.6  C) (Oral)   Resp 16   Ht 1.676 m (5' 6\")   Wt 66.6 kg (146 lb 14.4 oz)   SpO2 96%   BMI 23.71 kg/m    Weight:     Vitals:    01/24/23 0636   Weight: 66.6 kg (146 lb 14.4 oz)      Weight change:   Body mass index is 23.71 kg/m .    Intake/Output last 3 shifts:  I/O last 3 completed shifts:  In: 2720 [P.O.:2720]  Out: 475 [Urine:475]  Intake/Output this shift:  I/O this shift:  In: 240 [P.O.:240]  Out: 1800 [Urine:1800]    Review of Systems:   As per subjective, all others negative.    Physical Exam:  General Appearance:  RASS -2, No distress   Head:  Normocephalic, without obvious abnormality, atraumatic   Eyes:  PERRL, conjunctiva/corneas clear, EOM's intact   Nose: Nares normal, septum midline   Throat: Lips, mucosa, and tongue normal; teeth and gums normal   Neck: Supple, symmetrical, trachea midline   Back:   Symmetric, no curvature, ROM normal   Lungs:   Clear to auscultation bilaterally, respirations unlabored, room air   Heart:  " Regular rate and rhythm, S1, S2 normal    Abdomen:   Soft, non-tender, bowel sounds active all four quadrants   Extremities: incision covered    Skin: Skin warm, dry    Neurologic: RASS -2     Imaging: Reviewed I have personally reviewed pertinent labs, tests, and radiologic imaging in patient's chart.      Labs: Reviewed I have personally reviewed pertinent labs, tests, and radiologic imaging in patient's chart.      Total time spent 35 minutes with greater than 50% in consultation, education and coordination of care.   Also discussed with RN.   Please see A&P for additional details of medical decision making.  Elements of Medical Decision Making as described above. High risk therapy including opioids, high risk drug therapy including oral and/or parenteral controlled substances.       Teagan CLARKE FNP-C  Acute Care Pain Management Program  [facility] ([place], [place], [place])  Monday-Friday 8a-4p   Page via online paging system or call [phone]

## 2023-01-26 NOTE — PROGRESS NOTES
"Patient insistent on murray catheter rather than another straight catheterization (\"they've already done it three times. Aren't they supposed to place a murray after three straight caths?\"), see provider notification note. Verbal from dawn okay to place murray after education provided on risks and likely greater safety of intermittent catheterization vs indwelling.     Provided education on greater risk for infection with indwelling catheter vs intermittent cath. Patient reports that she does not feel that she will be able to get up to the bathroom to void, states, \"It needs to come out.\"    Explained procedure to patient step by step during preparation and placement. Assistance of aide appreciated.    Placed murray catheter with some difficulty due to pt anatomy, limitations in mobility, and apparent mild swelling/edema of perineal area. Some resistance at urethra, gentle steady pressure with catheter applied until urine return, patient uncomfortable but denied pain with insertion. Clear, pale yellow urine draining.  "

## 2023-01-26 NOTE — PROGRESS NOTES
[facility] MEDICINE PROGRESS NOTE      Identification/Summary: Martha Chun is a 62 year old female with a past medical history of hypertension, generalized anxiety disorder, major depression, osteoarthritis, underwent left total hip arthroplasty due to severe osteoarthritis.  POD 2. Moderate left hip pain present.  Has recurrent acute anxiety.  Pain team evaluated. History of chronic neck pain s/p cervical spinal fusion, history of S/P Bilateral Occipital Nerve Stimulator for occipital neuralgia and headaches, intentional drug overdose and was hospitalized at [facility] 6/2022, spinal fusion for neck pain, chronic pain syndrome,  fibromyalgia, TCA overdose, suicidal attempt. Pain control with Tylenol 650 mg every 4 hours as needed, PO Dilaudid 1 to 2 mg every 4 hours as needed.  Required Narcan once on 1/25 for somnolence.    Assessment and Plan:  Essential hypertension  Tekturna 300 mg daily  Diltiazem  mg daily  Blood pressure is stable    Sinus tachycardia, secondary to acute anxiety  Heart rate is in 90s now  Continue diltiazem    Generalized anxiety disorder  Major depression   Recurrent acute anxiety  Seroquel 200 mg at bedtime  Hydroxyzine 25 to 50 mg every 8 hours as needed    History of migraine headache  On Fiorinal as needed at home    Acute blood loss anemia  Hemoglobin 8.5 from 13.3    Acute urinary retention, resolved    Acute postop pain on chronic pain  Significant history of chronic pain  Appreciate acute pain  Pain control with Tylenol 650 mg every 4 hours as needed, PO Dilaudid 1 to 2 mg every 4 hours as needed.    Status post left total hip arthroplasty  Resume routine postoperative care  Physical and Occupational Therapy  Use incentive spirometry frequently  DVT prophylaxis per orthopedics, aspirin 81 mg twice a day    Zuleima Lozano MD  [place] [employer]  [place] [employer] [facility]  [facility]  Phone: #[phone]  Securely message with the [address]  Web Console (learn more here)  Text page via [facility] Paging/Directory     Interval History/Subjective:  Resting comfortably.  Feeling much better today.  Less anxious.  Moderate left hip pain.  Very positive to get therapy today      Physical Exam/Objective:  Temp:  [97.5  F (36.4  C)-99.5  F (37.5  C)] 97.8  F (36.6  C)  Pulse:  [84-97] 95  Resp:  [16] 16  BP: (124-156)/(60-79) 124/65  SpO2:  [96 %-99 %] 96 %  Body mass index is 23.71 kg/m .    GENERAL:  Alert, appears comfortable, in no acute distress, anxious easily, appears stated age   HEAD:  Normocephalic, without obvious abnormality, atraumatic   EYES:  PERRL, conjunctiva  clear,   EOM's intact   NOSE: Nares normal,  mucosa normal, no drainage   THROAT: Lips, mucosa,   gums normal, mouth moist   NECK: Supple, symmetrical, trachea midline   BACK:   Symmetric, no curvature, ROM normal   LUNGS:   Clear to auscultation bilaterally, no rales, rhonchi, or wheezing, symmetric chest rise on inhalation, respirations unlabored   CHEST WALL:  No tenderness or deformity   HEART:  Regular rate and rhythm, S1 and S2 normal, no murmur    ABDOMEN:   Soft, non-tender, bowel sounds active , no masses, no organomegaly, no rebound or guarding   EXTREMITIES: Status post left total hip arthroplasty   SKIN: No rashes   NEURO: Alert, oriented x3    PSYCH: Cooperative, behavior is appropriate, anxious easily     Data reviewed today: I personally reviewed all new medications, labs, imaging/diagnostics reports over the past 24 hours. Pertinent findings include:    Imaging:   Left hip xray  Postoperative changes bilateral total hip arthroplasty. Components appear well seated. Postprocedural air surrounding the left hip joint.    Labs:  Most Recent 3 CBC's:  Recent Labs   Lab Test 01/25/23  0441 01/24/23  2223 10/19/21  1206   WBC 16.0* 16.1* 6.5   HGB 8.5* 9.2* 13.3   MCV 91 89 96    251 302     Most Recent 3 BMP's:  Recent Labs   Lab Test 01/25/23  0441 01/24/23 2223  10/22/21  0930 10/19/21  1206   * 133*  --  136   POTASSIUM 4.7 4.4  --  4.2   CHLORIDE 99 100  --  103   CO2 25 24  --  28   BUN 13 12  --  12   CR 0.81 0.79  --  0.64   ANIONGAP 7 9  --  5   ALYSE 8.5 8.8  --  8.9   * 132* 79 90       Medications:   Personally Reviewed.  Medications     sodium chloride 100 mL/hr at 01/26/23 0828       aliskiren  300 mg Oral At Bedtime     aspirin  81 mg Oral BID     diltiazem ER  120 mg Oral QPM     fluticasone  2 spray Both Nostrils QPM     lactobacillus rhamnosus (GG)  1 capsule Oral At Bedtime     magnesium oxide  200 mg Oral BID     polyethylene glycol  17 g Oral Daily     QUEtiapine  200 mg Oral At Bedtime     senna-docusate  1 tablet Oral BID     sodium chloride (PF)  3 mL Intracatheter Q8H     Vitamin D3  125 mcg Oral Daily     Total time spent >35 min by me on the date of service doing chart review, history, exam, documentation & further activities per the note.

## 2023-01-26 NOTE — PROGRESS NOTES
"                  [facility] Orthopaedics Progress Note      Post-operative Day: 2 Day Post-Op    Procedure(s):  LEFT TOTAL HIP ARTHROPLASTY      Subjective:    Pain: moderate   Chest pain, SOB:  No   Dyson placed yesterday evening due to continued retention      Objective:  Blood pressure 136/79, pulse 95, temperature 99.5  F (37.5  C), temperature source Oral, resp. rate 16, height 1.676 m (5' 6\"), weight 66.6 kg (146 lb 14.4 oz), SpO2 97 %, not currently breastfeeding.    Patient Vitals for the past 24 hrs:   BP Temp Temp src Pulse Resp SpO2   01/26/23 0402 136/79 99.5  F (37.5  C) Oral 95 16 97 %   01/25/23 2300 (!) 156/67 98.8  F (37.1  C) Oral 84 16 96 %   01/25/23 2056 (!) 141/63 -- -- 91 -- --   01/25/23 1933 137/60 98.2  F (36.8  C) Oral 92 16 99 %   01/25/23 1803 -- -- -- 97 16 98 %   01/25/23 1556 (!) 143/64 97.5  F (36.4  C) Oral 92 16 99 %   01/25/23 1500 135/62 98.1  F (36.7  C) Oral 92 16 99 %   01/25/23 1304 (!) 151/67 -- -- 98 16 96 %   01/25/23 1219 118/59 -- -- 94 16 98 %   01/25/23 1149 129/59 -- -- 90 16 99 %   01/25/23 1108 122/58 98.3  F (36.8  C) Oral 97 18 98 %   01/25/23 1049 136/64 -- -- 109 -- 97 %   01/25/23 1043 -- -- -- 105 16 96 %   01/25/23 1040 -- -- -- 102 16 97 %   01/25/23 1033 107/50 -- -- 87 20 96 %   01/25/23 0930 114/54 97.9  F (36.6  C) Oral 94 18 96 %       Wt Readings from Last 4 Encounters:   01/24/23 66.6 kg (146 lb 14.4 oz)   06/07/22 63.5 kg (140 lb)   10/22/21 62.3 kg (137 lb 5.6 oz)   09/07/21 61.8 kg (136 lb 3.2 oz)         Motor function, sensation, and circulation intact   Yes  Wound status: incisions are clean dry and intact. Yes  Calf tenderness: Bilateral  No    Pertinent Labs   Lab Results: personally reviewed.     Recent Labs   Lab Test 01/25/23  0441 01/24/23  2223 10/19/21  1206 08/25/21  0823   INR  --   --  0.96 0.91   HGB 8.5* 9.2* 13.3 13.7   HCT 24.6* 26.3* 39.1 41.6   MCV 91 89 96 98    251 302 303   * 133* 136 137       Plan: " Anticoagulation protocol: Aspirin 81 mg BID  x 40  Days, Patient is on Batalbital with 325mg ASA.  We reviewed she may take this upon discharge instead of 81mg ASA BID            Pain medications:  dilaudid, tylenol, aspirin and vistaril   Weight bearing status:  WBAT   Appreciate medicine's guidance regarding urinary retention and current murray, discharge with murray vs voiding trial prior to discharge             Disposition:  Home today with home therapy, medical clearance, completion of PT.            Continue cares and rehabilitation     Report completed by:  Sumi Miranda PA-C  Date: 1/26/2023  Time: 8:19 AM

## 2023-01-27 ENCOUNTER — APPOINTMENT (OUTPATIENT)
Dept: OCCUPATIONAL THERAPY | Facility: CLINIC | Age: 63
DRG: 940 | End: 2023-01-27
Attending: ORTHOPAEDIC SURGERY
Payer: COMMERCIAL

## 2023-01-27 ENCOUNTER — APPOINTMENT (OUTPATIENT)
Dept: PHYSICAL THERAPY | Facility: CLINIC | Age: 63
DRG: 940 | End: 2023-01-27
Attending: ORTHOPAEDIC SURGERY
Payer: COMMERCIAL

## 2023-01-27 ENCOUNTER — APPOINTMENT (OUTPATIENT)
Dept: ULTRASOUND IMAGING | Facility: CLINIC | Age: 63
DRG: 940 | End: 2023-01-27
Attending: PHYSICIAN ASSISTANT
Payer: COMMERCIAL

## 2023-01-27 PROCEDURE — 97530 THERAPEUTIC ACTIVITIES: CPT | Mod: GP

## 2023-01-27 PROCEDURE — 120N000001 HC R&B MED SURG/OB

## 2023-01-27 PROCEDURE — 250N000011 HC RX IP 250 OP 636: Performed by: ORTHOPAEDIC SURGERY

## 2023-01-27 PROCEDURE — 97116 GAIT TRAINING THERAPY: CPT | Mod: GP

## 2023-01-27 PROCEDURE — 99232 SBSQ HOSP IP/OBS MODERATE 35: CPT | Performed by: NURSE PRACTITIONER

## 2023-01-27 PROCEDURE — 250N000013 HC RX MED GY IP 250 OP 250 PS 637: Performed by: INTERNAL MEDICINE

## 2023-01-27 PROCEDURE — 250N000013 HC RX MED GY IP 250 OP 250 PS 637: Performed by: FAMILY MEDICINE

## 2023-01-27 PROCEDURE — 250N000013 HC RX MED GY IP 250 OP 250 PS 637: Performed by: NURSE PRACTITIONER

## 2023-01-27 PROCEDURE — 97110 THERAPEUTIC EXERCISES: CPT | Mod: GP

## 2023-01-27 PROCEDURE — 97535 SELF CARE MNGMENT TRAINING: CPT | Mod: GO

## 2023-01-27 PROCEDURE — 93971 EXTREMITY STUDY: CPT | Mod: LT

## 2023-01-27 PROCEDURE — 250N000013 HC RX MED GY IP 250 OP 250 PS 637: Performed by: ORTHOPAEDIC SURGERY

## 2023-01-27 RX ORDER — HYDROXYZINE HCL 25 MG
12.5 TABLET ORAL ONCE
Status: COMPLETED | OUTPATIENT
Start: 2023-01-27 | End: 2023-01-27

## 2023-01-27 RX ADMIN — ONDANSETRON 4 MG: 4 TABLET, ORALLY DISINTEGRATING ORAL at 22:18

## 2023-01-27 RX ADMIN — ACETAMINOPHEN 650 MG: 325 TABLET ORAL at 17:14

## 2023-01-27 RX ADMIN — DILTIAZEM HYDROCHLORIDE 120 MG: 120 CAPSULE, EXTENDED RELEASE ORAL at 22:18

## 2023-01-27 RX ADMIN — MAGNESIUM OXIDE TAB 400 MG (241.3 MG ELEMENTAL MG) 200 MG: 400 (241.3 MG) TAB at 17:20

## 2023-01-27 RX ADMIN — ALISKIREN HEMIFUMARATE 300 MG: 150 TABLET, FILM COATED ORAL at 22:19

## 2023-01-27 RX ADMIN — Medication 2 SPRAY: at 21:05

## 2023-01-27 RX ADMIN — SENNOSIDES AND DOCUSATE SODIUM 1 TABLET: 50; 8.6 TABLET ORAL at 09:56

## 2023-01-27 RX ADMIN — MAGNESIUM OXIDE TAB 400 MG (241.3 MG ELEMENTAL MG) 200 MG: 400 (241.3 MG) TAB at 20:19

## 2023-01-27 RX ADMIN — IBUPROFEN 400 MG: 400 TABLET ORAL at 20:08

## 2023-01-27 RX ADMIN — ACETAMINOPHEN 650 MG: 325 TABLET ORAL at 22:18

## 2023-01-27 RX ADMIN — ACETAMINOPHEN 650 MG: 325 TABLET ORAL at 13:08

## 2023-01-27 RX ADMIN — HYDROMORPHONE HYDROCHLORIDE 2 MG: 2 TABLET ORAL at 14:35

## 2023-01-27 RX ADMIN — QUETIAPINE 200 MG: 100 TABLET ORAL at 22:18

## 2023-01-27 RX ADMIN — IBUPROFEN 400 MG: 400 TABLET ORAL at 11:13

## 2023-01-27 RX ADMIN — SENNOSIDES AND DOCUSATE SODIUM 1 TABLET: 50; 8.6 TABLET ORAL at 20:19

## 2023-01-27 RX ADMIN — ACETAMINOPHEN 650 MG: 325 TABLET ORAL at 08:01

## 2023-01-27 RX ADMIN — HYDROXYZINE HYDROCHLORIDE 12.5 MG: 25 TABLET ORAL at 20:18

## 2023-01-27 RX ADMIN — CHOLECALCIFEROL TAB 125 MCG (5000 UNIT) 125 MCG: 125 TAB at 08:03

## 2023-01-27 RX ADMIN — HYDROMORPHONE HYDROCHLORIDE 1 MG: 2 TABLET ORAL at 09:55

## 2023-01-27 RX ADMIN — ACETAMINOPHEN 650 MG: 325 TABLET ORAL at 04:42

## 2023-01-27 RX ADMIN — HYDROXYZINE HYDROCHLORIDE 25 MG: 25 TABLET, FILM COATED ORAL at 17:14

## 2023-01-27 RX ADMIN — POLYETHYLENE GLYCOL 3350 17 G: 17 POWDER, FOR SOLUTION ORAL at 09:55

## 2023-01-27 ASSESSMENT — ACTIVITIES OF DAILY LIVING (ADL)
ADLS_ACUITY_SCORE: 38

## 2023-01-27 NOTE — PLAN OF CARE
"Pt is A&Ox4. Pt rated pain 3-5/10 during shift thus far. Managed with prn tylenol, per pt request. Dressing is CDI. Full sensation per pt. Pt refused to get oob or be repositioned overnight stating she \"just wants to sleep and does not want to be disturbed\". Education on medication administration, alarms, and use of call-light to reduce risk for falls and injury. No further issues noted. CMS intact. VSS. Denies shortness of breath and nausea.           "

## 2023-01-27 NOTE — PROGRESS NOTES
Care Management Follow Up    Length of Stay (days): 1    Expected Discharge Date: 01/28/2023     Concerns to be Addressed: all concerns addressed in this encounter     Patient plan of care discussed at interdisciplinary rounds: Yes    Anticipated Discharge Disposition:  TCU VS Home with Home Care      Anticipated Discharge Services:  PT, OT and RN   Anticipated Discharge DME:  TBD     Patient/family educated on Medicare website which has current facility and service quality ratings:  Yes   Education Provided on the Discharge Plan:  Yes   Patient/Family in Agreement with the Plan: Yes      Referrals Placed by CM/SW:  TCU and [facility] has accepted.   Private pay costs discussed: Not applicable  At this time   Additional Information:  KUN met with Pt as she is not progressing well enough to return home with Home Care.  Pt still hopes that she will be able to return home tomorrow.  KUN told Pt that alternate plans needs to be and Pt agreed to have TCU referrals sent.  Pt lives in  [place]. Referrals sent to [facility], [facility] and [facility].  PAS needed if Pt goes to TCU.  Friend to transport.       SATISH Faust

## 2023-01-27 NOTE — PROGRESS NOTES
[facility] ACUTE PAIN SERVICE    Daily PAIN Progress Note    Assessment/Plan:  Martha Chun is a 62 year old female who was admitted on 1/24/2023.  Pain team was asked to see the patient for acute post op pain with history of chronic neck pain s/p cervical spinal fusion. Admitted for left EMELINA. History of S/P Bilateral Occipital Nerve Stimulator for occipital neuralgia and headaches, intentional drug overdose and was hospitalized at [facility] 6/2022, spinal fusion for neck pain, chronic pain syndrome,  fibromyalgia, TCA overdose, HTN, depression/anxiety, suicidal attempt, environmental allergies, allergic rhinitis.     Post op day: 3. (LEFT TOTAL HIP ARTHROPLASTY)      PLAN:   1) Pain is consistent with post op pain. Labs and imaging indicated: I have personally reviewed pertinent notes, labs, tests, and radiologic imaging in patient's chart. Treatment plan includes multimodal pain approach, Hospital Medicine Service for medical management, Orthopedics, PT, OT, post op cares. Discussed with Orthopedics about left calf pain, ortho will order LLE US to rule out DVT. Patient educated regarding multimodal pain approach, medications as listed below. Patient is understanding of the plan. All questions and concerns addressed to patient's satisfaction.   2)Multimodal Medication Therapy  Topical: none  NSAID'S: CrCl 75.7. Toradol 15mg iv q6h x doses complete. Ibuprofen prn ordered    Steroids: none  Muscle Relaxants: Robaxin 750mg q6hprn - discontinue as not using   Adjuvants: APAP 650mg q4hprn, PTA Fioricet q4hprn for HA, hydroxyzine 25mg q6h prn  Antidepressants/anxiolytics: PTA Seroquel 200mg hs   Opioids: Dilaudid 1-2 mg q4h prn  IV Pain medication: none  3)Non-medication interventions: ice, integrative therapy, acupuncture   4)Constipation Prophylaxis: Scheduled and prn  5) Care Teams: Hospital Medicine Service, Orthopedics      -Opioid prescriber has been none.   -MN  pulled from system on 1/24. This indicates  "no opioid use.   -Butalbital-Aspirin-Caffeine Cp #60 for 30d (filedl 1/11/23, 12/2, 11/1, 9/29...)   -Aprodine Tablet #90 for 90 days (filled 12/15)   -Lorazepam 0.5mg tab #90 for 30 days (filled on 6/15, 5/13, and 4/5)   -Gabapentin 100mg #60 for 30 days (filled 5/26)   -Butalbital-Acetaminophn  #30 for 8 days (filled 4/19)   Discharge Recommendations - We recommend prescribing the following at the time of discharge: per ortho     Discussed plan and discharge plan with ortho. Pain controlled. Pain team will sign off.      Subjective:  Describes pain as 4-6/10 and aching . She is laying in bed, awake, alert. RN at bedside. Patient reports she has not been able to get up or move surgical leg due to pain. She does mention pain in her left calf.     Has used 1 mg PO dilaudid in 24 hours.     Principal Problem:    Primary osteoarthritis of left hip      Objective:  Vital signs in last 24 hours:  /61 (BP Location: Left arm, Patient Position: Supine)   Pulse 102   Temp 97.9  F (36.6  C) (Oral)   Resp 14   Ht 1.676 m (5' 6\")   Wt 64.9 kg (143 lb 1.3 oz)   SpO2 94%   BMI 23.09 kg/m    Weight:     Vitals:    01/24/23 0636 01/27/23 0601   Weight: 66.6 kg (146 lb 14.4 oz) 64.9 kg (143 lb 1.3 oz)      Weight change:   Body mass index is 23.09 kg/m .    Intake/Output last 3 shifts:  I/O last 3 completed shifts:  In: 1200 [P.O.:1200]  Out: 3400 [Urine:3400]  Intake/Output this shift:  No intake/output data recorded.    Review of Systems:   As per subjective, all others negative.    Physical Exam:  General Appearance:  Alert, no distress    Head:  Normocephalic, without obvious abnormality, atraumatic   Eyes:  PERRL, conjunctiva/corneas clear, EOM's intact   Nose: Nares normal, septum midline   Throat: Lips, mucosa, and tongue normal; teeth and gums normal   Neck: Supple, symmetrical, trachea midline   Back:   Symmetric, no curvature, ROM normal   Lungs:   Clear to auscultation bilaterally, respirations " unlabored, room air   Heart:  Regular rate and rhythm, S1, S2 normal    Abdomen:   Soft, non-tender, bowel sounds active all four quadrants   Extremities: Incision covered, pain in left calf reported, homans neg bilaterally    Skin: Skin warm, dry    Neurologic: Alert, moves all extremities      Imaging: Reviewed I have personally reviewed pertinent labs, tests, and radiologic imaging in patient's chart.      Labs: Reviewed I have personally reviewed pertinent labs, tests, and radiologic imaging in patient's chart.      Total time spent 35 minutes with greater than 50% in consultation, education and coordination of care.   Also discussed with RN.   Please see A&P for additional details of medical decision making.  Elements of Medical Decision Making as described above. High risk therapy including opioids, high risk drug therapy including oral and/or parenteral controlled substances.       TERESA BennettP-LANA  Acute Care Pain Management Program  [facility] ([place], [place], [place])  Monday-Friday 8a-4p   Page via online paging system or call [phone]

## 2023-01-27 NOTE — PLAN OF CARE
Goal Outcome Evaluation:      Plan of Care Reviewed With: patient    Overall Patient Progress: improvingOverall Patient Progress: improving  Patient vital signs are at baseline: Yes  Patient able to ambulate as they were prior to admission or with assist devices provided by therapies during their stay:  Yes  Patient MUST void prior to discharge:  Yes  Patient able to tolerate oral intake:  Yes  Pain has adequate pain control using Oral analgesics:  Yes  Does patient have an identified :  Yes  Has goal D/C date and time been discussed with patient:  Yes

## 2023-01-27 NOTE — CONSULTS
"ACUPUNCTURIST TREATMENT NOTE    Name: Martha Chun  :  1960  MRN:  [id number]    Acupuncture Treatment  Patient Type: Orthopedic  Intervention Reason: Pain  Pain Location: (L) upper leg  Pre-session Pain Ratin  Post-session Pain Ratin  Patient complaint:: Pain of lateral (L) upper leg  Initial insertions: Du 20, Yin domingo, (L) St 34, 36, (L) Gb 36, Li 4  Number of needles inserted: 7  Number of needles removed: 7         \"Risks and benefits of acupuncture were discussed with patient. Consent for treatment was given. We thank you for the referral.\"     Sean Castellon    Date:  2023  Time:  2:24 PM    "

## 2023-01-27 NOTE — PLAN OF CARE
Problem: Plan of Care - These are the overarching goals to be used throughout the patient stay.    Goal: Absence of Hospital-Acquired Illness or Injury  Intervention: Identify and Manage Fall Risk  Recent Flowsheet Documentation  Taken 1/26/2023 1700 by Madeline Gonzalez RN  Safety Promotion/Fall Prevention:   activity supervised   bed alarm on   clutter free environment maintained   fall prevention program maintained   increased rounding and observation   increase visualization of patient   lighting adjusted   mobility aid in reach   nonskid shoes/slippers when out of bed   patient and family education   room door open   room near nurse's station   room organization consistent   safety round/check completed   supervised activity   treat reversible contributory factors   treat underlying cause     Martha is a one assist with gait belt and walker.  She is steady on her feet just slow.    Problem: Plan of Care - These are the overarching goals to be used throughout the patient stay.    Goal: Optimal Comfort and Wellbeing  Intervention: Monitor Pain and Promote Comfort  Recent Flowsheet Documentation  Taken 1/26/2023 1708 by Madeline Gonzalez RN  Pain Management Interventions:   medication (see MAR)   care clustered   cold applied   quiet environment facilitated  Martha complains of pain in her L hip and can get scheduled and prn pain meds.  She is sensitive to pain meds, prefer the 1 mg of PO dilaudid over the 2 mg dilaudid.     Goal Outcome Evaluation:       Plan is to discharge home tomorrow with home care.

## 2023-01-27 NOTE — PROGRESS NOTES
"                  [facility] Progress Note      Post-operative Day: 3 Day Post-Op    Procedure(s):  LEFT TOTAL HIP ARTHROPLASTY      Subjective:    Better today.    Pain: moderate   Chest pain, SOB:  No   Urinary retention has resolved, murray was removed yesterday.  Unsure about going home today due to support and her concerns about being able to get up and out of bed/chair.  Notes posterior ankle pain.      Objective:  Blood pressure 138/64, pulse 102, temperature 98.7  F (37.1  C), temperature source Oral, resp. rate 16, height 1.676 m (5' 6\"), weight 64.9 kg (143 lb 1.3 oz), SpO2 96 %, not currently breastfeeding.    Patient Vitals for the past 24 hrs:   BP Temp Temp src Pulse Resp SpO2 Weight   01/27/23 0601 -- -- -- -- -- -- 64.9 kg (143 lb 1.3 oz)   01/27/23 0430 138/64 -- -- 102 16 96 % --   01/27/23 0100 (!) 176/75 98.7  F (37.1  C) Oral 101 18 95 % --   01/26/23 2240 (!) 167/72 -- -- 98 18 99 % --   01/26/23 1532 (!) 187/76 98.2  F (36.8  C) Oral 98 18 98 % --   01/26/23 0820 124/65 97.8  F (36.6  C) Oral 95 16 96 % --       Wt Readings from Last 4 Encounters:   01/27/23 64.9 kg (143 lb 1.3 oz)   06/07/22 63.5 kg (140 lb)   10/22/21 62.3 kg (137 lb 5.6 oz)   09/07/21 61.8 kg (136 lb 3.2 oz)         Motor function, sensation, and circulation intact   Yes  Wound status: incisions are clean dry and intact. Yes  Calf tenderness: Bilateral  No    Pertinent Labs   Lab Results: personally reviewed.     Recent Labs   Lab Test 01/25/23  0441 01/24/23  2223 10/19/21  1206 08/25/21  0823   INR  --   --  0.96 0.91   HGB 8.5* 9.2* 13.3 13.7   HCT 24.6* 26.3* 39.1 41.6   MCV 91 89 96 98    251 302 303   * 133* 136 137       Plan: US left calf ordered to eval dvt due to pain and decreased activity since EMELINA.  Anticoagulation protocol: Aspirin 81 mg BID  x 40  Days, Patient is on Batalbital with 325mg ASA.  We reviewed she may take this upon discharge instead of 81mg ASA BID            Pain " medications:  dilaudid, tylenol, aspirin and vistaril   Weight bearing status:  WBAT   Disposition:  Home today with home therapy, follow up in 2 weeks.            Continue cares and rehabilitation     Report completed by:  Sumi Miranda PA-C  Date: 1/27/2023  Time: 8:19 AM

## 2023-01-28 ENCOUNTER — APPOINTMENT (OUTPATIENT)
Dept: OCCUPATIONAL THERAPY | Facility: CLINIC | Age: 63
DRG: 940 | End: 2023-01-28
Attending: ORTHOPAEDIC SURGERY
Payer: COMMERCIAL

## 2023-01-28 PROCEDURE — 97535 SELF CARE MNGMENT TRAINING: CPT | Mod: GO

## 2023-01-28 PROCEDURE — 97110 THERAPEUTIC EXERCISES: CPT | Mod: GP

## 2023-01-28 PROCEDURE — 250N000013 HC RX MED GY IP 250 OP 250 PS 637: Performed by: NURSE PRACTITIONER

## 2023-01-28 PROCEDURE — 99232 SBSQ HOSP IP/OBS MODERATE 35: CPT | Performed by: EMERGENCY MEDICINE

## 2023-01-28 PROCEDURE — 250N000013 HC RX MED GY IP 250 OP 250 PS 637: Performed by: FAMILY MEDICINE

## 2023-01-28 PROCEDURE — 250N000011 HC RX IP 250 OP 636: Performed by: ORTHOPAEDIC SURGERY

## 2023-01-28 PROCEDURE — 250N000013 HC RX MED GY IP 250 OP 250 PS 637: Performed by: ORTHOPAEDIC SURGERY

## 2023-01-28 PROCEDURE — 120N000001 HC R&B MED SURG/OB

## 2023-01-28 PROCEDURE — 97116 GAIT TRAINING THERAPY: CPT | Mod: GP

## 2023-01-28 RX ADMIN — BUTALBITAL, ASPIRIN AND CAFFEINE 1 TABLET: 50; 325; 40 TABLET ORAL at 09:01

## 2023-01-28 RX ADMIN — DILTIAZEM HYDROCHLORIDE 120 MG: 120 CAPSULE, EXTENDED RELEASE ORAL at 21:05

## 2023-01-28 RX ADMIN — IBUPROFEN 400 MG: 400 TABLET ORAL at 17:34

## 2023-01-28 RX ADMIN — ALISKIREN HEMIFUMARATE 300 MG: 150 TABLET, FILM COATED ORAL at 21:04

## 2023-01-28 RX ADMIN — HYDROMORPHONE HYDROCHLORIDE 1 MG: 2 TABLET ORAL at 09:00

## 2023-01-28 RX ADMIN — IBUPROFEN 400 MG: 400 TABLET ORAL at 10:36

## 2023-01-28 RX ADMIN — SENNOSIDES AND DOCUSATE SODIUM 1 TABLET: 50; 8.6 TABLET ORAL at 21:05

## 2023-01-28 RX ADMIN — ACETAMINOPHEN 650 MG: 325 TABLET ORAL at 18:57

## 2023-01-28 RX ADMIN — SENNOSIDES AND DOCUSATE SODIUM 1 TABLET: 50; 8.6 TABLET ORAL at 08:15

## 2023-01-28 RX ADMIN — ONDANSETRON 4 MG: 4 TABLET, ORALLY DISINTEGRATING ORAL at 10:36

## 2023-01-28 RX ADMIN — MAGNESIUM OXIDE TAB 400 MG (241.3 MG ELEMENTAL MG) 200 MG: 400 (241.3 MG) TAB at 21:04

## 2023-01-28 RX ADMIN — QUETIAPINE 200 MG: 100 TABLET ORAL at 21:05

## 2023-01-28 RX ADMIN — CHOLECALCIFEROL TAB 125 MCG (5000 UNIT) 125 MCG: 125 TAB at 08:15

## 2023-01-28 RX ADMIN — HYDROXYZINE HYDROCHLORIDE 25 MG: 25 TABLET, FILM COATED ORAL at 16:28

## 2023-01-28 RX ADMIN — ACETAMINOPHEN 650 MG: 325 TABLET ORAL at 13:52

## 2023-01-28 RX ADMIN — POLYETHYLENE GLYCOL 3350 17 G: 17 POWDER, FOR SOLUTION ORAL at 08:15

## 2023-01-28 RX ADMIN — BUTALBITAL, ASPIRIN AND CAFFEINE 1 TABLET: 50; 325; 40 TABLET ORAL at 14:38

## 2023-01-28 RX ADMIN — HYDROMORPHONE HYDROCHLORIDE 1 MG: 2 TABLET ORAL at 18:58

## 2023-01-28 RX ADMIN — ACETAMINOPHEN 650 MG: 325 TABLET ORAL at 08:15

## 2023-01-28 RX ADMIN — MAGNESIUM OXIDE TAB 400 MG (241.3 MG ELEMENTAL MG) 200 MG: 400 (241.3 MG) TAB at 16:28

## 2023-01-28 RX ADMIN — ONDANSETRON 4 MG: 4 TABLET, ORALLY DISINTEGRATING ORAL at 16:24

## 2023-01-28 ASSESSMENT — ACTIVITIES OF DAILY LIVING (ADL)
ADLS_ACUITY_SCORE: 33

## 2023-01-28 NOTE — PROGRESS NOTES
Care Management Follow Up    Length of Stay (days): 2    Expected Discharge Date: 01/28/2023     Concerns to be Addressed: Discharge Planning  Patient plan of care discussed at interdisciplinary rounds: Yes    Anticipated Discharge Disposition: TCU  Anticipated Discharge Services: N/A  Anticipated Discharge DME: N/A    Patient/family educated on Medicare website which has current facility and service quality ratings: Yes  Education Provided on the Discharge Plan: Per Team  Patient/Family in Agreement with the Plan: Yes    Referrals Placed by CM/SW: TCU  Private pay costs discussed: Not applicable    Additional Information:  SWCM received voicemail from Amor at [facility] ([phone]), stating that Pt is accepted for TCU. Called back and spoke to alternate weekend staff, who will verify possible availability for weekend admission. Staff will return call once verified. Friend to transport.    10:00 AM  SWCM received call from [facility], could accept Pt later this morning. SWCM met with Pt to discuss plan. Pt indicates concerns about pain and states that she would like to go home. Reviewed past discussion with team about safety concerns with going home due to level of needs. Pt states that there are air quality issues at [facility] that are a concern for her. Spoke with nurse and contacted hospitalist for further input.     12:21 PM  SWCM spoke with hospitalist after their meeting with Pt. Pt will not be going to TCU today, expresses desire for plan to return home with help from friend. Left VM for mark Grace to confirm caregiving ability for Pt at home. Called back to [facility] to advise that Pt will not be admitting today. CM team should call Amor in admissions on Monday once final plan of home vs. TCU is decided.    1:16 PM  Received call back from mark Grace, discussed current TCU recommendations and therapy updates. Lesly had been prepared to spend about  3 days with Pt, but had planned on more general caregiving tasks rather than frequent physical assistance. Discussed some concerns about being able to meet Pt's physical needs, pain management, frequency of therapy at home. Encouraged Lesly to speak with Pt further about planning and to discuss what she and/or other friends are realistically able to provide. Lesly will contact Pt and CM team will follow up tomorrow regarding updates. Will keep TCU referrals open at this point pending final decision.    MAHSA Mendoza

## 2023-01-28 NOTE — PROGRESS NOTES
"                  [facility] Orthopaedics Progress Note      Post-operative Day: 4 Day Post-Op    Procedure(s):  LEFT TOTAL HIP ARTHROPLASTY      Subjective:    Patient states she had a headache. Also states some pain below her rib cage and SOB. Sitting comfortable no retractions and breathing comfortably.    Pain: moderate   Chest pain, SOB:  No chest pain. Some SOB but O2 WNL.        Objective:  Blood pressure 125/59, pulse 102, temperature 98.2  F (36.8  C), temperature source Oral, resp. rate 18, height 1.676 m (5' 6\"), weight 64.9 kg (143 lb 1.3 oz), SpO2 98 %, not currently breastfeeding.    Patient Vitals for the past 24 hrs:   BP Temp Temp src Pulse Resp SpO2   01/28/23 0756 125/59 98.2  F (36.8  C) Oral 102 18 98 %   01/27/23 2300 (!) 166/82 98.4  F (36.9  C) Oral 91 16 96 %   01/27/23 2008 (!) 178/81 -- -- 93 -- --   01/27/23 1714 (!) 165/89 98.7  F (37.1  C) Oral 95 16 96 %   01/27/23 1553 130/60 98.2  F (36.8  C) Oral 95 15 96 %       Wt Readings from Last 4 Encounters:   01/27/23 64.9 kg (143 lb 1.3 oz)   06/07/22 63.5 kg (140 lb)   10/22/21 62.3 kg (137 lb 5.6 oz)   09/07/21 61.8 kg (136 lb 3.2 oz)         Motor function, sensation, and circulation intact   Yes  Wound status: incisions are clean dry and intact. Yes  Calf tenderness: Bilateral  No    Pertinent Labs   Lab Results: personally reviewed.     Recent Labs   Lab Test 01/25/23  0441 01/24/23  2223 10/19/21  1206 08/25/21  0823   INR  --   --  0.96 0.91   HGB 8.5* 9.2* 13.3 13.7   HCT 24.6* 26.3* 39.1 41.6   MCV 91 89 96 98    251 302 303   * 133* 136 137       Plan:   Anticoagulation protocol: Aspirin 81 mg BID  x 40  Days, Patient is on Batalbital with 325mg ASA.  We reviewed she may take this upon discharge instead of 81mg ASA BID            Pain medications:  dilaudid, tylenol, aspirin and vistaril   Weight bearing status:  WBAT   Disposition: Recommend TCU as patient will likely be failure to thrive at home.             " Continue cares and rehabilitation     Report completed by:  Patricio Hernandez PA-C  Date: 1/28/2023  Time: 8:26 AM

## 2023-01-28 NOTE — PLAN OF CARE
Problem: Hip Arthroplasty  Goal: Optimal Coping  Outcome: Progressing     Problem: Hip Arthroplasty  Goal: Optimal Functional Ability  Intervention: Promote Optimal Functional Status  Recent Flowsheet Documentation  Taken 1/28/2023 7954 by Jessy Herron, RN  Activity Management: ambulated to bathroom    Pt A/O x 4, VSS on room air. Pain managed with alternating between Acetaminophen and Ibuprofen, Dilaudid administered once. Pt is a SBA with walker and a gait belt. Pt has two PIVS that are both saline locked. Discharge pending either TCU placement or home care services.

## 2023-01-28 NOTE — PROGRESS NOTES
[facility] MEDICINE PROGRESS NOTE      Summary: 62 year old female on hospital day number 5 after having a left THR  On 1/24.  She was discharged on 1/27 though bounced back due to an inability  To be fully set up at home for care.  She was readmitted due to this.    PMHx is extensive including HTN, ROSIO, major depression and sensitivity to air vents,  Room temperature changes, perfumes.  Case management have been involved.  She was accepted at a local TCU but due to her sensitivities she is refusing to  Have this care.  She wishes to be discharged back home after her caregiver obtains  A chair for over her toilet.  I believe this is the better plan.  Today she does not like  The room temperature. She ate lunch.  Her pain is controlled.    Problem list:    1.  POD #5 left EMELINA  2.  HTN: cardizem, tekturna  3.  ROSIO  4.  Headaches, migraine  5.  Anemia: 8.5 on 1/25; check level tomorrow  6.  dvt prevention:      Hilary Hyde MD  [facility] Medicine  [facility]  Phone: #[phone]    Interval History/Subjective: I want to be at home; my caregiver will obtain a chair  For over the toilet; I dont want the TCU    Physical Exam/Objective:  Temp:  [98.2  F (36.8  C)-98.7  F (37.1  C)] 98.2  F (36.8  C)  Pulse:  [] 102  Resp:  [15-18] 18  BP: (125-178)/(59-89) 125/59  SpO2:  [96 %-98 %] 98 %  Body mass index is 23.09 kg/m .    GENERAL:  Alert, frail, no distress   HEAD:  Normocephalic, without obvious abnormality, atraumatic   EYES:  PERRL, conjunctiva/corneas clear, no scleral icterus, EOM's intact   NOSE: Nares normal, septum midline, mucosa normal, no drainage   THROAT: Lips, mucosa, and tongue normal; teeth and gums normal, mouth moist   NECK: Supple, symmetrical, trachea midline   BACK:   Symmetric, no curvature, ROM normal   LUNGS:   Clear to auscultation bilaterally, no rales, rhonchi, or wheezing, symmetric chest rise on inhalation,  respirations unlabored   CHEST WALL:  No tenderness or deformity   HEART:  Regular rate and rhythm, S1 and S2 normal, no murmur, rub, or gallop    ABDOMEN:   Soft, non-tender, bowel sounds active all four quadrants, no masses, no organomegaly, no rebound or guarding   EXTREMITIES: Extremities normal, atraumatic, no cyanosis or edema    SKIN: Dry to touch, no exanthems in the visualized areas   NEURO: Alert, oriented x3, moves all four extremities freely   PSYCH: Cooperative, behavior is appropriate      Data reviewed today: I personally reviewed all new medications, labs, imaging/diagnostics reports over the past 24 hours. Pertinent findings include:    Imaging:   No results found for this or any previous visit (from the past 24 hour(s)).    Labs:  Most Recent 3 BMP's:Recent Labs   Lab Test 01/25/23  0441 01/24/23  2223 10/22/21  0930 10/19/21  1206   * 133*  --  136   POTASSIUM 4.7 4.4  --  4.2   CHLORIDE 99 100  --  103   CO2 25 24  --  28   BUN 13 12  --  12   CR 0.81 0.79  --  0.64   ANIONGAP 7 9  --  5   ALYSE 8.5 8.8  --  8.9   * 132* 79 90       Medications:   Personally Reviewed.  Medications       aliskiren  300 mg Oral At Bedtime     diltiazem ER  120 mg Oral QPM     fluticasone  2 spray Both Nostrils QPM     lactobacillus rhamnosus (GG)  1 capsule Oral At Bedtime     magnesium oxide  200 mg Oral BID     polyethylene glycol  17 g Oral Daily     QUEtiapine  200 mg Oral At Bedtime     senna-docusate  1 tablet Oral BID     sodium chloride (PF)  3 mL Intracatheter Q8H     Vitamin D3  125 mcg Oral Daily

## 2023-01-28 NOTE — PROGRESS NOTES
Pt received  At report time. Alert and oriented. Denied pain, nausea and sob. Can transfer with stand by assist. She has been settled in bed and she appears asleep.  Report given to the night nurse

## 2023-01-28 NOTE — PLAN OF CARE
[id number]:    Pt requested a 12.5 mg hydroxyzine at beginning of shift. Paged cross cover and one time dose ordered and given. PRN Advil and Tylenol given for pain. Patient's dorsiflexion on left foot is weak and foot looks more dropped than right. Pt still reporting some numbness in LLE. Given Zofran once for nausea. Patient complaining of intermittent dizziness, which does not seem to be influenced by position or movement. Patient unsure if dizziness started recently or prior to surgery. Patient expresses anxiety about many various details.    Patient vital signs are at baseline: Yes  Patient able to ambulate as they were prior to admission or with assist devices provided by therapies during their stay:  Yes  Patient MUST void prior to discharge:  Yes  Patient able to tolerate oral intake:  Yes  Pain has adequate pain control using Oral analgesics:  Yes  Does patient have an identified :  No,  Reason:  Patient does not live with anyone who can help her. Unclear if she will have a friend who can stay with her or not.  Has goal D/C date and time been discussed with patient:  No,  Reason: Unclear if patient is going to be discharging to a TCU or home with home care.

## 2023-01-29 VITALS
HEIGHT: 66 IN | RESPIRATION RATE: 18 BRPM | HEART RATE: 81 BPM | SYSTOLIC BLOOD PRESSURE: 150 MMHG | TEMPERATURE: 98.4 F | OXYGEN SATURATION: 98 % | BODY MASS INDEX: 22.99 KG/M2 | WEIGHT: 143.08 LBS | DIASTOLIC BLOOD PRESSURE: 80 MMHG

## 2023-01-29 PROCEDURE — 250N000011 HC RX IP 250 OP 636: Performed by: ORTHOPAEDIC SURGERY

## 2023-01-29 PROCEDURE — 99232 SBSQ HOSP IP/OBS MODERATE 35: CPT | Performed by: HOSPITALIST

## 2023-01-29 PROCEDURE — 250N000013 HC RX MED GY IP 250 OP 250 PS 637: Performed by: FAMILY MEDICINE

## 2023-01-29 PROCEDURE — 250N000013 HC RX MED GY IP 250 OP 250 PS 637: Performed by: NURSE PRACTITIONER

## 2023-01-29 PROCEDURE — 250N000013 HC RX MED GY IP 250 OP 250 PS 637: Performed by: ORTHOPAEDIC SURGERY

## 2023-01-29 RX ORDER — AMOXICILLIN 250 MG
1 CAPSULE ORAL 2 TIMES DAILY
Qty: 30 TABLET | Refills: 0 | Status: SHIPPED | OUTPATIENT
Start: 2023-01-29

## 2023-01-29 RX ORDER — ONDANSETRON 4 MG/1
4 TABLET, ORALLY DISINTEGRATING ORAL EVERY 6 HOURS PRN
Qty: 20 TABLET | Refills: 0 | Status: SHIPPED | OUTPATIENT
Start: 2023-01-29

## 2023-01-29 RX ORDER — HYDROMORPHONE HYDROCHLORIDE 2 MG/1
1-2 TABLET ORAL EVERY 4 HOURS PRN
Qty: 20 TABLET | Refills: 0 | Status: SHIPPED | OUTPATIENT
Start: 2023-01-29

## 2023-01-29 RX ADMIN — BUTALBITAL, ASPIRIN AND CAFFEINE 1 TABLET: 50; 325; 40 TABLET ORAL at 10:21

## 2023-01-29 RX ADMIN — POLYETHYLENE GLYCOL 3350 17 G: 17 POWDER, FOR SOLUTION ORAL at 09:08

## 2023-01-29 RX ADMIN — SENNOSIDES AND DOCUSATE SODIUM 1 TABLET: 50; 8.6 TABLET ORAL at 09:08

## 2023-01-29 RX ADMIN — ONDANSETRON 4 MG: 4 TABLET, ORALLY DISINTEGRATING ORAL at 03:21

## 2023-01-29 RX ADMIN — HYDROMORPHONE HYDROCHLORIDE 1 MG: 2 TABLET ORAL at 03:21

## 2023-01-29 RX ADMIN — IBUPROFEN 400 MG: 400 TABLET ORAL at 09:08

## 2023-01-29 RX ADMIN — CHOLECALCIFEROL TAB 125 MCG (5000 UNIT) 125 MCG: 125 TAB at 09:09

## 2023-01-29 RX ADMIN — ACETAMINOPHEN 650 MG: 325 TABLET ORAL at 02:16

## 2023-01-29 RX ADMIN — ONDANSETRON 4 MG: 4 TABLET, ORALLY DISINTEGRATING ORAL at 10:21

## 2023-01-29 ASSESSMENT — ACTIVITIES OF DAILY LIVING (ADL)
ADLS_ACUITY_SCORE: 33
ADLS_ACUITY_SCORE: 31
ADLS_ACUITY_SCORE: 33

## 2023-01-29 NOTE — PLAN OF CARE
Problem: Hip Arthroplasty  Goal: Acceptable Pain Control  Intervention: Prevent or Manage Pain  Recent Flowsheet Documentation  Taken 1/28/2023 1606 by Jessy rByant RN  Pain Management Interventions: medication (see MAR)     (Shift [id number]) Pt A/O x 4. Pt complains of headache during shift. Managing with prn and scheduled medications. Pt does have some shoulder pain along with knee and hip pain. Managing with prn and sched meds, ice, and repositioning. Voiding and ambulating well. Tolerating po intake. On and off nausea managing with prn zofran given x1 this shift. Pt does seek out safe frequently requesting specific needs other wise pleasant. VSS on RA. Discharge plan home tomorrow 11AM.

## 2023-01-29 NOTE — PLAN OF CARE
Problem: Hip Arthroplasty  Goal: Anesthesia/Sedation Recovery  Intervention: Optimize Anesthesia Recovery  Recent Flowsheet Documentation  Taken 1/29/2023 0908 by Jessy Herron RN  Safety Promotion/Fall Prevention:    activity supervised    clutter free environment maintained    fall prevention program maintained    increased rounding and observation    increase visualization of patient    lighting adjusted    nonskid shoes/slippers when out of bed    patient and family education    room door open    room near nurse's station    room organization consistent     Pt A/O x 4, VSS on room air. Pain managed with alternating between Acetaminophen and Ibuprofen. Pt is a SBA with walker and a gait belt. PIV's removed. Discharge to home today with assistance of friend, Lesly, who will also be transporting pt. Pt supplied medications: Butalbital-aspirin-caffeine returned to pt with a total of 3 capsules remaining.     Nurse teaching given on 1/29/23 and the patient expresses understanding and acceptance of instructions. Jessy Herron RN 1/29/2023 11:06 AM

## 2023-01-29 NOTE — PROGRESS NOTES
Physical Therapy Discharge Summary    Reason for therapy discharge:    Discharged to home with home therapy.    Progress towards therapy goal(s). See goals on Care Plan in [facility] electronic health record for goal details.  Goals met    Therapy recommendation(s):    Continued therapy is recommended.  Rationale/Recommendations:  Home PT.  Continue home exercise program.

## 2023-01-29 NOTE — PROGRESS NOTES
[facility] MEDICINE PROGRESS NOTE      Identification/Summary: Martha Chun is a 62 year old female with a past medical history of depression, anxiety, chronic pain, suicide attempts by overdose, borderline personality, somatic symptom disorder who was admitted on 1/24/2023 for elective left hip surgery.    Problem-Based Medical Decision Making  Essential Hypertension  No CP, SOB  -140's systolic  Diltiazem and aliskiren as ordered    Headache  Migraines  Had headache, received prns with relief  No clinical signs of HA during our interview  No change to home medications at discharge per [facility]    Normocytic anemia  Last check 1/25, Hb 8.5, no palmar pallor to suggest ongoing bleeding  Would recommend routine post-op monitoring    S/P Left MIS total hip arthroplasty performed 1/24  she's concerned primarily about railings near her toilet for getting off the seat, otherwise denies discharge concerns  150 intra-operative blood loss  Outpatient monitoring per primary    Hyponatremia  No lab checks performed today, but doesn't clinically look confused or encephalopathic    Otherwise: RRR, cta b/l, not in visible acute distress, alert, no confusion  Has healed posterior cervical scar, midline, no pupil asymmetry,   She interrupts my history gathering frequently, I.e. to ask about her IV removal, alarms, and to demand extra sugar with her breakfast  She doesn't provide meaningful history when asked about lightheadedness    Discharge comments: Med rec is completed, would agree with discharge today  Code Status: Full Code    Body mass index is 23.09 kg/m .  Wt Readings from Last 5 Encounters:   01/27/23 64.9 kg (143 lb 1.3 oz)   06/07/22 63.5 kg (140 lb)   10/22/21 62.3 kg (137 lb 5.6 oz)   09/07/21 61.8 kg (136 lb 3.2 oz)   08/27/21 62.2 kg (137 lb 2 oz)       Diet: Advance Diet as Tolerated: Regular Diet Adult  Discharge Instruction - Regular Diet Adult  DVT Prophylaxis:  Defer to primary  service    I personally reviewed the vital signs for the past 24 hours  I personally reviewed all new medications, labs, imaging/diagnostics reports over the past 24 hours.  Santosh Mora MD, MPH  Hospitalist,[facility]: [facility]  Phone: #[phone]    Medications:   Personally Reviewed.  Medications       aliskiren  300 mg Oral At Bedtime     diltiazem ER  120 mg Oral QPM     fluticasone  2 spray Both Nostrils QPM     lactobacillus rhamnosus (GG)  1 capsule Oral At Bedtime     magnesium oxide  200 mg Oral BID     polyethylene glycol  17 g Oral Daily     QUEtiapine  200 mg Oral At Bedtime     senna-docusate  1 tablet Oral BID     sodium chloride (PF)  3 mL Intracatheter Q8H     Vitamin D3  125 mcg Oral Daily       Clinically Significant Risk Factors              # Hypoalbuminemia: Lowest albumin = 3.3 g/dL at 1/24/2023 10:23 PM, will monitor as appropriate

## 2023-01-29 NOTE — PROGRESS NOTES
Assumed care from [id number]. Pt is AOx4, on ra, vss. Minimal pain reported this shift, icepacks maintained. Pt is demanding and particular with care, however, calm and cooperative. Ambulating with GB and FWW and standby assist. Denies N/V.  All PM meds administered. Pt sleeping comfortably in bed.

## 2023-01-29 NOTE — PROGRESS NOTES
"                  [facility] Orthopaedics Progress Note      Post-operative Day: 5 Day Post-Op    Procedure(s):  LEFT TOTAL HIP ARTHROPLASTY      Subjective:      Pain: moderate   Chest pain, SOB:  No chest pain. Some SOB but O2 WNL.        Objective:  Blood pressure (!) 150/80, pulse 81, temperature 98.4  F (36.9  C), temperature source Oral, resp. rate 18, height 1.676 m (5' 6\"), weight 64.9 kg (143 lb 1.3 oz), SpO2 98 %, not currently breastfeeding.    Patient Vitals for the past 24 hrs:   BP Temp Temp src Pulse Resp SpO2   01/29/23 0800 (!) 150/80 98.4  F (36.9  C) Oral 81 18 98 %   01/28/23 2349 (!) 142/72 98.5  F (36.9  C) Oral 97 16 96 %   01/28/23 1547 139/70 98.4  F (36.9  C) Oral 94 16 99 %       Wt Readings from Last 4 Encounters:   01/27/23 64.9 kg (143 lb 1.3 oz)   06/07/22 63.5 kg (140 lb)   10/22/21 62.3 kg (137 lb 5.6 oz)   09/07/21 61.8 kg (136 lb 3.2 oz)         Motor function, sensation, and circulation intact   Yes  Wound status: incisions are clean dry and intact. Yes  Calf tenderness: Bilateral  No    Pertinent Labs   Lab Results: personally reviewed.     Recent Labs   Lab Test 01/25/23  0441 01/24/23  2223 10/19/21  1206 08/25/21  0823   INR  --   --  0.96 0.91   HGB 8.5* 9.2* 13.3 13.7   HCT 24.6* 26.3* 39.1 41.6   MCV 91 89 96 98    251 302 303   * 133* 136 137       Plan:   Anticoagulation protocol: Aspirin 81 mg BID  x 40  Days, Patient is on Batalbital with 325mg ASA.  We reviewed she may take this upon discharge instead of 81mg ASA BID            Pain medications:  dilaudid, tylenol, aspirin and vistaril   Weight bearing status:  WBAT   Disposition: Home with home care            Continue cares and rehabilitation     Report completed by:  Patricio Hernandez PA-C  Date: 1/29/2023  Time: 8:26 AM   "

## 2023-01-29 NOTE — PROGRESS NOTES
Occupational Therapy Discharge Summary    Reason for therapy discharge:    Discharged to home with home therapy.    Progress towards therapy goal(s). See goals on Care Plan in [facility] electronic health record for goal details.  Goals met    Therapy recommendation(s):    Continued therapy is recommended.  Rationale/Recommendations:  Home OT to facilitate increased safety/independence with ADLs/IADLs.    Angela Valdez OTR/L 1/29/2023

## 2023-01-29 NOTE — PROGRESS NOTES
Care Management Discharge Note    Discharge Date: 01/29/2023       Discharge Disposition:  Home with [facility]        Private pay costs discussed: Not applicable        Handoff Referral Completed: No    Additional Information:  Pt to discharge home today with [facility].  Notified [facility] of discharge plan and faxed orders.      SATISH Clarke

## 2023-01-29 NOTE — PLAN OF CARE
Problem: Plan of Care - These are the overarching goals to be used throughout the patient stay.    Goal: Optimal Comfort and Wellbeing  Intervention: Monitor Pain and Promote Comfort  Recent Flowsheet Documentation  Taken 1/29/2023 0316 by Roselia Berrios, RN  Pain Management Interventions: medication (see MAR)  Taken 1/29/2023 0216 by Roselia Berrios, RN  Pain Management Interventions:   medication (see MAR)   ambulation/increased activity    Patient vital signs are at baseline: Yes  Patient able to ambulate as they were prior to admission or with assist devices provided by therapies during their stay:  Yes  Patient MUST void prior to discharge:  Yes  Patient able to tolerate oral intake:  Yes  Pain has adequate pain control using Oral analgesics:  Yes  Does patient have an identified :  Yes  Has goal D/C date and time been discussed with patient:  Yes    Pt A&Ox4. VSS on RA. CMS intact, intermittent numbness to left heel. Dressing c/d/i. Voiding adequately. Up with sba with walker and gait belt to the bathroom. Pain managed with PRN tylenol and dilaudid 1 mg for pain overnight. IV SL x2. PRN zofran x1 overnight for nausea. Discharge home this AM.

## 2023-03-14 DIAGNOSIS — M54.81 BILATERAL OCCIPITAL NEURALGIA: Primary | ICD-10-CM

## 2023-03-14 PROCEDURE — 99207 PR NO BILLABLE SERVICE THIS VISIT: CPT | Performed by: NEUROLOGICAL SURGERY

## 2023-04-18 ENCOUNTER — TELEPHONE (OUTPATIENT)
Dept: NEUROSURGERY | Facility: CLINIC | Age: 63
End: 2023-04-18
Payer: COMMERCIAL

## 2023-04-18 ENCOUNTER — HOSPITAL ENCOUNTER (OUTPATIENT)
Facility: CLINIC | Age: 63
End: 2023-04-18
Attending: NEUROLOGICAL SURGERY | Admitting: NEUROLOGICAL SURGERY
Payer: COMMERCIAL

## 2023-04-18 NOTE — TELEPHONE ENCOUNTER
I called patient in regards to getting her set up with surgery with Dr. Shrestha, but when I asked if she was ready to move forward she stated that she has been dealing with frequent dizzy spells. I let her know that I would pass a message off to care team.      Blair Yap on 4/18/2023 at 11:07 AM

## 2023-04-19 NOTE — TELEPHONE ENCOUNTER
Patient called this morning stating that she was not ready to schedule surgery, and that she would contact us when the dizziness and headaches went away.        Blair Yap on 4/19/2023 at 1:42 PM

## 2023-04-20 NOTE — TELEPHONE ENCOUNTER
Called pt re the dizziness she reported to our surgery scheduler. Pt said she is dizzy when she gets out of bed and at various times throughout the day. She said she has appointments scheduled for her PCP to determine the source of the dizziness and resolve it. She will call us when she is ready to schedule surgery. Nothing further at this time.

## 2023-05-24 ENCOUNTER — TELEPHONE (OUTPATIENT)
Dept: PSYCHIATRY | Facility: CLINIC | Age: 63
End: 2023-05-24
Payer: COMMERCIAL

## 2023-05-24 NOTE — TELEPHONE ENCOUNTER
Per Elsy: I received an email with a fax from a [facility] pharmacist requesting we forward Martha's [facility] records to her as part of their medication therapy management program/evaluation. Can you reach out to Martha and advise that she needs to complete an DAHLIA so that [place] can forward that record?   I will forward the fax I received to the OBC inbox.     Patient last seen by provider was 6/6/22. She was referred back to primary at that time.     Left voicemail for patient. I let her know that we needed an DAHLIA to send her [facility] records to her. I gave her the number to medical records and the fax number for our OBC's.   I also gave her the [facility] phone number in case of questions.     Elma Ramirez RN on 5/24/2023 at 3:02 PM

## 2023-06-06 ENCOUNTER — TELEPHONE (OUTPATIENT)
Dept: PSYCHIATRY | Facility: CLINIC | Age: 63
End: 2023-06-06
Payer: COMMERCIAL

## 2023-06-06 NOTE — TELEPHONE ENCOUNTER
Donte - Patient Practitioner/Nursing Recommendation Report.  Emailed to provider.  Original in mail drawer.

## 2023-06-07 NOTE — TELEPHONE ENCOUNTER
"Spoke to pt. She does not have a copy of [employer] result at home. Pt asked not to send anything to her via [employer] because 'it's hard to open\"       As per pt request, printed and mailed result to her home address.       "

## 2023-06-07 NOTE — TELEPHONE ENCOUNTER
Call place to patient. Requested return call to Triage so directives from Elsy Andrew NP can be reviewed with patient.

## 2023-08-05 ENCOUNTER — HEALTH MAINTENANCE LETTER (OUTPATIENT)
Age: 63
End: 2023-08-05

## 2023-11-16 NOTE — PROGRESS NOTES
NEUROSURGERY CLINIC NOTE       Reason for Visit:              Post Operative Evaluation and Wound Assessment       PROCEDURE PERFORMED:  10/22/2021           Dr. Kobi Shrestha   1.  Phase II occipital nerve stimulator implant bilateral  2.  Use of intraoperative fluoroscopy.  3. Electronic interrogation of neurostimulator        IMPLANTS:    Implant Name Type Inv. Item Serial No.  Lot No. LRB No. Used Action   OCTRODE LEAD KIT, 60CM      [id number]   Right 1 Implanted   OCTRODE LEAD KIT, 60 CM     [id number] [employer]WIN   Right 1 Implanted   PROCLAIM XR5 BATTERY     [id number]   Right 1 Implanted    Octrode lead kit Neurology device   [id number] [employer] NA Left 1 Implanted   OCTRODE LEAD KIT, 60CM     [id number]     Left 1 Implanted   PROCLAIM XR 5     [id number]     Left 1 Implanted         HISTORY OF PRESENT ILLNESS:  Martha Chun is a 61-year-old female who has a history of occipital neuralgia and complex facial pain, who was seen by Dr. Shrestha at the [facility] to discuss an occipital nerve stimulation trial for treatment of her longstanding occipital neuralgia, significant occipital pain, and had a successful trial of occipital nerve block; however, that did not provide any durable relief.  She underwent percutaneous placement of bilateral occipital nerve stimulators externalized trial that was successful. She had significant pain reduction but wanted more coverage laterally. We had a significant discussion about the risks, benefits and alternatives.    Today,   She is having difficulty finding an optimal program at this time.   She is tearful at times during the exam, and discouraged.   She is meeting with the [employer] representative today, and continuing to work with programming towards adequate coverage.    She would also like a referral to pain management for head/neck pain, and TMJ   She is interested in botox for neck pain   She did have some bruising at the site of  Patient returning call to nurse, nurse currently unavailable.    the stimulator placement  -primarily on the right.   Some swelling - right, but improved.   After patient visited w/me, the [place] representative came out of the room and stated   That the patient was citing severe depression, and having suicidal ideation.   She has been evaluated in the past by SHAYNE Freed in Mental Health and was recommended  to have follow up, but she has not followed up.   Today, we were able to reach out to psychology/mental health and the patient spoke directly to   Ion Haywood. (see separate dictation and progress note dated today)   She also spoke directly to Dr. Shrestha via conference telephone call.     Current Outpatient Medications   Medication     acetaminophen (TYLENOL) 500 MG tablet     aliskiren (TEKTURNA) 150 MG tablet     Butalbital-Acetaminophen (PHRENILIN)  MG TABS per tablet     butalbital-acetaminophen-caffeine (ESGIC) -40 MG tablet     butalbital-aspirin-caffeine (FIORINAL) -40 MG capsule     carboxymethylcellulose (REFRESH PLUS) 0.5 % SOLN ophthalmic solution     cholecalciferol 50 MCG (2000 UT) tablet     diltiazem ER (DILT-XR) 120 MG 24 hr capsule     diphenhydrAMINE-acetaminophen (TYLENOL PM)  MG tablet     fluticasone (FLONASE) 50 MCG/ACT nasal spray     gabapentin (NEURONTIN) 100 MG capsule     LORazepam (ATIVAN) 0.5 MG tablet     Melatonin 10 MG TABS tablet     mirtazapine (REMERON) 15 MG tablet     mirtazapine (REMERON) 7.5 MG tablet     Omega-3 Fatty Acids (FISH OIL PO)     Oxymetazoline HCl (NASAL SPRAY) 0.05 % SOLN     triprolidine-pseudoePHEDrine (APRODINE) 2.5-60 MG TABS per tablet     No current facility-administered medications for this visit.       Examination:   There were no vitals taken for this visit.      Neurological Assessment:      General    Alert, cooperative.  In mild distress, tearful at times   Pulmonary:   Breathing comfortably on room air. No cough, or shortness of breath  Skin:    Visible skin without lesions or  obvious rash  Speech is fluent  Maintains eye contact  Musculoskeletal:    Moving extremities freely with good strength  Incision:    Clean, dry, and intact.  No open drainage.   Some swelling of surgical site on the right.   She has some ecchymosis, and bruising R> L  Closed w/surgical skin glue   She has some skin tightness on the right- but on exam- skin is pliable   And palpable tubing, but does not elicit pain response.    Cranial nerve exam   Maintains eye contact, extra ocular movements grossly intact   Able to raise eyebrows  Puff out checks  Smile/show teeth with no facial droop  Facial features symmetrical  Tongue protrusion midline- no deviation  Able to shrug shoulders   Finger-to-nose movement intact  No pronator drift     Assessment:    S/p   1.  Phase II occipital nerve stimulator implant bilateral  2.  Use of intraoperative fluoroscopy.  3. Electronic interrogation of neurostimulator    Plan:   ~Patient will follow up w/mental health/psychology provider  ~Patient will give authorization for remote access for programming.  ~Dr. Shrestha reassured the patient that he will then work together w/the patient to see if she  can obtain optimal programming and coverage.   ~Referral placed for pain management per patient request.   ~Dr Shrestha will follow up with patient as discussed.       At the end of the visit, all the patient's questions and concerns had been addressed and the patient was agreeable with the plan of care as outlined above. The patient has our office contact information at [phone], and knows to call with any questions, concerns, or changes in condition.       Ashley Wilson DNP  Neurosurgery Nurse Practitioner  [facility]  [phone]

## 2024-04-10 NOTE — PROGRESS NOTES
[facility] Collaborative Care Psychiatry Service  June 6, 2022      Behavioral Health Clinician Progress Note    Patient Name: Martha Chun           Service Type:  Individual      Service Location:   [place] / Email (patient reached)     Session Start Time: 14:07  Session End Time: 14:33      Session Length: 16 - 37      Attendees: Patient     Service Modality:  Video Visit:      Telemedicine Visit: The patient's condition can be safely assessed and treated via synchronous audio and visual telemedicine encounter.      Reason for Telemedicine Visit: Services only offered telehealth    Originating Site (Patient Location): Patient's home    Distant Site (Provider Location): Provider Remote Setting- Home Office    Consent:  The patient/guardian has verbally consented to: the potential risks and benefits of telemedicine (video visit) versus in person care; bill my insurance or make self-payment for services provided; and responsibility for payment of non-covered services.     Mode of Communication:  Video Conference via [email or web address]    As the provider I attest to compliance with applicable laws and regulations related to telemedicine.    Visit Activities (Refresh list every visit): [facility] Only    [facility]: [facility]  Treatment Plan Review Date: due by fourth visit  See Flowsheets for today's PHQ-9 and ROSIO-7 results  Previous PHQ-9:   PHQ-9 SCORE 12/9/2021 12/9/2021 1/18/2022   PHQ-9 Total Score MyChart - 18 (Moderately severe depression) 22 (Severe depression)   PHQ-9 Total Score 18 18 22     Previous ROSIO-7: No flowsheet data found.    LISA LEVEL:  No flowsheet data found.    DATA  Extended Session (60+ minutes): No  Interactive Complexity: No  Crisis: No  [facility] Patient: No    Treatment Objective(s) Addressed in This Session:  Target Behavior(s): Depression    Depressed Mood: Increase interest, engagement, and pleasure in doing things  Decrease frequency and intensity of feeling down,  "depressed, hopeless  Decrease thoughts that you'd be better off dead or of suicide / self-harm    Current Stressors / Issues:   Update: \"really bad.\" \"I figured out that the previous surgery and stimulator were making the neck pain worse.\" Patient has a plan to have the stimulator removed in the next month. She is hopeful that will relieve some of the pain she is feeling. Patient comments \"I need to keep hope going or else I get worse.\" Patient indicates she has been doing research for over 2 years and has found there is a specialized provider in [place] that can do a procedure that could help; however, she is unable to get to that provider due to cost and the difficulty with her not being able to travel.  Patient will try to make this work if possible.  She is also looking into a couple of local providers but is worried they cannot help.  Patient expresses continued depression and frustration because of the limitations that her neck pain causes.  Patient describes that she is unable to leave the apartment or do anything.  She is frustrated that most treatments do not work and those that do only offer temporary relief.  Patient did connect with a ketamine treatment clinic but was unable to receive the initial service because she required as needed anxiety medication prior to the treatment and her blood pressure was still too high.  Patient expresses concerns that the clinic did not understand her needs her medication history and did not feel it was a good fit.  Patient does not plan to return and no longer feels that ketamine could be helpful.  Patient expresses frustration with a lot of different treatment providers not understanding her needs.  Patient describes that her body feels very weak and she has difficulty doing even the simplest of exercises.  Patient also reports experiencing \"too many traumas\" and describes dealing with chronic mold issues, varying living conditions, being physically ill, etc.  " "Patient indicates she has limited supports and does not want to exhaust to the ones that she does.  Patient has been using her lorazepam as needed for her emotional days but tries to cut back to only half a pill.  Patient is also trying to access supports but not finding anything helps.    SI: \"not want to be alive.\" Feel that physical pain and sadness pushes to thoughts of suicide but not make a plan or intent. Not do because of her daughters and knowing the negative impact on them.  Patient was reminded to access her safety plan and use it as needed.  She was also reminded about crisis resources.  Patient indicates they do not seem to help and was provided information on the [facility] crisis program.  [facility] validated patient for her distress.  [facility] provided encouragement for patient to continue reaching out to different providers to see if they could help.  [facility] suggest patient connect with her insurance company to see if there is someone that can help her navigate different resources without having to spend too much time figuring it out on her own.  [facility] also reminded patient that chronic pain does not have a goal of pain relief at 100% but rather a reduction in pain in order to manage.  [facility] encouraged patient to continue accessing her supports and connect with a therapist that can help her manage her recent traumas.    Stressors: chronic pain and limited supports. Finding places to go for specialized services. Divorce pending. Applying for social security.    Tx: tried to get connected to therapy and not work out for many reasons. Selective with needing in-person but close to her house or phone sessions. Not able to do virtual due to getting headaches and not being able to drive far. [facility] will place mental health referral.    Etoh: denies  Substance: denies  Nicotine: denies  Caffeine: just medication    Most Important: not wanting to put medications in body so wanting \"natural\" remedies.     Progress on " Treatment Objective(s) / Homework:  Not applicable-session spent establishing rapport and assessing for need    Motivational Interviewing    MI Intervention: Expressed Empathy/Understanding, Supported Autonomy, Collaboration, Evocation, Permission to raise concern or advise, Reflections: simple and complex and Change talk (evoked)     Change Talk Expressed by the Patient: Desire to change Reasons to change Need to change    Provider Response to Change Talk: E - Evoked more info from patient about behavior change    Also provided psychoeducation about behavioral health condition, symptoms, and treatment options    Care Plan review completed: No    Medication Review:  No changes to current psychiatric medication(s)    Medication Compliance:  Yes    Changes in Health Issues:   Yes: Pain, Associated Psychological Distress    Chemical Use Review:   Substance Use: Chemical use reviewed, no active concerns identified      Tobacco Use: No current tobacco use.      Assessment: Current Emotional / Mental Status (status of significant symptoms):  Risk status (Self / Other harm or suicidal ideation)  Patient has had a history of suicidal ideation: Patient reports a history of suicidal thoughts but denies any attempts  Patient denies current fears or concerns for personal safety.  Patient reports the following current or recent suicidal ideation or behaviors: Patient continues to have suicidal thoughts due to chronic pain and frustration but denies she has a plan or intent.  Patient feels safe.  Patient denies current or recent homicidal ideation or behaviors.  Patient denies current or recent self injurious behavior or ideation.  Patient denies other safety concerns.  A safety and risk management plan has not been developed at this time, however patient was encouraged to call [place] [phone] should there be a change in any of these risk factors.  Patient was reminded about her previous safety plan created on 11/08/2021 with  [facility] ALCAZAR    Appearance:   Appropriate   Eye Contact:   Good   Psychomotor Behavior: Normal   Attitude:   Cooperative   Orientation:   All  Speech   Rate / Production: Normal    Volume:  Normal   Mood:    Depressed   Affect:    Appropriate  Tearful  Thought Content:  Clear   Thought Form:  Coherent  Logical   Insight:    Fair     Diagnoses:  1. Moderate episode of recurrent major depressive disorder (H)      Collateral Reports Completed:  Communicated with: [facility] Team    Plan: (Homework, other):  Patient was given information about behavioral services and encouraged to schedule a follow up appointment with the clinic [facility] as needed.  She was also given information about mental health symptoms and treatment options .  CD Recommendations: No indications of CD issues.     Caterina Ramos, MAHSA  June 6, 2022

## 2024-05-08 NOTE — CONFIDENTIAL NOTE
Left VM for pt letting her know I got her message about scheduling a follow-up video or telephone appontment with Dr. Shrestha to discuss removing her stimulator. I have reached out to scheduling to contact pt to set something up. Left my number should pt have any additional questions.    Elevated dopplers, cercalge in place/Incompetent Cervix/Cervical Insufficiency/Intrauterine Growth Restriction/Other

## 2024-09-22 ENCOUNTER — HEALTH MAINTENANCE LETTER (OUTPATIENT)
Age: 64
End: 2024-09-22

## 2024-10-21 ENCOUNTER — APPOINTMENT (OUTPATIENT)
Dept: ULTRASOUND IMAGING | Facility: CLINIC | Age: 64
End: 2024-10-21
Attending: EMERGENCY MEDICINE
Payer: COMMERCIAL

## 2024-10-21 ENCOUNTER — APPOINTMENT (OUTPATIENT)
Dept: RADIOLOGY | Facility: CLINIC | Age: 64
End: 2024-10-21
Attending: EMERGENCY MEDICINE
Payer: COMMERCIAL

## 2024-10-21 ENCOUNTER — HOSPITAL ENCOUNTER (EMERGENCY)
Facility: CLINIC | Age: 64
Discharge: HOME | End: 2024-10-21
Attending: EMERGENCY MEDICINE | Admitting: EMERGENCY MEDICINE
Payer: COMMERCIAL

## 2024-10-21 VITALS
WEIGHT: 150 LBS | BODY MASS INDEX: 24.11 KG/M2 | HEIGHT: 66 IN | DIASTOLIC BLOOD PRESSURE: 101 MMHG | TEMPERATURE: 97.6 F | OXYGEN SATURATION: 99 % | RESPIRATION RATE: 18 BRPM | HEART RATE: 81 BPM | SYSTOLIC BLOOD PRESSURE: 210 MMHG

## 2024-10-21 DIAGNOSIS — R60.0 LOWER EXTREMITY EDEMA: ICD-10-CM

## 2024-10-21 DIAGNOSIS — I10 HYPERTENSION, UNSPECIFIED TYPE: ICD-10-CM

## 2024-10-21 LAB
ANION GAP SERPL CALCULATED.3IONS-SCNC: 15 MMOL/L (ref 7–15)
BASOPHILS # BLD AUTO: 0.1 10E3/UL (ref 0–0.2)
BASOPHILS NFR BLD AUTO: 1 %
BUN SERPL-MCNC: 10.7 MG/DL (ref 8–23)
CALCIUM SERPL-MCNC: 9.2 MG/DL (ref 8.8–10.4)
CHLORIDE SERPL-SCNC: 98 MMOL/L (ref 98–107)
CREAT SERPL-MCNC: 0.54 MG/DL (ref 0.51–0.95)
EGFRCR SERPLBLD CKD-EPI 2021: >90 ML/MIN/1.73M2
EOSINOPHIL # BLD AUTO: 0.1 10E3/UL (ref 0–0.7)
EOSINOPHIL NFR BLD AUTO: 1 %
ERYTHROCYTE [DISTWIDTH] IN BLOOD BY AUTOMATED COUNT: 13 % (ref 10–15)
GLUCOSE SERPL-MCNC: 95 MG/DL (ref 70–99)
HCO3 SERPL-SCNC: 22 MMOL/L (ref 22–29)
HCT VFR BLD AUTO: 41.2 % (ref 35–47)
HGB BLD-MCNC: 14.1 G/DL (ref 11.7–15.7)
IMM GRANULOCYTES # BLD: 0 10E3/UL
IMM GRANULOCYTES NFR BLD: 0 %
LYMPHOCYTES # BLD AUTO: 1.6 10E3/UL (ref 0.8–5.3)
LYMPHOCYTES NFR BLD AUTO: 21 %
MAGNESIUM SERPL-MCNC: 2.2 MG/DL (ref 1.7–2.3)
MCH RBC QN AUTO: 31.3 PG (ref 26.5–33)
MCHC RBC AUTO-ENTMCNC: 34.2 G/DL (ref 31.5–36.5)
MCV RBC AUTO: 91 FL (ref 78–100)
MONOCYTES # BLD AUTO: 0.6 10E3/UL (ref 0–1.3)
MONOCYTES NFR BLD AUTO: 8 %
NEUTROPHILS # BLD AUTO: 5.4 10E3/UL (ref 1.6–8.3)
NEUTROPHILS NFR BLD AUTO: 69 %
NRBC # BLD AUTO: 0 10E3/UL
NRBC BLD AUTO-RTO: 0 /100
PLATELET # BLD AUTO: 314 10E3/UL (ref 150–450)
POTASSIUM SERPL-SCNC: 3.9 MMOL/L (ref 3.4–5.3)
RBC # BLD AUTO: 4.51 10E6/UL (ref 3.8–5.2)
SODIUM SERPL-SCNC: 135 MMOL/L (ref 135–145)
WBC # BLD AUTO: 7.8 10E3/UL (ref 4–11)

## 2024-10-21 PROCEDURE — 80048 BASIC METABOLIC PNL TOTAL CA: CPT | Performed by: EMERGENCY MEDICINE

## 2024-10-21 PROCEDURE — 93971 EXTREMITY STUDY: CPT | Mod: LT

## 2024-10-21 PROCEDURE — 85025 COMPLETE CBC W/AUTO DIFF WBC: CPT | Performed by: EMERGENCY MEDICINE

## 2024-10-21 PROCEDURE — 99284 EMERGENCY DEPT VISIT MOD MDM: CPT | Mod: 25

## 2024-10-21 PROCEDURE — 73610 X-RAY EXAM OF ANKLE: CPT | Mod: LT

## 2024-10-21 PROCEDURE — 83735 ASSAY OF MAGNESIUM: CPT | Performed by: EMERGENCY MEDICINE

## 2024-10-21 PROCEDURE — 36415 COLL VENOUS BLD VENIPUNCTURE: CPT | Performed by: EMERGENCY MEDICINE

## 2024-10-21 RX ORDER — VALSARTAN 40 MG/1
40 TABLET ORAL DAILY
Status: DISCONTINUED | OUTPATIENT
Start: 2024-10-21 | End: 2024-10-21

## 2024-10-21 RX ORDER — VALSARTAN 40 MG/1
40 TABLET ORAL DAILY
Qty: 30 TABLET | Refills: 0 | Status: SHIPPED | OUTPATIENT
Start: 2024-10-21

## 2024-10-21 ASSESSMENT — COLUMBIA-SUICIDE SEVERITY RATING SCALE - C-SSRS
2. HAVE YOU ACTUALLY HAD ANY THOUGHTS OF KILLING YOURSELF IN THE PAST MONTH?: NO
6. HAVE YOU EVER DONE ANYTHING, STARTED TO DO ANYTHING, OR PREPARED TO DO ANYTHING TO END YOUR LIFE?: YES
1. IN THE PAST MONTH, HAVE YOU WISHED YOU WERE DEAD OR WISHED YOU COULD GO TO SLEEP AND NOT WAKE UP?: NO

## 2024-10-21 ASSESSMENT — ACTIVITIES OF DAILY LIVING (ADL)
ADLS_ACUITY_SCORE: 38
ADLS_ACUITY_SCORE: 36

## 2024-10-21 NOTE — ED PROVIDER NOTES
EMERGENCY DEPARTMENT ENCOUNTER      NAME: Martha Chun  AGE: 64 year old female  YOB: 1960  MRN: [id number]  EVALUATION DATE & TIME: 10/21/2024 11:58 AM    PCP: Ewelina Gonzalez    ED PROVIDER: Mariela Kirkpatrick MD      Chief Complaint   Patient presents with    Hypertension         FINAL IMPRESSION:  1. Hypertension, unspecified type          ED COURSE & MEDICAL DECISION MAKIN:30 PM The medical student met with the patient and performed an initial examination. We then discussed the patient's presentation, examination, and plan for ED workup. I will see and assess the patient prior to initiation of the ED workup.  12:39 PM I introduced myself to the patient, obtained patient history, performed a physical exam, and discussed plan for ED workup including potential diagnostic laboratory/imaging studies and interventions.  2:04 PM patient wanting to try valsartan.  However, she states that 40 mg daily is too high for her.  She like to cut this dose into quarters.  This may be hard to do but she reports that she previously has done that with her losartan.  I will therefore prescribe this for her so she can try and follow-up with her primary care provider.  She states she cannot take it during the day, she therefore declined a dose now.  2:14 PM Updating patient. Xray of the ankle demonstrates a large os trigonum which may be contributing to the patient's pain.   2:25 PM I updated the patient on negative left lower extremity ultrasound, x-ray of the left ankle demonstrating os trigonum.  I discussed this with the patient, discussed movements that would exacerbate her pain in her left ankle.  I offered to put a compression dressing on it, she reports she has a wrap at home that she will use.  She states she has compression socks at home but it has been too hot to use them.  She is asking what I would suggest for her blood pressure.  I explained to her that she has a myriad of sensitivities to  medications, if she is wanting to try the valsartan, this would certainly be reasonable.  She is asking whether she should discontinue the amlodipine and clonidine.  I had initially suggested that she stay on the low-dose of amlodipine at 2.5 mg as I do not believe this is contributing to her lower extremity swelling or headaches, but this made her very tearful and anxious and she states she cannot continue on this medication.  If she is feeling as if the side effects are too great to manage with amlodipine and clonidine I would certainly not force these medications on her.  She is willing to try the valsartan and the way she would like to try it which is 1/4 tablet of a 40 mg tablet at nighttime.  She states she still has valsartan at home and will start that up.  I encouraged her to follow-up with her primary care provider for ongoing management of blood pressure.  She remains hypertensive currently, I suspect this is chronic and not acute.  She is declining any medications in the emergency department.  She is asymptomatic with this hypertension.  I do not see any signs of endorgan damage with this blood pressure.    Pertinent Labs & Imaging studies reviewed. (See chart for details)  64 year old female presents to the Emergency Department for evaluation of hypertension and left lower extremity swelling.  Patient reports that she has tried multiple blood pressure medications but have many sensitivities.  She was and amlodipine 2.5 mg, increase it to 5 mg and had headaches, swelling in her left leg.  She therefore self discontinued her amlodipine and spironolactone.  She reports that her spironolactone had make her sodium dropped to 134.  She felt this was low.  Has not been on any medications over the last couple weeks.  Last blood pressure was 223/100.  She reports that her primary care provider has said that she does not know of any other options to put her on.  This is made her very frustrated and she has been  trying to follow-up with cardiology.  She does report that she would like to try valsartan for her blood pressure.  Therefore, we will give her a low-dose of valsartan and prescribe this to see if this    At the conclusion of the encounter I discussed the results of all of the tests and the disposition. The questions were answered. The patient or family acknowledged understanding and was agreeable with the care plan.       Medical Decision Making  Obtained supplemental history:Supplemental history obtained?: No  Reviewed external records: External records reviewed?: Outpatient Record: PCP Visit 9/27/24  Care impacted by chronic illness:Chronic Pain and Hypertension  Did you consider but not order tests?: Work up considered but not performed and documented in chart, if applicable  Did you interpret images independently?: Independent interpretation of ECG and images noted in documentation, when applicable.  Consultation discussion with other provider:Did you involve another provider (consultant, , pharmacy, etc.)?: No  Admission considered. Patient was signed out to the oncoming physician, disposition pending.    MIPS: Not Applicable        MEDICATIONS GIVEN IN THE EMERGENCY:  Medications - No data to display    NEW PRESCRIPTIONS STARTED AT TODAY'S ER VISIT  New Prescriptions    VALSARTAN (DIOVAN) 40 MG TABLET    Take 1 tablet (40 mg) by mouth daily.          =================================================================    HPI    Patient information was obtained from: Patient    Use of : N/A      Martha CABRALES Chun is a 64 year old female with a pertinent history of HTN, fibromyalgia, occipital neuralgia, migraines, and mast cell activation syndrome who presents to this ED by walk in for evaluation of left ankle swelling and high blood pressure.    The patient reports a history of mast cell activation syndrome. She has tried multiple antihypertensive medications but has struggled to find a medication that  does not cause side effects. 3 weeks ago she was started on amlodipine. On the amlodipine her systolic pressure was 130s-140s, but soon after starting the medication she developed left ankle pain and swelling. The swelling is on the lateral aspect of the left ankle which the pain is more on the medial aspect. She self-discontinued the amlodipine a week ago and has not had any improvement in her ankle pain and swelling. She also reports an ongoing headache in a band-like distribution with blurry vision for the past week. She denies any abdominal pain.    She denies any history of blood clots. She has not had any recent travel, surgeries, hospitalizations, or extended periods of immobilization.      PAST MEDICAL HISTORY:  Past Medical History:   Diagnosis Date    Anxiety     Cervical spine instability     COVID-19 12/22/2022    Degenerative arthritis     Delusional disorder (H)     Depression     Fibromyalgia     Hypertension     Insomnia     Migraines     Occipital neuralgia     b/l    Other chronic pain     Back pain    PONV (postoperative nausea and vomiting)     Seasonal allergies     Suicide attempt (H) 06/2022    from drug overdose       PAST SURGICAL HISTORY:  Past Surgical History:   Procedure Laterality Date    ARTHROPLASTY HIP Left 1/24/2023    Procedure: LEFT TOTAL HIP ARTHROPLASTY;  Surgeon: Danny Woodson MD;  Location: [facility] Main OR    CERVICAL FUSION  11/2020 November and Dec 2020    IMPLANT PULSE GENERATOR SUBCUTANEOUS Bilateral 10/22/2021    Procedure: Phase II occipital nerve stimulator placement, bilateral occipital electrodes and bilateral trigeminal electrodes, Implantation of bilateral subcutaneous neurostimulator Percutaneous implantation of neurostimulator electrode array; cranial nerve Implantable neurostimulator electrode, each CPT 03417 Implantable neurostimulator pulse generator, dual array, nonrechargeable, includes extensi    INSERT STIMULATOR OCCIPITAL Bilateral 08/27/2021     "Procedure: Externalized trial of percutaneous occipital nerve stimulator bilateral Percutaneous implantation of neurostimulator electrode array; cranial nerve CPT 37304;  Surgeon: Kobi Shrestha MD;  Location: [facility] OR    TOTAL HIP ARTHROPLASTY Right     TOTAL KNEE ARTHROPLASTY Left 01/2018    ZZC LIGATE FALLOPIAN TUBE      Description: Tubal Ligation;  Recorded: 01/16/2012;           CURRENT MEDICATIONS:    valsartan (DIOVAN) 40 MG tablet  acetaminophen (TYLENOL) 500 MG tablet  aliskiren (TEKTURNA) 150 MG tablet  B Complex-Folic Acid (B COMPLEX FORMULA 1 PO)  butalbital-aspirin-caffeine (FIORINAL) -40 MG capsule  carboxymethylcellulose (REFRESH PLUS) 0.5 % SOLN ophthalmic solution  diltiazem ER (DILT-XR) 120 MG 24 hr capsule  fluticasone (FLONASE) 50 MCG/ACT nasal spray  Garlic 1000 MG CAPS  HYDROmorphone (DILAUDID) 2 MG tablet  hydrOXYzine (ATARAX) 25 MG tablet  ibuprofen (ADVIL/MOTRIN) 200 MG tablet  Magnesium Citrate 200 MG TABS  Omega-3 Fatty Acids (FISH OIL PO)  ondansetron (ZOFRAN ODT) 4 MG ODT tab  Oxymetazoline HCl (NASAL SPRAY) 0.05 % SOLN  polyethylene glycol (MIRALAX) 17 GM/Dose powder  Probiotic Product (PROBIOTIC ADVANCED PO)  Quercetin 250 MG TABS  QUEtiapine (SEROQUEL) 100 MG tablet  senna-docusate (SENOKOT-S/PERICOLACE) 8.6-50 MG tablet  triprolidine-pseudoePHEDrine (APRODINE) 2.5-60 MG TABS per tablet  Vitamin D3 (CHOLECALCIFEROL) 125 MCG (5000 UT) tablet        ALLERGIES:  Allergies   Allergen Reactions    Amlodipine Other (See Comments)     Constipation, \"liver pain\".        Clonidine      Other reaction(s): Dizziness    Escitalopram Muscle Pain (Myalgia)     Other reaction(s): Myalgias    Gluten Meal Headache and GI Disturbance    Hydrochlorothiazide      confusion    Mold Nausea     Ringing in ears, racing heart    Molds & Smuts Headache and Nausea     Ringing in ears, racing heart    Paroxetine Other (See Comments)     Palpitations/racing heart        Penicillins Unknown     Does not " "remember reaction, mother said she allergic to mold; patient refuses Pcn or any derivative      Sulfa Antibiotics Unknown    Tramadol      Other reaction(s): Runny Nose  Facial pain, sinus swelling      Carvedilol Other (See Comments)     Worsening depression        Codeine Headache and Nausea and Vomiting    Cortisone Other (See Comments)     Patient reports and possibly all steroids \"makes me to feel sick\", get fungal infections      Duloxetine Headache    Oxycodone Headache       FAMILY HISTORY:  Family History   Problem Relation Age of Onset    Cancer Mother     Diabetes Father     Hypertension Father     Pulmonary Embolism Daughter     Anesthesia Reaction No family hx of        SOCIAL HISTORY:   Social History     Socioeconomic History    Marital status: Legally     Number of children: 2   Occupational History    Occupation: on disability   Tobacco Use    Smoking status: Former     Current packs/day: 0.00     Average packs/day: 1 pack/day for 15.0 years (15.0 ttl pk-yrs)     Types: Cigarettes     Start date: 10/10/1999     Quit date: 10/10/2014     Years since quitting: 10.0    Smokeless tobacco: Never   Substance and Sexual Activity    Alcohol use: Not Currently     Comment: Last drink 10/2014    Drug use: Not Currently     Social Determinants of Health      Received from [employer] [employer]    Financial Resource Strain    Received from [employer] [employer]    Social Connections       VITALS:  BP (!) 226/109   Pulse 81   Temp 97.6  F (36.4  C) (Temporal)   Resp 16   Ht 1.676 m (5' 6\")   Wt 68 kg (150 lb)   SpO2 99%   BMI 24.21 kg/m      PHYSICAL EXAM    Constitutional: Well developed, Well nourished, NAD  HENT: Normocephalic, Atraumatic, Bilateral external ears normal, Oropharynx normal, mucous membranes moist, Nose normal.   Neck- Normal range of motion, " No tenderness, Supple, No stridor.  Eyes: PERRL, EOMI, Conjunctiva normal, No discharge.   Respiratory: Normal breath sounds, No respiratory distress  Cardiovascular: Normal heart rate, Regular rhythm  GI: Bowel sounds normal, Soft, No tenderness,   Musculoskeletal: Good range of motion in all major joints. No tenderness to palpation or major deformities noted. Swelling of the left lower extremity is mild. Right lower extremity is normal. Palpable effusion overlying the left ankle, slight warmth with no erythema, normal ROM of the left ankle with no tenderness.    Integument: Warm, Dry, No erythema, No rash  Neurologic: Alert & oriented x 3, Normal motor function, Normal sensory function, No focal deficits noted. Normal gait.   Psychiatric: Affect normal, Judgment normal, Mood normal.     LAB:  All pertinent labs reviewed and interpreted.  Results for orders placed or performed during the hospital encounter of 10/21/24   US Lower Extremity Venous Duplex Left    Impression    IMPRESSION:  1.  No deep venous thrombosis in the left lower extremity.   XR Ankle Left G/E 3 Views    Impression    IMPRESSION: Mild soft tissue swelling around the ankle. No acute fracture or malalignment. Mild tibiotalar joint degenerative changes. The ankle mortise is congruent. The talar dome is unremarkable. Small plantar calcaneal enthesophyte. Large os   trigonum. Osteopenia.   CBC with platelets and differential   Result Value Ref Range    WBC Count 7.8 4.0 - 11.0 10e3/uL    RBC Count 4.51 3.80 - 5.20 10e6/uL    Hemoglobin 14.1 11.7 - 15.7 g/dL    Hematocrit 41.2 35.0 - 47.0 %    MCV 91 78 - 100 fL    MCH 31.3 26.5 - 33.0 pg    MCHC 34.2 31.5 - 36.5 g/dL    RDW 13.0 10.0 - 15.0 %    Platelet Count 314 150 - 450 10e3/uL    % Neutrophils 69 %    % Lymphocytes 21 %    % Monocytes 8 %    % Eosinophils 1 %    % Basophils 1 %    % Immature Granulocytes 0 %    NRBCs per 100 WBC 0 <1 /100    Absolute Neutrophils 5.4 1.6 - 8.3 10e3/uL     Absolute Lymphocytes 1.6 0.8 - 5.3 10e3/uL    Absolute Monocytes 0.6 0.0 - 1.3 10e3/uL    Absolute Eosinophils 0.1 0.0 - 0.7 10e3/uL    Absolute Basophils 0.1 0.0 - 0.2 10e3/uL    Absolute Immature Granulocytes 0.0 <=0.4 10e3/uL    Absolute NRBCs 0.0 10e3/uL       RADIOLOGY:  Reviewed all pertinent imaging. Please see official radiology report.  US Lower Extremity Venous Duplex Left   Final Result   IMPRESSION:   1.  No deep venous thrombosis in the left lower extremity.      XR Ankle Left G/E 3 Views   Final Result   IMPRESSION: Mild soft tissue swelling around the ankle. No acute fracture or malalignment. Mild tibiotalar joint degenerative changes. The ankle mortise is congruent. The talar dome is unremarkable. Small plantar calcaneal enthesophyte. Large os    trigonum. Osteopenia.                I, Fermin Denton, am serving as a scribe to document services personally performed by Mariela Kirkpatrick MD, based on my observation and the provider's statements to me. I, Mariela Kirkpatrick MD, attest that Fermin Denton is acting in a scribe capacity, has observed my performance of the services and has documented them in accordance with my direction.    Mariela Kirkpatrick MD  Emergency Medicine  [facility] EMERGENCY ROOM  [address] [id number] [phone]     Mariela Kirkpatrick MD  10/21/24 1770

## 2024-10-21 NOTE — ED TRIAGE NOTES
Patient presents to ED with L calf swelling that has been ongoing despite stopping Amlodipine one week ago, was on it for 3 weeks, patient rtying different HTN meds and cannot find one that is working or one that she can be on r/t mast cell activation.       Triage Assessment (Adult)       Row Name 10/21/24 1154          Triage Assessment    Airway WDL WDL        Respiratory WDL    Respiratory WDL WDL        Skin Circulation/Temperature WDL    Skin Circulation/Temperature WDL WDL        Cardiac WDL    Cardiac WDL WDL        Peripheral/Neurovascular WDL    Peripheral Neurovascular WDL WDL        Cognitive/Neuro/Behavioral WDL    Cognitive/Neuro/Behavioral WDL WDL

## 2024-10-21 NOTE — ED NOTES
Patient refusing frequent blood pressure checks every 15 minutes because the blood pressure cuff hurts. Provider notified.

## 2024-11-07 ENCOUNTER — OFFICE VISIT (OUTPATIENT)
Dept: CARDIOLOGY | Facility: CLINIC | Age: 64
End: 2024-11-07
Payer: COMMERCIAL

## 2024-11-07 VITALS
BODY MASS INDEX: 25.18 KG/M2 | HEART RATE: 75 BPM | WEIGHT: 156 LBS | SYSTOLIC BLOOD PRESSURE: 194 MMHG | DIASTOLIC BLOOD PRESSURE: 91 MMHG | OXYGEN SATURATION: 100 %

## 2024-11-07 DIAGNOSIS — D89.40 MAST CELL ACTIVATION SYNDROME (H): ICD-10-CM

## 2024-11-07 DIAGNOSIS — F32.A ANXIETY AND DEPRESSION: ICD-10-CM

## 2024-11-07 DIAGNOSIS — I10 PRIMARY HYPERTENSION: Primary | ICD-10-CM

## 2024-11-07 DIAGNOSIS — F41.9 ANXIETY AND DEPRESSION: ICD-10-CM

## 2024-11-07 PROCEDURE — 99204 OFFICE O/P NEW MOD 45 MIN: CPT | Performed by: INTERNAL MEDICINE

## 2024-11-07 RX ORDER — LORAZEPAM 0.5 MG/1
0.5 TABLET ORAL 3 TIMES DAILY
COMMUNITY

## 2024-11-07 RX ORDER — IRBESARTAN 75 MG/1
75 TABLET ORAL AT BEDTIME
Qty: 30 TABLET | Refills: 6 | Status: SHIPPED | OUTPATIENT
Start: 2024-11-07 | End: 2024-11-08

## 2024-11-07 NOTE — PATIENT INSTRUCTIONS
Continue current medications and plan to wean off Diltiazem in several more days  Begin Irbesartan 75mg with half tablet daily and increasing to full tablet daily if tolerating and blood pressure still high.

## 2024-11-07 NOTE — LETTER
11/7/2024    PHU FARRELL  [facility] [address]  [facility] [id number]    RE: Martha Chun       Dear Colleague,     I had the pleasure of seeing Martha Chun in the [facility].      Thank you for asking the [facility] team to see Ms. Martha Chun to help with management of hypertension given history of mast cell activation syndrome.    Assessment/Recommendations   Assessment:    1.  Accelerated hypertension, intolerant to multiple medications which she reports is due to her mast cell activation syndrome.  She has been tried on multiple agents including beta-blockers and other calcium channel blockers which she does not tolerate.  Is currently on diltiazem but thinks this is causing issues as well and she would like to wean herself off.  Has been on losartan in the past which she thinks may have helped with her blood pressures but she stopped taking it for some perceived side effects such as headache.  Would like to retry another ARB and suggested irbesartan which I told her I would be willing to try.  2.  Mast cell activation syndrome.  She was reportedly diagnosed with this many years ago though I see no mention of it in the primary care notes.  3.  Depression and anxiety    Plan:  1.  Begin irbesartan 75 mg tablet by taking half tablet daily and increasing the dose after several days if no untoward side effects and blood pressure remains high  2.  Discontinue Cardizem after 3 or 4 days if tolerated  3.  Continue to monitor blood pressures at home       History of Present Illness    Ms. Martha Chun is a 64 year old female with history of mast cell activation syndrome, hypertension who is self referred today for blood pressure treatment.  Reports multiple side effects to many blood pressure medications in the past including beta-blockers, calcium channel blockers, ACE inhibitors, ARB's and diuretics including hydrochlorothiazide and  spironolactone.  Some of these have been associated with activation of her mast cell syndrome or perceived side effects such as headaches, constipation.  Her primary has tried many agents and patient is now frustrated seeking another opinion.  She has never seen a specialist for this mast cell activation syndrome which may be beneficial.  States her insurance will not allow her to go to the [facility].  Did see some improvement in her blood pressure with losartan and would like to give another ARB a try.  She would also like to try coming off diltiazem.    ECG (personally reviewed): No ECG today    Cardiac Imaging Studies (personally reviewed): No previous cardiac imaging     Physical Examination Review of Systems   BP (!) 194/91 (BP Location: Left arm, Patient Position: Sitting, Cuff Size: Adult Regular)   Pulse 75   Wt 70.8 kg (156 lb)   SpO2 100%   BMI 25.18 kg/m    Body mass index is 25.18 kg/m .  Wt Readings from Last 3 Encounters:   11/07/24 70.8 kg (156 lb)   10/21/24 68 kg (150 lb)   01/27/23 64.9 kg (143 lb 1.3 oz)     General Appearance:   Awake, Alert, No acute distress.   HEENT:  No scleral icterus; the mucous membranes were pink and moist.   Neck: No cervical bruits or jugular venous distention    Chest: The spine was straight. The chest was symmetric.   Lungs:   Respirations unlabored; the lungs are clear to auscultation. No wheezing   Cardiovascular:   Regular rate and rhythm.  S1, S2 normal.  No murmur or gallop   Abdomen:  No organomegaly, masses, bruits, or tenderness. Bowels sounds are present   Extremities: No peripheral edema   Skin: No xanthelasma. Warm, Dry.   Musculoskeletal: No tenderness.   Neurologic: Mood and affect are appropriate.    Encounter Vitals  BP: (!) 194/91  Pulse: 75  SpO2: 100 %  Weight: 70.8 kg (156 lb)                                         Medical History  Surgical History Family History Social History   Past Medical History:   Diagnosis Date     Anxiety       Cervical spine instability      COVID-19 12/22/2022     Degenerative arthritis      Delusional disorder (H)      Depression      Fibromyalgia      Hypertension      Insomnia      Migraines      Occipital neuralgia     b/l     Other chronic pain     Back pain     PONV (postoperative nausea and vomiting)      Seasonal allergies      Suicide attempt (H) 06/2022    from drug overdose    Past Surgical History:   Procedure Laterality Date     ARTHROPLASTY HIP Left 1/24/2023    Procedure: LEFT TOTAL HIP ARTHROPLASTY;  Surgeon: Danny Woodson MD;  Location: [facility] Main OR     CERVICAL FUSION  11/2020 November and Dec 2020     IMPLANT PULSE GENERATOR SUBCUTANEOUS Bilateral 10/22/2021    Procedure: Phase II occipital nerve stimulator placement, bilateral occipital electrodes and bilateral trigeminal electrodes, Implantation of bilateral subcutaneous neurostimulator Percutaneous implantation of neurostimulator electrode array; cranial nerve Implantable neurostimulator electrode, each CPT 31232 Implantable neurostimulator pulse generator, dual array, nonrechargeable, includes extensi     INSERT STIMULATOR OCCIPITAL Bilateral 08/27/2021    Procedure: Externalized trial of percutaneous occipital nerve stimulator bilateral Percutaneous implantation of neurostimulator electrode array; cranial nerve CPT 86926;  Surgeon: Kobi Shrestha MD;  Location: [facility] OR     TOTAL HIP ARTHROPLASTY Right      TOTAL KNEE ARTHROPLASTY Left 01/2018     ZZC LIGATE FALLOPIAN TUBE      Description: Tubal Ligation;  Recorded: 01/16/2012;    Family History   Problem Relation Age of Onset     Cancer Mother      Diabetes Father      Hypertension Father      Pulmonary Embolism Daughter      Anesthesia Reaction No family hx of     Social History     Socioeconomic History     Marital status: Legally      Spouse name: Not on file     Number of children: 2     Years of education: Not on file     Highest education level: Not on file    Occupational History     Occupation: on disability   Tobacco Use     Smoking status: Former     Current packs/day: 0.00     Average packs/day: 1 pack/day for 15.0 years (15.0 ttl pk-yrs)     Types: Cigarettes     Start date: 10/10/1999     Quit date: 10/10/2014     Years since quitting: 10.0     Smokeless tobacco: Never   Substance and Sexual Activity     Alcohol use: Not Currently     Comment: Last drink 10/2014     Drug use: Not Currently     Sexual activity: Not on file   Other Topics Concern     Parent/sibling w/ CABG, MI or angioplasty before 65F 55M? Not Asked   Social History Narrative     Not on file     Social Drivers of Health     Financial Resource Strain: High Risk (1/1/2022)    [facility]    Financial Resource Strain      Difficulty of Paying Living Expenses: Not on file      Difficulty of Paying Living Expenses: Not on file   Food Insecurity: Not on file   Transportation Needs: Not on file   Physical Activity: Not on file   Stress: Not on file   Social Connections: Unknown (1/1/2022)    [facility]    Social Connections      Frequency of Communication with Friends and Family: Not on file   Interpersonal Safety: Not on file   Housing Stability: Not on file          Medications  Allergies   Current Outpatient Medications   Medication Sig Dispense Refill     acetaminophen (TYLENOL) 500 MG tablet Take 2 tablets (1,000 mg) by mouth every 8 hours       aliskiren (TEKTURNA) 150 MG tablet Take 300 mg by mouth At Bedtime       B Complex-Folic Acid (B COMPLEX FORMULA 1 PO) Take 1 teaspoonful by mouth daily       butalbital-aspirin-caffeine (FIORINAL) -40 MG capsule Take 1 capsule by mouth every 4 hours as needed for headaches 60 capsule 3     carboxymethylcellulose (REFRESH PLUS) 0.5 % SOLN ophthalmic solution Place 1-2  drops into both eyes 3 times daily as needed       diltiazem ER (DILT-XR) 120 MG 24 hr capsule Take 120 mg by mouth every evening       fluticasone (FLONASE) 50 MCG/ACT nasal spray Spray 2 sprays into both nostrils every evening       Garlic 1000 MG CAPS Take 1 tablet by mouth 3 times daily       irbesartan (AVAPRO) 75 MG tablet Take 1 tablet (75 mg) by mouth at bedtime. 30 tablet 6     LORazepam (ATIVAN) 0.5 MG tablet Take 0.5 mg by mouth 3 times daily.       Magnesium Citrate 200 MG TABS Take 300 mg by mouth 2 times daily (Takes at supper and bedtime)       Oxymetazoline HCl (NASAL SPRAY) 0.05 % SOLN Spray 1 spray into both nostrils 2 times daily as needed       Probiotic Product (PROBIOTIC ADVANCED PO) Take 1 capsule by mouth At Bedtime       Quercetin 250 MG TABS Take 500 mg by mouth daily       QUEtiapine (SEROQUEL) 100 MG tablet Take 200 mg by mouth At Bedtime       senna-docusate (SENOKOT-S/PERICOLACE) 8.6-50 MG tablet Take 1 tablet by mouth 2 times daily 30 tablet 0     triprolidine-pseudoePHEDrine (APRODINE) 2.5-60 MG TABS per tablet Take 0.5 tablets by mouth 2 times daily       Vitamin D3 (CHOLECALCIFEROL) 125 MCG (5000 UT) tablet Take 125 mcg by mouth daily       HYDROmorphone (DILAUDID) 2 MG tablet Take 0.5-1 tablets (1-2 mg) by mouth every 4 hours as needed for moderate pain (4-6) (Patient not taking: Reported on 11/7/2024) 20 tablet 0     hydrOXYzine (ATARAX) 25 MG tablet Take 1 tablet (25 mg) by mouth every 6 hours as needed for other (adjuvant pain) (Patient not taking: Reported on 11/7/2024)       ibuprofen (ADVIL/MOTRIN) 200 MG tablet Take 400 mg by mouth every 4 hours as needed for pain (Patient not taking: Reported on 11/7/2024)       Omega-3 Fatty Acids (FISH OIL PO) Take 1 capsule by mouth 2 times daily (Patient not taking: Reported on 11/7/2024)       ondansetron (ZOFRAN ODT) 4 MG ODT tab Take 1 tablet (4 mg) by mouth every 6 hours as needed for nausea or vomiting (Patient not taking: Reported  "on 11/7/2024) 20 tablet 0     polyethylene glycol (MIRALAX) 17 GM/Dose powder Take 1 capful by mouth daily as needed for constipation (Patient not taking: Reported on 11/7/2024)       valsartan (DIOVAN) 40 MG tablet Take 1 tablet (40 mg) by mouth daily. (Patient not taking: Reported on 11/7/2024) 30 tablet 0      Allergies   Allergen Reactions     Amlodipine Other (See Comments)     Constipation, \"liver pain\".         Clonidine      Other reaction(s): Dizziness     Escitalopram Muscle Pain (Myalgia)     Other reaction(s): Myalgias     Gluten Meal Headache and GI Disturbance     Hydrochlorothiazide      confusion     Lisinopril Dizziness     Mold Nausea     Ringing in ears, racing heart     Molds & Smuts Headache and Nausea     Ringing in ears, racing heart     Paroxetine Other (See Comments)     Palpitations/racing heart         Penicillins Unknown     Does not remember reaction, mother said she allergic to mold; patient refuses Pcn or any derivative       Sulfa Antibiotics Unknown     Tramadol      Other reaction(s): Runny Nose  Facial pain, sinus swelling       Carvedilol Other (See Comments)     Worsening depression         Codeine Headache and Nausea and Vomiting     Cortisone Other (See Comments)     Patient reports and possibly all steroids \"makes me to feel sick\", get fungal infections       Duloxetine Headache     Oxycodone Headache         Lab Results    Chemistry/lipid CBC Cardiac Enzymes/BNP/TSH/INR   Recent Labs   Lab Test 10/21/24  1403 08/25/21  0823 03/19/20  0930   TRIG  --   --  49  49   LDL  --   --  158*   BUN 10.7   < > 14      < > 139   CO2 22   < > 26    < > = values in this interval not displayed.    Recent Labs   Lab Test 10/21/24  1403   WBC 7.8   HGB 14.1   HCT 41.2   MCV 91       Recent Labs   Lab Test 01/25/23  1054 01/24/23  2223 10/19/21  1206 08/25/21  0823 03/19/20  0930   TROPONINI 0.02   < >  --   --   --    TSH  --   --   --   --  2.24   INR  --   --  0.96   < >  --  "    < > = values in this interval not displayed.        A total of 50 minutes was spent reviewing patient's medical records, obtaining history and performing examination, as well as discussing diagnoses/ recommendations with patient and answering all questions.                        Thank you for allowing me to participate in the care of your patient.      Sincerely,     Janis Levy MD     [facility]  cc:   Referred Self, MD  No address on file

## 2024-11-07 NOTE — PROGRESS NOTES
Thank you for asking the [facility] team to see Ms. Martha Chun to help with management of hypertension given history of mast cell activation syndrome.    Assessment/Recommendations   Assessment:    1.  Accelerated hypertension, intolerant to multiple medications which she reports is due to her mast cell activation syndrome.  She has been tried on multiple agents including beta-blockers and other calcium channel blockers which she does not tolerate.  Is currently on diltiazem but thinks this is causing issues as well and she would like to wean herself off.  Has been on losartan in the past which she thinks may have helped with her blood pressures but she stopped taking it for some perceived side effects such as headache.  Would like to retry another ARB and suggested irbesartan which I told her I would be willing to try.  2.  Mast cell activation syndrome.  She was reportedly diagnosed with this many years ago though I see no mention of it in the primary care notes.  3.  Depression and anxiety    Plan:  1.  Begin irbesartan 75 mg tablet by taking half tablet daily and increasing the dose after several days if no untoward side effects and blood pressure remains high  2.  Discontinue Cardizem after 3 or 4 days if tolerated  3.  Continue to monitor blood pressures at home       History of Present Illness    Ms. Martha Chun is a 64 year old female with history of mast cell activation syndrome, hypertension who is self referred today for blood pressure treatment.  Reports multiple side effects to many blood pressure medications in the past including beta-blockers, calcium channel blockers, ACE inhibitors, ARB's and diuretics including hydrochlorothiazide and spironolactone.  Some of these have been associated with activation of her mast cell syndrome or perceived side effects such as headaches, constipation.  Her primary has tried many agents and patient is now frustrated seeking another  opinion.  She has never seen a specialist for this mast cell activation syndrome which may be beneficial.  States her insurance will not allow her to go to the [facility].  Did see some improvement in her blood pressure with losartan and would like to give another ARB a try.  She would also like to try coming off diltiazem.    ECG (personally reviewed): No ECG today    Cardiac Imaging Studies (personally reviewed): No previous cardiac imaging     Physical Examination Review of Systems   BP (!) 194/91 (BP Location: Left arm, Patient Position: Sitting, Cuff Size: Adult Regular)   Pulse 75   Wt 70.8 kg (156 lb)   SpO2 100%   BMI 25.18 kg/m    Body mass index is 25.18 kg/m .  Wt Readings from Last 3 Encounters:   11/07/24 70.8 kg (156 lb)   10/21/24 68 kg (150 lb)   01/27/23 64.9 kg (143 lb 1.3 oz)     General Appearance:   Awake, Alert, No acute distress.   HEENT:  No scleral icterus; the mucous membranes were pink and moist.   Neck: No cervical bruits or jugular venous distention    Chest: The spine was straight. The chest was symmetric.   Lungs:   Respirations unlabored; the lungs are clear to auscultation. No wheezing   Cardiovascular:   Regular rate and rhythm.  S1, S2 normal.  No murmur or gallop   Abdomen:  No organomegaly, masses, bruits, or tenderness. Bowels sounds are present   Extremities: No peripheral edema   Skin: No xanthelasma. Warm, Dry.   Musculoskeletal: No tenderness.   Neurologic: Mood and affect are appropriate.    Encounter Vitals  BP: (!) 194/91  Pulse: 75  SpO2: 100 %  Weight: 70.8 kg (156 lb)                                         Medical History  Surgical History Family History Social History   Past Medical History:   Diagnosis Date    Anxiety     Cervical spine instability     COVID-19 12/22/2022    Degenerative arthritis     Delusional disorder (H)     Depression     Fibromyalgia     Hypertension     Insomnia     Migraines     Occipital neuralgia     b/l    Other chronic pain     Back  pain    PONV (postoperative nausea and vomiting)     Seasonal allergies     Suicide attempt (H) 06/2022    from drug overdose    Past Surgical History:   Procedure Laterality Date    ARTHROPLASTY HIP Left 1/24/2023    Procedure: LEFT TOTAL HIP ARTHROPLASTY;  Surgeon: Danny Woodson MD;  Location: [facility] Main OR    CERVICAL FUSION  11/2020 November and Dec 2020    IMPLANT PULSE GENERATOR SUBCUTANEOUS Bilateral 10/22/2021    Procedure: Phase II occipital nerve stimulator placement, bilateral occipital electrodes and bilateral trigeminal electrodes, Implantation of bilateral subcutaneous neurostimulator Percutaneous implantation of neurostimulator electrode array; cranial nerve Implantable neurostimulator electrode, each CPT 28688 Implantable neurostimulator pulse generator, dual array, nonrechargeable, includes extensi    INSERT STIMULATOR OCCIPITAL Bilateral 08/27/2021    Procedure: Externalized trial of percutaneous occipital nerve stimulator bilateral Percutaneous implantation of neurostimulator electrode array; cranial nerve CPT 29499;  Surgeon: Kobi Shrestha MD;  Location: [facility] OR    TOTAL HIP ARTHROPLASTY Right     TOTAL KNEE ARTHROPLASTY Left 01/2018    ZZC LIGATE FALLOPIAN TUBE      Description: Tubal Ligation;  Recorded: 01/16/2012;    Family History   Problem Relation Age of Onset    Cancer Mother     Diabetes Father     Hypertension Father     Pulmonary Embolism Daughter     Anesthesia Reaction No family hx of     Social History     Socioeconomic History    Marital status: Legally      Spouse name: Not on file    Number of children: 2    Years of education: Not on file    Highest education level: Not on file   Occupational History    Occupation: on disability   Tobacco Use    Smoking status: Former     Current packs/day: 0.00     Average packs/day: 1 pack/day for 15.0 years (15.0 ttl pk-yrs)     Types: Cigarettes     Start date: 10/10/1999     Quit date: 10/10/2014     Years since  quitting: 10.0    Smokeless tobacco: Never   Substance and Sexual Activity    Alcohol use: Not Currently     Comment: Last drink 10/2014    Drug use: Not Currently    Sexual activity: Not on file   Other Topics Concern    Parent/sibling w/ CABG, MI or angioplasty before 65F 55M? Not Asked   Social History Narrative    Not on file     Social Drivers of Health     Financial Resource Strain: High Risk (1/1/2022)    Received from [facility], [facility]    Financial Resource Strain     Difficulty of Paying Living Expenses: Not on file     Difficulty of Paying Living Expenses: Not on file   Food Insecurity: Not on file   Transportation Needs: Not on file   Physical Activity: Not on file   Stress: Not on file   Social Connections: Unknown (1/1/2022)    Received from [employer] [facility], [employer] [facility]    Social Connections     Frequency of Communication with Friends and Family: Not on file   Interpersonal Safety: Not on file   Housing Stability: Not on file          Medications  Allergies   Current Outpatient Medications   Medication Sig Dispense Refill    acetaminophen (TYLENOL) 500 MG tablet Take 2 tablets (1,000 mg) by mouth every 8 hours      aliskiren (TEKTURNA) 150 MG tablet Take 300 mg by mouth At Bedtime      B Complex-Folic Acid (B COMPLEX FORMULA 1 PO) Take 1 teaspoonful by mouth daily      butalbital-aspirin-caffeine (FIORINAL) -40 MG capsule Take 1 capsule by mouth every 4 hours as needed for headaches 60 capsule 3    carboxymethylcellulose (REFRESH PLUS) 0.5 % SOLN ophthalmic solution Place 1-2 drops into both eyes 3 times daily as needed      diltiazem ER (DILT-XR) 120 MG 24 hr capsule Take 120 mg by mouth every evening      fluticasone (FLONASE) 50 MCG/ACT nasal spray Spray 2 sprays into both nostrils every evening      Garlic 1000 MG CAPS Take 1 tablet by mouth 3 times daily       irbesartan (AVAPRO) 75 MG tablet Take 1 tablet (75 mg) by mouth at bedtime. 30 tablet 6    LORazepam (ATIVAN) 0.5 MG tablet Take 0.5 mg by mouth 3 times daily.      Magnesium Citrate 200 MG TABS Take 300 mg by mouth 2 times daily (Takes at supper and bedtime)      Oxymetazoline HCl (NASAL SPRAY) 0.05 % SOLN Spray 1 spray into both nostrils 2 times daily as needed      Probiotic Product (PROBIOTIC ADVANCED PO) Take 1 capsule by mouth At Bedtime      Quercetin 250 MG TABS Take 500 mg by mouth daily      QUEtiapine (SEROQUEL) 100 MG tablet Take 200 mg by mouth At Bedtime      senna-docusate (SENOKOT-S/PERICOLACE) 8.6-50 MG tablet Take 1 tablet by mouth 2 times daily 30 tablet 0    triprolidine-pseudoePHEDrine (APRODINE) 2.5-60 MG TABS per tablet Take 0.5 tablets by mouth 2 times daily      Vitamin D3 (CHOLECALCIFEROL) 125 MCG (5000 UT) tablet Take 125 mcg by mouth daily      HYDROmorphone (DILAUDID) 2 MG tablet Take 0.5-1 tablets (1-2 mg) by mouth every 4 hours as needed for moderate pain (4-6) (Patient not taking: Reported on 11/7/2024) 20 tablet 0    hydrOXYzine (ATARAX) 25 MG tablet Take 1 tablet (25 mg) by mouth every 6 hours as needed for other (adjuvant pain) (Patient not taking: Reported on 11/7/2024)      ibuprofen (ADVIL/MOTRIN) 200 MG tablet Take 400 mg by mouth every 4 hours as needed for pain (Patient not taking: Reported on 11/7/2024)      Omega-3 Fatty Acids (FISH OIL PO) Take 1 capsule by mouth 2 times daily (Patient not taking: Reported on 11/7/2024)      ondansetron (ZOFRAN ODT) 4 MG ODT tab Take 1 tablet (4 mg) by mouth every 6 hours as needed for nausea or vomiting (Patient not taking: Reported on 11/7/2024) 20 tablet 0    polyethylene glycol (MIRALAX) 17 GM/Dose powder Take 1 capful by mouth daily as needed for constipation (Patient not taking: Reported on 11/7/2024)      valsartan (DIOVAN) 40 MG tablet Take 1 tablet (40 mg) by mouth daily. (Patient not taking: Reported on 11/7/2024) 30 tablet 0  "     Allergies   Allergen Reactions    Amlodipine Other (See Comments)     Constipation, \"liver pain\".        Clonidine      Other reaction(s): Dizziness    Escitalopram Muscle Pain (Myalgia)     Other reaction(s): Myalgias    Gluten Meal Headache and GI Disturbance    Hydrochlorothiazide      confusion    Lisinopril Dizziness    Mold Nausea     Ringing in ears, racing heart    Molds & Smuts Headache and Nausea     Ringing in ears, racing heart    Paroxetine Other (See Comments)     Palpitations/racing heart        Penicillins Unknown     Does not remember reaction, mother said she allergic to mold; patient refuses Pcn or any derivative      Sulfa Antibiotics Unknown    Tramadol      Other reaction(s): Runny Nose  Facial pain, sinus swelling      Carvedilol Other (See Comments)     Worsening depression        Codeine Headache and Nausea and Vomiting    Cortisone Other (See Comments)     Patient reports and possibly all steroids \"makes me to feel sick\", get fungal infections      Duloxetine Headache    Oxycodone Headache         Lab Results    Chemistry/lipid CBC Cardiac Enzymes/BNP/TSH/INR   Recent Labs   Lab Test 10/21/24  1403 08/25/21  0823 03/19/20  0930   TRIG  --   --  49  49   LDL  --   --  158*   BUN 10.7   < > 14      < > 139   CO2 22   < > 26    < > = values in this interval not displayed.    Recent Labs   Lab Test 10/21/24  1403   WBC 7.8   HGB 14.1   HCT 41.2   MCV 91       Recent Labs   Lab Test 01/25/23  1054 01/24/23  2223 10/19/21  1206 08/25/21  0823 03/19/20  0930   TROPONINI 0.02   < >  --   --   --    TSH  --   --   --   --  2.24   INR  --   --  0.96   < >  --     < > = values in this interval not displayed.        A total of 50 minutes was spent reviewing patient's medical records, obtaining history and performing examination, as well as discussing diagnoses/ recommendations with patient and answering all questions.                      "

## 2024-11-08 DIAGNOSIS — I10 PRIMARY HYPERTENSION: ICD-10-CM

## 2024-11-08 RX ORDER — IRBESARTAN 75 MG/1
75 TABLET ORAL AT BEDTIME
Qty: 90 TABLET | Refills: 3 | Status: SHIPPED | OUTPATIENT
Start: 2024-11-08

## 2024-11-19 ENCOUNTER — TELEPHONE (OUTPATIENT)
Dept: CARDIOLOGY | Facility: CLINIC | Age: 64
End: 2024-11-19
Payer: COMMERCIAL

## 2024-11-19 NOTE — TELEPHONE ENCOUNTER
[facility]    Phone Message    May a detailed message be left on voicemail: yes     Reason for Call: Other: patient is calling as she was seen at urgent care regarding her high blood pressure and would like to speak to the care team regarding this, thank you      Action Taken: Other: cardiology     Travel Screening: Not Applicable     Date of Service:

## 2024-12-21 DIAGNOSIS — I10 PRIMARY HYPERTENSION: ICD-10-CM

## 2024-12-21 RX ORDER — IRBESARTAN 75 MG/1
150 TABLET ORAL AT BEDTIME
Qty: 180 TABLET | Refills: 3 | Status: SHIPPED | OUTPATIENT
Start: 2024-12-21

## 2024-12-27 ENCOUNTER — LAB (OUTPATIENT)
Dept: CARDIOLOGY | Facility: CLINIC | Age: 64
End: 2024-12-27
Payer: COMMERCIAL

## 2024-12-27 DIAGNOSIS — I10 PRIMARY HYPERTENSION: ICD-10-CM

## 2024-12-27 LAB
ANION GAP SERPL CALCULATED.3IONS-SCNC: 10 MMOL/L (ref 7–15)
BUN SERPL-MCNC: 9.6 MG/DL (ref 8–23)
CALCIUM SERPL-MCNC: 9.7 MG/DL (ref 8.8–10.4)
CHLORIDE SERPL-SCNC: 97 MMOL/L (ref 98–107)
CREAT SERPL-MCNC: 0.64 MG/DL (ref 0.51–0.95)
EGFRCR SERPLBLD CKD-EPI 2021: >90 ML/MIN/1.73M2
GLUCOSE SERPL-MCNC: 96 MG/DL (ref 70–99)
HCO3 SERPL-SCNC: 29 MMOL/L (ref 22–29)
POTASSIUM SERPL-SCNC: 4.4 MMOL/L (ref 3.4–5.3)
SODIUM SERPL-SCNC: 136 MMOL/L (ref 135–145)

## 2024-12-27 PROCEDURE — 36415 COLL VENOUS BLD VENIPUNCTURE: CPT

## 2024-12-27 PROCEDURE — 80048 BASIC METABOLIC PNL TOTAL CA: CPT

## 2025-01-02 DIAGNOSIS — I10 PRIMARY HYPERTENSION: Primary | ICD-10-CM

## 2025-01-07 ENCOUNTER — MYC MEDICAL ADVICE (OUTPATIENT)
Dept: CARDIOLOGY | Facility: CLINIC | Age: 65
End: 2025-01-07
Payer: COMMERCIAL

## 2025-01-08 ENCOUNTER — OFFICE VISIT (OUTPATIENT)
Dept: CARDIOLOGY | Facility: CLINIC | Age: 65
End: 2025-01-08
Payer: COMMERCIAL

## 2025-01-08 VITALS — SYSTOLIC BLOOD PRESSURE: 158 MMHG | DIASTOLIC BLOOD PRESSURE: 92 MMHG

## 2025-01-08 DIAGNOSIS — I10 PRIMARY HYPERTENSION: Primary | ICD-10-CM

## 2025-01-08 RX ORDER — ZINC SULFATE 50(220)MG
220 CAPSULE ORAL DAILY
COMMUNITY

## 2025-01-08 RX ORDER — ALISKIREN 300 MG/1
300 TABLET, FILM COATED ORAL DAILY
COMMUNITY

## 2025-01-08 NOTE — LETTER
"1/8/2025    [facility] [address]  [facility] [address]    RE: Martha CABRALES Chun       Dear Colleague,     I had the pleasure of seeing Martha Chun in the [facility].    HEART CARE ENCOUNTER CONSULTATON NOTE      [facility]  [phone]      Assessment/Recommendations   Assessment:   Hypertension: aliskiren 300 mg daily, Irbesartan 150 mg daily pm, BMP stable potassium and creatinine.  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 BMP in 2 weeks with BP check.  Very hesitant about increasing an additional antihypertensive medications.  We had a discussion regarding necessity for multiple agents as not uncommon in BP management.  Discussed hydralazine versus alpha-blocker management of blood pressure if she does not tolerated increase irbesartan or if additional agent is needed.  I think she will be able to take a daytime medication due to side effects, takes her medications at bedtime.  Discussed low-sodium diet  Blood pressure twice weekly at home, increased frequency causes her anxiety    Follow up in 1-2 months for BP     History of Present Illness/Subjective    HPI: Martha Chun is a 64 year old female with PMHx of Hypertension presents for follow up.    Patient is concerned about irbesartan causing dizziness and headache.  Also has a small area of numbness on her distal left foot she tells me about.  This may be due to her chronic neck pain and high blood pressure as well.  Stopped diltiazem due to concern of side effects.  Her blood pressure 1 day ago at home was 200/112.  She continues to not feel well overall.  Follows with a \"holistic allergist " "\"for suspected mast cell activation which she also reports has limited her medication options.  Is trying to treat this naturally.  Has not seen a allergist before.         Physical Examination  Review of Systems   Vitals: BP (!) 158/92   BMI= There is no height or weight on file to calculate BMI.  Wt Readings from Last 3 Encounters:   11/07/24 70.8 kg (156 lb)   10/21/24 68 kg (150 lb)   01/27/23 64.9 kg (143 lb 1.3 oz)           ENT/Mouth: membranes moist, no oral lesions or bleeding gums.      EYES:  no scleral icterus, normal conjunctivae       Chest/Lungs:   lungs are clear to auscultation, no rales or wheezing, equal chest wall expansion    Cardiovascular:   Regular. Normal first and second heart sounds with no murmurs, rubs, or gallops; the  radial absent edema bilaterally        Extremities: no cyanosis or clubbing   Skin: no xanthelasma, warm.    Neurologic:  no tremors     Psychiatric: alert and oriented x3, calm        Please refer above for cardiac ROS details.        Medical History  Surgical History Family History Social History   Past Medical History:   Diagnosis Date     Anxiety      Cervical spine instability      COVID-19 12/22/2022     Degenerative arthritis      Delusional disorder (H)      Depression      Fibromyalgia      Hypertension      Insomnia      Migraines      Occipital neuralgia     b/l     Other chronic pain     Back pain     PONV (postoperative nausea and vomiting)      Seasonal allergies      Suicide attempt (H) 06/2022    from drug overdose     Past Surgical History:   Procedure Laterality Date     ARTHROPLASTY HIP Left 1/24/2023    Procedure: LEFT TOTAL HIP ARTHROPLASTY;  Surgeon: Danny Woodson MD;  Location: [facility] Main OR     CERVICAL FUSION  11/2020 November and Dec 2020     IMPLANT PULSE GENERATOR SUBCUTANEOUS Bilateral 10/22/2021    Procedure: Phase II occipital nerve stimulator placement, bilateral occipital electrodes and bilateral trigeminal electrodes, " Implantation of bilateral subcutaneous neurostimulator Percutaneous implantation of neurostimulator electrode array; cranial nerve Implantable neurostimulator electrode, each CPT 62550 Implantable neurostimulator pulse generator, dual array, nonrechargeable, includes extensi     INSERT STIMULATOR OCCIPITAL Bilateral 08/27/2021    Procedure: Externalized trial of percutaneous occipital nerve stimulator bilateral Percutaneous implantation of neurostimulator electrode array; cranial nerve CPT 84910;  Surgeon: Kobi Shrestha MD;  Location: [facility] OR     TOTAL HIP ARTHROPLASTY Right      TOTAL KNEE ARTHROPLASTY Left 01/2018     ZZC LIGATE FALLOPIAN TUBE      Description: Tubal Ligation;  Recorded: 01/16/2012;     Family History   Problem Relation Age of Onset     Cancer Mother      Diabetes Father      Hypertension Father      Pulmonary Embolism Daughter      Anesthesia Reaction No family hx of         Social History     Socioeconomic History     Marital status: Legally      Spouse name: Not on file     Number of children: 2     Years of education: Not on file     Highest education level: Not on file   Occupational History     Occupation: on disability   Tobacco Use     Smoking status: Former     Current packs/day: 0.00     Average packs/day: 1 pack/day for 15.0 years (15.0 ttl pk-yrs)     Types: Cigarettes     Start date: 10/10/1999     Quit date: 10/10/2014     Years since quitting: 10.2     Smokeless tobacco: Never   Substance and Sexual Activity     Alcohol use: Not Currently     Comment: Last drink 10/2014     Drug use: Not Currently     Sexual activity: Not on file   Other Topics Concern     Parent/sibling w/ CABG, MI or angioplasty before 65F 55M? Not Asked   Social History Narrative     Not on file     Social Drivers of Health     Financial Resource Strain: High Risk (1/1/2022)    [facility]    Financial Resource  Strain      Difficulty of Paying Living Expenses: Not on file      Difficulty of Paying Living Expenses: Not on file   Food Insecurity: Not on file   Transportation Needs: Not on file   Physical Activity: Not on file   Stress: Not on file   Social Connections: Unknown (1/1/2022)    Received [facility], [employer] [facility]    Social Connections      Frequency of Communication with Friends and Family: Not on file   Interpersonal Safety: Not on file   Housing Stability: Not on file           Medications  Allergies   Current Outpatient Medications   Medication Sig Dispense Refill     aliskiren (TEKTURNA) 300 MG tablet Take 300 mg by mouth daily.       ASPIRIN-CAFFEINE PO Take by mouth. No caffeine       B Complex-Folic Acid (B COMPLEX FORMULA 1 PO) Take 1 teaspoonful by mouth daily       butalbital-aspirin-caffeine (FIORINAL) -40 MG capsule Take 1 capsule by mouth every 4 hours as needed for headaches 60 capsule 3     carboxymethylcellulose (REFRESH PLUS) 0.5 % SOLN ophthalmic solution Place 1-2 drops into both eyes 3 times daily as needed       fluticasone (FLONASE) 50 MCG/ACT nasal spray Spray 2 sprays into both nostrils every evening       Garlic 1000 MG CAPS Take 1 tablet by mouth 3 times daily       LORazepam (ATIVAN) 0.5 MG tablet Take 0.5 mg by mouth 3 times daily.       Magnesium Citrate 200 MG TABS Take 300 mg by mouth 2 times daily (Takes at supper and bedtime)       Probiotic Product (PROBIOTIC ADVANCED PO) Take 1 capsule by mouth At Bedtime       Quercetin 250 MG TABS Take 500 mg by mouth daily       QUEtiapine (SEROQUEL) 100 MG tablet Take 200 mg by mouth At Bedtime       Vitamin D3 (CHOLECALCIFEROL) 125 MCG (5000 UT) tablet Take 125 mcg by mouth daily       zinc sulfate (ZINCATE) 220 (50 Zn) MG capsule Take 220 mg by mouth daily.       acetaminophen (TYLENOL) 500 MG tablet Take 2 tablets (1,000 mg) by mouth every 8 hours (Patient not  "taking: Reported on 1/8/2025)       HYDROmorphone (DILAUDID) 2 MG tablet Take 0.5-1 tablets (1-2 mg) by mouth every 4 hours as needed for moderate pain (4-6) (Patient not taking: Reported on 1/8/2025) 20 tablet 0     hydrOXYzine (ATARAX) 25 MG tablet Take 1 tablet (25 mg) by mouth every 6 hours as needed for other (adjuvant pain) (Patient not taking: Reported on 1/8/2025)       ibuprofen (ADVIL/MOTRIN) 200 MG tablet Take 400 mg by mouth every 4 hours as needed for pain (Patient not taking: Reported on 1/8/2025)       irbesartan (AVAPRO) 75 MG tablet Take 2 tablets (150 mg) by mouth at bedtime. 180 tablet 3     Omega-3 Fatty Acids (FISH OIL PO) Take 1 capsule by mouth 2 times daily (Patient not taking: Reported on 11/7/2024)       ondansetron (ZOFRAN ODT) 4 MG ODT tab Take 1 tablet (4 mg) by mouth every 6 hours as needed for nausea or vomiting (Patient not taking: Reported on 1/8/2025) 20 tablet 0     Oxymetazoline HCl (NASAL SPRAY) 0.05 % SOLN Spray 1 spray into both nostrils 2 times daily as needed (Patient not taking: Reported on 1/8/2025)       polyethylene glycol (MIRALAX) 17 GM/Dose powder Take 1 capful by mouth daily as needed for constipation (Patient not taking: Reported on 1/8/2025)       senna-docusate (SENOKOT-S/PERICOLACE) 8.6-50 MG tablet Take 1 tablet by mouth 2 times daily 30 tablet 0     triprolidine-pseudoePHEDrine (APRODINE) 2.5-60 MG TABS per tablet Take 0.5 tablets by mouth 2 times daily         Allergies   Allergen Reactions     Amlodipine Other (See Comments)     Constipation, \"liver pain\".         Clonidine      Other reaction(s): Dizziness     Escitalopram Muscle Pain (Myalgia)     Other reaction(s): Myalgias     Gluten Meal Headache and GI Disturbance     Hydrochlorothiazide      confusion     Lisinopril Dizziness     Mold Nausea     Ringing in ears, racing heart     Molds & Smuts Headache and Nausea     Ringing in ears, racing heart     Paroxetine Other (See Comments)     " "Palpitations/racing heart         Penicillins Unknown     Does not remember reaction, mother said she allergic to mold; patient refuses Pcn or any derivative       Sulfa Antibiotics Unknown     Tramadol      Other reaction(s): Runny Nose  Facial pain, sinus swelling       Carvedilol Other (See Comments)     Worsening depression         Codeine Headache and Nausea and Vomiting     Cortisone Other (See Comments)     Patient reports and possibly all steroids \"makes me to feel sick\", get fungal infections       Duloxetine Headache     Oxycodone Headache          Lab Results    Chemistry/lipid CBC Cardiac Enzymes/BNP/TSH/INR   Recent Labs   Lab Test 03/19/20  0930   CHOL 259*  259*   HDL 91   *   TRIG 49  49     Recent Labs   Lab Test 03/19/20  0930   *     Recent Labs   Lab Test 12/27/24  1152      POTASSIUM 4.4   CHLORIDE 97*   CO2 29   GLC 96   BUN 9.6   CR 0.64   GFRESTIMATED >90   ALYSE 9.7     Recent Labs   Lab Test 12/27/24  1152 10/21/24  1403 01/25/23  0441   CR 0.64 0.54 0.81     No results for input(s): \"A1C\" in the last 86100 hours.       Recent Labs   Lab Test 10/21/24  1403   WBC 7.8   HGB 14.1   HCT 41.2   MCV 91        Recent Labs   Lab Test 10/21/24  1403 01/25/23  0441 01/24/23  2223   HGB 14.1 8.5* 9.2*    Recent Labs   Lab Test 01/25/23  1054 01/25/23  0441 01/24/23  2223   TROPONINI 0.02 0.02 0.01     Recent Labs   Lab Test 10/19/21  1206   NTBNP 137*     Recent Labs   Lab Test 03/19/20  0930   TSH 2.24     Recent Labs   Lab Test 10/19/21  1206 08/25/21  0823   INR 0.96 0.91          This note has been dictated using voice recognition software. Any grammatical, typographical, or context distortions are unintentional and inherent to the software    Claire Carrion                                         Thank you for allowing me to participate in the care of your patient.      Sincerely,     Claire Roper PA-C     [facility]" [facility]  cc:   Janis Levy MD  [address], [address]  [place] [address]

## 2025-01-08 NOTE — PROGRESS NOTES
"  HEART CARE ENCOUNTER CONSULTATON NOTE      [facility]  [phone]      Assessment/Recommendations   Assessment:   Hypertension: aliskiren 300 mg daily, Irbesartan 150 mg daily pm, BMP stable potassium and creatinine.  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 BMP in 2 weeks with BP check.  Very hesitant about increasing an additional antihypertensive medications.  We had a discussion regarding necessity for multiple agents as not uncommon in BP management.  Discussed hydralazine versus alpha-blocker management of blood pressure if she does not tolerated increase irbesartan or if additional agent is needed.  I think she will be able to take a daytime medication due to side effects, takes her medications at bedtime.  Discussed low-sodium diet  Blood pressure twice weekly at home, increased frequency causes her anxiety    Follow up in 1-2 months for BP     History of Present Illness/Subjective    HPI: Martha Chun is a 64 year old female with PMHx of Hypertension presents for follow up.    Patient is concerned about irbesartan causing dizziness and headache.  Also has a small area of numbness on her distal left foot she tells me about.  This may be due to her chronic neck pain and high blood pressure as well.  Stopped diltiazem due to concern of side effects.  Her blood pressure 1 day ago at home was 200/112.  She continues to not feel well overall.  Follows with a \"holistic allergist \"for suspected mast cell activation which she also reports has limited her medication options.  Is trying to treat this naturally.  Has not seen a allergist before.         Physical Examination  Review of Systems   Vitals: BP (!) " 158/92   BMI= There is no height or weight on file to calculate BMI.  Wt Readings from Last 3 Encounters:   11/07/24 70.8 kg (156 lb)   10/21/24 68 kg (150 lb)   01/27/23 64.9 kg (143 lb 1.3 oz)           ENT/Mouth: membranes moist, no oral lesions or bleeding gums.      EYES:  no scleral icterus, normal conjunctivae       Chest/Lungs:   lungs are clear to auscultation, no rales or wheezing, equal chest wall expansion    Cardiovascular:   Regular. Normal first and second heart sounds with no murmurs, rubs, or gallops; the  radial absent edema bilaterally        Extremities: no cyanosis or clubbing   Skin: no xanthelasma, warm.    Neurologic:  no tremors     Psychiatric: alert and oriented x3, calm        Please refer above for cardiac ROS details.        Medical History  Surgical History Family History Social History   Past Medical History:   Diagnosis Date    Anxiety     Cervical spine instability     COVID-19 12/22/2022    Degenerative arthritis     Delusional disorder (H)     Depression     Fibromyalgia     Hypertension     Insomnia     Migraines     Occipital neuralgia     b/l    Other chronic pain     Back pain    PONV (postoperative nausea and vomiting)     Seasonal allergies     Suicide attempt (H) 06/2022    from drug overdose     Past Surgical History:   Procedure Laterality Date    ARTHROPLASTY HIP Left 1/24/2023    Procedure: LEFT TOTAL HIP ARTHROPLASTY;  Surgeon: Danny Woodson MD;  Location: [facility] Main OR    CERVICAL FUSION  11/2020 November and Dec 2020    IMPLANT PULSE GENERATOR SUBCUTANEOUS Bilateral 10/22/2021    Procedure: Phase II occipital nerve stimulator placement, bilateral occipital electrodes and bilateral trigeminal electrodes, Implantation of bilateral subcutaneous neurostimulator Percutaneous implantation of neurostimulator electrode array; cranial nerve Implantable neurostimulator electrode, each CPT 62920 Implantable neurostimulator pulse generator, dual array,  nonrechargeable, includes extensi    INSERT STIMULATOR OCCIPITAL Bilateral 08/27/2021    Procedure: Externalized trial of percutaneous occipital nerve stimulator bilateral Percutaneous implantation of neurostimulator electrode array; cranial nerve CPT 65855;  Surgeon: Kobi Shrestha MD;  Location: [facility] OR    TOTAL HIP ARTHROPLASTY Right     TOTAL KNEE ARTHROPLASTY Left 01/2018    ZZC LIGATE FALLOPIAN TUBE      Description: Tubal Ligation;  Recorded: 01/16/2012;     Family History   Problem Relation Age of Onset    Cancer Mother     Diabetes Father     Hypertension Father     Pulmonary Embolism Daughter     Anesthesia Reaction No family hx of         Social History     Socioeconomic History    Marital status: Legally      Spouse name: Not on file    Number of children: 2    Years of education: Not on file    Highest education level: Not on file   Occupational History    Occupation: on disability   Tobacco Use    Smoking status: Former     Current packs/day: 0.00     Average packs/day: 1 pack/day for 15.0 years (15.0 ttl pk-yrs)     Types: Cigarettes     Start date: 10/10/1999     Quit date: 10/10/2014     Years since quitting: 10.2    Smokeless tobacco: Never   Substance and Sexual Activity    Alcohol use: Not Currently     Comment: Last drink 10/2014    Drug use: Not Currently    Sexual activity: Not on file   Other Topics Concern    Parent/sibling w/ CABG, MI or angioplasty before 65F 55M? Not Asked   Social History Narrative    Not on file     Social Drivers of Health     Financial Resource Strain: High Risk (1/1/2022)    [facility]    Financial Resource Strain     Difficulty of Paying Living Expenses: Not on file     Difficulty of Paying Living Expenses: Not on file   Food Insecurity: Not on file   Transportation Needs: Not on file   Physical Activity: Not on file   Stress: Not on file   Social Connections:  Unknown (1/1/2022)    [facility]    Social Connections     Frequency of Communication with Friends and Family: Not on file   Interpersonal Safety: Not on file   Housing Stability: Not on file           Medications  Allergies   Current Outpatient Medications   Medication Sig Dispense Refill    aliskiren (TEKTURNA) 300 MG tablet Take 300 mg by mouth daily.      ASPIRIN-CAFFEINE PO Take by mouth. No caffeine      B Complex-Folic Acid (B COMPLEX FORMULA 1 PO) Take 1 teaspoonful by mouth daily      butalbital-aspirin-caffeine (FIORINAL) -40 MG capsule Take 1 capsule by mouth every 4 hours as needed for headaches 60 capsule 3    carboxymethylcellulose (REFRESH PLUS) 0.5 % SOLN ophthalmic solution Place 1-2 drops into both eyes 3 times daily as needed      fluticasone (FLONASE) 50 MCG/ACT nasal spray Spray 2 sprays into both nostrils every evening      Garlic 1000 MG CAPS Take 1 tablet by mouth 3 times daily      LORazepam (ATIVAN) 0.5 MG tablet Take 0.5 mg by mouth 3 times daily.      Magnesium Citrate 200 MG TABS Take 300 mg by mouth 2 times daily (Takes at supper and bedtime)      Probiotic Product (PROBIOTIC ADVANCED PO) Take 1 capsule by mouth At Bedtime      Quercetin 250 MG TABS Take 500 mg by mouth daily      QUEtiapine (SEROQUEL) 100 MG tablet Take 200 mg by mouth At Bedtime      Vitamin D3 (CHOLECALCIFEROL) 125 MCG (5000 UT) tablet Take 125 mcg by mouth daily      zinc sulfate (ZINCATE) 220 (50 Zn) MG capsule Take 220 mg by mouth daily.      acetaminophen (TYLENOL) 500 MG tablet Take 2 tablets (1,000 mg) by mouth every 8 hours (Patient not taking: Reported on 1/8/2025)      HYDROmorphone (DILAUDID) 2 MG tablet Take 0.5-1 tablets (1-2 mg) by mouth every 4 hours as needed for moderate pain (4-6) (Patient not taking: Reported on 1/8/2025) 20 tablet 0    hydrOXYzine (ATARAX) 25 MG tablet Take 1 tablet (25 mg) by mouth  "every 6 hours as needed for other (adjuvant pain) (Patient not taking: Reported on 1/8/2025)      ibuprofen (ADVIL/MOTRIN) 200 MG tablet Take 400 mg by mouth every 4 hours as needed for pain (Patient not taking: Reported on 1/8/2025)      irbesartan (AVAPRO) 75 MG tablet Take 2 tablets (150 mg) by mouth at bedtime. 180 tablet 3    Omega-3 Fatty Acids (FISH OIL PO) Take 1 capsule by mouth 2 times daily (Patient not taking: Reported on 11/7/2024)      ondansetron (ZOFRAN ODT) 4 MG ODT tab Take 1 tablet (4 mg) by mouth every 6 hours as needed for nausea or vomiting (Patient not taking: Reported on 1/8/2025) 20 tablet 0    Oxymetazoline HCl (NASAL SPRAY) 0.05 % SOLN Spray 1 spray into both nostrils 2 times daily as needed (Patient not taking: Reported on 1/8/2025)      polyethylene glycol (MIRALAX) 17 GM/Dose powder Take 1 capful by mouth daily as needed for constipation (Patient not taking: Reported on 1/8/2025)      senna-docusate (SENOKOT-S/PERICOLACE) 8.6-50 MG tablet Take 1 tablet by mouth 2 times daily 30 tablet 0    triprolidine-pseudoePHEDrine (APRODINE) 2.5-60 MG TABS per tablet Take 0.5 tablets by mouth 2 times daily         Allergies   Allergen Reactions    Amlodipine Other (See Comments)     Constipation, \"liver pain\".        Clonidine      Other reaction(s): Dizziness    Escitalopram Muscle Pain (Myalgia)     Other reaction(s): Myalgias    Gluten Meal Headache and GI Disturbance    Hydrochlorothiazide      confusion    Lisinopril Dizziness    Mold Nausea     Ringing in ears, racing heart    Molds & Smuts Headache and Nausea     Ringing in ears, racing heart    Paroxetine Other (See Comments)     Palpitations/racing heart        Penicillins Unknown     Does not remember reaction, mother said she allergic to mold; patient refuses Pcn or any derivative      Sulfa Antibiotics Unknown    Tramadol      Other reaction(s): Runny Nose  Facial pain, sinus swelling      Carvedilol Other (See Comments)     Worsening " "depression        Codeine Headache and Nausea and Vomiting    Cortisone Other (See Comments)     Patient reports and possibly all steroids \"makes me to feel sick\", get fungal infections      Duloxetine Headache    Oxycodone Headache          Lab Results    Chemistry/lipid CBC Cardiac Enzymes/BNP/TSH/INR   Recent Labs   Lab Test 03/19/20  0930   CHOL 259*  259*   HDL 91   *   TRIG 49  49     Recent Labs   Lab Test 03/19/20  0930   *     Recent Labs   Lab Test 12/27/24  1152      POTASSIUM 4.4   CHLORIDE 97*   CO2 29   GLC 96   BUN 9.6   CR 0.64   GFRESTIMATED >90   ALYSE 9.7     Recent Labs   Lab Test 12/27/24  1152 10/21/24  1403 01/25/23  0441   CR 0.64 0.54 0.81     No results for input(s): \"A1C\" in the last 42688 hours.       Recent Labs   Lab Test 10/21/24  1403   WBC 7.8   HGB 14.1   HCT 41.2   MCV 91        Recent Labs   Lab Test 10/21/24  1403 01/25/23  0441 01/24/23  2223   HGB 14.1 8.5* 9.2*    Recent Labs   Lab Test 01/25/23  1054 01/25/23  0441 01/24/23  2223   TROPONINI 0.02 0.02 0.01     Recent Labs   Lab Test 10/19/21  1206   NTBNP 137*     Recent Labs   Lab Test 03/19/20  0930   TSH 2.24     Recent Labs   Lab Test 10/19/21  1206 08/25/21  0823   INR 0.96 0.91          This note has been dictated using voice recognition software. Any grammatical, typographical, or context distortions are unintentional and inherent to the software    Claire Carrion                                       "

## 2025-01-08 NOTE — PATIENT INSTRUCTIONS
It was a pleasure taking part in your care today:    - Increase Irbesartan to 2.5 tablets daily for 1 week, then increase to 3 tablets (225 mg daily) for one week.  - Return to clinic in 2 weeks for lab visit to monitor potassium and blood pressure  - Return to clinic in 1 month for blood pressure visit  - Monitor blood pressure at home in the morning hours daily. Goal <140/90    - without improvement hydralazine (multiple times daily) or doxazosin or terazosin (alpha blockers)    Please call the [facility] clinic with any questions or concerns at [phone].     Claire Galdamez PA-C

## 2025-01-08 NOTE — TELEPHONE ENCOUNTER
[facility] Call Center    Phone Message    May a detailed message be left on voicemail: yes     Reason for Call: Other: pt called per Mildred's recommendation to be seen for the below concerns. Pt chose the 1030am appt in [place] with [facility] today, as Mildred is booked solid for [facility], and pt declined afternoon tomorrow and appts following next week.      Action Taken: Other: cardiology     Travel Screening: Not Applicable      Thank you!  [facility]        Date of Service:

## 2025-01-13 ENCOUNTER — TRANSFERRED RECORDS (OUTPATIENT)
Dept: HEALTH INFORMATION MANAGEMENT | Facility: CLINIC | Age: 65
End: 2025-01-13
Payer: COMMERCIAL

## 2025-01-17 ENCOUNTER — MYC MEDICAL ADVICE (OUTPATIENT)
Dept: CARDIOLOGY | Facility: CLINIC | Age: 65
End: 2025-01-17
Payer: COMMERCIAL

## 2025-01-17 DIAGNOSIS — I10 PRIMARY HYPERTENSION: Primary | ICD-10-CM

## 2025-01-20 RX ORDER — DOXAZOSIN 1 MG/1
1 TABLET ORAL AT BEDTIME
Qty: 30 TABLET | Refills: 4 | Status: SHIPPED | OUTPATIENT
Start: 2025-01-20

## 2025-01-20 NOTE — TELEPHONE ENCOUNTER
Msg rec'd 1-20-25 @ 1515:  Claire Galdamez, EDVIN  P [facility] Cv Rn Team X  Please order doxazosin 1 mg once daily indication hypertension to patient's pharmacy  Reduce irbesartan back to 150 mg once daily, did not tolerate higher dose      Phone call to patient who confirmed she had alrdeady reduced Irbesartan to 150mg daily 3 days ago and her plan to start Doxazosin - confirmed preferred pharmacy for new Rx and 2-12-25 follow-up with DORIE.  mg

## 2025-01-27 NOTE — PROGRESS NOTES
"  HEART CARE ENCOUNTER CONSULTATON NOTE      [facility]  [phone]      Assessment/Recommendations   Assessment:   Hypertension: Aliskiren 300 mg daily, Irbesartan 112/5 mg (1 1/2 tablets), addition of doxazosin 1 mg daily not yet started. Home BP measurements improved from previous per recent monitoring.  - Mild increase in irbesartan was not tolerated due to dizziness/headache/increased depression?  She has history of hesitancy to medication adjustments due to intolerance/side effects in the past.  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  She will start 1/2 mg doxazosin for 3 to 5 days and then increase to 1 mg daily if tolerated.  We discussed potential side effects to monitor for.  Continue irbesartan and aliskiren  Certainly possible neck pain, whitecoat hypertension, anxiety contributes to high blood pressure readings.  Although has had acutely elevated measurements at home in the past.  Blood pressure twice weekly at home, increased frequency causes her anxiety    Telephone visit  Patient location: Home  Clinician location: [facility]  Start: 1/28/2025 1:01 pm  Stop: 1/28/2025 1:16 pm    Follow up 1/12/2025 with me     History of Present Illness/Subjective    HPI: Martha Chun is a 64 year old female with PMHx of Hypertension presents for follow up telephone visit.      Patient reports difficulty with very small 37 and half milligram increase in irbesartan following last visit.  She says she has history of cervical fusion, a \"facet disease \"awaiting possible stem cell therapy and irbesartan worsened her \"headachey\", tension and anxiety.  Reduced breath down to 1/2 75 mg tablets    Seroquel increased 1 week ago and he reports \"horrible\" side " effects starting to improve so has avoided doxazosin.  She reports better home blood pressure measurements than reported following last visit. 118/83, 122/88, 149/95, 139/90 over the last couple of weeks.  Still some elevation of diastolic BP, intermittently systolic.    Her PCP is at [facility].     Physical Examination  Review of Systems   Vitals: There were no vitals taken for this visit.  BMI= There is no height or weight on file to calculate BMI.  Wt Readings from Last 3 Encounters:   11/07/24 70.8 kg (156 lb)   10/21/24 68 kg (150 lb)   01/27/23 64.9 kg (143 lb 1.3 oz)     Telephone visit      ENT/Mouth: Clear voice   EYES:         Chest/Lungs:   No shortness of breath/wheezing audible during call   Cardiovascular:          Extremities:    Skin:    Neurologic: Voice steady   Psychiatric:        Please refer above for cardiac ROS details.        Medical History  Surgical History Family History Social History   Past Medical History:   Diagnosis Date    Anxiety     Cervical spine instability     COVID-19 12/22/2022    Degenerative arthritis     Delusional disorder (H)     Depression     Fibromyalgia     Hypertension     Insomnia     Migraines     Occipital neuralgia     b/l    Other chronic pain     Back pain    PONV (postoperative nausea and vomiting)     Seasonal allergies     Suicide attempt (H) 06/2022    from drug overdose     Past Surgical History:   Procedure Laterality Date    ARTHROPLASTY HIP Left 1/24/2023    Procedure: LEFT TOTAL HIP ARTHROPLASTY;  Surgeon: Danny Woodson MD;  Location: [facility] Main OR    CERVICAL FUSION  11/2020 November and Dec 2020    IMPLANT PULSE GENERATOR SUBCUTANEOUS Bilateral 10/22/2021    Procedure: Phase II occipital nerve stimulator placement, bilateral occipital electrodes and bilateral trigeminal electrodes, Implantation of bilateral subcutaneous neurostimulator Percutaneous implantation of neurostimulator electrode array; cranial nerve Implantable  neurostimulator electrode, each CPT 98954 Implantable neurostimulator pulse generator, dual array, nonrechargeable, includes extensi    INSERT STIMULATOR OCCIPITAL Bilateral 08/27/2021    Procedure: Externalized trial of percutaneous occipital nerve stimulator bilateral Percutaneous implantation of neurostimulator electrode array; cranial nerve CPT 35829;  Surgeon: Kobi Shrestha MD;  Location: [facility] OR    TOTAL HIP ARTHROPLASTY Right     TOTAL KNEE ARTHROPLASTY Left 01/2018    ZZC LIGATE FALLOPIAN TUBE      Description: Tubal Ligation;  Recorded: 01/16/2012;     Family History   Problem Relation Age of Onset    Cancer Mother     Diabetes Father     Hypertension Father     Pulmonary Embolism Daughter     Anesthesia Reaction No family hx of         Social History     Socioeconomic History    Marital status: Legally      Spouse name: Not on file    Number of children: 2    Years of education: Not on file    Highest education level: Not on file   Occupational History    Occupation: on disability   Tobacco Use    Smoking status: Former     Current packs/day: 0.00     Average packs/day: 1 pack/day for 15.0 years (15.0 ttl pk-yrs)     Types: Cigarettes     Start date: 10/10/1999     Quit date: 10/10/2014     Years since quitting: 10.3    Smokeless tobacco: Never   Substance and Sexual Activity    Alcohol use: Not Currently     Comment: Last drink 10/2014    Drug use: Not Currently    Sexual activity: Not on file   Other Topics Concern    Parent/sibling w/ CABG, MI or angioplasty before 65F 55M? Not Asked   Social History Narrative    Not on file     Social Drivers of Health     Financial Resource Strain: High Risk (1/1/2022)    [facility]    Financial Resource Strain     Difficulty of Paying Living Expenses: Not on file     Difficulty of Paying Living Expenses: Not on file   Food Insecurity: Not on file   Transportation  Needs: Not on file   Physical Activity: Not on file   Stress: Not on file   Social Connections: Unknown (1/1/2022)    [facility]    Social Connections     Frequency of Communication with Friends and Family: Not on file   Interpersonal Safety: Not on file   Housing Stability: Not on file           Medications  Allergies   Current Outpatient Medications   Medication Sig Dispense Refill    acetaminophen (TYLENOL) 500 MG tablet Take 2 tablets (1,000 mg) by mouth every 8 hours (Patient not taking: Reported on 1/8/2025)      aliskiren (TEKTURNA) 300 MG tablet Take 300 mg by mouth daily.      ASPIRIN-CAFFEINE PO Take by mouth. No caffeine      B Complex-Folic Acid (B COMPLEX FORMULA 1 PO) Take 1 teaspoonful by mouth daily      butalbital-aspirin-caffeine (FIORINAL) -40 MG capsule Take 1 capsule by mouth every 4 hours as needed for headaches 60 capsule 3    carboxymethylcellulose (REFRESH PLUS) 0.5 % SOLN ophthalmic solution Place 1-2 drops into both eyes 3 times daily as needed      doxazosin (CARDURA) 1 MG tablet Take 1 tablet (1 mg) by mouth at bedtime. 30 tablet 4    fluticasone (FLONASE) 50 MCG/ACT nasal spray Spray 2 sprays into both nostrils every evening      Garlic 1000 MG CAPS Take 1 tablet by mouth 3 times daily      HYDROmorphone (DILAUDID) 2 MG tablet Take 0.5-1 tablets (1-2 mg) by mouth every 4 hours as needed for moderate pain (4-6) (Patient not taking: Reported on 1/8/2025) 20 tablet 0    hydrOXYzine (ATARAX) 25 MG tablet Take 1 tablet (25 mg) by mouth every 6 hours as needed for other (adjuvant pain) (Patient not taking: Reported on 1/8/2025)      ibuprofen (ADVIL/MOTRIN) 200 MG tablet Take 400 mg by mouth every 4 hours as needed for pain (Patient not taking: Reported on 1/8/2025)      irbesartan (AVAPRO) 75 MG tablet Take 2 tablets (150 mg) by mouth at bedtime. 180 tablet 3    LORazepam (ATIVAN) 0.5 MG  "tablet Take 0.5 mg by mouth 3 times daily.      Magnesium Citrate 200 MG TABS Take 300 mg by mouth 2 times daily (Takes at supper and bedtime)      Omega-3 Fatty Acids (FISH OIL PO) Take 1 capsule by mouth 2 times daily (Patient not taking: Reported on 11/7/2024)      ondansetron (ZOFRAN ODT) 4 MG ODT tab Take 1 tablet (4 mg) by mouth every 6 hours as needed for nausea or vomiting (Patient not taking: Reported on 1/8/2025) 20 tablet 0    Oxymetazoline HCl (NASAL SPRAY) 0.05 % SOLN Spray 1 spray into both nostrils 2 times daily as needed (Patient not taking: Reported on 1/8/2025)      polyethylene glycol (MIRALAX) 17 GM/Dose powder Take 1 capful by mouth daily as needed for constipation (Patient not taking: Reported on 1/8/2025)      Probiotic Product (PROBIOTIC ADVANCED PO) Take 1 capsule by mouth At Bedtime      Quercetin 250 MG TABS Take 500 mg by mouth daily      QUEtiapine (SEROQUEL) 100 MG tablet Take 200 mg by mouth At Bedtime      senna-docusate (SENOKOT-S/PERICOLACE) 8.6-50 MG tablet Take 1 tablet by mouth 2 times daily 30 tablet 0    triprolidine-pseudoePHEDrine (APRODINE) 2.5-60 MG TABS per tablet Take 0.5 tablets by mouth 2 times daily      Vitamin D3 (CHOLECALCIFEROL) 125 MCG (5000 UT) tablet Take 125 mcg by mouth daily      zinc sulfate (ZINCATE) 220 (50 Zn) MG capsule Take 220 mg by mouth daily.         Allergies   Allergen Reactions    Amlodipine Other (See Comments)     Constipation, \"liver pain\".        Clonidine      Other reaction(s): Dizziness    Escitalopram Muscle Pain (Myalgia)     Other reaction(s): Myalgias    Gluten Meal Headache and GI Disturbance    Hydrochlorothiazide      confusion    Lisinopril Dizziness    Mold Nausea     Ringing in ears, racing heart    Molds & Smuts Headache and Nausea     Ringing in ears, racing heart    Paroxetine Other (See Comments)     Palpitations/racing heart        Penicillins Unknown     Does not remember reaction, mother said she allergic to mold; patient " "refuses Pcn or any derivative      Sulfa Antibiotics Unknown    Tramadol      Other reaction(s): Runny Nose  Facial pain, sinus swelling      Carvedilol Other (See Comments)     Worsening depression        Codeine Headache and Nausea and Vomiting    Cortisone Other (See Comments)     Patient reports and possibly all steroids \"makes me to feel sick\", get fungal infections      Duloxetine Headache    Oxycodone Headache          Lab Results    Chemistry/lipid CBC Cardiac Enzymes/BNP/TSH/INR   Recent Labs   Lab Test 03/19/20  0930   CHOL 259*  259*   HDL 91   *   TRIG 49  49     Recent Labs   Lab Test 03/19/20  0930   *     Recent Labs   Lab Test 12/27/24  1152      POTASSIUM 4.4   CHLORIDE 97*   CO2 29   GLC 96   BUN 9.6   CR 0.64   GFRESTIMATED >90   ALYSE 9.7     Recent Labs   Lab Test 12/27/24  1152 10/21/24  1403 01/25/23  0441   CR 0.64 0.54 0.81     No results for input(s): \"A1C\" in the last 03288 hours.       Recent Labs   Lab Test 10/21/24  1403   WBC 7.8   HGB 14.1   HCT 41.2   MCV 91        Recent Labs   Lab Test 10/21/24  1403 01/25/23  0441 01/24/23  2223   HGB 14.1 8.5* 9.2*    Recent Labs   Lab Test 01/25/23  1054 01/25/23  0441 01/24/23  2223   TROPONINI 0.02 0.02 0.01     Recent Labs   Lab Test 10/19/21  1206   NTBNP 137*     Recent Labs   Lab Test 03/19/20  0930   TSH 2.24     Recent Labs   Lab Test 10/19/21  1206 08/25/21  0823   INR 0.96 0.91          This note has been dictated using voice recognition software. Any grammatical, typographical, or context distortions are unintentional and inherent to the software    Claire Galdamez PA-C                                       "

## 2025-01-28 ENCOUNTER — VIRTUAL VISIT (OUTPATIENT)
Dept: CARDIOLOGY | Facility: CLINIC | Age: 65
End: 2025-01-28
Payer: COMMERCIAL

## 2025-01-28 DIAGNOSIS — I10 PRIMARY HYPERTENSION: Primary | ICD-10-CM

## 2025-01-28 PROCEDURE — 98014 SYNCH AUDIO-ONLY EST MOD 30: CPT

## 2025-01-28 RX ORDER — MAGNESIUM GLYCINATE 100 MG
100 CAPSULE ORAL DAILY
COMMUNITY

## 2025-01-28 RX ORDER — QUETIAPINE FUMARATE 25 MG/1
25 TABLET, FILM COATED ORAL AT BEDTIME
COMMUNITY
Start: 2024-10-31

## 2025-01-28 NOTE — LETTER
1/28/2025    [facility] [address]  [facility] [id number]    RE: Martha M Adiel       Dear Colleague,     I had the pleasure of seeing Martha Chun in the [facility].    HEART CARE ENCOUNTER CONSULTATON NOTE      [facility]  [phone]      Assessment/Recommendations   Assessment:   Hypertension: Aliskiren 300 mg daily, Irbesartan 112/5 mg (1 1/2 tablets), addition of doxazosin 1 mg daily not yet started. Home BP measurements improved from previous per recent monitoring.  - Mild increase in irbesartan was not tolerated due to dizziness/headache/increased depression?  She has history of hesitancy to medication adjustments due to intolerance/side effects in the past.  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  She will start 1/2 mg doxazosin for 3 to 5 days and then increase to 1 mg daily if tolerated.  We discussed potential side effects to monitor for.  Continue irbesartan and aliskiren  Certainly possible neck pain, whitecoat hypertension, anxiety contributes to high blood pressure readings.  Although has had acutely elevated measurements at home in the past.  Blood pressure twice weekly at home, increased frequency causes her anxiety    Telephone visit  Patient location: Home  Clinician location: [facility]  Start: 1/28/2025 1:01 pm  Stop: 1/28/2025 1:16 pm    Follow up 1/12/2025 with me     History of Present Illness/Subjective    HPI: Martha Chun is a 64 year old female with PMHx of Hypertension presents for follow up telephone visit.      Patient reports difficulty with very small 37 and half milligram increase in irbesartan following last visit.  She says she has history of cervical  "fusion, a \"facet disease \"awaiting possible stem cell therapy and irbesartan worsened her \"headachey\", tension and anxiety.  Reduced breath down to 1/2 75 mg tablets    Seroquel increased 1 week ago and he reports \"horrible\" side effects starting to improve so has avoided doxazosin.  She reports better home blood pressure measurements than reported following last visit. 118/83, 122/88, 149/95, 139/90 over the last couple of weeks.  Still some elevation of diastolic BP, intermittently systolic.    Her PCP is at [facility].     Physical Examination  Review of Systems   Vitals: There were no vitals taken for this visit.  BMI= There is no height or weight on file to calculate BMI.  Wt Readings from Last 3 Encounters:   11/07/24 70.8 kg (156 lb)   10/21/24 68 kg (150 lb)   01/27/23 64.9 kg (143 lb 1.3 oz)     Telephone visit      ENT/Mouth: Clear voice   EYES:         Chest/Lungs:   No shortness of breath/wheezing audible during call   Cardiovascular:          Extremities:    Skin:    Neurologic: Voice steady   Psychiatric:        Please refer above for cardiac ROS details.        Medical History  Surgical History Family History Social History   Past Medical History:   Diagnosis Date     Anxiety      Cervical spine instability      COVID-19 12/22/2022     Degenerative arthritis      Delusional disorder (H)      Depression      Fibromyalgia      Hypertension      Insomnia      Migraines      Occipital neuralgia     b/l     Other chronic pain     Back pain     PONV (postoperative nausea and vomiting)      Seasonal allergies      Suicide attempt (H) 06/2022    from drug overdose     Past Surgical History:   Procedure Laterality Date     ARTHROPLASTY HIP Left 1/24/2023    Procedure: LEFT TOTAL HIP ARTHROPLASTY;  Surgeon: Danny Woodson MD;  Location: [facility] Main OR     CERVICAL FUSION  11/2020 November and Dec 2020     IMPLANT PULSE GENERATOR SUBCUTANEOUS Bilateral 10/22/2021    Procedure: Phase II occipital " nerve stimulator placement, bilateral occipital electrodes and bilateral trigeminal electrodes, Implantation of bilateral subcutaneous neurostimulator Percutaneous implantation of neurostimulator electrode array; cranial nerve Implantable neurostimulator electrode, each CPT 52997 Implantable neurostimulator pulse generator, dual array, nonrechargeable, includes extensi     INSERT STIMULATOR OCCIPITAL Bilateral 08/27/2021    Procedure: Externalized trial of percutaneous occipital nerve stimulator bilateral Percutaneous implantation of neurostimulator electrode array; cranial nerve CPT 29742;  Surgeon: Kobi Shrestha MD;  Location: [facility] OR     TOTAL HIP ARTHROPLASTY Right      TOTAL KNEE ARTHROPLASTY Left 01/2018     ZZC LIGATE FALLOPIAN TUBE      Description: Tubal Ligation;  Recorded: 01/16/2012;     Family History   Problem Relation Age of Onset     Cancer Mother      Diabetes Father      Hypertension Father      Pulmonary Embolism Daughter      Anesthesia Reaction No family hx of         Social History     Socioeconomic History     Marital status: Legally      Spouse name: Not on file     Number of children: 2     Years of education: Not on file     Highest education level: Not on file   Occupational History     Occupation: on disability   Tobacco Use     Smoking status: Former     Current packs/day: 0.00     Average packs/day: 1 pack/day for 15.0 years (15.0 ttl pk-yrs)     Types: Cigarettes     Start date: 10/10/1999     Quit date: 10/10/2014     Years since quitting: 10.3     Smokeless tobacco: Never   Substance and Sexual Activity     Alcohol use: Not Currently     Comment: Last drink 10/2014     Drug use: Not Currently     Sexual activity: Not on file   Other Topics Concern     Parent/sibling w/ CABG, MI or angioplasty before 65F 55M? Not Asked   Social History Narrative     Not on file     Social Drivers of Health     Financial Resource Strain: High Risk (1/1/2022)    Received from [facility], [facility]    Financial Resource Strain      Difficulty of Paying Living Expenses: Not on file      Difficulty of Paying Living Expenses: Not on file   Food Insecurity: Not on file   Transportation Needs: Not on file   Physical Activity: Not on file   Stress: Not on file   Social Connections: Unknown (1/1/2022)    Received from [facility], [facility]    Social Connections      Frequency of Communication with Friends and Family: Not on file   Interpersonal Safety: Not on file   Housing Stability: Not on file           Medications  Allergies   Current Outpatient Medications   Medication Sig Dispense Refill     acetaminophen (TYLENOL) 500 MG tablet Take 2 tablets (1,000 mg) by mouth every 8 hours (Patient not taking: Reported on 1/8/2025)       aliskiren (TEKTURNA) 300 MG tablet Take 300 mg by mouth daily.       ASPIRIN-CAFFEINE PO Take by mouth. No caffeine       B Complex-Folic Acid (B COMPLEX FORMULA 1 PO) Take 1 teaspoonful by mouth daily       butalbital-aspirin-caffeine (FIORINAL) -40 MG capsule Take 1 capsule by mouth every 4 hours as needed for headaches 60 capsule 3     carboxymethylcellulose (REFRESH PLUS) 0.5 % SOLN ophthalmic solution Place 1-2 drops into both eyes 3 times daily as needed       doxazosin (CARDURA) 1 MG tablet Take 1 tablet (1 mg) by mouth at bedtime. 30 tablet 4     fluticasone (FLONASE) 50 MCG/ACT nasal spray Spray 2 sprays into both nostrils every evening       Garlic 1000 MG CAPS Take 1 tablet by mouth 3 times daily       HYDROmorphone (DILAUDID) 2 MG tablet Take 0.5-1 tablets (1-2 mg) by mouth every 4 hours as needed for moderate pain (4-6) (Patient not taking: Reported on 1/8/2025) 20 tablet 0     hydrOXYzine (ATARAX) 25 MG tablet Take 1 tablet (25 mg) by mouth every 6 hours as needed for other (adjuvant pain) (Patient not taking: Reported on  "1/8/2025)       ibuprofen (ADVIL/MOTRIN) 200 MG tablet Take 400 mg by mouth every 4 hours as needed for pain (Patient not taking: Reported on 1/8/2025)       irbesartan (AVAPRO) 75 MG tablet Take 2 tablets (150 mg) by mouth at bedtime. 180 tablet 3     LORazepam (ATIVAN) 0.5 MG tablet Take 0.5 mg by mouth 3 times daily.       Magnesium Citrate 200 MG TABS Take 300 mg by mouth 2 times daily (Takes at supper and bedtime)       Omega-3 Fatty Acids (FISH OIL PO) Take 1 capsule by mouth 2 times daily (Patient not taking: Reported on 11/7/2024)       ondansetron (ZOFRAN ODT) 4 MG ODT tab Take 1 tablet (4 mg) by mouth every 6 hours as needed for nausea or vomiting (Patient not taking: Reported on 1/8/2025) 20 tablet 0     Oxymetazoline HCl (NASAL SPRAY) 0.05 % SOLN Spray 1 spray into both nostrils 2 times daily as needed (Patient not taking: Reported on 1/8/2025)       polyethylene glycol (MIRALAX) 17 GM/Dose powder Take 1 capful by mouth daily as needed for constipation (Patient not taking: Reported on 1/8/2025)       Probiotic Product (PROBIOTIC ADVANCED PO) Take 1 capsule by mouth At Bedtime       Quercetin 250 MG TABS Take 500 mg by mouth daily       QUEtiapine (SEROQUEL) 100 MG tablet Take 200 mg by mouth At Bedtime       senna-docusate (SENOKOT-S/PERICOLACE) 8.6-50 MG tablet Take 1 tablet by mouth 2 times daily 30 tablet 0     triprolidine-pseudoePHEDrine (APRODINE) 2.5-60 MG TABS per tablet Take 0.5 tablets by mouth 2 times daily       Vitamin D3 (CHOLECALCIFEROL) 125 MCG (5000 UT) tablet Take 125 mcg by mouth daily       zinc sulfate (ZINCATE) 220 (50 Zn) MG capsule Take 220 mg by mouth daily.         Allergies   Allergen Reactions     Amlodipine Other (See Comments)     Constipation, \"liver pain\".         Clonidine      Other reaction(s): Dizziness     Escitalopram Muscle Pain (Myalgia)     Other reaction(s): Myalgias     Gluten Meal Headache and GI Disturbance     Hydrochlorothiazide      confusion     Lisinopril " "Dizziness     Mold Nausea     Ringing in ears, racing heart     Molds & Smuts Headache and Nausea     Ringing in ears, racing heart     Paroxetine Other (See Comments)     Palpitations/racing heart         Penicillins Unknown     Does not remember reaction, mother said she allergic to mold; patient refuses Pcn or any derivative       Sulfa Antibiotics Unknown     Tramadol      Other reaction(s): Runny Nose  Facial pain, sinus swelling       Carvedilol Other (See Comments)     Worsening depression         Codeine Headache and Nausea and Vomiting     Cortisone Other (See Comments)     Patient reports and possibly all steroids \"makes me to feel sick\", get fungal infections       Duloxetine Headache     Oxycodone Headache          Lab Results    Chemistry/lipid CBC Cardiac Enzymes/BNP/TSH/INR   Recent Labs   Lab Test 03/19/20  0930   CHOL 259*  259*   HDL 91   *   TRIG 49  49     Recent Labs   Lab Test 03/19/20  0930   *     Recent Labs   Lab Test 12/27/24  1152      POTASSIUM 4.4   CHLORIDE 97*   CO2 29   GLC 96   BUN 9.6   CR 0.64   GFRESTIMATED >90   ALYSE 9.7     Recent Labs   Lab Test 12/27/24  1152 10/21/24  1403 01/25/23  0441   CR 0.64 0.54 0.81     No results for input(s): \"A1C\" in the last 84581 hours.       Recent Labs   Lab Test 10/21/24  1403   WBC 7.8   HGB 14.1   HCT 41.2   MCV 91        Recent Labs   Lab Test 10/21/24  1403 01/25/23  0441 01/24/23  2223   HGB 14.1 8.5* 9.2*    Recent Labs   Lab Test 01/25/23  1054 01/25/23  0441 01/24/23  2223   TROPONINI 0.02 0.02 0.01     Recent Labs   Lab Test 10/19/21  1206   NTBNP 137*     Recent Labs   Lab Test 03/19/20  0930   TSH 2.24     Recent Labs   Lab Test 10/19/21  1206 08/25/21  0823   INR 0.96 0.91          This note has been dictated using voice recognition software. Any grammatical, typographical, or context distortions are unintentional and inherent to the software    Claire Galdamez PA-C                                   "       Thank you for allowing me to participate in the care of your patient.      Sincerely,     Claire Roper PA-C     [facility]  cc:   Referred Self, MD  No address on file

## 2025-02-26 NOTE — PROGRESS NOTES
HEART CARE ENCOUNTER CONSULTATON [facility]  [phone]      Assessment/Recommendations   Assessment:   Hypertension: Aliskiren 300 mg daily, Irbesartan 150 mg daily. addition of doxazosin 1 mg (1/2 tablet) cause significant dizziness and she discontinued it. Recent home /97, 145/90. These are not at goal.  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  She will consider retrial of amlodipine 2.5 mg daily.  Pain and stress likely contributing to hypertension, although reported side effects of medications felt equally as harmful per patient.  Her medication intolerance/side effects are largely unexpected with associated medications. I have given her the OK to take aspirin/caffeine and Advil sparingly for pain as it causes significant effects to her mental health.  She is aware of potential effects on renal function and blood pressure with persistent use of these medications.  Blood pressure twice weekly at home, increased frequency causes her anxiety      Telephone visit  Patient location: Home  Clinician location: [facility]  Start: 2/27/2025 12:55 pm  Stop: 2/27/2025 1:13  I spent 18 minutes on the date of encounter of which 18 minutes spent in medical discussion       Transporation difficult which leads to telehealth visits    Follow up 1/12/2025 with me     History of Present Illness/Subjective    HPI: Martha Chun is a 64 year old female with PMHx of Hypertension presents for follow up telephone visit.    Patient reports she is in a similar state as our last conversation.  Continues to deal with severe neck pain/migraines she believes is worsened by irbesartan.  She reduced her irbesartan dose and is slowly  increased back to 150 mg daily.  She tried doxazosin half tablet of the 1 mg tablet and says she experienced severe dizzy spell that was abnormal for other occasional lightheadedness/dizziness.  She discontinued this.  Blood pressure recently 136/97, 145/90.  Elevated, but improved from her previous visit.    Continues to struggle with depressed mood dealing with pain.  She is awaiting information about stem cell treatment for neck pain.  Weather changed has caused sinusitis due to mold.  She had question trying valsartan in place of irbesartan, but her primary doctor had recorded suicidal thoughts on this medication in the past.  She question to restarting amlodipine but recalls getting swelling in unilateral ankle that resolved a few weeks after stopping amlodipine.       Her PCP is at [facility].     Physical Examination  Review of Systems   Vitals: There were no vitals taken for this visit.  BMI= There is no height or weight on file to calculate BMI.  Wt Readings from Last 3 Encounters:   11/07/24 70.8 kg (156 lb)   10/21/24 68 kg (150 lb)   01/27/23 64.9 kg (143 lb 1.3 oz)     Telephone visit      ENT/Mouth: Clear voice   EYES:         Chest/Lungs:   No shortness of breath/wheezing audible during call   Cardiovascular:          Extremities:    Skin:    Neurologic: Voice steady   Psychiatric:        Please refer above for cardiac ROS details.        Medical History  Surgical History Family History Social History   Past Medical History:   Diagnosis Date    Anxiety     Cervical spine instability     COVID-19 12/22/2022    Degenerative arthritis     Delusional disorder (H)     Depression     Fibromyalgia     Hypertension     Insomnia     Migraines     Occipital neuralgia     b/l    Other chronic pain     Back pain    PONV (postoperative nausea and vomiting)     Seasonal allergies     Suicide attempt (H) 06/2022    from drug overdose     Past Surgical History:   Procedure Laterality Date    ARTHROPLASTY HIP  Left 1/24/2023    Procedure: LEFT TOTAL HIP ARTHROPLASTY;  Surgeon: Danny Woodson MD;  Location: [facility] Main OR    CERVICAL FUSION  11/2020 November and Dec 2020    IMPLANT PULSE GENERATOR SUBCUTANEOUS Bilateral 10/22/2021    Procedure: Phase II occipital nerve stimulator placement, bilateral occipital electrodes and bilateral trigeminal electrodes, Implantation of bilateral subcutaneous neurostimulator Percutaneous implantation of neurostimulator electrode array; cranial nerve Implantable neurostimulator electrode, each CPT 40713 Implantable neurostimulator pulse generator, dual array, nonrechargeable, includes extensi    INSERT STIMULATOR OCCIPITAL Bilateral 08/27/2021    Procedure: Externalized trial of percutaneous occipital nerve stimulator bilateral Percutaneous implantation of neurostimulator electrode array; cranial nerve CPT 51191;  Surgeon: Kobi Shrestha MD;  Location: [facility] OR    TOTAL HIP ARTHROPLASTY Right     TOTAL KNEE ARTHROPLASTY Left 01/2018    ZZC LIGATE FALLOPIAN TUBE      Description: Tubal Ligation;  Recorded: 01/16/2012;     Family History   Problem Relation Age of Onset    Cancer Mother     Diabetes Father     Hypertension Father     Pulmonary Embolism Daughter     Anesthesia Reaction No family hx of         Social History     Socioeconomic History    Marital status: Legally      Spouse name: Not on file    Number of children: 2    Years of education: Not on file    Highest education level: Not on file   Occupational History    Occupation: on disability   Tobacco Use    Smoking status: Former     Current packs/day: 0.00     Average packs/day: 1 pack/day for 15.0 years (15.0 ttl pk-yrs)     Types: Cigarettes     Start date: 10/10/1999     Quit date: 10/10/2014     Years since quitting: 10.3    Smokeless tobacco: Never   Substance and Sexual Activity    Alcohol use: Not Currently     Comment: Last drink 10/2014    Drug use: Not Currently    Sexual activity: Not on file    Other Topics Concern    Parent/sibling w/ CABG, MI or angioplasty before 65F 55M? Not Asked   Social History Narrative    Not on file     Social Drivers of Health     Financial Resource Strain: High Risk (1/1/2022)    Received from [facility], [facility]    Financial Resource Strain     Difficulty of Paying Living Expenses: Not on file     Difficulty of Paying Living Expenses: Not on file   Food Insecurity: Not on file   Transportation Needs: Not on file   Physical Activity: Not on file   Stress: Not on file   Social Connections: Unknown (1/1/2022)    Received from [facility], [facility]    Social Connections     Frequency of Communication with Friends and Family: Not on file   Interpersonal Safety: Not on file   Housing Stability: Not on file           Medications  Allergies   Current Outpatient Medications   Medication Sig Dispense Refill    aliskiren (TEKTURNA) 300 MG tablet Take 300 mg by mouth daily.      B Complex-Folic Acid (B COMPLEX FORMULA 1 PO) Take 1 teaspoonful by mouth daily      Butalbital-Acetaminophen (PHRENILIN)  MG TABS per tablet Take 1 tablet by mouth every 4 hours as needed.      carboxymethylcellulose (REFRESH PLUS) 0.5 % SOLN ophthalmic solution Place 1-2 drops into both eyes 3 times daily as needed      fluticasone (FLONASE) 50 MCG/ACT nasal spray Spray 2 sprays into both nostrils every evening      irbesartan (AVAPRO) 75 MG tablet Take 2 tablets (150 mg) by mouth at bedtime. 180 tablet 3    lactobacillus rhamnosus, GG, (CULTURELL) capsule Take 1 capsule by mouth 2 times daily.      LORazepam (ATIVAN) 0.5 MG tablet Take 0.5 mg by mouth 3 times daily.      magnesium glycinate 100 MG CAPS capsule Take 100 mg by mouth daily.      Probiotic Product (PROBIOTIC ADVANCED PO) Take 1 capsule by mouth At Bedtime      QUEtiapine (SEROQUEL) 100 MG tablet Take  "100 mg by mouth at bedtime.      QUEtiapine (SEROQUEL) 25 MG tablet Take 25 mg by mouth at bedtime.      triprolidine-pseudoePHEDrine (APRODINE) 2.5-60 MG TABS per tablet Take 0.5 tablets by mouth 2 times daily      UNABLE TO FIND Take 250 mg by mouth 3 times daily. MEDICATION NAME: Chinese Skull Cap      Vitamin D3 (CHOLECALCIFEROL) 125 MCG (5000 UT) tablet Take 125 mcg by mouth daily      zinc sulfate (ZINCATE) 220 (50 Zn) MG capsule Take 220 mg by mouth daily.      butalbital-aspirin-caffeine (FIORINAL) -40 MG capsule Take 1 capsule by mouth every 4 hours as needed for headaches (Patient not taking: Reported on 2/27/2025) 60 capsule 3    ondansetron (ZOFRAN ODT) 4 MG ODT tab Take 1 tablet (4 mg) by mouth every 6 hours as needed for nausea or vomiting (Patient not taking: Reported on 2/27/2025) 20 tablet 0    Quercetin 250 MG TABS Take 500 mg by mouth daily (Patient not taking: Reported on 2/27/2025)         Allergies   Allergen Reactions    Amlodipine Other (See Comments)     Constipation, \"liver pain\".        Clonidine      Other reaction(s): Dizziness    Doxazosin Dizziness    Escitalopram Muscle Pain (Myalgia)     Other reaction(s): Myalgias    Gluten Meal Headache and GI Disturbance    Hydrochlorothiazide      confusion    Lisinopril Dizziness    Mold Nausea     Ringing in ears, racing heart    Molds & Smuts Headache and Nausea     Ringing in ears, racing heart    Paroxetine Other (See Comments)     Palpitations/racing heart        Penicillins Unknown     Does not remember reaction, mother said she allergic to mold; patient refuses Pcn or any derivative      Sulfa Antibiotics Unknown    Tramadol      Other reaction(s): Runny Nose  Facial pain, sinus swelling      Carvedilol Other (See Comments)     Worsening depression        Codeine Headache and Nausea and Vomiting    Cortisone Other (See Comments)     Patient reports and possibly all steroids \"makes me to feel sick\", get fungal infections      " "Duloxetine Headache    Oxycodone Headache          Lab Results    Chemistry/lipid CBC Cardiac Enzymes/BNP/TSH/INR   Recent Labs   Lab Test 03/19/20  0930   CHOL 259*  259*   HDL 91   *   TRIG 49  49     Recent Labs   Lab Test 03/19/20  0930   *     Recent Labs   Lab Test 12/27/24  1152      POTASSIUM 4.4   CHLORIDE 97*   CO2 29   GLC 96   BUN 9.6   CR 0.64   GFRESTIMATED >90   ALYSE 9.7     Recent Labs   Lab Test 12/27/24  1152 10/21/24  1403 01/25/23  0441   CR 0.64 0.54 0.81     No results for input(s): \"A1C\" in the last 56198 hours.       Recent Labs   Lab Test 10/21/24  1403   WBC 7.8   HGB 14.1   HCT 41.2   MCV 91        Recent Labs   Lab Test 10/21/24  1403 01/25/23  0441 01/24/23  2223   HGB 14.1 8.5* 9.2*    Recent Labs   Lab Test 01/25/23  1054 01/25/23  0441 01/24/23  2223   TROPONINI 0.02 0.02 0.01     Recent Labs   Lab Test 10/19/21  1206   NTBNP 137*     Recent Labs   Lab Test 03/19/20  0930   TSH 2.24     Recent Labs   Lab Test 10/19/21  1206 08/25/21  0823   INR 0.96 0.91          This note has been dictated using voice recognition software. Any grammatical, typographical, or context distortions are unintentional and inherent to the software    Claire Galdamez PA-C                                       "

## 2025-02-27 ENCOUNTER — VIRTUAL VISIT (OUTPATIENT)
Dept: CARDIOLOGY | Facility: CLINIC | Age: 65
End: 2025-02-27
Payer: COMMERCIAL

## 2025-02-27 DIAGNOSIS — I10 PRIMARY HYPERTENSION: Primary | ICD-10-CM

## 2025-02-27 RX ORDER — LACTOBACILLUS RHAMNOSUS GG 10B CELL
1 CAPSULE ORAL 2 TIMES DAILY
COMMUNITY

## 2025-02-27 RX ORDER — BUTALBITAL AND ACETAMINOPHEN 325; 50 MG/1; MG/1
1 TABLET ORAL EVERY 4 HOURS PRN
COMMUNITY
Start: 2025-02-20

## 2025-02-27 NOTE — LETTER
2/27/2025    [facility] [address]  [facility] [address]    RE: Martha Chun       Dear Colleague,     I had the pleasure of seeing Martha Chun in the [facility].    HEART CARE ENCOUNTER CONSULTATON NOTE      [facility]  [phone]      Assessment/Recommendations   Assessment:   Hypertension: Aliskiren 300 mg daily, Irbesartan 150 mg daily. addition of doxazosin 1 mg (1/2 tablet) cause significant dizziness and she discontinued it. Recent home /97, 145/90. These are not at goal.  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  She will consider retrial of amlodipine 2.5 mg daily.  Pain and stress likely contributing to hypertension, although reported side effects of medications felt equally as harmful per patient.  Her medication intolerance/side effects are largely unexpected with associated medications. I have given her the OK to take aspirin/caffeine and Advil sparingly for pain as it causes significant effects to her mental health.  She is aware of potential effects on renal function and blood pressure with persistent use of these medications.  Blood pressure twice weekly at home, increased frequency causes her anxiety      Telephone visit  Patient location: Home  Clinician location: [facility]  Start: 2/27/2025 12:55 pm  Stop: 2/27/2025 1:13  I spent 18 minutes on the date of encounter of which 18 minutes spent in medical discussion       Transporation difficult which leads to telehealth visits    Follow up 1/12/2025 with me     History of Present Illness/Subjective    HPI: Martha CABRALES Adiel is a 64 year old female with PMHx of Hypertension presents for follow up  telephone visit.    Patient reports she is in a similar state as our last conversation.  Continues to deal with severe neck pain/migraines she believes is worsened by irbesartan.  She reduced her irbesartan dose and is slowly increased back to 150 mg daily.  She tried doxazosin half tablet of the 1 mg tablet and says she experienced severe dizzy spell that was abnormal for other occasional lightheadedness/dizziness.  She discontinued this.  Blood pressure recently 136/97, 145/90.  Elevated, but improved from her previous visit.    Continues to struggle with depressed mood dealing with pain.  She is awaiting information about stem cell treatment for neck pain.  Weather changed has caused sinusitis due to mold.  She had question trying valsartan in place of irbesartan, but her primary doctor had recorded suicidal thoughts on this medication in the past.  She question to restarting amlodipine but recalls getting swelling in unilateral ankle that resolved a few weeks after stopping amlodipine.       Her PCP is at [facility].     Physical Examination  Review of Systems   Vitals: There were no vitals taken for this visit.  BMI= There is no height or weight on file to calculate BMI.  Wt Readings from Last 3 Encounters:   11/07/24 70.8 kg (156 lb)   10/21/24 68 kg (150 lb)   01/27/23 64.9 kg (143 lb 1.3 oz)     Telephone visit      ENT/Mouth: Clear voice   EYES:         Chest/Lungs:   No shortness of breath/wheezing audible during call   Cardiovascular:          Extremities:    Skin:    Neurologic: Voice steady   Psychiatric:        Please refer above for cardiac ROS details.        Medical History  Surgical History Family History Social History   Past Medical History:   Diagnosis Date     Anxiety      Cervical spine instability      COVID-19 12/22/2022     Degenerative arthritis      Delusional disorder (H)      Depression      Fibromyalgia      Hypertension      Insomnia      Migraines      Occipital neuralgia      b/l     Other chronic pain     Back pain     PONV (postoperative nausea and vomiting)      Seasonal allergies      Suicide attempt (H) 06/2022    from drug overdose     Past Surgical History:   Procedure Laterality Date     ARTHROPLASTY HIP Left 1/24/2023    Procedure: LEFT TOTAL HIP ARTHROPLASTY;  Surgeon: Danny Woodson MD;  Location: [facility]     CERVICAL FUSION  11/2020 November and Dec 2020     IMPLANT PULSE GENERATOR SUBCUTANEOUS Bilateral 10/22/2021    Procedure: Phase II occipital nerve stimulator placement, bilateral occipital electrodes and bilateral trigeminal electrodes, Implantation of bilateral subcutaneous neurostimulator Percutaneous implantation of neurostimulator electrode array; cranial nerve Implantable neurostimulator electrode, each CPT 58085 Implantable neurostimulator pulse generator, dual array, nonrechargeable, includes extensi     INSERT STIMULATOR OCCIPITAL Bilateral 08/27/2021    Procedure: Externalized trial of percutaneous occipital nerve stimulator bilateral Percutaneous implantation of neurostimulator electrode array; cranial nerve CPT 40163;  Surgeon: Kobi Shrestha MD;  Location: [facility] OR     TOTAL HIP ARTHROPLASTY Right      TOTAL KNEE ARTHROPLASTY Left 01/2018     ZZC LIGATE FALLOPIAN TUBE      Description: Tubal Ligation;  Recorded: 01/16/2012;     Family History   Problem Relation Age of Onset     Cancer Mother      Diabetes Father      Hypertension Father      Pulmonary Embolism Daughter      Anesthesia Reaction No family hx of         Social History     Socioeconomic History     Marital status: Legally      Spouse name: Not on file     Number of children: 2     Years of education: Not on file     Highest education level: Not on file   Occupational History     Occupation: on disability   Tobacco Use     Smoking status: Former     Current packs/day: 0.00     Average packs/day: 1 pack/day for 15.0 years (15.0 ttl pk-yrs)     Types: Cigarettes     Start  date: 10/10/1999     Quit date: 10/10/2014     Years since quitting: 10.3     Smokeless tobacco: Never   Substance and Sexual Activity     Alcohol use: Not Currently     Comment: Last drink 10/2014     Drug use: Not Currently     Sexual activity: Not on file   Other Topics Concern     Parent/sibling w/ CABG, MI or angioplasty before 65F 55M? Not Asked   Social History Narrative     Not on file     Social Drivers of Health     Financial Resource Strain: High Risk (1/1/2022)    [facility]    Financial Resource Strain      Difficulty of Paying Living Expenses: Not on file      Difficulty of Paying Living Expenses: Not on file   Food Insecurity: Not on file   Transportation Needs: Not on file   Physical Activity: Not on file   Stress: Not on file   Social Connections: Unknown (1/1/2022)    [facility]    Social Connections      Frequency of Communication with Friends and Family: Not on file   Interpersonal Safety: Not on file   Housing Stability: Not on file           Medications  Allergies   Current Outpatient Medications   Medication Sig Dispense Refill     aliskiren (TEKTURNA) 300 MG tablet Take 300 mg by mouth daily.       B Complex-Folic Acid (B COMPLEX FORMULA 1 PO) Take 1 teaspoonful by mouth daily       Butalbital-Acetaminophen (PHRENILIN)  MG TABS per tablet Take 1 tablet by mouth every 4 hours as needed.       carboxymethylcellulose (REFRESH PLUS) 0.5 % SOLN ophthalmic solution Place 1-2 drops into both eyes 3 times daily as needed       fluticasone (FLONASE) 50 MCG/ACT nasal spray Spray 2 sprays into both nostrils every evening       irbesartan (AVAPRO) 75 MG tablet Take 2 tablets (150 mg) by mouth at bedtime. 180 tablet 3     lactobacillus rhamnosus, GG, (CULTURELL) capsule Take 1 capsule by mouth 2 times daily.        "LORazepam (ATIVAN) 0.5 MG tablet Take 0.5 mg by mouth 3 times daily.       magnesium glycinate 100 MG CAPS capsule Take 100 mg by mouth daily.       Probiotic Product (PROBIOTIC ADVANCED PO) Take 1 capsule by mouth At Bedtime       QUEtiapine (SEROQUEL) 100 MG tablet Take 100 mg by mouth at bedtime.       QUEtiapine (SEROQUEL) 25 MG tablet Take 25 mg by mouth at bedtime.       triprolidine-pseudoePHEDrine (APRODINE) 2.5-60 MG TABS per tablet Take 0.5 tablets by mouth 2 times daily       UNABLE TO FIND Take 250 mg by mouth 3 times daily. MEDICATION NAME: Chinese Skull Cap       Vitamin D3 (CHOLECALCIFEROL) 125 MCG (5000 UT) tablet Take 125 mcg by mouth daily       zinc sulfate (ZINCATE) 220 (50 Zn) MG capsule Take 220 mg by mouth daily.       butalbital-aspirin-caffeine (FIORINAL) -40 MG capsule Take 1 capsule by mouth every 4 hours as needed for headaches (Patient not taking: Reported on 2/27/2025) 60 capsule 3     ondansetron (ZOFRAN ODT) 4 MG ODT tab Take 1 tablet (4 mg) by mouth every 6 hours as needed for nausea or vomiting (Patient not taking: Reported on 2/27/2025) 20 tablet 0     Quercetin 250 MG TABS Take 500 mg by mouth daily (Patient not taking: Reported on 2/27/2025)         Allergies   Allergen Reactions     Amlodipine Other (See Comments)     Constipation, \"liver pain\".         Clonidine      Other reaction(s): Dizziness     Doxazosin Dizziness     Escitalopram Muscle Pain (Myalgia)     Other reaction(s): Myalgias     Gluten Meal Headache and GI Disturbance     Hydrochlorothiazide      confusion     Lisinopril Dizziness     Mold Nausea     Ringing in ears, racing heart     Molds & Smuts Headache and Nausea     Ringing in ears, racing heart     Paroxetine Other (See Comments)     Palpitations/racing heart         Penicillins Unknown     Does not remember reaction, mother said she allergic to mold; patient refuses Pcn or any derivative       Sulfa Antibiotics Unknown     Tramadol      Other " "reaction(s): Runny Nose  Facial pain, sinus swelling       Carvedilol Other (See Comments)     Worsening depression         Codeine Headache and Nausea and Vomiting     Cortisone Other (See Comments)     Patient reports and possibly all steroids \"makes me to feel sick\", get fungal infections       Duloxetine Headache     Oxycodone Headache          Lab Results    Chemistry/lipid CBC Cardiac Enzymes/BNP/TSH/INR   Recent Labs   Lab Test 03/19/20  0930   CHOL 259*  259*   HDL 91   *   TRIG 49  49     Recent Labs   Lab Test 03/19/20  0930   *     Recent Labs   Lab Test 12/27/24  1152      POTASSIUM 4.4   CHLORIDE 97*   CO2 29   GLC 96   BUN 9.6   CR 0.64   GFRESTIMATED >90   ALYSE 9.7     Recent Labs   Lab Test 12/27/24  1152 10/21/24  1403 01/25/23  0441   CR 0.64 0.54 0.81     No results for input(s): \"A1C\" in the last 25028 hours.       Recent Labs   Lab Test 10/21/24  1403   WBC 7.8   HGB 14.1   HCT 41.2   MCV 91        Recent Labs   Lab Test 10/21/24  1403 01/25/23  0441 01/24/23  2223   HGB 14.1 8.5* 9.2*    Recent Labs   Lab Test 01/25/23  1054 01/25/23  0441 01/24/23  2223   TROPONINI 0.02 0.02 0.01     Recent Labs   Lab Test 10/19/21  1206   NTBNP 137*     Recent Labs   Lab Test 03/19/20  0930   TSH 2.24     Recent Labs   Lab Test 10/19/21  1206 08/25/21  0823   INR 0.96 0.91          This note has been dictated using voice recognition software. Any grammatical, typographical, or context distortions are unintentional and inherent to the software    Claire Galdamez PA-C                                         Thank you for allowing me to participate in the care of your patient.      Sincerely,     Claire Roper PA-C      Heart Care  cc:   Referred Self, MD  No address on file      "

## 2025-04-16 ENCOUNTER — TELEPHONE (OUTPATIENT)
Dept: NEUROSURGERY | Facility: CLINIC | Age: 65
End: 2025-04-16
Payer: COMMERCIAL

## 2025-04-16 NOTE — TELEPHONE ENCOUNTER
[facility] Call Center    Phone Message    May a detailed message be left on voicemail: yes     Reason for Call: Other: Patient called stating she had her stimulator removed by a general surgeon, states the clips were left in her head. She is wondering if Dr. Shrestha could remove them for her. She states she would like to speak to LAURA Edwards or one of Dr. Shrestha's nurses. Please review.      Action Taken: Message routed to:  [facility] Neurosurgery    Travel Screening: Not Applicable     Date of Service:

## 2025-04-17 NOTE — TELEPHONE ENCOUNTER
"Pt said she had the occipital nerve stimulator removed by Dr. Tee at [facility] \"about a year ago.\" Pt said some \"clips\" were left behind and she wants them removed by Dr. Shrestha. I let pt know I'll need to get the operative report and any imaging for Dr. Shrestha to review. Pt said imaging is at [facility]. Will follow up with pt re a consult after records are reviewed. Pt in agreement with plan. Nothing further at this time.   "

## 2025-07-06 ENCOUNTER — APPOINTMENT (OUTPATIENT)
Dept: CT IMAGING | Facility: HOSPITAL | Age: 65
End: 2025-07-06
Attending: PHYSICIAN ASSISTANT
Payer: COMMERCIAL

## 2025-07-06 ENCOUNTER — APPOINTMENT (OUTPATIENT)
Dept: MRI IMAGING | Facility: HOSPITAL | Age: 65
End: 2025-07-06
Attending: PHYSICIAN ASSISTANT
Payer: COMMERCIAL

## 2025-07-06 ENCOUNTER — HOSPITAL ENCOUNTER (INPATIENT)
Facility: HOSPITAL | Age: 65
LOS: 2 days | Discharge: HOME | End: 2025-07-08
Attending: EMERGENCY MEDICINE
Payer: COMMERCIAL

## 2025-07-06 DIAGNOSIS — R11.0 NAUSEA: ICD-10-CM

## 2025-07-06 DIAGNOSIS — H53.8 BLURRED VISION: ICD-10-CM

## 2025-07-06 DIAGNOSIS — M54.81 BILATERAL OCCIPITAL NEURALGIA: Primary | ICD-10-CM

## 2025-07-06 DIAGNOSIS — R51.9 HEADACHE: ICD-10-CM

## 2025-07-06 DIAGNOSIS — R42 LIGHTHEADEDNESS: ICD-10-CM

## 2025-07-06 DIAGNOSIS — I10 HYPERTENSION: ICD-10-CM

## 2025-07-06 DIAGNOSIS — I63.9 ACUTE STROKE DUE TO ISCHEMIA (H): ICD-10-CM

## 2025-07-06 LAB
ANION GAP SERPL CALCULATED.3IONS-SCNC: 13 MMOL/L (ref 7–15)
BASOPHILS # BLD AUTO: 0.1 10E3/UL (ref 0–0.2)
BASOPHILS NFR BLD AUTO: 1 %
BUN SERPL-MCNC: 11.7 MG/DL (ref 8–23)
CALCIUM SERPL-MCNC: 10 MG/DL (ref 8.8–10.4)
CHLORIDE SERPL-SCNC: 94 MMOL/L (ref 98–107)
CHOLEST SERPL-MCNC: 226 MG/DL
CREAT SERPL-MCNC: 0.57 MG/DL (ref 0.51–0.95)
EGFRCR SERPLBLD CKD-EPI 2021: >90 ML/MIN/1.73M2
EOSINOPHIL # BLD AUTO: 0 10E3/UL (ref 0–0.7)
EOSINOPHIL NFR BLD AUTO: 0 %
ERYTHROCYTE [DISTWIDTH] IN BLOOD BY AUTOMATED COUNT: 12.3 % (ref 10–15)
EST. AVERAGE GLUCOSE BLD GHB EST-MCNC: 105 MG/DL
GLUCOSE SERPL-MCNC: 102 MG/DL (ref 70–99)
HBA1C MFR BLD: 5.3 %
HCO3 SERPL-SCNC: 26 MMOL/L (ref 22–29)
HCT VFR BLD AUTO: 43.1 % (ref 35–47)
HDLC SERPL-MCNC: 87 MG/DL
HGB BLD-MCNC: 14.4 G/DL (ref 11.7–15.7)
HOLD SPECIMEN: NORMAL
HOLD SPECIMEN: NORMAL
IMM GRANULOCYTES # BLD: 0 10E3/UL
IMM GRANULOCYTES NFR BLD: 0 %
LDLC SERPL CALC-MCNC: 125 MG/DL
LYMPHOCYTES # BLD AUTO: 2 10E3/UL (ref 0.8–5.3)
LYMPHOCYTES NFR BLD AUTO: 22 %
MCH RBC QN AUTO: 30.3 PG (ref 26.5–33)
MCHC RBC AUTO-ENTMCNC: 33.4 G/DL (ref 31.5–36.5)
MCV RBC AUTO: 91 FL (ref 78–100)
MONOCYTES # BLD AUTO: 0.7 10E3/UL (ref 0–1.3)
MONOCYTES NFR BLD AUTO: 8 %
NEUTROPHILS # BLD AUTO: 6.2 10E3/UL (ref 1.6–8.3)
NEUTROPHILS NFR BLD AUTO: 69 %
NONHDLC SERPL-MCNC: 139 MG/DL
NRBC # BLD AUTO: 0 10E3/UL
NRBC BLD AUTO-RTO: 0 /100
PLATELET # BLD AUTO: 298 10E3/UL (ref 150–450)
POTASSIUM SERPL-SCNC: 4.9 MMOL/L (ref 3.4–5.3)
RBC # BLD AUTO: 4.76 10E6/UL (ref 3.8–5.2)
SODIUM SERPL-SCNC: 133 MMOL/L (ref 135–145)
TRIGL SERPL-MCNC: 68 MG/DL
TROPONIN T SERPL HS-MCNC: 6 NG/L
WBC # BLD AUTO: 9 10E3/UL (ref 4–11)

## 2025-07-06 PROCEDURE — 99418 PROLNG IP/OBS E/M EA 15 MIN: CPT | Performed by: STUDENT IN AN ORGANIZED HEALTH CARE EDUCATION/TRAINING PROGRAM

## 2025-07-06 PROCEDURE — 120N000001 HC R&B MED SURG/OB

## 2025-07-06 PROCEDURE — 99207 PR NO BILLABLE SERVICE THIS VISIT: CPT | Performed by: STUDENT IN AN ORGANIZED HEALTH CARE EDUCATION/TRAINING PROGRAM

## 2025-07-06 PROCEDURE — 70496 CT ANGIOGRAPHY HEAD: CPT

## 2025-07-06 PROCEDURE — 255N000002 HC RX 255 OP 636: Performed by: PHYSICIAN ASSISTANT

## 2025-07-06 PROCEDURE — 84484 ASSAY OF TROPONIN QUANT: CPT | Performed by: PHYSICIAN ASSISTANT

## 2025-07-06 PROCEDURE — 250N000011 HC RX IP 250 OP 636: Performed by: PHYSICIAN ASSISTANT

## 2025-07-06 PROCEDURE — 93005 ELECTROCARDIOGRAM TRACING: CPT | Performed by: PHYSICIAN ASSISTANT

## 2025-07-06 PROCEDURE — 96374 THER/PROPH/DIAG INJ IV PUSH: CPT | Mod: 59

## 2025-07-06 PROCEDURE — 83036 HEMOGLOBIN GLYCOSYLATED A1C: CPT | Performed by: STUDENT IN AN ORGANIZED HEALTH CARE EDUCATION/TRAINING PROGRAM

## 2025-07-06 PROCEDURE — 250N000013 HC RX MED GY IP 250 OP 250 PS 637: Performed by: STUDENT IN AN ORGANIZED HEALTH CARE EDUCATION/TRAINING PROGRAM

## 2025-07-06 PROCEDURE — 36415 COLL VENOUS BLD VENIPUNCTURE: CPT | Performed by: PHYSICIAN ASSISTANT

## 2025-07-06 PROCEDURE — 70543 MRI ORBT/FAC/NCK W/O &W/DYE: CPT

## 2025-07-06 PROCEDURE — 250N000011 HC RX IP 250 OP 636: Performed by: STUDENT IN AN ORGANIZED HEALTH CARE EDUCATION/TRAINING PROGRAM

## 2025-07-06 PROCEDURE — 85025 COMPLETE CBC W/AUTO DIFF WBC: CPT | Performed by: PHYSICIAN ASSISTANT

## 2025-07-06 PROCEDURE — 99285 EMERGENCY DEPT VISIT HI MDM: CPT | Mod: 25

## 2025-07-06 PROCEDURE — 250N000013 HC RX MED GY IP 250 OP 250 PS 637: Performed by: PHYSICIAN ASSISTANT

## 2025-07-06 PROCEDURE — 99223 1ST HOSP IP/OBS HIGH 75: CPT | Performed by: STUDENT IN AN ORGANIZED HEALTH CARE EDUCATION/TRAINING PROGRAM

## 2025-07-06 PROCEDURE — 250N000009 HC RX 250: Performed by: PHYSICIAN ASSISTANT

## 2025-07-06 PROCEDURE — 80048 BASIC METABOLIC PNL TOTAL CA: CPT | Performed by: PHYSICIAN ASSISTANT

## 2025-07-06 PROCEDURE — 82465 ASSAY BLD/SERUM CHOLESTEROL: CPT | Performed by: STUDENT IN AN ORGANIZED HEALTH CARE EDUCATION/TRAINING PROGRAM

## 2025-07-06 PROCEDURE — A9585 GADOBUTROL INJECTION: HCPCS | Performed by: PHYSICIAN ASSISTANT

## 2025-07-06 RX ORDER — CARBOXYMETHYLCELLULOSE SODIUM 5 MG/ML
1-2 SOLUTION/ DROPS OPHTHALMIC 3 TIMES DAILY PRN
Status: DISCONTINUED | OUTPATIENT
Start: 2025-07-06 | End: 2025-07-08 | Stop reason: HOSPADM

## 2025-07-06 RX ORDER — LORAZEPAM 0.5 MG/1
0.5 TABLET ORAL 3 TIMES DAILY PRN
Status: DISCONTINUED | OUTPATIENT
Start: 2025-07-06 | End: 2025-07-07

## 2025-07-06 RX ORDER — ASPIRIN 325 MG
325 TABLET ORAL ONCE
Status: DISCONTINUED | OUTPATIENT
Start: 2025-07-06 | End: 2025-07-06

## 2025-07-06 RX ORDER — ALISKIREN 300 MG/1
300 TABLET, FILM COATED ORAL AT BEDTIME
Status: DISCONTINUED | OUTPATIENT
Start: 2025-07-06 | End: 2025-07-06

## 2025-07-06 RX ORDER — ONDANSETRON 2 MG/ML
4 INJECTION INTRAMUSCULAR; INTRAVENOUS EVERY 6 HOURS PRN
Status: DISCONTINUED | OUTPATIENT
Start: 2025-07-06 | End: 2025-07-08 | Stop reason: HOSPADM

## 2025-07-06 RX ORDER — FLUTICASONE PROPIONATE 50 MCG
2 SPRAY, SUSPENSION (ML) NASAL EVERY EVENING
Status: DISCONTINUED | OUTPATIENT
Start: 2025-07-06 | End: 2025-07-08 | Stop reason: HOSPADM

## 2025-07-06 RX ORDER — TRIPROLIDINE/PSEUDOEPHEDRINE 2.5MG-60MG
1 TABLET ORAL 3 TIMES DAILY PRN
Status: DISCONTINUED | OUTPATIENT
Start: 2025-07-06 | End: 2025-07-06

## 2025-07-06 RX ORDER — HYDRALAZINE HYDROCHLORIDE 20 MG/ML
10 INJECTION INTRAMUSCULAR; INTRAVENOUS EVERY 30 MIN PRN
Status: DISCONTINUED | OUTPATIENT
Start: 2025-07-06 | End: 2025-07-08 | Stop reason: HOSPADM

## 2025-07-06 RX ORDER — ACETAMINOPHEN 325 MG/1
975 TABLET ORAL 3 TIMES DAILY PRN
Status: DISCONTINUED | OUTPATIENT
Start: 2025-07-06 | End: 2025-07-08 | Stop reason: HOSPADM

## 2025-07-06 RX ORDER — HYDRALAZINE HYDROCHLORIDE 20 MG/ML
10 INJECTION INTRAMUSCULAR; INTRAVENOUS ONCE
Status: COMPLETED | OUTPATIENT
Start: 2025-07-06 | End: 2025-07-06

## 2025-07-06 RX ORDER — TETRACAINE HYDROCHLORIDE 5 MG/ML
1-2 SOLUTION OPHTHALMIC ONCE
Status: COMPLETED | OUTPATIENT
Start: 2025-07-06 | End: 2025-07-06

## 2025-07-06 RX ORDER — LABETALOL HYDROCHLORIDE 5 MG/ML
10 INJECTION, SOLUTION INTRAVENOUS EVERY 10 MIN PRN
Status: DISCONTINUED | OUTPATIENT
Start: 2025-07-06 | End: 2025-07-07

## 2025-07-06 RX ORDER — ONDANSETRON 4 MG/1
4 TABLET, ORALLY DISINTEGRATING ORAL EVERY 6 HOURS PRN
Status: DISCONTINUED | OUTPATIENT
Start: 2025-07-06 | End: 2025-07-08 | Stop reason: HOSPADM

## 2025-07-06 RX ORDER — LIDOCAINE 40 MG/G
CREAM TOPICAL
Status: DISCONTINUED | OUTPATIENT
Start: 2025-07-06 | End: 2025-07-08 | Stop reason: HOSPADM

## 2025-07-06 RX ORDER — TETRACAINE HYDROCHLORIDE 5 MG/ML
1 SOLUTION OPHTHALMIC ONCE
Status: DISCONTINUED | OUTPATIENT
Start: 2025-07-06 | End: 2025-07-06

## 2025-07-06 RX ORDER — IRBESARTAN 150 MG/1
150 TABLET ORAL AT BEDTIME
Status: DISCONTINUED | OUTPATIENT
Start: 2025-07-06 | End: 2025-07-08 | Stop reason: HOSPADM

## 2025-07-06 RX ORDER — IOPAMIDOL 755 MG/ML
67 INJECTION, SOLUTION INTRAVASCULAR ONCE
Status: COMPLETED | OUTPATIENT
Start: 2025-07-06 | End: 2025-07-06

## 2025-07-06 RX ORDER — BUTALBITAL AND ACETAMINOPHEN 325; 50 MG/1; MG/1
1 TABLET ORAL EVERY 8 HOURS PRN
Status: DISCONTINUED | OUTPATIENT
Start: 2025-07-06 | End: 2025-07-06

## 2025-07-06 RX ORDER — ASPIRIN 81 MG/1
81 TABLET ORAL DAILY
Status: DISCONTINUED | OUTPATIENT
Start: 2025-07-07 | End: 2025-07-08 | Stop reason: HOSPADM

## 2025-07-06 RX ORDER — GADOBUTROL 604.72 MG/ML
6 INJECTION INTRAVENOUS ONCE
Status: COMPLETED | OUTPATIENT
Start: 2025-07-06 | End: 2025-07-06

## 2025-07-06 RX ORDER — ALISKIREN 150 MG/1
300 TABLET, FILM COATED ORAL AT BEDTIME
Status: DISCONTINUED | OUTPATIENT
Start: 2025-07-06 | End: 2025-07-08 | Stop reason: HOSPADM

## 2025-07-06 RX ORDER — LORAZEPAM 0.5 MG/1
0.5 TABLET ORAL ONCE
Status: COMPLETED | OUTPATIENT
Start: 2025-07-06 | End: 2025-07-06

## 2025-07-06 RX ORDER — ATORVASTATIN CALCIUM 40 MG/1
80 TABLET, FILM COATED ORAL DAILY
Status: DISCONTINUED | OUTPATIENT
Start: 2025-07-07 | End: 2025-07-08 | Stop reason: HOSPADM

## 2025-07-06 RX ORDER — MAGNESIUM OXIDE 400 MG/1
400 TABLET ORAL AT BEDTIME
Status: DISCONTINUED | OUTPATIENT
Start: 2025-07-06 | End: 2025-07-06

## 2025-07-06 RX ORDER — HYDRALAZINE HYDROCHLORIDE 10 MG/1
10 TABLET, FILM COATED ORAL ONCE
Status: DISCONTINUED | OUTPATIENT
Start: 2025-07-06 | End: 2025-07-06

## 2025-07-06 RX ADMIN — QUETIAPINE FUMARATE 125 MG: 100 TABLET ORAL at 23:34

## 2025-07-06 RX ADMIN — IRBESARTAN 150 MG: 150 TABLET ORAL at 23:34

## 2025-07-06 RX ADMIN — LORAZEPAM 0.5 MG: 0.5 TABLET ORAL at 18:11

## 2025-07-06 RX ADMIN — GADOBUTROL 6 ML: 604.72 INJECTION INTRAVENOUS at 19:22

## 2025-07-06 RX ADMIN — FLUTICASONE PROPIONATE 2 SPRAY: 50 SPRAY, METERED NASAL at 23:34

## 2025-07-06 RX ADMIN — TETRACAINE HYDROCHLORIDE 2 DROP: 5 SOLUTION OPHTHALMIC at 23:34

## 2025-07-06 RX ADMIN — LABETALOL HYDROCHLORIDE 10 MG: 5 INJECTION, SOLUTION INTRAVENOUS at 23:34

## 2025-07-06 RX ADMIN — HYDRALAZINE HYDROCHLORIDE 10 MG: 20 INJECTION INTRAMUSCULAR; INTRAVENOUS at 20:49

## 2025-07-06 RX ADMIN — IOPAMIDOL 67 ML: 755 INJECTION, SOLUTION INTRAVENOUS at 17:45

## 2025-07-06 ASSESSMENT — ACTIVITIES OF DAILY LIVING (ADL)
ADLS_ACUITY_SCORE: 55

## 2025-07-06 ASSESSMENT — COLUMBIA-SUICIDE SEVERITY RATING SCALE - C-SSRS
2. HAVE YOU ACTUALLY HAD ANY THOUGHTS OF KILLING YOURSELF IN THE PAST MONTH?: NO
6. HAVE YOU EVER DONE ANYTHING, STARTED TO DO ANYTHING, OR PREPARED TO DO ANYTHING TO END YOUR LIFE?: YES
1. IN THE PAST MONTH, HAVE YOU WISHED YOU WERE DEAD OR WISHED YOU COULD GO TO SLEEP AND NOT WAKE UP?: YES

## 2025-07-06 ASSESSMENT — VISUAL ACUITY
OS: 20/40;WITH CORRECTIVE LENSES
OU: WITH CORRECTIVE LENSES;20/30
OD: 20/50;WITH CORRECTIVE LENSES

## 2025-07-06 NOTE — ED PROVIDER NOTES
EMERGENCY DEPARTMENT ENCOUNTER      NAME: Martha Chun  AGE: 65 year old female  YOB: 1960  MRN: [id number]  EVALUATION DATE & TIME: No admission date for patient encounter.    PCP: Ewelina Gonzalez    ED PROVIDER: Elba Stoll PA-C      Chief Complaint   Patient presents with    Eye Problem         FINAL IMPRESSION:  1. Blurred vision    2. Headache    3. Nausea    4. Lightheadedness    5. Acute stroke due to ischemia (H)    6. Hypertension          ED COURSE & MEDICAL DECISION MAKIN:34 PM I introduced myself to patient, performed initial HPI and examination.   3:00 PM Staffed with Dr. Stevenson  3:19 PM Patient reports concerns about hydralazine since she is sensitive to medicines. Confirmed she does not have documented allergy to this. Discussed potential adverse reactions.   3:58 PM Noting headache (unchanged), lightheadedness, nausea. Feels it could be the stress. Rechecked, plan to try some ativan.   4:42 PM Patient has a history of occipital nerve stimulator but believes she still has some clips/hardware intact. Reviewing chart, note 25, removed 1 year ago by Dr. Tee at [facility]. Plan to follow up for consult.   6:50 PM Checked on patient, going down to MRI now.   8:01 PM Eye Pressures WNL  8:06 PM Reviewed MRI, paged for stroke neurology.   8:17 PM Updated patient  8:27 PM Spoke with stroke neurology fellow. Clarified with patient that symptoms started at 11 am on Thursday. Outside of treatment window for blood thinners. Aim for normotension since she is outside of treatment window. Will run by attending and follow up. Will admit to hospitalist, paged to hospitalist.   8:36 PM Blood pressure 215/104. Will order hydralazine.   8:17 PM Updated patient  8:27 PM Spoke with stroke neurology fellow. Clarified with patient that symptoms started at 11 am on Thursday. Outside of treatment window for blood thinners. Aim for normotension since she is outside of treatment window.  "Will run by attending and follow up. Will admit to hospitalist, paged to hospitalist.   8:52 PM Updated patient. Getting hydralazine now.   9:05 PM Spoke with hospitalist who accepts admission    65 year old female with PMH HTN, Anxiety, Chronic daily headaches, chronic pain, somatic symptom disorder, Suicidal ideation with attempt by drug ingestion presents to the Emergency Department for evaluation of vision changes:    Symptoms started 3 days ago with \"pulling\" in her eyes, subsequently blurred/double vision that improves slightly when she covers either eye. Chronic headaches unchanged. No new neck pain. Did have a stem cell injection in cervical spine for headaches/pain x 12 weeks ago.   No chest pain but does report some left axillary pain.  No other contributory symptoms.  Does have a history of HTN, compliant with medicines.     Upon arrival patient is markedly hypertensive (systolic 200). Initial plan for hydralazine but blood pressure did improve on recheck without intervention (aside from Ativan given for other symptoms). Thus hydralazine held.   EKG nonischemic. Troponin 6 with a few days of symptoms, no indication for delta troponin. Clinically less suspicious for hypertensive urgency. Clinically not consistent with aortic dissection.     Visual acuity: 20/40 left, 20/50 right, 20/30 bilateral  Ocular pressures 12, 16 (WNL)    Does have chronic sore throat and nasal congestion. History of allergies. Certainly possible symptoms are associated with that. No acute URI symptoms to suggest viral URI/infectious etiology.  No neck pain or stiffness and procedure 12 weeks ago, low suspicion for discitis, epidural abscess or hematoma, or other cervical etiology that would be causing symptoms.  Considered aneurysm, CVA, or other causes.   Plan for MRI, patient has a history of occipital nerve stimulator which was removed 1 year ago but believes she still has clips/wires. I attempted to find information in " patient's chart, unable to find any details regarding this. Reviewed CT, shows chronic small vessel ischemic changes, probable left thalamic lacunar infarct age indeterminant. No lare vessel occlusion or high grade stenosis. Does have atherosclerotic plaquing with mild stenoses.   MRI was able to be performed after CT confirming no retained hardward, MRI ultimately shows acute/early subacute infarct right anterolateral pontomedullary junction. Questionable very tiny focus left posterior aspect of pontomedullary junction.     Consulted with stroke neurology, unfortunately patient is outside of treamtent window. Will aim for normotensive blood pressures and admit to hospitalist.            Medical Decision Making      Admit.    MIPS (CTPE, Dental pain, Dyson, Sinusitis, Asthma/COPD, Head Trauma): Not Applicable          MEDICATIONS GIVEN IN THE EMERGENCY:  Medications   tetracaine (PONTOCAINE) 0.5 % ophthalmic solution 1-2 drop (has no administration in time range)   LORazepam (ATIVAN) tablet 0.5 mg (0.5 mg Oral $Given 7/6/25 1811)   iopamidol (ISOVUE-370) solution 67 mL (67 mLs Intravenous $Given 7/6/25 1745)   gadobutrol (GADAVIST) injection 6 mL (6 mLs Intravenous $Given 7/6/25 1922)   hydrALAZINE (APRESOLINE) injection 10 mg (10 mg Intravenous $Given 7/6/25 2049)       NEW PRESCRIPTIONS STARTED AT TODAY'S ER VISIT  New Prescriptions    No medications on file          =================================================================    HPI    Patient information was obtained from: Patient    Use of : N/A         Martha Chun is a 65 year old female with a pertinent history of HTN, Anxiety, Chronic daily headaches, chronic pain, somatic symptom disorder, Suicidal ideation with attempt by drug ingestion who presents to this ED by private car for evaluation of eye problem.     Started to feel a pulling in her eyes on Thursday, subsequently developed blurred vision next day and worse with moving eyes to the  right. Causes it to go blurred/doubled.  Does wear glasses at baseline, has not helped with vision changes.  Feels better if she covers one eye and looks through the other, but symptoms worse with trying to coordinate vision with both eyes.    Yesterday she felt more tired, symptoms persisted into today prompting ED evaluation.     Did have a stem cell injection 12 weeks ago. Unsure if this could be related. Procedure by Dr. Camacho 4/16.   Cervical bone marrow aspirate concentrate treatment on 04/17/2025.  Initially questioned if symptoms could be related to allergies. Always has some congestion, mild sore throat.   Has headaches always, feels similar to prior.     No nausea or vomiting.   No chest pain or shortness of breath but does have pain before left armpit.     Does have a history of high blood pressure. Takes 300 aliskiren, 150 irbesartan. Has been taking medicines      Per chart review: Patient called nurse triage earlier today for blurred vision x 3 days, pulling sensation in her eyes. Turns to the right causes double vision.       Multiple drug allergies on file including: Amlodipine, Clonidine, Hydrochlorothiazide    REVIEW OF SYSTEMS   ROS negative unless otherwise stated in HPI    PAST MEDICAL HISTORY:  Past Medical History:   Diagnosis Date    Acute stroke due to ischemia (H) 7/6/2025    Anxiety     Cervical spine instability     COVID-19 12/22/2022    Degenerative arthritis     Delusional disorder (H)     Depression     Fibromyalgia     Hypertension     Insomnia     Migraines     Occipital neuralgia     b/l    Other chronic pain     Back pain    PONV (postoperative nausea and vomiting)     Seasonal allergies     Suicide attempt (H) 06/2022    from drug overdose       PAST SURGICAL HISTORY:  Past Surgical History:   Procedure Laterality Date    ARTHROPLASTY HIP Left 1/24/2023    Procedure: LEFT TOTAL HIP ARTHROPLASTY;  Surgeon: Danny Woodson MD;  Location: [facility] Main OR    CERVICAL FUSION   "11/2020 November and Dec 2020    IMPLANT PULSE GENERATOR SUBCUTANEOUS Bilateral 10/22/2021    Procedure: Phase II occipital nerve stimulator placement, bilateral occipital electrodes and bilateral trigeminal electrodes, Implantation of bilateral subcutaneous neurostimulator Percutaneous implantation of neurostimulator electrode array; cranial nerve Implantable neurostimulator electrode, each CPT 38038 Implantable neurostimulator pulse generator, dual array, nonrechargeable, includes extensi    INSERT STIMULATOR OCCIPITAL Bilateral 08/27/2021    Procedure: Externalized trial of percutaneous occipital nerve stimulator bilateral Percutaneous implantation of neurostimulator electrode array; cranial nerve CPT 15250;  Surgeon: Kobi Shrestha MD;  Location: [facility] OR    TOTAL HIP ARTHROPLASTY Right     TOTAL KNEE ARTHROPLASTY Left 01/2018    ZZC LIGATE FALLOPIAN TUBE      Description: Tubal Ligation;  Recorded: 01/16/2012;       CURRENT MEDICATIONS:    aliskiren (TEKTURNA) 300 MG tablet  B Complex-Folic Acid (B COMPLEX FORMULA 1 PO)  Butalbital-Acetaminophen (PHRENILIN)  MG TABS per tablet  carboxymethylcellulose (REFRESH PLUS) 0.5 % SOLN ophthalmic solution  fluticasone (FLONASE) 50 MCG/ACT nasal spray  irbesartan (AVAPRO) 75 MG tablet  LORazepam (ATIVAN) 0.5 MG tablet  magnesium glycinate 100 MG CAPS capsule  Probiotic Product (PROBIOTIC ADVANCED PO)  QUEtiapine (SEROQUEL) 100 MG tablet  QUEtiapine (SEROQUEL) 25 MG tablet  triprolidine-pseudoePHEDrine (APRODINE) 2.5-60 MG TABS per tablet  UNABLE TO FIND  UNABLE TO FIND  Vitamin D3 (CHOLECALCIFEROL) 125 MCG (5000 UT) tablet  zinc sulfate (ZINCATE) 220 (50 Zn) MG capsule        ALLERGIES:  Allergies   Allergen Reactions    Amlodipine Other (See Comments)     Constipation, \"liver pain\".        Clonidine      Other reaction(s): Dizziness    Doxazosin Dizziness    Escitalopram Muscle Pain (Myalgia)     Other reaction(s): Myalgias    Gluten Meal Headache and GI " "Disturbance    Hydrochlorothiazide      confusion    Lisinopril Dizziness    Mold Nausea     Ringing in ears, racing heart    Molds & Smuts Headache and Nausea     Ringing in ears, racing heart    Paroxetine Other (See Comments)     Palpitations/racing heart        Penicillins Unknown     Does not remember reaction, mother said she allergic to mold; patient refuses Pcn or any derivative      Sulfa Antibiotics Unknown    Tramadol      Other reaction(s): Runny Nose  Facial pain, sinus swelling      Carvedilol Other (See Comments)     Worsening depression        Codeine Headache and Nausea and Vomiting    Cortisone Other (See Comments)     Patient reports and possibly all steroids \"makes me to feel sick\", get fungal infections      Duloxetine Headache    Oxycodone Headache       FAMILY HISTORY:  Family History   Problem Relation Age of Onset    Cancer Mother     Diabetes Father     Hypertension Father     Pulmonary Embolism Daughter     Anesthesia Reaction No family hx of        SOCIAL HISTORY:   Social History     Socioeconomic History    Marital status: Legally     Number of children: 2   Occupational History    Occupation: on disability   Tobacco Use    Smoking status: Former     Current packs/day: 0.00     Average packs/day: 1 pack/day for 15.0 years (15.0 ttl pk-yrs)     Types: Cigarettes     Start date: 10/10/1999     Quit date: 10/10/2014     Years since quitting: 10.7    Smokeless tobacco: Never   Substance and Sexual Activity    Alcohol use: Not Currently     Comment: Last drink 10/2014    Drug use: Not Currently     Social Drivers of Health      Received from [facility]    Financial Resource Strain    Received from [facility]    Social Connections       VITALS:  BP (!) 215/104   Pulse 85   Temp 97.6  F (36.4  C) (Oral)   Resp 16   Ht 1.692 m (5' 6.6\")   Wt 61.2 kg (134 lb 14.4 oz)   SpO2 99%   BMI 21.38 kg/m      PHYSICAL EXAM "    Constitutional: Well developed, Well nourished, NAD, GCS 15   HENT: Normocephalic, Atraumatic, Oropharynx clear.   Neck- Supple, Nontender. Normal ROM. No stridor.  Eyes: Conjunctiva normal. PERRL. EOM intact. Vision grossly intact. No gaze deficits, EOM intact. No photophobia or nystagmus.   Respiratory: No respiratory distress, speaking in full sentences. Normal breath sounds, No wheezing  Cardiovascular: Normal heart rate, Regular rhythm, No murmurs.    GI: Soft, nontender  Musculoskeletal: No deformities, Moves all extremities equally. No calf tenderness or swelling. Strength WNL  Integument: Warm, Dry, No erythema, ecchymosis, or rash.  Neurologic: Alert & oriented x 3, Normal sensory function. No focal deficits, cranial nerves II-XII intact. Negative pronator drift. Negative finger-nose-finger.   Psychiatric: Affect normal, Judgment normal, Mood normal. Cooperative.      LAB:  All pertinent labs reviewed and interpreted.  Results for orders placed or performed during the hospital encounter of 07/06/25   MR Brain and Orbits w/o & w Contrast    Impression    IMPRESSION:  HEAD MRI:  1.  Small curvilinear acute/early subacute infarction at the right anterolateral aspect of pontomedullary junction.   2.  Questionable very tiny focus at the left posterior aspect of the pontomedullary junction.  3.  No definite associated hemorrhage.  4.  No significant mass effect.  5.  Mild chronic small vessel ischemia.  6.  Mild atrophy.    ORBIT MRI:  1.  Normal MRI of the orbits.    CTA Head Neck with Contrast    Impression    IMPRESSION:   HEAD CT:  1.  No evidence of hemorrhage.  2.  Probable left thalamic lacunar infarct, age indeterminate.  3.  Scattered presumed chronic lacunar infarcts.  4.  Patchy nonspecific white matter hypoattenuation presumably representing chronic small vessel ischemic change.    HEAD AND NECK CTA:   1.  No evidence of large vessel occlusion or high-grade stenosis.  2.  Scattered atherosclerotic  plaquing with mild stenoses.   Basic metabolic panel   Result Value Ref Range    Sodium 133 (L) 135 - 145 mmol/L    Potassium 4.9 3.4 - 5.3 mmol/L    Chloride 94 (L) 98 - 107 mmol/L    Carbon Dioxide (CO2) 26 22 - 29 mmol/L    Anion Gap 13 7 - 15 mmol/L    Urea Nitrogen 11.7 8.0 - 23.0 mg/dL    Creatinine 0.57 0.51 - 0.95 mg/dL    GFR Estimate >90 >60 mL/min/1.73m2    Calcium 10.0 8.8 - 10.4 mg/dL    Glucose 102 (H) 70 - 99 mg/dL   Result Value Ref Range    Troponin T, High Sensitivity 6 <=14 ng/L   CBC with platelets and differential   Result Value Ref Range    WBC Count 9.0 4.0 - 11.0 10e3/uL    RBC Count 4.76 3.80 - 5.20 10e6/uL    Hemoglobin 14.4 11.7 - 15.7 g/dL    Hematocrit 43.1 35.0 - 47.0 %    MCV 91 78 - 100 fL    MCH 30.3 26.5 - 33.0 pg    MCHC 33.4 31.5 - 36.5 g/dL    RDW 12.3 10.0 - 15.0 %    Platelet Count 298 150 - 450 10e3/uL    % Neutrophils 69 %    % Lymphocytes 22 %    % Monocytes 8 %    % Eosinophils 0 %    % Basophils 1 %    % Immature Granulocytes 0 %    NRBCs per 100 WBC 0 <1 /100    Absolute Neutrophils 6.2 1.6 - 8.3 10e3/uL    Absolute Lymphocytes 2.0 0.8 - 5.3 10e3/uL    Absolute Monocytes 0.7 0.0 - 1.3 10e3/uL    Absolute Eosinophils 0.0 0.0 - 0.7 10e3/uL    Absolute Basophils 0.1 0.0 - 0.2 10e3/uL    Absolute Immature Granulocytes 0.0 <=0.4 10e3/uL    Absolute NRBCs 0.0 10e3/uL   Extra Blue Top Tube   Result Value Ref Range    Hold Specimen JIC    Extra Red Top Tube   Result Value Ref Range    Hold Specimen JIC        RADIOLOGY:  Reviewed all pertinent imaging. Please see official radiology report.  MR Brain and Orbits w/o & w Contrast   Final Result   IMPRESSION:   HEAD MRI:   1.  Small curvilinear acute/early subacute infarction at the right anterolateral aspect of pontomedullary junction.    2.  Questionable very tiny focus at the left posterior aspect of the pontomedullary junction.   3.  No definite associated hemorrhage.   4.  No significant mass effect.   5.  Mild chronic small  vessel ischemia.   6.  Mild atrophy.      ORBIT MRI:   1.  Normal MRI of the orbits.       CTA Head Neck with Contrast   Final Result   IMPRESSION:    HEAD CT:   1.  No evidence of hemorrhage.   2.  Probable left thalamic lacunar infarct, age indeterminate.   3.  Scattered presumed chronic lacunar infarcts.   4.  Patchy nonspecific white matter hypoattenuation presumably representing chronic small vessel ischemic change.      HEAD AND NECK CTA:    1.  No evidence of large vessel occlusion or high-grade stenosis.   2.  Scattered atherosclerotic plaquing with mild stenoses.          EKG:    Performed at: 15:04:03    Impression: Normal sinus rhythm, Possible left atrial enlargement    Rate: 80 BPM  OR Interval: 164 ms  QRS Interval: 88 ms  QTc Interval: 388/447 ms  ST Changes: None  Comparison: When compared with ECG 24-Jan-2023 no significant change was found    Dr. Stevenson and I have independently reviewed and interpreted the EKG(s) documented above.    PROCEDURES:   None        Elba Stoll PA-C  Emergency Medicine  [facility] EMERGENCY DEPARTMENT  [facility] [facility] [id number]  [phone]             Elba Stoll PA-C  07/06/25 1030

## 2025-07-06 NOTE — ED TRIAGE NOTES
"Presents for issues with her bilateral eyes that started on Thursday. Stated has bilateral blurred vision if both eyes open but that vision is clear in each eye if the other eye is closed. Also c/o of a \"pulling\" sensation in her eyes. Fond to be hypertensive in triage.      Triage Assessment (Adult)       Row Name 07/06/25 0464          Triage Assessment    Airway WDL WDL        Respiratory WDL    Respiratory WDL WDL        Skin Circulation/Temperature WDL    Skin Circulation/Temperature WDL WDL        Cardiac WDL    Cardiac WDL WDL        Peripheral/Neurovascular WDL    Peripheral Neurovascular WDL WDL        Cognitive/Neuro/Behavioral WDL    Cognitive/Neuro/Behavioral WDL WDL                     "

## 2025-07-06 NOTE — ED PROVIDER NOTES
"Emergency Department Midlevel Supervisory Note     I had a face to face encounter with this patient seen by the Advanced Practice Provider (DORIE). I personally made/approved the management plan and take responsibility for the patient management. I personally saw patient and performed a substantive portion of the visit including all aspects of the medical decision making.     ED Course:  4:28 PM Elba Good PA-C staffed patient with me. I agree with their assessment and plan of management, and I will see the patient.  5:02 PM I met with the patient to introduce myself, gather additional history, perform my initial exam, and discuss the plan.     Brief HPI:     Martha Chun is a 65 year old female who presents for evaluation of an eye problem.    Patient began experiencing a \"pulling\" feeling in her eye 3 days ago (07/03/2025) which was followed by double and blurred vision. Moving her eyes to the right and coordinating vision with both eyes provoke the symptoms. Patient notes fatigue, headache, and stem cell injection 12 weeks ago. She reports pain before her left armpit.    Patient denies nausea, vomiting, shortness of breath, or chest pain.    I, Jessy Sandoval, am serving as a scribe to document services personally performed by Alex Stevenson MD, based on my observations and the provider's statements to me.   I, Alex Stevenson MD attest that Jessy Sandoval was acting in a scribe capacity, has observed my performance of the services and has documented them in accordance with my direction.    Brief Physical Exam: BP (!) 163/101   Pulse 86   Temp 97.6  F (36.4  C) (Oral)   Resp 18   Ht 1.692 m (5' 6.6\")   Wt 61.2 kg (134 lb 14.4 oz)   SpO2 100%   BMI 21.38 kg/m    Constitutional:  Alert, in no acute distress  EYES: Conjunctivae clear  HENT:  Atraumatic  Respiratory:  Respirations even, unlabored, in no acute respiratory distress  Cardiovascular:  Regular rate and rhythm, good peripheral perfusion  GI: " Soft, non-distended, non-tender  Musculoskeletal:  Moves all 4 extremities equally, grossly symmetrical strength  Integument: Warm & dry. No appreciable rash, erythema.  Neurologic:  Alert & oriented, speech clear and fluent, no focal deficits noted  Psych: Normal mood and affect       MDM:    Patient presents with blurred vision, has a pulling sensation to her eyes, is quite hypertensive.    Differential includes press, intercranial hemorrhage, anxiety, glaucoma, stroke, temporal arteritis    Plan for CTA, MRI with and without    EKG, troponin, BMP, CBC    Imaging does not show aneurysm or hemorrhage.  MRI shows subacute infarct.  Plan admit to the hospital for further management of subacute infarct and high blood pressure           1. Blurred vision    2. Headache    3. Nausea    4. Lightheadedness    5. Acute stroke due to ischemia (H)            Labs and Imaging:  Results for orders placed or performed during the hospital encounter of 07/06/25   MR Brain and Orbits w/o & w Contrast    Impression    IMPRESSION:  HEAD MRI:  1.  Small curvilinear acute/early subacute infarction at the right anterolateral aspect of pontomedullary junction.   2.  Questionable very tiny focus at the left posterior aspect of the pontomedullary junction.  3.  No definite associated hemorrhage.  4.  No significant mass effect.  5.  Mild chronic small vessel ischemia.  6.  Mild atrophy.    ORBIT MRI:  1.  Normal MRI of the orbits.    CTA Head Neck with Contrast    Impression    IMPRESSION:   HEAD CT:  1.  No evidence of hemorrhage.  2.  Probable left thalamic lacunar infarct, age indeterminate.  3.  Scattered presumed chronic lacunar infarcts.  4.  Patchy nonspecific white matter hypoattenuation presumably representing chronic small vessel ischemic change.    HEAD AND NECK CTA:   1.  No evidence of large vessel occlusion or high-grade stenosis.  2.  Scattered atherosclerotic plaquing with mild stenoses.   Basic metabolic panel   Result  Value Ref Range    Sodium 133 (L) 135 - 145 mmol/L    Potassium 4.9 3.4 - 5.3 mmol/L    Chloride 94 (L) 98 - 107 mmol/L    Carbon Dioxide (CO2) 26 22 - 29 mmol/L    Anion Gap 13 7 - 15 mmol/L    Urea Nitrogen 11.7 8.0 - 23.0 mg/dL    Creatinine 0.57 0.51 - 0.95 mg/dL    GFR Estimate >90 >60 mL/min/1.73m2    Calcium 10.0 8.8 - 10.4 mg/dL    Glucose 102 (H) 70 - 99 mg/dL   Result Value Ref Range    Troponin T, High Sensitivity 6 <=14 ng/L   CBC with platelets and differential   Result Value Ref Range    WBC Count 9.0 4.0 - 11.0 10e3/uL    RBC Count 4.76 3.80 - 5.20 10e6/uL    Hemoglobin 14.4 11.7 - 15.7 g/dL    Hematocrit 43.1 35.0 - 47.0 %    MCV 91 78 - 100 fL    MCH 30.3 26.5 - 33.0 pg    MCHC 33.4 31.5 - 36.5 g/dL    RDW 12.3 10.0 - 15.0 %    Platelet Count 298 150 - 450 10e3/uL    % Neutrophils 69 %    % Lymphocytes 22 %    % Monocytes 8 %    % Eosinophils 0 %    % Basophils 1 %    % Immature Granulocytes 0 %    NRBCs per 100 WBC 0 <1 /100    Absolute Neutrophils 6.2 1.6 - 8.3 10e3/uL    Absolute Lymphocytes 2.0 0.8 - 5.3 10e3/uL    Absolute Monocytes 0.7 0.0 - 1.3 10e3/uL    Absolute Eosinophils 0.0 0.0 - 0.7 10e3/uL    Absolute Basophils 0.1 0.0 - 0.2 10e3/uL    Absolute Immature Granulocytes 0.0 <=0.4 10e3/uL    Absolute NRBCs 0.0 10e3/uL   Extra Blue Top Tube   Result Value Ref Range    Hold Specimen JIC    Extra Red Top Tube   Result Value Ref Range    Hold Specimen JIC        I have reviewed the relevant laboratory studies above.        Procedures:  I was present for the key portions of procedures documented in DORIE/midlevel note, see midlevel note for further details.    Alex Stevenson MD  [facility] EMERGENCY DEPARTMENT  [address] [place] [id number] [phone]       Alex Stevenson MD  07/06/25 2758

## 2025-07-07 ENCOUNTER — APPOINTMENT (OUTPATIENT)
Dept: OCCUPATIONAL THERAPY | Facility: HOSPITAL | Age: 65
End: 2025-07-07
Attending: STUDENT IN AN ORGANIZED HEALTH CARE EDUCATION/TRAINING PROGRAM
Payer: COMMERCIAL

## 2025-07-07 ENCOUNTER — APPOINTMENT (OUTPATIENT)
Dept: SPEECH THERAPY | Facility: HOSPITAL | Age: 65
End: 2025-07-07
Attending: STUDENT IN AN ORGANIZED HEALTH CARE EDUCATION/TRAINING PROGRAM
Payer: COMMERCIAL

## 2025-07-07 ENCOUNTER — APPOINTMENT (OUTPATIENT)
Dept: CARDIOLOGY | Facility: HOSPITAL | Age: 65
DRG: 065 | End: 2025-07-07
Attending: STUDENT IN AN ORGANIZED HEALTH CARE EDUCATION/TRAINING PROGRAM
Payer: COMMERCIAL

## 2025-07-07 LAB
ANION GAP SERPL CALCULATED.3IONS-SCNC: 9 MMOL/L (ref 7–15)
ATRIAL RATE - MUSE: 80 BPM
BUN SERPL-MCNC: 11.4 MG/DL (ref 8–23)
CALCIUM SERPL-MCNC: 9.1 MG/DL (ref 8.8–10.4)
CHLORIDE SERPL-SCNC: 95 MMOL/L (ref 98–107)
CREAT SERPL-MCNC: 0.62 MG/DL (ref 0.51–0.95)
DIASTOLIC BLOOD PRESSURE - MUSE: NORMAL MMHG
EGFRCR SERPLBLD CKD-EPI 2021: >90 ML/MIN/1.73M2
ERYTHROCYTE [DISTWIDTH] IN BLOOD BY AUTOMATED COUNT: 12.4 % (ref 10–15)
GLUCOSE BLDC GLUCOMTR-MCNC: 84 MG/DL (ref 70–99)
GLUCOSE BLDC GLUCOMTR-MCNC: 98 MG/DL (ref 70–99)
GLUCOSE BLDC GLUCOMTR-MCNC: 99 MG/DL (ref 70–99)
GLUCOSE SERPL-MCNC: 126 MG/DL (ref 70–99)
HCO3 SERPL-SCNC: 28 MMOL/L (ref 22–29)
HCT VFR BLD AUTO: 37.5 % (ref 35–47)
HGB BLD-MCNC: 12.6 G/DL (ref 11.7–15.7)
INTERPRETATION ECG - MUSE: NORMAL
LVEF ECHO: NORMAL
MCH RBC QN AUTO: 30.2 PG (ref 26.5–33)
MCHC RBC AUTO-ENTMCNC: 33.6 G/DL (ref 31.5–36.5)
MCV RBC AUTO: 90 FL (ref 78–100)
P AXIS - MUSE: 67 DEGREES
PLATELET # BLD AUTO: 275 10E3/UL (ref 150–450)
POTASSIUM SERPL-SCNC: 4 MMOL/L (ref 3.4–5.3)
PR INTERVAL - MUSE: 164 MS
QRS DURATION - MUSE: 88 MS
QT - MUSE: 388 MS
QTC - MUSE: 447 MS
R AXIS - MUSE: 12 DEGREES
RBC # BLD AUTO: 4.17 10E6/UL (ref 3.8–5.2)
SODIUM SERPL-SCNC: 132 MMOL/L (ref 135–145)
SYSTOLIC BLOOD PRESSURE - MUSE: NORMAL MMHG
T AXIS - MUSE: 59 DEGREES
VENTRICULAR RATE- MUSE: 80 BPM
WBC # BLD AUTO: 7.9 10E3/UL (ref 4–11)

## 2025-07-07 PROCEDURE — 250N000013 HC RX MED GY IP 250 OP 250 PS 637: Performed by: INTERNAL MEDICINE

## 2025-07-07 PROCEDURE — 97165 OT EVAL LOW COMPLEX 30 MIN: CPT | Mod: GO

## 2025-07-07 PROCEDURE — 120N000001 HC R&B MED SURG/OB

## 2025-07-07 PROCEDURE — 92610 EVALUATE SWALLOWING FUNCTION: CPT | Mod: GN

## 2025-07-07 PROCEDURE — 36415 COLL VENOUS BLD VENIPUNCTURE: CPT | Performed by: STUDENT IN AN ORGANIZED HEALTH CARE EDUCATION/TRAINING PROGRAM

## 2025-07-07 PROCEDURE — 250N000011 HC RX IP 250 OP 636: Performed by: STUDENT IN AN ORGANIZED HEALTH CARE EDUCATION/TRAINING PROGRAM

## 2025-07-07 PROCEDURE — 999N000147 HC STATISTIC PT IP EVAL DEFER

## 2025-07-07 PROCEDURE — 82962 GLUCOSE BLOOD TEST: CPT

## 2025-07-07 PROCEDURE — 83735 ASSAY OF MAGNESIUM: CPT | Performed by: INTERNAL MEDICINE

## 2025-07-07 PROCEDURE — 97530 THERAPEUTIC ACTIVITIES: CPT | Mod: GO

## 2025-07-07 PROCEDURE — 250N000013 HC RX MED GY IP 250 OP 250 PS 637: Performed by: HOSPITALIST

## 2025-07-07 PROCEDURE — 250N000013 HC RX MED GY IP 250 OP 250 PS 637: Performed by: STUDENT IN AN ORGANIZED HEALTH CARE EDUCATION/TRAINING PROGRAM

## 2025-07-07 PROCEDURE — 99222 1ST HOSP IP/OBS MODERATE 55: CPT | Performed by: INTERNAL MEDICINE

## 2025-07-07 PROCEDURE — 85014 HEMATOCRIT: CPT | Performed by: STUDENT IN AN ORGANIZED HEALTH CARE EDUCATION/TRAINING PROGRAM

## 2025-07-07 PROCEDURE — 80048 BASIC METABOLIC PNL TOTAL CA: CPT | Performed by: STUDENT IN AN ORGANIZED HEALTH CARE EDUCATION/TRAINING PROGRAM

## 2025-07-07 PROCEDURE — 84100 ASSAY OF PHOSPHORUS: CPT | Performed by: INTERNAL MEDICINE

## 2025-07-07 PROCEDURE — 93306 TTE W/DOPPLER COMPLETE: CPT

## 2025-07-07 PROCEDURE — 93306 TTE W/DOPPLER COMPLETE: CPT | Mod: 26 | Performed by: INTERNAL MEDICINE

## 2025-07-07 PROCEDURE — 99232 SBSQ HOSP IP/OBS MODERATE 35: CPT | Performed by: HOSPITALIST

## 2025-07-07 RX ORDER — LORAZEPAM 0.5 MG/1
0.5 TABLET ORAL 4 TIMES DAILY PRN
Status: DISCONTINUED | OUTPATIENT
Start: 2025-07-07 | End: 2025-07-08 | Stop reason: HOSPADM

## 2025-07-07 RX ORDER — VERAPAMIL HYDROCHLORIDE 40 MG/1
40 TABLET ORAL EVERY 8 HOURS SCHEDULED
Status: DISCONTINUED | OUTPATIENT
Start: 2025-07-07 | End: 2025-07-08 | Stop reason: HOSPADM

## 2025-07-07 RX ORDER — CLOPIDOGREL BISULFATE 75 MG/1
75 TABLET ORAL DAILY
Status: DISCONTINUED | OUTPATIENT
Start: 2025-07-07 | End: 2025-07-08 | Stop reason: HOSPADM

## 2025-07-07 RX ADMIN — LORAZEPAM 0.5 MG: 0.5 TABLET ORAL at 10:25

## 2025-07-07 RX ADMIN — IRBESARTAN 150 MG: 150 TABLET ORAL at 22:13

## 2025-07-07 RX ADMIN — ALISKIREN 300 MG: 150 TABLET, FILM COATED ORAL at 22:13

## 2025-07-07 RX ADMIN — QUETIAPINE FUMARATE 125 MG: 100 TABLET ORAL at 22:13

## 2025-07-07 RX ADMIN — LORAZEPAM 0.5 MG: 0.5 TABLET ORAL at 15:45

## 2025-07-07 RX ADMIN — LORAZEPAM 0.5 MG: 0.5 TABLET ORAL at 22:38

## 2025-07-07 RX ADMIN — VERAPAMIL HYDROCHLORIDE 40 MG: 40 TABLET ORAL at 22:13

## 2025-07-07 RX ADMIN — ONDANSETRON 4 MG: 4 TABLET, ORALLY DISINTEGRATING ORAL at 00:51

## 2025-07-07 RX ADMIN — LORAZEPAM 0.5 MG: 0.5 TABLET ORAL at 00:01

## 2025-07-07 ASSESSMENT — ACTIVITIES OF DAILY LIVING (ADL)
ADLS_ACUITY_SCORE: 31
ADLS_ACUITY_SCORE: 31
ADLS_ACUITY_SCORE: 54
ADLS_ACUITY_SCORE: 31
ADLS_ACUITY_SCORE: 31
ADLS_ACUITY_SCORE: 55
ADLS_ACUITY_SCORE: 31
ADLS_ACUITY_SCORE: 54
ADLS_ACUITY_SCORE: 31
ADLS_ACUITY_SCORE: 54
DEPENDENT_IADLS:: INDEPENDENT;TRANSPORTATION
ADLS_ACUITY_SCORE: 54
ADLS_ACUITY_SCORE: 54
ADLS_ACUITY_SCORE: 31
ADLS_ACUITY_SCORE: 54
ADLS_ACUITY_SCORE: 31

## 2025-07-07 NOTE — PROVIDER NOTIFICATION
Dr Mejia text paged per pt req-Pt takes 0.5mg Ativan qid prn at home at 0400,1500, 1800 and 2200. She has taken three doses today of her tid prn order and is asking if she can have ordered qid prn like she does at home or tid prn and one dose at hs prn.

## 2025-07-07 NOTE — H&P
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 Found to have 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.    #Acute small infarction at the right anterolateral aspect of the pontomedullary junction.  #Acute double-vision  Symptoms began on 7/3 as an eye \"tightness\" that developed into double vision on 7/4. She notices it when looked straight forward and it worsens when she looks to the right, improves when she looks to the left. Goes away when covering up either eye. No eye pain or redness with this. Continues to have her chronic headaches and neck pain. On exam, has subtly decreased lateral excursion of her right eye. MRI brain and orbits revealed 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.  - Neurology consulted; outside window for lytics  - Neurology recommended ASA 325mg once followed by 81mg daily; patient reports a possible allergic reaction in the past and also that her physician who performed her stem cell injection 4/2025  told her that she shouldn't take aspirin. I feel it is likely safe to try but the patient declined this for now overnight.  - Holding ASA 81mg daily for now unless patient decides she is okay trying it  - Start atorvastatin 80mg daily, assess for tolerance  - TTE  - Tele  - Goal is normotension, but patient has difficulty with severe HTN as an outpatient and does not tolerate many BP medications; try labetalol and hydralazine PRN acutely and assess tolerance    #Primary HTN  Follows with cardiology for this but limited by MCAS reactions.  - Continue PTA aliskiren and " irbesartan, see allergy list    #Possible MCAS  The patient reports flushing/dizziness reaction to many medications. She doesn't know which doctor diagnosed her with this but believes it was a functional medicine doctor. Does also see a PCP.  - Will need to assess individual medications for tolerance    #Chronic headaches  Has chronic headaches and neck pain. Has been diagnosed with migraines but also chronic cervicalgia. Follows with [facility] and has a stem cell injection (harvested from her bone marrow) with PRP on 4/17/25. Injected around her cervical neck area, but not sure exactly where these injections were directed. Follows with Walter Camacho at the [facility]. She hasn't noticed any benefit yet.    #Depression  - Continue PTA Seroquel    #History of SI  Moderate suicide risk based on automated protocol. Patient denied SI to me on admission. Order set ordered.        Diet: Regular Diet Adult  NPO for Medical/Clinical Reasons Except for: Meds    DVT Prophylaxis: Pneumatic Compression Devices  Dyson Catheter: Not present  Lines: None     Cardiac Monitoring: ACTIVE order. Indication: Stroke, acute (48 hours)  Code Status: Full Code      Clinically Significant Risk Factors Present on Admission         # Hyponatremia: Lowest Na = 133 mmol/L in last 2 days, will monitor as appropriate  # Hypochloremia: Lowest Cl = 94 mmol/L in last 2 days, will monitor as appropriate          # Hypertension: Noted on problem list                      Disposition Plan     Medically Ready for Discharge: Anticipated Tomorrow           LONI JUSTICE MD  Hospitalist Service  [facility]  Securely message with [employer] (more info)  Text page via [employer] Paging/Directory     ______________________________________________________________________    Chief Complaint   Blurry vision    History is obtained from the patient    History of Present Illness   Martha M Chun is a 65 year old female with PMHx of HTN,  "anxiety, chronic headaches/migraines, and 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 She reports developing \"tightness\" in her eyes on 7/3, then developed double vision on 7/4. She notes it when looked straight forward and it worsens when she looks to the right, improves when she looks to the left. Goes away when covering up wither eye. No eye pain or redness with this. Continues to have her chronic headaches and neck pain.    MRI brain and orbits revealed 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.      Past Medical History    Past Medical History:   Diagnosis Date    Acute stroke due to ischemia (H) 7/6/2025    Anxiety     Cervical spine instability     COVID-19 12/22/2022    Degenerative arthritis     Delusional disorder (H)     Depression     Fibromyalgia     Hypertension     Insomnia     Migraines     Occipital neuralgia     b/l    Other chronic pain     Back pain    PONV (postoperative nausea and vomiting)     Seasonal allergies     Suicide attempt (H) 06/2022    from drug overdose       Past Surgical History   Past Surgical History:   Procedure Laterality Date    ARTHROPLASTY HIP Left 1/24/2023    Procedure: LEFT TOTAL HIP ARTHROPLASTY;  Surgeon: Danny Woodson MD;  Location: [facility] Main OR    CERVICAL FUSION  11/2020 November and Dec 2020    IMPLANT PULSE GENERATOR SUBCUTANEOUS Bilateral 10/22/2021    Procedure: Phase II occipital nerve stimulator placement, bilateral occipital electrodes and bilateral trigeminal electrodes, Implantation of bilateral subcutaneous neurostimulator Percutaneous implantation of neurostimulator electrode array; cranial nerve Implantable neurostimulator electrode, each CPT 30488 Implantable neurostimulator pulse generator, dual array, nonrechargeable, includes extensi    INSERT STIMULATOR OCCIPITAL Bilateral 08/27/2021    Procedure: " Externalized trial of percutaneous occipital nerve stimulator bilateral Percutaneous implantation of neurostimulator electrode array; cranial nerve CPT 59892;  Surgeon: Kobi Shrestha MD;  Location: [facility] OR    TOTAL HIP ARTHROPLASTY Right     TOTAL KNEE ARTHROPLASTY Left 01/2018    [facility] LIGATE FALLOPIAN TUBE      Description: Tubal Ligation;  Recorded: 01/16/2012;       Prior to Admission Medications   Prior to Admission Medications   Prescriptions Last Dose Informant Patient Reported? Taking?   B Complex-Folic Acid (B COMPLEX FORMULA 1 PO) 7/5/2025 Bedtime  Yes Yes   Sig: Take 1 teaspoonful by mouth daily   Butalbital-Acetaminophen (PHRENILIN)  MG TABS per tablet 7/5/2025 Bedtime  Yes Yes   Sig: Take 0.25-1 tablets by mouth every 4 hours as needed for headaches.   LORazepam (ATIVAN) 0.5 MG tablet 7/6/2025 Morning  Yes Yes   Sig: Take 0.5 mg by mouth 3 times daily as needed for anxiety.   Probiotic Product (PROBIOTIC ADVANCED PO) 7/5/2025 Bedtime  Yes Yes   Sig: Take 1 capsule by mouth At Bedtime   QUEtiapine (SEROQUEL) 100 MG tablet 7/5/2025 Bedtime  Yes Yes   Sig: Take 100 mg by mouth at bedtime.   QUEtiapine (SEROQUEL) 25 MG tablet 7/5/2025 Bedtime  Yes Yes   Sig: Take 25 mg by mouth at bedtime.   UNABLE TO FIND 7/5/2025 Bedtime  Yes Yes   Sig: Take 250 mg by mouth 3 times daily. MEDICATION NAME: Chinese Skull Cap   UNABLE TO FIND 7/6/2025 Morning  Yes Yes   Sig: MEDICATION NAME: ketotifen ophthalmic solution < 0.01% from compounding pharmacy per patient; uable to find Hx.   Vitamin D3 (CHOLECALCIFEROL) 125 MCG (5000 UT) tablet 7/5/2025 Bedtime  Yes Yes   Sig: Take 125 mcg by mouth daily   aliskiren (TEKTURNA) 300 MG tablet 7/5/2025 Bedtime  Yes Yes   Sig: Take 300 mg by mouth daily.   carboxymethylcellulose (REFRESH PLUS) 0.5 % SOLN ophthalmic solution 7/5/2025 Bedtime  Yes Yes   Sig: Place 1-2 drops into both eyes 3 times daily as needed for dry eyes.   fluticasone (FLONASE) 50 MCG/ACT nasal spray 7/5/2025  Bedtime  Yes Yes   Sig: Spray 2 sprays into both nostrils every evening   irbesartan (AVAPRO) 75 MG tablet 7/5/2025 Bedtime  No Yes   Sig: Take 2 tablets (150 mg) by mouth at bedtime.   magnesium glycinate 100 MG CAPS capsule 7/5/2025 Bedtime  Yes Yes   Sig: Take 100 mg by mouth daily.   triprolidine-pseudoePHEDrine (APRODINE) 2.5-60 MG TABS per tablet 7/6/2025 Morning  Yes Yes   Sig: Take 0.25-0.5 tablets by mouth 3 times daily.   zinc sulfate (ZINCATE) 220 (50 Zn) MG capsule 7/5/2025 Bedtime  Yes Yes   Sig: Take 220 mg by mouth daily.      Facility-Administered Medications: None           Physical Exam   Vital Signs: Temp: 97.6  F (36.4  C) Temp src: Oral BP: (!) 196/95 Pulse: 99   Resp: 13 SpO2: 100 %      Weight: 134 lbs 14.4 oz    General: No overt distress, conversational, non-toxic appearing  HEENT: MMM. Extraocular muscle examination notable for decreased lateral excursion of her right eye, otherwise intact and remaining cranial nerve testing is normal.  Pulmonary: Normal effort  Cardiac: RRR. No m/r/g  Abdomen: Soft. NT, ND.  Extremities: No bilateral LE edema  Neuro: alert and awake, Ox4    Medical Decision Making       90 MINUTES SPENT BY ME on the date of service doing chart review, history, exam, documentation & further activities per the note.      Data     I have personally reviewed the following data over the past 24 hrs:    9.0  \   14.4   / 298     133 (L) 94 (L) 11.7 /  102 (H)   4.9 26 0.57 \     Trop: 6 BNP: N/A     TSH: N/A T4: N/A A1C: 5.3       Imaging results reviewed over the past 24 hrs:   Recent Results (from the past 24 hours)   CTA Head Neck with Contrast    Narrative    EXAM: CTA HEAD NECK W CONTRAST  LOCATION: [facility]  DATE: 7/6/2025    INDICATION: Blurred vision, double vision, HTN  COMPARISON: None.  CONTRAST: isovue 370 67ml  TECHNIQUE: Head and neck CT angiogram with IV contrast. Noncontrast head CT followed by axial helical CT images of the head and  neck vessels obtained during the arterial phase of intravenous contrast administration. Axial 2D reconstructed images and   multiplanar 3D MIP reconstructed images of the head and neck vessels were performed by the technologist. Dose reduction techniques were used. All stenosis measurements made according to NASCET criteria unless otherwise specified.    FINDINGS:   NONCONTRAST HEAD CT:   No evidence of hemorrhage. Left thalamic lacunar infarct, age indeterminate. Probable scattered old lacunar infarcts. Background of nonspecific patchy white matter hypoattenuation presumably representing chronic small vessel ischemic change. No acute   osseous abnormality.    HEAD CTA:  No evidence of large vessel occlusion, high-grade stenosis, aneurysm, or high flow vascular malformation. Scattered atherosclerotic plaquing with mild stenoses.    NECK CTA:  No evidence of large vessel occlusion, high-grade stenosis, or dissection. Scattered atherosclerotic plaquing with mild stenoses.    NONVASCULAR STRUCTURES: Cervical spine degenerative in postoperative changes. Small area of ovoid hypoattenuation along the left ear paraglottic fold, presumably focal adherent secretions.      Impression    IMPRESSION:   HEAD CT:  1.  No evidence of hemorrhage.  2.  Probable left thalamic lacunar infarct, age indeterminate.  3.  Scattered presumed chronic lacunar infarcts.  4.  Patchy nonspecific white matter hypoattenuation presumably representing chronic small vessel ischemic change.    HEAD AND NECK CTA:   1.  No evidence of large vessel occlusion or high-grade stenosis.  2.  Scattered atherosclerotic plaquing with mild stenoses.   MR Brain and Orbits w/o & w Contrast    Narrative    EXAM: MR BRAIN AND ORBITS W/O and W CONTRAST  LOCATION: [facility]  DATE: 7/6/2025    INDICATION: Blurred vision, double vision, headaches  COMPARISON: MRI head 2/5/2021  CONTRAST: 6mL Gadavist  TECHNIQUE:  1) Routine multiplanar  multisequence head MRI without and with intravenous contrast.  2) Dedicated high-resolution multiplanar multisequence MRI of the orbits without and with intravenous contrast.    FINDINGS:  INTRACRANIAL CONTENTS: Small curvilinear acute/early subacute infarction at the right anterolateral aspect of pontomedullary junction. Questionable very tiny focus at the left posterior aspect of the pontomedullary junction. These are best visualized on   series 10 images 43 and 44 and series 10 image 44; respectively. No definite associated hemorrhage. No significant mass effect. Scattered nonspecific T2/FLAIR hyperintensities within the cerebral white matter most consistent with mild chronic   microvascular ischemic change. Mild generalized cerebral atrophy. No hydrocephalus. Normal position of the cerebellar tonsils. No pathologic contrast enhancement.    SELLA: No abnormality accounting for technique.    OSSEOUS STRUCTURES/SOFT TISSUES: Normal marrow signal. The major intracranial vascular flow voids are maintained.     ORBITS: Dedicated MRI of the orbits was performed. Intact globes. Symmetrical extraocular musculature without thickening/enlargement. The intraorbital, canalicular and prechiasmatic optic nerve segments are normal in size and signal intensity   bilaterally. The optic chiasm and proximal optic tracts are unremarkable. No localized inflammation of the intraconal or extraconal orbital fat. Normal lacrimal glands. No intraorbital mass or pathologic intraorbital enhancement.     SINUSES/MASTOIDS: No paranasal sinus mucosal disease. No middle ear or mastoid effusion.       Impression    IMPRESSION:  HEAD MRI:  1.  Small curvilinear acute/early subacute infarction at the right anterolateral aspect of pontomedullary junction.   2.  Questionable very tiny focus at the left posterior aspect of the pontomedullary junction.  3.  No definite associated hemorrhage.  4.  No significant mass effect.  5.  Mild chronic small  vessel ischemia.  6.  Mild atrophy.    ORBIT MRI:  1.  Normal MRI of the orbits.

## 2025-07-07 NOTE — PLAN OF CARE
Problem: Adult Inpatient Plan of Care  Goal: Optimal Comfort and Wellbeing  Outcome: Progressing   Goal Outcome Evaluation:         Pt is A/O x4. BP was elevated, labetalol given. HR dropped to 39-40's for 30mins. Hospitalist aware.  C/o nausea and anxiety. Zofran and ativan were given. Pt resting in bed. VSS on RA.

## 2025-07-07 NOTE — PROGRESS NOTES
Physical Therapy     Chart reviewed; orders acknowledged. Pt is independent with functional mobility and has no concerns regarding strength or balance.  Will defer to nursing staff for mobilization while hospitalized to prevent deconditioning.  Plan was discussed with OT, who will refer pt to outpatient vision therapy for diplopia.  Will D/C current PT orders; please reorder if status changes. Thank you.          7/7/2025 by Maria L Flores DPT

## 2025-07-07 NOTE — PLAN OF CARE
Problem: Stroke, Ischemic (Includes Transient Ischemic Attack)  Goal: Optimal Coping  Outcome: Progressing     Problem: Stroke, Ischemic (Includes Transient Ischemic Attack)  Goal: Optimal Functional Ability  Outcome: Progressing  Intervention: Optimize Functional Ability  Recent Flowsheet Documentation  Taken 7/7/2025 1500 by Ariella Aguero RN  Activity Management:   ambulated outside room   ambulated in room   ambulated to bathroom  Taken 7/7/2025 1437 by Ariella Aguero RN  Activity Management: back to bed     Problem: Comorbidity Management  Goal: Blood Pressure in Desired Range  Outcome: Progressing  Intervention: Maintain Blood Pressure Management  Recent Flowsheet Documentation  Taken 7/7/2025 1524 by Ariella Aguero RN  Medication Review/Management: medications reviewed  Taken 7/7/2025 1232 by Ariella Aguero RN  Medication Review/Management: medications reviewed     Problem: Adult Inpatient Plan of Care  Goal: Plan of Care Review  Description: The Plan of Care Review/Shift note should be completed every shift.  The Outcome Evaluation is a brief statement about your assessment that the patient is improving, declining, or no change.  This information will be displayed automatically on your shift  note.  Outcome: Progressing  Flowsheets (Taken 7/7/2025 1646)  Plan of Care Reviewed With: patient  Overall Patient Progress: improving   Goal Outcome Evaluation:      Plan of Care Reviewed With: patient    Overall Patient Progress: improvingOverall Patient Progress: improving     NIHSS 0. Vision changes since 7/3 or 7/4. Pt unsure. Very anxious. Crying. Blurred vision unless covering one eye. Increased blurred vision in right and double vision in left when turning. Up indep. Needs a lot of reassurance.

## 2025-07-07 NOTE — PLAN OF CARE
Goal Outcome Evaluation:      Plan of Care Reviewed With: patient          Outcome Evaluation: TBD - likely can return home

## 2025-07-07 NOTE — PHARMACY-ADMISSION MEDICATION HISTORY
Pharmacy Intern Admission Medication History    Admission medication history is complete. The information provided in this note is only as accurate as the sources available at the time of the update.    Information Source(s): Patient and CareEverywhere/SureScripts via in-person    Pertinent Information: Patient reports getting a low concentration of ketotifen from a compounding pharmacy, but only knew it was less than 0.01%. Could not find info on this compounded medication.    Changes made to PTA medication list:  Added: Ketotifen (< 0.01% per patient)  Deleted: Fiorinal, Zofran, Quercetin  Changed: Butalbital-acetaminophen (quarter tablets minimum per patient), lorazepam (prn), Aprodine (0.25-0.5 tablets TID per patient)    Allergies reviewed with patient and updates made in EHR: yes    Medication History Completed By: ALEJANDRO KELLY 7/6/2025 8:33 PM    PTA Med List   Medication Sig Last Dose/Taking    aliskiren (TEKTURNA) 300 MG tablet Take 300 mg by mouth daily. 7/5/2025 Bedtime    B Complex-Folic Acid (B COMPLEX FORMULA 1 PO) Take 1 teaspoonful by mouth daily 7/5/2025 Bedtime    Butalbital-Acetaminophen (PHRENILIN)  MG TABS per tablet Take 0.25-1 tablets by mouth every 4 hours as needed for headaches. 7/5/2025 Bedtime    carboxymethylcellulose (REFRESH PLUS) 0.5 % SOLN ophthalmic solution Place 1-2 drops into both eyes 3 times daily as needed for dry eyes. 7/5/2025 Bedtime    fluticasone (FLONASE) 50 MCG/ACT nasal spray Spray 2 sprays into both nostrils every evening 7/5/2025 Bedtime    irbesartan (AVAPRO) 75 MG tablet Take 2 tablets (150 mg) by mouth at bedtime. 7/5/2025 Bedtime    LORazepam (ATIVAN) 0.5 MG tablet Take 0.5 mg by mouth 3 times daily as needed for anxiety. 7/6/2025 Morning    magnesium glycinate 100 MG CAPS capsule Take 100 mg by mouth daily. 7/5/2025 Bedtime    Probiotic Product (PROBIOTIC ADVANCED PO) Take 1 capsule by mouth At Bedtime 7/5/2025 Bedtime    QUEtiapine (SEROQUEL) 100 MG  tablet Take 100 mg by mouth at bedtime. 7/5/2025 Bedtime    QUEtiapine (SEROQUEL) 25 MG tablet Take 25 mg by mouth at bedtime. 7/5/2025 Bedtime    triprolidine-pseudoePHEDrine (APRODINE) 2.5-60 MG TABS per tablet Take 0.25-0.5 tablets by mouth 3 times daily. 7/6/2025 Morning    UNABLE TO FIND MEDICATION NAME: ketotifen ophthalmic solution < 0.01% from compounding pharmacy per patient; uable to find Hx. 7/6/2025 Morning    UNABLE TO FIND Take 250 mg by mouth 3 times daily. MEDICATION NAME: Chinese Skull Cap 7/5/2025 Bedtime    Vitamin D3 (CHOLECALCIFEROL) 125 MCG (5000 UT) tablet Take 125 mcg by mouth daily 7/5/2025 Bedtime    zinc sulfate (ZINCATE) 220 (50 Zn) MG capsule Take 220 mg by mouth daily. 7/5/2025 Bedtime

## 2025-07-07 NOTE — PROGRESS NOTES
Message received from bedside RN:    Insurance benefits Care Manager - Laura Allen, ph# [phone]    SATISH Schwartz

## 2025-07-07 NOTE — CONSULTS
"  [facility]    Stroke Telephone Note    I was called by Alex Stevenson on 07/06/25 regarding patient Martha Chun. The patient is a 65 year old female with a PMH of HTN, and suspected MCAS who presents with a chief complaint of blurry vision in bilateral eyes that improves when one eye is closed. She also feels a sensation of \"pulling\" in her eyes. Of note, patient has history of stem cell injection in her C-spine for upper cervical instability. Symptoms started ~3 days ago. MRI was performed which revealed a right and left pontomedullary stroke.     Vitals  BP: (!) 215/104   Pulse: 85   Resp: 16   Temp: 97.6  F (36.4  C)   Weight: 61.2 kg (134 lb 14.4 oz)    Imaging Findings  MRI Brain: MRI was performed which revealed a right and left pontomedullary stroke ( R>L)   CTA head/neck: negative    Impression  64 yo F with bilateral blurry vision who was found to have R & L pontomedullary stroke.  Acute ischemic stroke of R & L pontomedullary junction due to undetermined etiology     Recommendations  - Use orderset: \"Ischemic Stroke Routine Admission\" or \"Ischemic Stroke No Thrombolytics/No Thrombectomy ICU Admission\"  - BP goal --> Normotension  - Place Neurology IP Stroke Consult order   - Neurochecks and Vital Signs every Q4   - Aspirin 325 mg today; Start ASA 81mg tomorrow  - Statin: Start Atorvastatin 80mg will titrate as needed once new labs are obtained  - TTE (with Bubble Study if age 60 yrs or less)  - Telemetry, EKG  - Bedside Glucose Monitoring  - A1c, Lipid Panel, Troponin x 3  - PT/OT/SLP  - Stroke Education  - Euthermia, Euglycemia    My recommendations are based on the information provided over the phone by Martha Chun's in-person providers. They are not intended to replace the clinical judgment of her in-person providers. I was not requested to personally see or examine the patient at this time.     The Stroke Staff is Dr. Braswell.    Prince Belcher MD  Vascular Neurology " "Fellow    To page me or covering stroke neurology team member, click here: [email or web address]  Choose \"On Call\" tab at top, then select \"NEUROLOGY/ALL SITES\" from middle drop-down box, press Enter, then look for \"stroke\" or \"telestroke\" for your site.    "

## 2025-07-07 NOTE — PROGRESS NOTES
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  Found to have 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.     Achieving goal oriented therapy for CVA, HTN, hyperlipemia may prove challenging in light of numerous medications intolerances and non-traditional medical approach.  I think a strategy of adding 1 medication at a time would be good for Ms. Chun.      Assessment/Plan    #Acute small infarction at the right anterolateral aspect of the pontomedullary junction.  #Acute double-vision  -asa (declines), ok with plavix  -declines statin or PCSK9-I.  Consider zetia.  -BP goal eventually <130/80    #Primary HTN - Follows with cardiology for this but limited by MCAS reactions.  -Currently on PTA aliskiren and irbesartan.    -given intra-cavitary gradient probably should be on beta-blocker (intolerant) or verapamil rather than afterload reducers.  Ms. Chun is inquiring about mast cell release for calcium channel blockers vs. Beta-blockers which I am not aware of.  Cards and pharmacy consults.     #Possible MCAS (though apparently testing negative in the past)  The patient reports flushing/dizziness reaction to many medications. She doesn't know which doctor diagnosed her with this but believes it was a functional medicine doctor. Does also see a PCP.  - Will need to assess individual medications for tolerance  -on compounded ketotifen  -on aprodine long term (combination of anti-histamine-pseudoephedrine) which is probably not good for her hypertension long term.    -defer to her outpatient prescriber regarding these meds.    "  #Chronic headaches  Has chronic headaches and neck pain. Has been diagnosed with migraines but also chronic cervicalgia. Follows with [facility] and has a stem cell injection (harvested from her bone marrow) with PRP on 4/17/25. Injected around her cervical neck area, but not sure exactly where these injections were directed. Follows with Walter Camacho at the [facility] clinic. She hasn't noticed any benefit yet.  Was told not to take aspirin due to anti-inflammatory effect.    #Depression - Continue PTA Seroquel     #History of SI  Moderate suicide risk based on automated protocol. Patient denied SI to admitting hospitalist.  Order set ordered.    Checklist:  Code Status: Full Code    Diet: Gluten Free Diet     Cardiac Monitoring: ACTIVE order. Indication: Stroke, acute (48 hours)   DVT px:  Pneumatic Compression Devices    Disposition and Discharge Planning    Barriers:     Number of days selected below is from a list of system pre-approved options.   Medically Ready for Discharge: Anticipated Tomorrow      System Identified Risk Variables  Auto-populated based on system request.  If more complex management decisions required, to be addressed above.  \"  Clinically Significant Risk Factors Present on Admission         # Hyponatremia: Lowest Na = 132 mmol/L in last 2 days, will monitor as appropriate  # Hypochloremia: Lowest Cl = 94 mmol/L in last 2 days, will monitor as appropriate          # Hypertension: Noted on problem list               # Financial/Environmental Concerns: none         \"    Interval Events/Subjective/Notable results:    Ongoing visual disturbance which is the most bothersome.  Lightheadedness  Chronic HA for which she had the stem cell injection  Very concerned about medications and side-effects/effects on stem cell injection  Reports intolerance to BB from yesterday.    Reviewed: ECHO, lipids, MRI, CTA  Personally interpreted: no afib  Discussed with:RN      Objective    Vital signs in last 24 " hours  Temp:  [97.8  F (36.6  C)-97.9  F (36.6  C)] 97.9  F (36.6  C)  Pulse:  [40-99] 77  Resp:  [12-24] 16  BP: (131-216)/() 155/71  SpO2:  [95 %-100 %] 100 % O2 Device: None (Room air)    Weight:   134 lbs 14.4 oz  Body mass index is 21.38 kg/m .    Intake/Output last 3 shifts  No intake/output data recorded.    Physical Exam  General:  Alert, cooperative, no distress    Neurologic:  oriented, covers eye to avoid double vision, fluent speech.   Psych: calm  HEENT:  Anicteric, MMM  CV: RRR SM normal S1 and S2, no edema  Lungs: CTAB.  Easy respirations  Abd: soft, NT  Skin: no rashes noted on exposed skin.    Dyson Catheter: Not present  Lines: None          Medical Decision Making               Flaca Mejia MD, [facility]  Internal Medicine Hospitalist

## 2025-07-07 NOTE — PHARMACY-CONSULT NOTE
Verapamil and Beta Blocker Mast Cell Activation      Verapamil  In patients with MCAS CCB's such as verapamil are recommended as an option for managing blood pressure as they have been shown to decrease mast cell activation. This is done via inhibiting antigen-induced release of mediators by primary effector cells, such as mast cells.     Beta Blockers  These should be avoided in patients with MCAS as they have been shown to possibly decrease the threshold for mast cell activation and to prime mast cells.      Walter Borges [facility] on 7/7/2025 at 4:48 PM

## 2025-07-07 NOTE — CONSULTS
HEART CARE CONSULTATON NOTE        Assessment/Recommendations   Assessment:  1.  Hypertension: poorly controlled with multiple medication intolerances and concern regarding possible mast cell activation syndrome.  Echocardiogram demonstrated a small intracavitary gradient without evidence of left ventricular hypertrophy/signs of hypertrophic cardiomyopathy.  May be related to dehydration at the time.  Multiple intolerances including clonidine, amlodipine, ACE inhibitors, beta-blockers, hydrochlorothiazide.  After discussion she is agreeable to trying verapamil.  2.  Acute CVA: Patient has declined aspirin agreeable to starting Plavix today.  Declines both statins or PCSK9 inhibitors.  She is considering Zetia.  3.  Reported mast activation syndrome although negative testing in the past  4.  Dyslipidemia    Plan:  1.  Start low-dose verapamil and if tolerated can adjust to long-acting tomorrow  2.  I agree with recommendations for patient to be on Plavix, aspirin and statin therapy.  Patient has declined aspirin and statins due to concerns regarding side effects.  3.  Continue on irbesartan  4.  Avoid dehydration    Clinically Significant Risk Factors Present on Admission         # Hyponatremia: Lowest Na = 132 mmol/L in last 2 days, will monitor as appropriate  # Hypochloremia: Lowest Cl = 94 mmol/L in last 2 days, will monitor as appropriate            # Hypertension: Noted on problem list                   # Financial/Environmental Concerns: none               History of Present Illness/Subjective    HPI: Martha Chun is a 65 year old female with history of anxiety, chronic headaches/migraines, hypertension poorly controlled, multiple medication intolerances, reported mast activation syndrome without confirmation on testing, chronic neck pain admitted with double vision found to have acute/subacute CVA.  Cardiology asked to assist with management of hypertension.  Patient has longstanding poorly controlled  "blood pressure and has multiple intolerances listed above.  Denies chest pain or breathing difficulty.  She was tried on metoprolol yesterday and reported that she felt tingling sensation all over her body and extremely unwell.  No presyncopal/syncopal symptoms.  EKG review shows sinus rhythm without significant ST-T    Echocardiogram 7/6/2025  Technically difficult study.  The left ventricle is normal in size. There is normal left ventricular wall  thickness. The visual ejection fraction is 65-70%. Grade I or early diastolic  dysfunction. An intracavitary gradient is present (upto 41mmHg).  The right ventricle is normal in size and function.  No hemodynamically significant valvular abnormalities on 2D or color flow  imaging. There is no comparison study available.     Physical Examination  Review of Systems   VITALS: BP (!) 155/70 (BP Location: Left arm)   Pulse 77   Temp 98  F (36.7  C) (Oral)   Resp 18   Ht 1.692 m (5' 6.6\")   Wt 61.2 kg (134 lb 14.4 oz)   SpO2 99%   BMI 21.38 kg/m    BMI: Body mass index is 21.38 kg/m .  Wt Readings from Last 3 Encounters:   07/06/25 61.2 kg (134 lb 14.4 oz)   11/07/24 70.8 kg (156 lb)   10/21/24 68 kg (150 lb)       Intake/Output Summary (Last 24 hours) at 7/7/2025 1705  Last data filed at 7/7/2025 1300  Gross per 24 hour   Intake 480 ml   Output --   Net 480 ml     General Appearance:   no distress, normal body habitus   ENT/Mouth: membranes moist, no oral lesions or bleeding gums.      EYES:  no scleral icterus, normal conjunctivae   Neck: no carotid bruits or thyromegaly   Chest/Lungs:   lungs are clear to auscultation   Cardiovascular:   Regular. Normal first and second heart sounds with no murmurs  no edema bilaterally    Abdomen:  no organomegaly, masses, bruits, or tenderness; bowel sounds are present   Extremities: no cyanosis or clubbing   Skin: no xanthelasma, warm.    Neurologic: normal  bilateral, no tremors     Psychiatric: alert and oriented x3, calm  "    Review Of Systems  Skin: negative  Eyes: negative  Ears/Nose/Throat: negative  Respiratory: No shortness of breath, dyspnea on exertion, cough, or hemoptysis  Cardiovascular: negative  Gastrointestinal: negative  Genitourinary: negative  Musculoskeletal: negative  Neurologic: negative  Psychiatric: negative  Hematologic/Lymphatic/Immunologic: negative  Endocrine: negative          Lab Results    Chemistry/lipid CBC Cardiac Enzymes/BNP/TSH/INR   Recent Labs   Lab Test 07/06/25  1611   CHOL 226*   HDL 87   *   TRIG 68     Recent Labs   Lab Test 07/06/25  1611 03/19/20  0930   * 158*     Recent Labs   Lab Test 07/07/25  1150 07/07/25  0804   NA  --  132*   POTASSIUM  --  4.0   CHLORIDE  --  95*   CO2  --  28   GLC 98 126*   BUN  --  11.4   CR  --  0.62   GFRESTIMATED  --  >90   ALYSE  --  9.1     Recent Labs   Lab Test 07/07/25  0804 07/06/25  1611 12/27/24  1152   CR 0.62 0.57 0.64     Recent Labs   Lab Test 07/06/25  1611   A1C 5.3          Recent Labs   Lab Test 07/07/25  0804   WBC 7.9   HGB 12.6   HCT 37.5   MCV 90        Recent Labs   Lab Test 07/07/25  0804 07/06/25  1611 10/21/24  1403   HGB 12.6 14.4 14.1    Recent Labs   Lab Test 01/25/23  1054 01/25/23  0441 01/24/23  2223   TROPONINI 0.02 0.02 0.01     Recent Labs   Lab Test 10/19/21  1206   NTBNP 137*     Recent Labs   Lab Test 03/19/20  0930   TSH 2.24     Recent Labs   Lab Test 10/19/21  1206 08/25/21  0823   INR 0.96 0.91        Medical History  Surgical History Family History Social History   Past Medical History:   Diagnosis Date    Acute stroke due to ischemia (H) 7/6/2025    Anxiety     Cervical spine instability     COVID-19 12/22/2022    Degenerative arthritis     Delusional disorder (H)     Depression     Fibromyalgia     Hypertension     Insomnia     Migraines     Occipital neuralgia     b/l    Other chronic pain     Back pain    PONV (postoperative nausea and vomiting)     Seasonal allergies     Suicide attempt (H) 06/2022     from drug overdose     Past Surgical History:   Procedure Laterality Date    ARTHROPLASTY HIP Left 1/24/2023    Procedure: LEFT TOTAL HIP ARTHROPLASTY;  Surgeon: Danny Woodson MD;  Location: [facility] OR    CERVICAL FUSION  11/2020 November and Dec 2020    IMPLANT PULSE GENERATOR SUBCUTANEOUS Bilateral 10/22/2021    Procedure: Phase II occipital nerve stimulator placement, bilateral occipital electrodes and bilateral trigeminal electrodes, Implantation of bilateral subcutaneous neurostimulator Percutaneous implantation of neurostimulator electrode array; cranial nerve Implantable neurostimulator electrode, each CPT 09251 Implantable neurostimulator pulse generator, dual array, nonrechargeable, includes extensi    INSERT STIMULATOR OCCIPITAL Bilateral 08/27/2021    Procedure: Externalized trial of percutaneous occipital nerve stimulator bilateral Percutaneous implantation of neurostimulator electrode array; cranial nerve CPT 04913;  Surgeon: Kobi Shrestha MD;  Location: [facility] OR    TOTAL HIP ARTHROPLASTY Right     TOTAL KNEE ARTHROPLASTY Left 01/2018    ZZC LIGATE FALLOPIAN TUBE      Description: Tubal Ligation;  Recorded: 01/16/2012;     Family History   Problem Relation Age of Onset    Cancer Mother     Diabetes Father     Hypertension Father     Pulmonary Embolism Daughter     Anesthesia Reaction No family hx of         Social History     Socioeconomic History    Marital status: Legally      Spouse name: Not on file    Number of children: 2    Years of education: Not on file    Highest education level: Not on file   Occupational History    Occupation: on disability   Tobacco Use    Smoking status: Former     Current packs/day: 0.00     Average packs/day: 1 pack/day for 15.0 years (15.0 ttl pk-yrs)     Types: Cigarettes     Start date: 10/10/1999     Quit date: 10/10/2014     Years since quitting: 10.7    Smokeless tobacco: Never   Substance and Sexual Activity    Alcohol use: Not Currently  "    Comment: Last drink 10/2014    Drug use: Not Currently    Sexual activity: Not on file   Other Topics Concern    Parent/sibling w/ CABG, MI or angioplasty before 65F 55M? Not Asked   Social History Narrative    Not on file     Social Drivers of Health     Financial Resource Strain: High Risk (1/1/2022)    Received from [employer]    Financial Resource Strain     Difficulty of Paying Living Expenses: Not on file     Difficulty of Paying Living Expenses: Not on file   Food Insecurity: Not on file   Transportation Needs: Not on file   Physical Activity: Not on file   Stress: Not on file   Social Connections: Unknown (1/1/2022)    Received from [employer]    Social Connections     Frequency of Communication with Friends and Family: Not on file   Interpersonal Safety: Not on file   Housing Stability: Not on file         Medications  Allergies   No current outpatient medications on file.        Allergies   Allergen Reactions    Amlodipine Other (See Comments)     Constipation, \"liver pain\".        Clonidine      Other reaction(s): Dizziness    Doxazosin Dizziness    Escitalopram Muscle Pain (Myalgia)     Other reaction(s): Myalgias    Gluten Meal Headache and GI Disturbance    Hydrochlorothiazide      confusion    Lisinopril Dizziness    Mold Nausea     Ringing in ears, racing heart    Molds & Smuts Headache and Nausea     Ringing in ears, racing heart    Paroxetine Other (See Comments)     Palpitations/racing heart        Penicillins Unknown     Does not remember reaction, mother said she allergic to mold; patient refuses Pcn or any derivative      Sulfa Antibiotics Unknown    Tramadol      Other reaction(s): Runny Nose  Facial pain, sinus swelling      Carvedilol Other (See Comments)     Worsening depression        Codeine Headache and Nausea and Vomiting    Cortisone Other (See Comments)     Patient reports and possibly all steroids \"makes me to " "feel sick\", get fungal infections      Duloxetine Headache    Oxycodone Headache         Franchesca Renner MD    "

## 2025-07-07 NOTE — PROGRESS NOTES
"Speech-Language Pathology: Clinical Swallow Evaluation     07/07/25 8111   Appointment Info   Signing Clinician's Name / Credentials (SLP) Catherine Cross MA CCC-SLP   General Information   Onset of Illness/Injury or Date of Surgery 07/06/25   Referring Physician Dr. Mejia   Pertinent History of Current Problem Per EMR: 65 year old female with PMHx of HTN, anxiety, chronic headaches/migraines, and 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 Found to have 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.     #Acute small infarction at the right anterolateral aspect of the pontomedullary junction.  #Acute double-vision  Symptoms began on 7/3 as an eye \"tightness\" that developed into double vision on 7/4. She notices it when looked straight forward and it worsens when she looks to the right, improves when she looks to the left. Goes away when covering up either eye. No eye pain or redness with this. Continues to have her chronic headaches and neck pain. On exam, has subtly decreased lateral excursion of her right eye. MRI brain and orbits revealed 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   General Observations Alert and cooperative   Type of Evaluation   Type of Evaluation Swallow Evaluation   Oral Motor   Oral Musculature generally intact   Oral Motor Deficits Observed   (No oral motor deficits)   Dentition (Oral Motor)   Dentition (Oral Motor) adequate dentition   Facial Symmetry (Oral Motor)   Comment, Facial Symmetry (Oral Motor) WFL-no overt asymmetry   Lip Function (Oral Motor)   Comment, Lip Function (Oral Motor) WFL   Tongue Function (Oral Motor)   Comment, Tongue Function (Oral Motor) WFL   General Swallowing Observations   Current Diet/Method of Nutritional Intake (General Swallowing Observations, NIS) thin liquids (level " 0);regular diet   Past History of Dysphagia Patient reports history of dysphagia related to cervical spine surgery. Patient has C3-C7 fusion with initial anterior approach and later a posterior approach in 2020 per her report; further specifics not located. She reports sensation of things sticking at times, noticeable with certain foods such as lettuce.   Swallowing Evaluation Clinical swallow evaluation   Clinical Swallow Evaluation   Clinical Swallow Evaluation Textures Trialed thin liquids;pureed;solid foods   Clinical Swallow Eval: Thin Liquid Texture Trial   Mode of Presentation, Thin Liquids cup   Volume of Liquid or Food Presented 3 ounces   Oral Phase of Swallow WFL   Pharyngeal Phase of Swallow intact   Clinical Swallow Evaluation: Solid Food Texture Trial   Mode of Presentation self-fed   Volume Presented 3 bites rice chex   Oral Phase WFL   Pharyngeal Phase intact   Diagnostic Statement no report of sensation of sticking; IND requested a sip of liquid when dry   Esophageal Phase of Swallow   Patient reports or presents with symptoms of esophageal dysphagia No   Swallowing Recommendations   Diet Consistency Recommendations regular diet;thin liquids (level 0)   Medication Administration Recommendations, Swallowing (SLP) per patient preference   Comment, Swallowing Recommendations No signs and symptoms of aspiration.  No new oral pharyngeal dysphagia, symptoms reported are consistent with ACDF C3-C7.  Patient appears readily able to compensate for symptoms with liquid wash as needed.   Clinical Impression   Criteria for Skilled Therapeutic Interventions Met (SLP Eval) Evaluation only   SLP Diagnosis Dysphagia   Risks & Benefits of therapy have been explained evaluation/treatment results reviewed;care plan/treatment goals reviewed;participants included;patient   Clinical Impression Comments Bedside swallow study completed.  No signs and symptoms of radiation or oropharyngeal dysphagia.  Hyolaryngeal elevation  appears intact.  Mastication is within normal limits.  Patient does report mild symptoms of dysphagia at baseline related to cervical  fusion.  Okay for regular diet and thin liquids with liquid wash as needed for any symptoms.  Patient is able to come communicate her wants needs and ideas WFL. No further ST is warranted at this time.   SLP Total Evaluation Time   Eval: oral/pharyngeal swallow function, clinical swallow Minutes (53108) 15   SLP Discharge Planning   SLP Rationale for DC Rec Evaluation only   SLP Brief overview of current status  No ongoing speech therapy.  Regular diet and thin liquids with liquid wash as needed   SLP Time and Intention   Total Session Time (sum of timed and untimed services) 15

## 2025-07-07 NOTE — PROGRESS NOTES
"Occupational Therapy Discharge Summary    Reason for therapy discharge:    All goals and outcomes met, no further needs identified.    Progress towards therapy goal(s). See goals on Care Plan in [facility] electronic health record for goal details.  Goals met    Therapy recommendation(s):    Continued therapy is recommended.  Rationale/Recommendations:  recommend outpatient occupational therapy to address visual deficits.     Meredith Zamora OTR/L 7/7/25 07/07/25 1520   Appointment Info   Signing Clinician's Name / Credentials (OT) Meredith Sera OTR/L   Living Environment   People in Home alone   Current Living Arrangements apartment   Home Accessibility no concerns   Self-Care   Equipment Currently Used at Home walker, rolling  (does not typically use)   Fall history within last six months no   Activity/Exercise/Self-Care Comment Independent with ADLs.   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  Found to have 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.\"   Existing Precautions/Restrictions fall   Limitations/Impairments visual   Cognitive Status Examination   Orientation Status orientation to person, place and time  (A&Ox4)   Cognitive Status Comments Anxious, tearful during session   Visual Perception   Impact " of Vision Impairment on Function (Vision) Pt reports diplopia when she looks up or looks to the R side, no diplopia with gaze to L. Visual tracking and convergence WNL   Pain Assessment   Patient Currently in Pain No   Range of Motion Comprehensive   General Range of Motion bilateral upper extremity ROM WFL   Strength Comprehensive (MMT)   General Manual Muscle Testing (MMT) Assessment no strength deficits identified   Coordination   Coordination Comments No coordination deficits identified   Bed Mobility   Bed Mobility supine-sit;sit-supine   Supine-Sit [place] (Bed Mobility) independent   Sit-Supine [place] (Bed Mobility) independent   Transfers   Transfers sit-stand transfer;toilet transfer   Sit-Stand Transfer   Sit-Stand [place] (Transfers) supervision   Toilet Transfer   Type (Toilet Transfer) sit-stand;stand-sit   [place] Level (Toilet Transfer) supervision   Balance   Balance Comments SBA functional mobility no AD, no LOB observed, no difficulty avoiding obstacles   Activities of Daily Living   BADL Assessment/Intervention lower body dressing;grooming   Lower Body Dressing Assessment/Training   [place] Level (Lower Body Dressing) independent   Position (Lower Body Dressing) edge of bed sitting   Grooming Assessment/Training   [place] Level (Grooming) supervision   Clinical Impression   Criteria for Skilled Therapeutic Interventions Met (OT) Yes, treatment indicated   OT Diagnosis Impaired vision   Influenced by the following impairments s/p 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   OT Problem List-Impairments impacting ADL problems related to;vision   Assessment of Occupational Performance 1-3 Performance Deficits   Identified Performance Deficits vision   Planned Therapy Interventions (OT) neuromuscular re-education;visual perception   Clinical Decision Making Complexity (OT) problem focused assessment/low complexity    Risk & Benefits of therapy have been explained evaluation/treatment results reviewed;care plan/treatment goals reviewed;risks/benefits reviewed;current/potential barriers reviewed;participants voiced agreement with care plan;participants included;patient   OT Total Evaluation Time   OT Eval, Low Complexity Minutes (73139) 12   OT Goals   Therapy Frequency (OT) One time eval and treatment   OT Predicted Duration/Target Date for Goal Attainment 07/14/25   OT Goals OT Goal 1   OT: Goal 1 Pt will demo safe functional mobility/ADL participation with use of compensatory techniques for diplopia.  (Goal met)   Interventions   Interventions Quick Adds Therapeutic Activity   Therapeutic Activities   Therapeutic Activity Minutes (47310) 8   Symptoms noted during/after treatment none   Treatment Detail/Skilled Intervention Focus on wayfinding and reading signage with use of visual compensatory techniques ambulating in hallway/performing ADLs in room. Pt ambulated 280' with no LOB, demo follow-through of compensatory techniques. Pt left in bed at end of session with direct handoff to RN.   OT Discharge Planning   OT Plan discharge OT   OT Discharge Recommendation (DC Rec) home with outpatient occupational therapy   OT Rationale for DC Rec Defer to outpatient OT for vision therapy, orders placed.   OT Brief overview of current status SBA   OT Total Distance Amb During Session (feet) 300   Total Session Time   Timed Code Treatment Minutes 8   Total Session Time (sum of timed and untimed services) 20

## 2025-07-07 NOTE — CONSULTS
Care Management Initial Consult    General Information  Assessment completed with: Patient,    Type of CM/SW Visit: Initial Assessment    Primary Care Provider verified and updated as needed: Yes   Readmission within the last 30 days: no previous admission in last 30 days      Reason for Consult: discharge planning  Advance Care Planning: Advance Care Planning Reviewed: no concerns identified          Communication Assessment  Patient's communication style: spoken language (English or Bilingual)             Cognitive  Cognitive/Neuro/Behavioral: WDL  Level of Consciousness: alert  Arousal Level: opens eyes spontaneously  Orientation: oriented x 4        Speech: clear, spontaneous    Living Environment:   People in home: alone     Current living Arrangements: apartment      Able to return to prior arrangements: yes       Family/Social Support:  Care provided by: self  Provides care for: no one     Support system:            Description of Support System:           Current Resources:   Patient receiving home care services: No        Community Resources: None  Equipment currently used at home: walker, standard  Supplies currently used at home: None    Employment/Financial:  Employment Status: disabled        Financial Concerns: none           Does the patient's insurance plan have a 3 day qualifying hospital stay waiver?  Yes     Which insurance plan 3 day waiver is available? Alternative insurance waiver    Will the waiver be used for post-acute placement? Undetermined at this time    Lifestyle & Psychosocial Needs:  Social Drivers of Health     Food Insecurity: Not on file   Depression: Not at risk (1/6/2022)    PHQ-2     PHQ-2 Score: 0   Recent Concern: Depression - At risk (12/9/2021)    PHQ-2     PHQ-2 Score: 6   Housing Stability: Not on file   Tobacco Use: Medium Risk (1/28/2025)    Patient History     Smoking Tobacco Use: Former     Smokeless Tobacco Use: Never     Passive Exposure: Not on file   Financial  Resource Strain: High Risk (1/1/2022)    [facility]    Financial Resource Strain     Difficulty of Paying Living Expenses: Not on file     Difficulty of Paying Living Expenses: Not on file   Alcohol Use: Not on file   Transportation Needs: Not on file   Physical Activity: Not on file   Interpersonal Safety: Not on file   Stress: Not on file   Social Connections: Unknown (1/1/2022)    Received from [facility]    Social Connections     Frequency of Communication with Friends and Family: Not on file   Health Literacy: Not on file       Functional Status:  Prior to admission patient needed assistance:   Dependent ADLs:: Independent, Ambulation-walker  Dependent IADLs:: Independent, Transportation       Mental Health Status:  Mental Health Status: No Current Concerns       Chemical Dependency Status:  Chemical Dependency Status: No Current Concerns             Values/Beliefs:  Spiritual, Cultural Beliefs, Adventist Practices, Values that affect care: no               Discussed  Partnership in Safe Discharge Planning  document with patient/family: No    Additional Information:  CM met with pt in room to discuss discharge planning and complete initial assessment. Demographics confirmed and updated on facesheet.     Pt lives in an apt alone . Pt uses w/c for mobility at night. Pt is independent at baseline, but does not drive. She states her friend assists with transportation when needed. Pt states she has been working on getting disability and has a CM that is assisting with getting in home services.     Unknown discharge needs, therapy consults pending.     Next Steps: follow for therapy sharona Quiroz, TERESASW

## 2025-07-08 VITALS
BODY MASS INDEX: 21.17 KG/M2 | DIASTOLIC BLOOD PRESSURE: 72 MMHG | RESPIRATION RATE: 22 BRPM | HEART RATE: 73 BPM | HEIGHT: 67 IN | WEIGHT: 134.9 LBS | SYSTOLIC BLOOD PRESSURE: 147 MMHG | TEMPERATURE: 98 F | OXYGEN SATURATION: 99 %

## 2025-07-08 LAB
GLUCOSE BLDC GLUCOMTR-MCNC: 94 MG/DL (ref 70–99)
MAGNESIUM SERPL-MCNC: 2.2 MG/DL (ref 1.7–2.3)
PHOSPHATE SERPL-MCNC: 4.4 MG/DL (ref 2.5–4.5)

## 2025-07-08 PROCEDURE — 99239 HOSP IP/OBS DSCHRG MGMT >30: CPT | Performed by: INTERNAL MEDICINE

## 2025-07-08 PROCEDURE — 250N000013 HC RX MED GY IP 250 OP 250 PS 637: Performed by: HOSPITALIST

## 2025-07-08 PROCEDURE — 99232 SBSQ HOSP IP/OBS MODERATE 35: CPT | Performed by: INTERNAL MEDICINE

## 2025-07-08 RX ORDER — CLOPIDOGREL BISULFATE 75 MG/1
75 TABLET ORAL DAILY
Qty: 30 TABLET | Refills: 0 | Status: SHIPPED | OUTPATIENT
Start: 2025-07-08

## 2025-07-08 RX ORDER — MECLIZINE HYDROCHLORIDE 25 MG/1
25 TABLET ORAL ONCE
Status: COMPLETED | OUTPATIENT
Start: 2025-07-08 | End: 2025-07-08

## 2025-07-08 RX ADMIN — LORAZEPAM 0.5 MG: 0.5 TABLET ORAL at 11:08

## 2025-07-08 ASSESSMENT — ACTIVITIES OF DAILY LIVING (ADL)
ADLS_ACUITY_SCORE: 31
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
## 2025-07-08 NOTE — PROGRESS NOTES
"  HEART CARE CONSULTATON NOTE        Assessment/Recommendations   Assessment:  1.  Hypertension: poorly controlled with multiple medication intolerances and concern regarding possible mast cell activation syndrome.  Echocardiogram demonstrated a small intracavitary gradient without evidence of left ventricular hypertrophy/signs of hypertrophic cardiomyopathy.  May be related to dehydration at the time.  Multiple intolerances including clonidine, amlodipine, ACE inhibitors, beta-blockers, hydrochlorothiazide.  She tried metoprolol and verapamil during this hospitalization and felt unwell with both.  Currently blood pressure is fairly well-controlled on irbesartan which she is tolerating.  2.  Acute CVA: Patient has declined aspirin agreeable to starting Plavix.  She did not start it last night and will be starting it today.  Declines both statins or PCSK9 inhibitors.  She is considering Zetia.  3.  Reported mast activation syndrome although negative testing in the past  4.  Dyslipidemia     Plan:  1.  Continue irbesartan  2.  Recommend Plavix as recommended by neurology.  Patient declines aspirin and statins.  3.  Continue on irbesartan  4.  Avoid dehydration  No further cardiac recommendations    Clinically Significant Risk Factors         # Hyponatremia: Lowest Na = 132 mmol/L in last 2 days, will monitor as appropriate  # Hypochloremia: Lowest Cl = 94 mmol/L in last 2 days, will monitor as appropriate            # Hypertension: Noted on problem list                    # Financial/Environmental Concerns: none                 History of Present Illness/Subjective    Patient complains of feeling very unwell after taking verapamil.  She feels that she has vision changes and is dizzy.       Physical Examination  Review of Systems   VITALS: BP (!) 147/72 (BP Location: Left arm)   Pulse 73   Temp 98  F (36.7  C) (Oral)   Resp 22   Ht 1.692 m (5' 6.6\")   Wt 61.2 kg (134 lb 14.4 oz)   SpO2 99%   BMI 21.38 kg/m  "   BMI: Body mass index is 21.38 kg/m .  Wt Readings from Last 3 Encounters:   07/06/25 61.2 kg (134 lb 14.4 oz)   11/07/24 70.8 kg (156 lb)   10/21/24 68 kg (150 lb)       Intake/Output Summary (Last 24 hours) at 7/8/2025 1224  Last data filed at 7/8/2025 1045  Gross per 24 hour   Intake 2260 ml   Output --   Net 2260 ml     General Appearance:   no distress, normal body habitus   ENT/Mouth: membranes moist, no oral lesions or bleeding gums.      EYES:  no scleral icterus, normal conjunctivae                       Skin: no xanthelasma, warm.    Neurologic: normal  bilateral, no tremors     Psychiatric: alert and oriented x3, calm     Review Of Systems  Skin: negative  Eyes: negative  Ears/Nose/Throat: negative  Respiratory: No shortness of breath, dyspnea on exertion, cough, or hemoptysis  Cardiovascular: negative  Gastrointestinal: negative  Genitourinary: negative  Musculoskeletal: negative  Neurologic: negative  Psychiatric: negative  Hematologic/Lymphatic/Immunologic: negative  Endocrine: negative          Lab Results    Chemistry/lipid CBC Cardiac Enzymes/BNP/TSH/INR   Recent Labs   Lab Test 07/06/25  1611   CHOL 226*   HDL 87   *   TRIG 68     Recent Labs   Lab Test 07/06/25  1611 03/19/20  0930   * 158*     Recent Labs   Lab Test 07/08/25  0817 07/07/25  1150 07/07/25  0804   NA  --   --  132*   POTASSIUM  --   --  4.0   CHLORIDE  --   --  95*   CO2  --   --  28   GLC 94   < > 126*   BUN  --   --  11.4   CR  --   --  0.62   GFRESTIMATED  --   --  >90   ALYSE  --   --  9.1    < > = values in this interval not displayed.     Recent Labs   Lab Test 07/07/25  0804 07/06/25  1611 12/27/24  1152   CR 0.62 0.57 0.64     Recent Labs   Lab Test 07/06/25  1611   A1C 5.3          Recent Labs   Lab Test 07/07/25  0804   WBC 7.9   HGB 12.6   HCT 37.5   MCV 90        Recent Labs   Lab Test 07/07/25  0804 07/06/25  1611 10/21/24  1403   HGB 12.6 14.4 14.1    Recent Labs   Lab Test 01/25/23  1054  01/25/23  0441 01/24/23  2223   TROPONINI 0.02 0.02 0.01     Recent Labs   Lab Test 10/19/21  1206   NTBNP 137*     Recent Labs   Lab Test 03/19/20  0930   TSH 2.24     Recent Labs   Lab Test 10/19/21  1206 08/25/21  0823   INR 0.96 0.91        Medical History  Surgical History Family History Social History   Past Medical History:   Diagnosis Date    Acute stroke due to ischemia (H) 7/6/2025    Anxiety     Cervical spine instability     COVID-19 12/22/2022    Degenerative arthritis     Delusional disorder (H)     Depression     Fibromyalgia     Hypertension     Insomnia     Migraines     Occipital neuralgia     b/l    Other chronic pain     Back pain    PONV (postoperative nausea and vomiting)     Seasonal allergies     Suicide attempt (H) 06/2022    from drug overdose     Past Surgical History:   Procedure Laterality Date    ARTHROPLASTY HIP Left 1/24/2023    Procedure: LEFT TOTAL HIP ARTHROPLASTY;  Surgeon: Danny Woodson MD;  Location: [facility] Main OR    CERVICAL FUSION  11/2020 November and Dec 2020    IMPLANT PULSE GENERATOR SUBCUTANEOUS Bilateral 10/22/2021    Procedure: Phase II occipital nerve stimulator placement, bilateral occipital electrodes and bilateral trigeminal electrodes, Implantation of bilateral subcutaneous neurostimulator Percutaneous implantation of neurostimulator electrode array; cranial nerve Implantable neurostimulator electrode, each CPT 99874 Implantable neurostimulator pulse generator, dual array, nonrechargeable, includes extensi    INSERT STIMULATOR OCCIPITAL Bilateral 08/27/2021    Procedure: Externalized trial of percutaneous occipital nerve stimulator bilateral Percutaneous implantation of neurostimulator electrode array; cranial nerve CPT 08882;  Surgeon: Kobi Shrestha MD;  Location: [facility] OR    TOTAL HIP ARTHROPLASTY Right     TOTAL KNEE ARTHROPLASTY Left 01/2018    ZZC LIGATE FALLOPIAN TUBE      Description: Tubal Ligation;  Recorded: 01/16/2012;     Family History    Problem Relation Age of Onset    Cancer Mother     Diabetes Father     Hypertension Father     Pulmonary Embolism Daughter     Anesthesia Reaction No family hx of         Social History     Socioeconomic History    Marital status: Legally      Spouse name: Not on file    Number of children: 2    Years of education: Not on file    Highest education level: Not on file   Occupational History    Occupation: on disability   Tobacco Use    Smoking status: Former     Current packs/day: 0.00     Average packs/day: 1 pack/day for 15.0 years (15.0 ttl pk-yrs)     Types: Cigarettes     Start date: 10/10/1999     Quit date: 10/10/2014     Years since quitting: 10.7    Smokeless tobacco: Never   Substance and Sexual Activity    Alcohol use: Not Currently     Comment: Last drink 10/2014    Drug use: Not Currently    Sexual activity: Not on file   Other Topics Concern    Parent/sibling w/ CABG, MI or angioplasty before 65F 55M? Not Asked   Social History Narrative    Not on file     Social Drivers of Health     Financial Resource Strain: Low Risk  (7/7/2025)    Financial Resource Strain     Within the past 12 months, have you or your family members you live with been unable to get utilities (heat, electricity) when it was really needed?: No   Food Insecurity: High Risk (7/7/2025)    Food Insecurity     Within the past 12 months, did you worry that your food would run out before you got money to buy more?: Yes     Within the past 12 months, did the food you bought just not last and you didn t have money to get more?: No   Transportation Needs: High Risk (7/7/2025)    Transportation Needs     Within the past 12 months, has lack of transportation kept you from medical appointments, getting your medicines, non-medical meetings or appointments, work, or from getting things that you need?: Yes   Physical Activity: Not on file   Stress: Not on file   Social Connections: Unknown (1/1/2022)    Received from [facility]    Social Connections     Frequency of Communication with Friends and Family: Not on file   Interpersonal Safety: Low Risk  (7/7/2025)    Interpersonal Safety     Do you feel physically and emotionally safe where you currently live?: Yes     Within the past 12 months, have you been hit, slapped, kicked or otherwise physically hurt by someone?: No     Within the past 12 months, have you been humiliated or emotionally abused in other ways by your partner or ex-partner?: No   Housing Stability: High Risk (7/7/2025)    Housing Stability     Do you have housing? : Yes     Are you worried about losing your housing?: Yes         Medications  Allergies   Current Outpatient Medications   Medication Sig Dispense Refill    clopidogrel (PLAVIX) 75 MG tablet Take 1 tablet (75 mg) by mouth daily. 30 tablet 0        Allergies   Allergen Reactions    Amlodipine Other (See Comments)     Constipation, \"liver pain\".        Clonidine      Other reaction(s): Dizziness    Doxazosin Dizziness    Escitalopram Muscle Pain (Myalgia)     Other reaction(s): Myalgias    Gluten Meal Headache and GI Disturbance    Hydrochlorothiazide      confusion    Lisinopril Dizziness    Mold Nausea     Ringing in ears, racing heart    Molds & Smuts Headache and Nausea     Ringing in ears, racing heart    Paroxetine Other (See Comments)     Palpitations/racing heart        Penicillins Unknown     Does not remember reaction, mother said she allergic to mold; patient refuses Pcn or any derivative      Sulfa Antibiotics Unknown    Tramadol      Other reaction(s): Runny Nose  Facial pain, sinus swelling      Carvedilol Other (See Comments)     Worsening depression        Codeine Headache and Nausea and Vomiting    Cortisone Other (See Comments)     Patient reports and possibly all steroids \"makes me to feel sick\", get fungal infections      Duloxetine Headache    Oxycodone Headache         Franchesca Renner MD    "

## 2025-07-08 NOTE — PLAN OF CARE
Pt discharged to home. Friend transport. Reviewed AVS. Answered all questions. Stroke booklet reviewed. Verbalized understanding of all. All belongings sent.

## 2025-07-08 NOTE — DISCHARGE INSTRUCTIONS
Your risk factors for stroke or TIA (transient ischemic attack):     Your Risk Factors Your Results Goals   [x] High blood pressure BP: (!) 147/72 (07/08/25 1129) Less than 120/80   [x] Cholesterol          Total 7/6/2025: 226 mg/dL   Less than 150    Triglycerides   7/6/2025: 68 mg/dL Less than 150    LDL 7/6/2025: 125 mg/dL    Less than 70    HDL 7/6/2025: 87 mg/dL         Greater than 40 (men)  Greater than 50 (women)   [] Diabetes                A1C 7/6/2025: 5.3 % Less than 5.7   [] Atrial fibrillation No atrial fibrillation noted on cardiac monitor Manage per physician orders   [] Smoking/tobacco use   Tobacco Use      Smoking status: Former        Packs/day: 0.00        Years: 1 pack/day for 15.0 years (15.0 ttl pk-yrs)        Types: Cigarettes        Start date: 10/10/1999        Quit date: 10/10/2014        Years since quitting: 10.7      Smokeless tobacco: Never   Quit smoking and tobacco   [] Overweight Body mass index is 21.38 kg/m .  Less than 25     Other risk factors include: carotid (neck) artery disease, other heart diseases, prior stroke or TIA, poor diet, lack of exercise, and excessive alcohol consumption.     [x] Written stroke educational materials given to patient including:   - Learning about BE FAST: Stroke Warning Signs and Learning about Risk Factors for Stroke (Healthwise)   - Understanding Stroke: Key Resources After a Stroke (FOD #[id number])       Know the warning signs and symptoms of stroke: BE FAST     B = Balance loss   E = Eyesight changes   F = Facial droop or numbness   A = Arm or leg weakness   S = Speech difficulty, slurred speech   T = Time to call 911 for help

## 2025-07-08 NOTE — PLAN OF CARE
Problem: Adult Inpatient Plan of Care  Goal: Plan of Care Review  Description: The Plan of Care Review/Shift note should be completed every shift.  The Outcome Evaluation is a brief statement about your assessment that the patient is improving, declining, or no change.  This information will be displayed automatically on your shift  note.  Outcome: Progressing  Goal: Absence of Hospital-Acquired Illness or Injury  Outcome: Progressing  Intervention: Identify and Manage Fall Risk  Recent Flowsheet Documentation  Taken 7/8/2025 0016 by Angeli Tse RN  Safety Promotion/Fall Prevention:   room near nurse's station   safety round/check completed   patient and family education  Taken 7/7/2025 2030 by Angeli Tse RN  Safety Promotion/Fall Prevention:   room near nurse's station   safety round/check completed   patient and family education  Intervention: Prevent Skin Injury  Recent Flowsheet Documentation  Taken 7/8/2025 0013 by Angeli Tse RN  Body Position: position changed independently  Intervention: Prevent and Manage VTE (Venous Thromboembolism) Risk  Recent Flowsheet Documentation  Taken 7/8/2025 0016 by Angeli Tse RN  VTE Prevention/Management: (education given)   patient refused intervention   SCDs off (sequential compression devices)  Taken 7/7/2025 2030 by Angeli Tse RN  VTE Prevention/Management: (education given)   patient refused intervention   SCDs off (sequential compression devices)  Intervention: Prevent Infection  Recent Flowsheet Documentation  Taken 7/8/2025 0016 by Angeli Tse RN  Infection Prevention:   hand hygiene promoted   personal protective equipment utilized   rest/sleep promoted  Taken 7/7/2025 2030 by Angeli Tse RN  Infection Prevention:   hand hygiene promoted   personal protective equipment utilized   rest/sleep promoted  Goal: Optimal Comfort and Wellbeing  Outcome: Progressing     Problem: Comorbidity Management  Goal: Blood Pressure in Desired Range  Outcome:  Progressing  Intervention: Maintain Blood Pressure Management  Recent Flowsheet Documentation  Taken 7/8/2025 0016 by Angeli Tse, RN  Medication Review/Management: medications reviewed  Taken 7/7/2025 2030 by Angeli Tse, RN  Medication Review/Management: medications reviewed     Problem: Stroke, Ischemic (Includes Transient Ischemic Attack)  Goal: Optimal Coping  Outcome: Progressing  Goal: Optimal Functional Ability  Outcome: Progressing   Goal Outcome Evaluation:         Patient alert and oriented x 4. Denied pain. NIH scoring 0. Blood sugar checked. Tele NSR. Call light within reach. Able to make needs known.    Stroke Education Note    The following information was reviewed with patient:    - Warning signs and symptoms of stroke:   B = Balance loss   E = Eyesight changes   F = Facial droop or numbness   A = Arm or leg weakness   S = Speech difficulty, slurred speech   T = Time to call 911 for help    Learner's response to education:     taking steps     Angeli Tse, RN

## 2025-07-08 NOTE — PROVIDER NOTIFICATION
Dr Jain text paged orthostatic BP's    Laying L 144/76 R 140/76   Sitting L 128/76 R 132/80   Standing L 126/80 R 126/88

## 2025-07-08 NOTE — PROGRESS NOTES
Care Management Follow Up    Length of Stay (days): 2    Expected Discharge Date: 07/08/2025    Anticipated Discharge Plan:  Home    Transportation: Anticipate Family/friend    PT Recommendations:    OT Recommendations:  home with outpatient occupational therapy     Barriers to Discharge: medical stability, diagnostic workup    Prior Living Situation: apartment with alone    Discussed  Partnership in Safe Discharge Planning  document with patient/family: No     Handoff Completed: No, handoff not indicated or clinically appropriate    Patient/Spokesperson Updated: No    Additional Information:    CM reviewed chart, discussed with medical team.  Per rounding MD, probably ok to discharge home today, waiting final lab results.  Anticipate no needs; per therapy, ok to discharge home with no added services.     Next Steps:   Care Management will sign off as no anticipated discharge needs.    MARTY Castellanos  Acute Care Management  [facility]  For further questions/concerns you can reach us at Vocera Web Console

## 2025-07-08 NOTE — DISCHARGE SUMMARY
[facility]  Hospitalist Discharge Summary      Date of Admission:  7/6/2025  Date of Discharge:  No discharge date for patient encounter.  Discharging Provider: Beka Jain DO, DO  Discharge Service: Hospitalist Service    Discharge Diagnoses       Clinically Significant Risk Factors          Follow-ups Needed After Discharge   Follow-up Appointments       Hospital Follow-up with Existing Primary Care Provider (PCP)          Schedule Primary Care visit within: 7 Days               Unresulted Labs Ordered in the Past 30 Days of this Admission       No orders found from 6/6/2025 to 7/7/2025.            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  Found to have 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.      Achieving goal oriented therapy for CVA, HTN, hyperlipemia may prove challenging in light of numerous medications intolerances and non-traditional medical approach.  I think a strategy of adding 1 medication at a time would be good for Ms. Chun.       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  #Acute double-vision  -asa (declines), ok with plavix  -declines statin or PCSK9-I.  Consider zetia.  -BP goal eventually <130/80     #Primary HTN - Follows with cardiology for this but limited by MCAS reactions.  -Currently on PTA aliskiren and irbesartan.    -given intra-cavitary gradient probably should be on beta-blocker (intolerant).  Verapamil tried but patient intolerant so discontinued. D/W Cardiology on discharge. She should  monitor BP's at home and follow-up with her providers.     #Possible MCAS (though apparently testing negative in the past)  The patient reports flushing/dizziness reaction to many medications. She doesn't know which doctor diagnosed her with this but believes it was a functional medicine doctor. Does also see a PCP.  -on compounded ketotifen  -on aprodine long term (combination of anti-histamine-pseudoephedrine) which is probably not good for her hypertension long term.    -defer to her outpatient prescriber regarding these meds.     #Chronic headaches  Has chronic headaches and neck pain. Has been diagnosed with migraines but also chronic cervicalgia. Follows with [facility] clinic and has a stem cell injection (harvested from her bone marrow) with PRP on 4/17/25. Injected around her cervical neck area, but not sure exactly where these injections were directed. Follows with Walter Camacho at the [facility] clinic. She hasn't noticed any benefit yet.  Was told not to take aspirin due to anti-inflammatory effect.    #Depression - Continue PTA Seroquel     #History of SI  Moderate suicide risk based on automated protocol. Patient denied SI to admitting hospitalist.         Consultations This Hospital Stay   NEUROLOGY IP STROKE CONSULT  SPEECH LANGUAGE PATH ADULT IP CONSULT  PHARMACY IP CONSULT  PHARMACY IP CONSULT  PHYSICAL THERAPY ADULT IP CONSULT  OCCUPATIONAL THERAPY ADULT IP CONSULT  REHAB ADMISSIONS LIAISON IP CONSULT  CARE MANAGEMENT / SOCIAL WORK IP CONSULT  CARDIOLOGY IP CONSULT  PHARMACY IP CONSULT  CARE MANAGEMENT / SOCIAL WORK IP CONSULT  SMOKING CESSATION PROGRAM IP CONSULT    Code Status   Full Code    Time Spent on this Encounter   IBeka DO, DO, personally saw the patient today and spent greater than 30 minutes discharging this patient.       Beka Jain DO, DO  [address] [address]  Phone: [phone]  Fax:  [phone]  ______________________________________________________________________    Physical Exam   Vital Signs: Temp: 98  F (36.7  C) Temp src: Oral BP: (!) 147/72 Pulse: 73   Resp: 22 SpO2: 99 % O2 Device: None (Room air)    Weight: 134 lbs 14.4 oz    Gen nad  Cv rrr  Lungs non-labored  Abd bs+, nd       Primary Care Physician   PHU FARRELL    Discharge Orders      Occupational Therapy  Referral      Occupational Therapy  Referral      Reason for your hospital stay    Refer to medical record     Activity    Your activity upon discharge: activity as tolerated     Monitor and record    Blood pressure: daily in a.m. and if you have symptoms of shortness of breath, chest pain, headache, sudden vision changes, any new neurological symptoms as discussed.  Keep log of BP's to give to your providers in follow-up as advised. If top number of your BP is > 175 and/or bottom number > 95 on 3 consecutive readings contact your provider.     Diet    Follow this diet upon discharge: Current Diet:Orders Placed This Encounter      Gluten Free Diet     Hospital Follow-up with Existing Primary Care Provider (PCP)            Significant Results and Procedures       Discharge Medications      Review of your medicines        START taking        Dose / Directions   clopidogrel 75 MG tablet  Commonly known as: PLAVIX      Dose: 75 mg  Take 1 tablet (75 mg) by mouth daily.  Quantity: 30 tablet  Refills: 0            CONTINUE these medicines which have NOT CHANGED        Dose / Directions   aliskiren 300 MG tablet  Commonly known as: TEKTURNA      Dose: 300 mg  Take 300 mg by mouth daily.  Refills: 0     B COMPLEX FORMULA 1 PO      Dose: 1 teaspoonful  Take 1 teaspoonful by mouth daily  Refills: 0     Butalbital-Acetaminophen  MG Tabs per tablet  Commonly known as: PHRENILIN      Dose: 0.25-1 tablet  Take 0.25-1 tablets by mouth every 4 hours as needed for headaches.  Refills: 0     carboxymethylcellulose 0.5  % Soln ophthalmic solution  Commonly known as: REFRESH PLUS      Dose: 1-2 drop  Place 1-2 drops into both eyes 3 times daily as needed for dry eyes.  Refills: 0     fluticasone 50 MCG/ACT nasal spray  Commonly known as: FLONASE      Dose: 2 spray  Spray 2 sprays into both nostrils every evening  Refills: 0     irbesartan 75 MG tablet  Commonly known as: AVAPRO  Used for: Primary hypertension      Dose: 150 mg  Take 2 tablets (150 mg) by mouth at bedtime.  Quantity: 180 tablet  Refills: 3     LORazepam 0.5 MG tablet  Commonly known as: ATIVAN      Dose: 0.5 mg  Take 0.5 mg by mouth 3 times daily as needed for anxiety.  Refills: 0     magnesium glycinate 100 MG Caps capsule      Dose: 100 mg  Take 100 mg by mouth daily.  Refills: 0     PROBIOTIC ADVANCED PO      Dose: 1 capsule  Take 1 capsule by mouth At Bedtime  Refills: 0     * QUEtiapine 100 MG tablet  Commonly known as: SEROquel      Dose: 100 mg  Take 100 mg by mouth at bedtime.  Refills: 0     * QUEtiapine 25 MG tablet  Commonly known as: SEROquel      Dose: 25 mg  Take 25 mg by mouth at bedtime.  Refills: 0     triprolidine-pseudoePHEDrine 2.5-60 MG Tabs per tablet  Commonly known as: APRODINE      Dose: 0.25-0.5 tablet  Take 0.25-0.5 tablets by mouth 3 times daily.  Refills: 0     Vitamin D3 125 MCG (5000 UT) tablet  Commonly known as: CHOLECALCIFEROL      Dose: 125 mcg  Take 125 mcg by mouth daily  Refills: 0     zinc sulfate 220 (50 Zn) MG capsule  Commonly known as: ZINCATE      Dose: 220 mg  Take 220 mg by mouth daily.  Refills: 0           * This list has 2 medication(s) that are the same as other medications prescribed for you. Read the directions carefully, and ask your doctor or other care provider to review them with you.                STOP taking      UNABLE TO FIND        UNABLE TO FIND                  Where to get your medicines        [id number] - [place], [place] - [address] AT [facility]  [address]  [address] [facility] [address]      Phone: [phone]   clopidogrel 75 MG tablet       Allergies   Allergies   Allergen Reactions    Amlodipine Other (See Comments)     Constipation, \"liver pain\".        Clonidine      Other reaction(s): Dizziness    Doxazosin Dizziness    Escitalopram Muscle Pain (Myalgia)     Other reaction(s): Myalgias    Gluten Meal Headache and GI Disturbance    Hydrochlorothiazide      confusion    Lisinopril Dizziness    Mold Nausea     Ringing in ears, racing heart    Molds & Smuts Headache and Nausea     Ringing in ears, racing heart    Paroxetine Other (See Comments)     Palpitations/racing heart        Penicillins Unknown     Does not remember reaction, mother said she allergic to mold; patient refuses Pcn or any derivative      Sulfa Antibiotics Unknown    Tramadol      Other reaction(s): Runny Nose  Facial pain, sinus swelling      Carvedilol Other (See Comments)     Worsening depression        Codeine Headache and Nausea and Vomiting    Cortisone Other (See Comments)     Patient reports and possibly all steroids \"makes me to feel sick\", get fungal infections      Duloxetine Headache    Oxycodone Headache     "

## 2025-07-09 ENCOUNTER — PATIENT OUTREACH (OUTPATIENT)
Dept: CARE COORDINATION | Facility: CLINIC | Age: 65
End: 2025-07-09
Payer: COMMERCIAL

## 2025-07-09 NOTE — PROGRESS NOTES
[facility]: Transitions of Care Outreach  Chief Complaint   Patient presents with    Clinic Care Coordination - Post Hospital       Most Recent Admission Date: 7/6/2025   Most Recent Admission Diagnosis: Lightheadedness - R42  Blurred vision - H53.8  Nausea - R11.0  Hypertension - I10  Headache - R51.9  Acute stroke due to ischemia (H) - I63.9     Most Recent Discharge Date: 7/8/2025   Most Recent Discharge Diagnosis: Blurred vision - H53.8  Headache - R51.9  Nausea - R11.0  Lightheadedness - R42  Acute stroke due to ischemia (H) - I63.9  Hypertension - I10  Bilateral occipital neuralgia - M54.81     Transitions of Care Assessment    Discharge Assessment  How are you doing now that you are home?: Writer spoke to patient who reports that things are good, no questions or concerns at this time.  How are your symptoms? (Red Flag symptoms escalate to triage hotline per guidelines): Improved  Do you know how to contact your clinic care team if you have future questions or changes to your health status? : Yes  Does the patient have their discharge instructions? : Yes  Does the patient have questions regarding their discharge instructions? : No  Were you started on any new medications or were there changes to any of your previous medications? : Yes  Does the patient have all of their medications?: Yes  Do you have questions regarding any of your medications? : No  Do you have all of your needed medical supplies or equipment (DME)?  (i.e. oxygen tank, CPAP, cane, etc.): Yes                  Follow up Plan   Hospital Follow-up with Existing Primary Care Provider (PCP)  Discharge Follow-Up  Discharge follow up appointment scheduled in alignment with recommended follow up timeframe or Transitions of Risk Category? (Low = within 30 days; Moderate= within 14 days; High= within 7 days): No  Patient's follow up appointment not scheduled: Patient declined scheduling support. Education on the importance of  transitions of care follow up. Provided scheduling phone number.    No future appointments.    Outpatient Plan as outlined on AVS reviewed with patient.    For any urgent concerns, please contact our 24 hour nurse triage line: [phone] ([phone])       SATISH Ayala

## 2025-07-10 ENCOUNTER — TELEPHONE (OUTPATIENT)
Dept: CARDIOLOGY | Facility: CLINIC | Age: 65
End: 2025-07-10
Payer: COMMERCIAL

## 2025-07-10 NOTE — TELEPHONE ENCOUNTER
[facility] Call Center    Phone Message    May a detailed message be left on voicemail: yes     Reason for Call: Other: Patient was in the ED over the week. She is wanting a call back regarding this visit and her blood pressure. Please call her back. Thanks!     Action Taken: Other: Cardiology    Travel Screening: Not Applicable     Date of Service:

## 2025-07-10 NOTE — TELEPHONE ENCOUNTER
Claire, This patient called with concerns re: her BP meds following a visit to the ED 7/6 for small stroke. She has many med intolerances.  She is wanting to talk to you and will call for an appt. Any recommendations re: her BP meds? -sea      ==========  PC to patient to discuss concerns. She was in  ED for a small stroke per her report and would like to talk to MARIE Rangel re: here BP. Writer asked about BP readings since home and the only reading she had was 150/98. Her voice was very soft and weak and she reports feeling tired, very difficult conversation. She is wanting an appt with Claire but declines speaking to  at this time, she will call back to schedule, she is currently lying down to rest. Best  can get as she has questions re: her BP medications. Await CB from pt for scheduling.  -sea

## 2025-07-14 ENCOUNTER — TELEPHONE (OUTPATIENT)
Dept: CARDIOLOGY | Facility: CLINIC | Age: 65
End: 2025-07-14
Payer: COMMERCIAL

## 2025-07-14 ENCOUNTER — TRANSCRIBE ORDERS (OUTPATIENT)
Dept: OTHER | Age: 65
End: 2025-07-14

## 2025-07-14 DIAGNOSIS — I63.81 LACUNAR INFARCTION (H): Primary | ICD-10-CM

## 2025-07-14 NOTE — TELEPHONE ENCOUNTER
----- Message from Madeline Valentin sent at 7/14/2025 11:04 AM CDT -----  Regarding: KML PATIENT  General phone call:    Caller: PATIENT    Primary cardiologist: ISIDRA/MEGAN    Detailed reason for call: PATIENT WOULD LIKE MEGAN TO ALYSE HER, SHE HAS HAS QUESTION FOR HER RE: MEDICATIONS.     Best phone number: [phone]    Best time to contact: ANY    Ok to leave a detailedmessage? YES    Device? NO    Additional Info:

## 2025-07-15 ENCOUNTER — TELEPHONE (OUTPATIENT)
Dept: NEUROLOGY | Facility: CLINIC | Age: 65
End: 2025-07-15
Payer: COMMERCIAL

## 2025-07-15 NOTE — TELEPHONE ENCOUNTER
Left Voicemail (1st Attempt) and Sent Mychart (1st Attempt) for the patient to call back and schedule the following:    Appointment type: New Stroke  Provider: Ye Holt Pillai  Return date: July 17 or 23  Specialty phone number: [phone]  Additional appointment(s) needed: na  Additonal Notes:     Schedule per Jessica B with fellow (Yanet (F) (SS) Ye (F) (SS)  Jaime (F) (SS)  )

## 2025-07-17 ENCOUNTER — THERAPY VISIT (OUTPATIENT)
Dept: OCCUPATIONAL THERAPY | Facility: CLINIC | Age: 65
End: 2025-07-17
Attending: INTERNAL MEDICINE
Payer: COMMERCIAL

## 2025-07-17 DIAGNOSIS — I63.9 ACUTE STROKE DUE TO ISCHEMIA (H): ICD-10-CM

## 2025-07-17 DIAGNOSIS — M54.81 BILATERAL OCCIPITAL NEURALGIA: ICD-10-CM

## 2025-07-17 DIAGNOSIS — H53.8 BLURRED VISION: ICD-10-CM

## 2025-07-17 PROCEDURE — 97166 OT EVAL MOD COMPLEX 45 MIN: CPT | Mod: GO | Performed by: OCCUPATIONAL THERAPIST

## 2025-07-17 PROCEDURE — 97535 SELF CARE MNGMENT TRAINING: CPT | Mod: GO | Performed by: OCCUPATIONAL THERAPIST

## 2025-07-17 NOTE — PROGRESS NOTES
OCCUPATIONAL THERAPY EVALUATION  Type of Visit: Evaluation       Fall Risk Screen:  Have you fallen 2 or more times in the past year?: No  Have you fallen and had an injury in the past year?: No  Is patient receiving Physical Therapy Services?: No    Subjective        Presenting condition or subjective complaint: blurred double vision  Date of onset: 07/08/25    Relevant medical history: Depression; Dizziness; High blood pressure; Migraines or headaches; Neck injury   Past Medical History:   Diagnosis Date    Acute stroke due to ischemia (H) 7/6/2025    Anxiety     Cervical spine instability     COVID-19 12/22/2022    Degenerative arthritis     Delusional disorder (H)     Depression     Fibromyalgia     Hypertension     Insomnia     Migraines     Occipital neuralgia     b/l    Other chronic pain     Back pain    PONV (postoperative nausea and vomiting)     Seasonal allergies     Suicide attempt (H) 06/2022    from drug overdose     Dates & types of surgery: i dont have  Past Surgical History:   Procedure Laterality Date    ARTHROPLASTY HIP Left 1/24/2023    Procedure: LEFT TOTAL HIP ARTHROPLASTY;  Surgeon: Danny Woodson MD;  Location: [facility] Main OR    CERVICAL FUSION  11/2020 November and Dec 2020    IMPLANT PULSE GENERATOR SUBCUTANEOUS Bilateral 10/22/2021    Procedure: Phase II occipital nerve stimulator placement, bilateral occipital electrodes and bilateral trigeminal electrodes, Implantation of bilateral subcutaneous neurostimulator Percutaneous implantation of neurostimulator electrode array; cranial nerve Implantable neurostimulator electrode, each CPT 68434 Implantable neurostimulator pulse generator, dual array, nonrechargeable, includes extensi    INSERT STIMULATOR OCCIPITAL Bilateral 08/27/2021    Procedure: Externalized trial of percutaneous occipital nerve stimulator bilateral Percutaneous implantation of neurostimulator electrode array; cranial nerve CPT 41409;  Surgeon: Kobi Shrestha MD;  " Location: [facility] OR    TOTAL HIP ARTHROPLASTY Right     TOTAL KNEE ARTHROPLASTY Left 2018    Z LIGATE FALLOPIAN TUBE      Description: Tubal Ligation;  Recorded: 2012;     Prior diagnostic imaging/testing results:       Prior therapy history for the same diagnosis, illness or injury: No      Prior Level of Function  Transfers: Independent  Ambulation: Independent  ADL: Independent  IADL: Driving, Finances, Laundry, Meal preparation, Medication management    Living Environment  Social support: Alone   Type of home: Apartment/condo   Stairs to enter the home: No       Ramp:     Stairs inside the home: No       Help at home:  cleaning person, friends have been bringing her some food since the stroke, has been very tired since the stroke  Equipment owned: Walker     Employment: No haven't worked for awhile  Hobbies/Interests: \"I just want to get well\" - tearful when asked for hobbies/leisure    Patient goals for therapy: see clearly without double vision    Pain assessment: Pain present  Location: head/Ratin/10     Objective   LOW VISION EVALUATION  ADDITIONAL HISTORY:  Current Responsibilities - IADLS: Finances, Laundry, Meal preparation, Medication management, see above  Hasn't driven since the winter - friends driving her for now    Others present at visit: none    COGNITIVE/BEHAVIORAL:  Communication: Intact  Cognitive Status: Oriented to person, place and time, patient reports feeling really \"stressed\" with everything going on  Behavior: Appropriate    Physical Status/Equipment   Physical Status: very tired, also feeling dizzy, feeling weak  Mobility Equipment Used: None  Bathing ADL Equipment Used: Grab bar,    Toileting ADL Equipment Used: none    VISUAL REPORT:  Functional complaints: Avocational tasks, Homemaking, Leisure, Reading, Safety in mobility  Visual Complaints: Visual fatigue, Difficulty maintaining focus, Double vision, Light sensitivity  Jp Bonnet Symptoms? No   Magnifiers and Low " "Vision AE owned: Handheld magnifier no light  Reading Glasses: Single lens (readers), Single lens (distance), they are 2+ years old  Power per MD report: unknown  Technology: Smart phone    LIGHTING AND GLARE:  Is your lighting adequate? Yes at home, keeps it dim at home  Is glare a problem? Yes indoors, Yes outdoors  Are you satisfied with your sunglasses? Not sure - double vision interferes - having to close one eye   Sunglass Details: Prescription sunglasses    VISUAL ACUITY:  Distance Acuity Right Eye: not available, not screened today  Distance Acuity Left Eye: not available, not screened today  Distance Acuity Both Eyes Together: not available, not screened today  Near Visual Acuity: see below    CONTRAST SENSITIVITY:  Contrast Sensitivity (score/25): 25/25    PREFERRED RETINAL LOCUS:  N/A    MN Read  Smallest Print Size Read: .5M  Critical Print Size: NT  Words per minute at critical print size: NT    Visual Field:  Central Visual Field: Appears normal  Central Visual Field Screen Used: Clock Face  Peripheral Visual Field: Appears normal  Peripheral Visual Field Screen Used: NT    Visual Attention: Appears normal    Oculomotor  Pursuits: Normal  Saccades: mild deficit: delayed at times and moving too quick at times, blinking and overshooting at times  Convergence: Abnormal and R eye sluggish (mild) at ~8 and 9\" on 3 trials, then able to  fixation again around 6\"; c/o \"blurry\" from ~5\" in  4 [place] Dot Test to screen binocularity: Screening suggests diplopia from 26-27\" and beyond (seeing accurately each eye separately and with both eyes up to 26\")     Assessment & Plan   CLINICAL IMPRESSIONS  Medical Diagnosis: Acute stroke due to ischemia (H) (I63.9)    Treatment Diagnosis: decreased ADL/IADL independence    Impression/Assessment: Pt is a 65 year old female presenting to Occupational Therapy due to double vision and notable fatigue.  The following significant findings have been identified: Impaired " activity tolerance, Impaired balance, Impaired mobility, Impaired strength, Pain, and Impaired visual perception.  These identified deficits interfere with their ability to perform recreational activities, household chores, driving , household mobility, community mobility, medication management, financial management, meal planning and preparation, and community or volunteer activities as compared to previous level of function.     Clinical Decision Making (Complexity):  Assessment of Occupational Performance: 5 or more Performance Deficits  Occupational Performance Limitations: functional mobility, driving and community mobility, health management and maintenance, home establishment and management, meal preparation and cleanup, shopping, and leisure activities  Clinical Decision Making (Complexity): Moderate complexity    PLAN OF CARE  Treatment Interventions:  Interventions: Self-Care/Home Management, Therapeutic Activity    Long Term Goals   OT Goal 1  Goal Identifier: near vision based ADL/IADLs  Goal Description: Patient to verbalize and/or demonstrate at least 2 compensatory strategies and/or AE during peripersonal visual-based ADLs and IADLs (eating, grooming/hygienes, reading, technology use, writing, etc.) to effectively compensate for visual deficits and eye strain during functional tasks for increased independence and safety.  Rationale: In order to maximize safety and independence with performance of self-care activities;In order to safely and appropriately apply compensatory strategies with ADL/IADL performance  Target Date: 10/15/25  OT Goal 2  Goal Identifier: distance vision  Goal Description: Patient to independently verbalize and/or successfully demonstrate at least 2 strategies and adapted techniques to effectively compensate for diplopia during distance viewing tasks/extrapersonal space for increased independence and safety with ADL/IADLs.  Rationale: In order to maximize safety and independence  with performance of self-care activities;In order to safely and appropriately apply compensatory strategies with ADL/IADL performance  Target Date: 10/15/25  OT Goal 3  Goal Identifier: fatigue management  Goal Description: Patient to verbalize and/or demonstrate 3 strategies for energy conservation/work simplification and fatigue management and for improved independence with ADL/IADLs at home and in the community, and increase FACIT - Fatigue score by 4 points for increased activity level.  Rationale: In order to maximize safety and independence with performance of self-care activities;In order to safely and appropriately apply compensatory strategies with ADL/IADL performance  Target Date: 10/15/25      Frequency of Treatment: 1x/week, decreasing in frequency prn  Duration of Treatment: 90 days     Recommended Referrals to Other Professionals: neuro-ophthalmology - will place referral  Education Assessment: Learner/Method: Patient;Listening;Reading;Demonstration  Education Comments: see above and below     Risks and benefits of evaluation/treatment have been explained.   Patient/Family/caregiver agrees with Plan of Care.     Evaluation Time:    OT Eval, Moderate Complexity Minutes (17695): 26  Signing Clinician: Norma Blount OT        [facility]                                                                                   OUTPATIENT OCCUPATIONAL THERAPY      PLAN OF TREATMENT FOR OUTPATIENT REHABILITATION   Patient's Last Name, First Name, Martha Matamoros YOB: 1960   Provider's Name   [facility]   Medical Record No.  [id number]     Onset Date: 07/08/25 Start of Care Date: 07/17/25     Medical Diagnosis:  Acute stroke due to ischemia (H) (I63.9)      OT Treatment Diagnosis:  decreased ADL/IADL independence Plan of Treatment  Frequency/Duration:1x/week, decreasing in frequency prn/90 days    Certification date from 07/17/25   To  10/15/25        See note for plan of treatment details and functional goals     Norma Blount OT                         I CERTIFY THE NEED FOR THESE SERVICES FURNISHED UNDER        THIS PLAN OF TREATMENT AND WHILE UNDER MY CARE .             Physician Signature               Date    X_____________________________________________________                  Referring Provider:  Beka Jain - [facility] based MD, will send to PCP Ewelina Gonzalez    Initial Assessment  See Epic Evaluation- 07/17/25

## 2025-07-17 NOTE — TELEPHONE ENCOUNTER
Action 7/17/25 MV   Action Taken Records request faxed to [facility]       REASON FOR VISIT: Lacunar infarction    PROVIDER: Dr. Belcher   DATE OF APPT: 7/23/25   NOTES (FOR ALL VISITS) STATUS DETAILS   OFFICE NOTE from referring provider In process Dr Ewelina Gonzalez @ [facility]:  7/14/25     HOSPITAL ENCOUNTERS Internal [facility]:  7/6/25-7/8/25   MEDICATION LIST Internal    IMAGING  (FOR ALL VISITS)     MRI (HEAD, NECK, SPINE) PACS [facility]:  MRI Brain 7/6/25    [facility]:  MRI Brain 2/5/21   CT (HEAD, NECK, SPINE) PACS [facility]:  CTA Head NEck 7/6/25

## 2025-07-21 ENCOUNTER — TELEPHONE (OUTPATIENT)
Dept: OPHTHALMOLOGY | Facility: CLINIC | Age: 65
End: 2025-07-21
Payer: COMMERCIAL

## 2025-07-22 ENCOUNTER — TELEPHONE (OUTPATIENT)
Dept: OPHTHALMOLOGY | Facility: CLINIC | Age: 65
End: 2025-07-22
Payer: COMMERCIAL

## 2025-07-23 ENCOUNTER — PRE VISIT (OUTPATIENT)
Dept: NEUROLOGY | Facility: CLINIC | Age: 65
End: 2025-07-23

## 2025-07-29 ENCOUNTER — TELEPHONE (OUTPATIENT)
Dept: CARDIOLOGY | Facility: CLINIC | Age: 65
End: 2025-07-29
Payer: COMMERCIAL

## 2025-07-29 DIAGNOSIS — I10 PRIMARY HYPERTENSION: Primary | ICD-10-CM

## 2025-07-29 DIAGNOSIS — D89.40 MAST CELL ACTIVATION SYNDROME: ICD-10-CM

## 2025-07-29 NOTE — TELEPHONE ENCOUNTER
[facility] Call Center    Phone Message    May a detailed message be left on voicemail: yes     Reason for Call: Other: Patient stated when they were seen in the hospital they spoke with cardiologist who recommended for them to follow up with a specialty clinic at the [facility] that helps with tolerating medications. Patient does not remember the name or specialty they are supposed to see, and will like to know if provider still has this information. Please call patient back to further discuss.     Action Taken: Other: Cardiology    Travel Screening: Not Applicable     Thank you!  [facility]

## 2025-07-29 NOTE — TELEPHONE ENCOUNTER
Dr. Renner,  Patient recall's having a conversation with you while in the hospital, 7/7 - 7/8, about a [facility] as she has multiple med intolerances.  She is requesting a referral if you have any recommendations.  Thank you,  Brandy

## 2025-07-30 NOTE — TELEPHONE ENCOUNTER
PC to patient with info for referral to the  htn clinic. Patient verbalized understanding and is thankful for the call. Number provided to patient to call for an appointment -   [facility] Providers [facility] Clinics  Appointments: [phone]  -------------------------------------------------------------  Franchesca Renner MD to Me (Selected Message)        7/29/25  5:07 PM  They have a hypertension clinic at the [facility] that she could be referred to.

## 2025-07-31 ENCOUNTER — VIRTUAL VISIT (OUTPATIENT)
Dept: OCCUPATIONAL THERAPY | Facility: CLINIC | Age: 65
End: 2025-07-31
Attending: INTERNAL MEDICINE
Payer: COMMERCIAL

## 2025-07-31 ENCOUNTER — PATIENT OUTREACH (OUTPATIENT)
Dept: CARE COORDINATION | Facility: CLINIC | Age: 65
End: 2025-07-31
Payer: COMMERCIAL

## 2025-07-31 DIAGNOSIS — I63.9 ACUTE STROKE DUE TO ISCHEMIA (H): ICD-10-CM

## 2025-07-31 DIAGNOSIS — M54.81 BILATERAL OCCIPITAL NEURALGIA: ICD-10-CM

## 2025-07-31 DIAGNOSIS — H53.8 BLURRED VISION: Primary | ICD-10-CM

## 2025-07-31 PROCEDURE — 97535 SELF CARE MNGMENT TRAINING: CPT | Mod: GO,GT,95 | Performed by: OCCUPATIONAL THERAPIST

## 2025-07-31 PROCEDURE — 97530 THERAPEUTIC ACTIVITIES: CPT | Mod: GO,GT,95 | Performed by: OCCUPATIONAL THERAPIST

## 2025-08-01 ENCOUNTER — TELEPHONE (OUTPATIENT)
Dept: CARDIOLOGY | Facility: CLINIC | Age: 65
End: 2025-08-01
Payer: COMMERCIAL

## 2025-08-04 ENCOUNTER — PATIENT OUTREACH (OUTPATIENT)
Dept: CARE COORDINATION | Facility: CLINIC | Age: 65
End: 2025-08-04
Payer: COMMERCIAL

## 2025-08-05 ENCOUNTER — HOSPITAL ENCOUNTER (EMERGENCY)
Facility: HOSPITAL | Age: 65
Discharge: HOME | End: 2025-08-05
Attending: EMERGENCY MEDICINE | Admitting: EMERGENCY MEDICINE
Payer: COMMERCIAL

## 2025-08-05 VITALS
HEART RATE: 100 BPM | WEIGHT: 134 LBS | BODY MASS INDEX: 21.53 KG/M2 | TEMPERATURE: 98.1 F | RESPIRATION RATE: 18 BRPM | SYSTOLIC BLOOD PRESSURE: 193 MMHG | OXYGEN SATURATION: 100 % | DIASTOLIC BLOOD PRESSURE: 91 MMHG | HEIGHT: 66 IN

## 2025-08-05 DIAGNOSIS — R07.89 MUSCULOSKELETAL CHEST PAIN: Primary | ICD-10-CM

## 2025-08-05 DIAGNOSIS — I10 ELEVATED BLOOD PRESSURE READING WITH DIAGNOSIS OF HYPERTENSION: ICD-10-CM

## 2025-08-05 LAB
ALBUMIN SERPL BCG-MCNC: 4.4 G/DL (ref 3.5–5.2)
ALBUMIN UR-MCNC: NEGATIVE MG/DL
ALP SERPL-CCNC: 112 U/L (ref 40–150)
ALT SERPL W P-5'-P-CCNC: 15 U/L (ref 0–50)
ANION GAP SERPL CALCULATED.3IONS-SCNC: 10 MMOL/L (ref 7–15)
APPEARANCE UR: CLEAR
AST SERPL W P-5'-P-CCNC: 19 U/L (ref 0–45)
BILIRUB SERPL-MCNC: 0.4 MG/DL
BILIRUB UR QL STRIP: NEGATIVE
BUN SERPL-MCNC: 11.6 MG/DL (ref 8–23)
CALCIUM SERPL-MCNC: 9.3 MG/DL (ref 8.8–10.4)
CHLORIDE SERPL-SCNC: 96 MMOL/L (ref 98–107)
COLOR UR AUTO: COLORLESS
CREAT SERPL-MCNC: 0.63 MG/DL (ref 0.51–0.95)
EGFRCR SERPLBLD CKD-EPI 2021: >90 ML/MIN/1.73M2
ERYTHROCYTE [DISTWIDTH] IN BLOOD BY AUTOMATED COUNT: 12.6 % (ref 10–15)
GLUCOSE SERPL-MCNC: 96 MG/DL (ref 70–99)
GLUCOSE UR STRIP-MCNC: NEGATIVE MG/DL
HCO3 SERPL-SCNC: 27 MMOL/L (ref 22–29)
HCT VFR BLD AUTO: 40 % (ref 35–47)
HGB BLD-MCNC: 13.2 G/DL (ref 11.7–15.7)
HGB UR QL STRIP: ABNORMAL
HOLD SPECIMEN: NORMAL
KETONES UR STRIP-MCNC: NEGATIVE MG/DL
LEUKOCYTE ESTERASE UR QL STRIP: NEGATIVE
MCH RBC QN AUTO: 29.6 PG (ref 26.5–33)
MCHC RBC AUTO-ENTMCNC: 33 G/DL (ref 31.5–36.5)
MCV RBC AUTO: 90 FL (ref 78–100)
NITRATE UR QL: NEGATIVE
PH UR STRIP: 7 [PH] (ref 5–7)
PLATELET # BLD AUTO: 268 10E3/UL (ref 150–450)
POTASSIUM SERPL-SCNC: 4.3 MMOL/L (ref 3.4–5.3)
PROT SERPL-MCNC: 7 G/DL (ref 6.4–8.3)
RBC # BLD AUTO: 4.46 10E6/UL (ref 3.8–5.2)
RBC URINE: 2 /HPF
SODIUM SERPL-SCNC: 133 MMOL/L (ref 135–145)
SP GR UR STRIP: 1 (ref 1–1.03)
SQUAMOUS EPITHELIAL: <1 /HPF
TROPONIN T SERPL HS-MCNC: 7 NG/L
TROPONIN T SERPL HS-MCNC: <6 NG/L
UROBILINOGEN UR STRIP-MCNC: NORMAL MG/DL
WBC # BLD AUTO: 7.3 10E3/UL (ref 4–11)
WBC URINE: <1 /HPF

## 2025-08-05 PROCEDURE — 99284 EMERGENCY DEPT VISIT MOD MDM: CPT | Performed by: EMERGENCY MEDICINE

## 2025-08-05 PROCEDURE — 85014 HEMATOCRIT: CPT | Performed by: EMERGENCY MEDICINE

## 2025-08-05 PROCEDURE — 93005 ELECTROCARDIOGRAM TRACING: CPT | Performed by: STUDENT IN AN ORGANIZED HEALTH CARE EDUCATION/TRAINING PROGRAM

## 2025-08-05 PROCEDURE — 250N000013 HC RX MED GY IP 250 OP 250 PS 637: Performed by: EMERGENCY MEDICINE

## 2025-08-05 PROCEDURE — 81001 URINALYSIS AUTO W/SCOPE: CPT | Performed by: EMERGENCY MEDICINE

## 2025-08-05 PROCEDURE — 36415 COLL VENOUS BLD VENIPUNCTURE: CPT | Performed by: EMERGENCY MEDICINE

## 2025-08-05 PROCEDURE — 84155 ASSAY OF PROTEIN SERUM: CPT | Performed by: EMERGENCY MEDICINE

## 2025-08-05 PROCEDURE — 84484 ASSAY OF TROPONIN QUANT: CPT | Performed by: EMERGENCY MEDICINE

## 2025-08-05 RX ORDER — BACLOFEN 10 MG/1
10 TABLET ORAL ONCE
Status: COMPLETED | OUTPATIENT
Start: 2025-08-05 | End: 2025-08-05

## 2025-08-05 RX ORDER — BACLOFEN 10 MG/1
10 TABLET ORAL 3 TIMES DAILY PRN
Qty: 20 TABLET | Refills: 0 | Status: SHIPPED | OUTPATIENT
Start: 2025-08-05

## 2025-08-05 RX ORDER — LORAZEPAM 0.5 MG/1
0.5 TABLET ORAL ONCE
Status: COMPLETED | OUTPATIENT
Start: 2025-08-05 | End: 2025-08-05

## 2025-08-05 RX ORDER — BISOPROLOL FUMARATE 5 MG/1
5 TABLET, FILM COATED ORAL DAILY
Qty: 30 TABLET | Refills: 0 | Status: SHIPPED | OUTPATIENT
Start: 2025-08-05

## 2025-08-05 RX ADMIN — LORAZEPAM 0.5 MG: 0.5 TABLET ORAL at 15:54

## 2025-08-05 ASSESSMENT — ACTIVITIES OF DAILY LIVING (ADL)
ADLS_ACUITY_SCORE: 54

## 2025-08-05 ASSESSMENT — COLUMBIA-SUICIDE SEVERITY RATING SCALE - C-SSRS
4. HAVE YOU HAD THESE THOUGHTS AND HAD SOME INTENTION OF ACTING ON THEM?: NO
6. HAVE YOU EVER DONE ANYTHING, STARTED TO DO ANYTHING, OR PREPARED TO DO ANYTHING TO END YOUR LIFE?: YES
2. HAVE YOU ACTUALLY HAD ANY THOUGHTS OF KILLING YOURSELF IN THE PAST MONTH?: YES
3. HAVE YOU BEEN THINKING ABOUT HOW YOU MIGHT KILL YOURSELF?: YES
1. IN THE PAST MONTH, HAVE YOU WISHED YOU WERE DEAD OR WISHED YOU COULD GO TO SLEEP AND NOT WAKE UP?: YES
5. HAVE YOU STARTED TO WORK OUT OR WORKED OUT THE DETAILS OF HOW TO KILL YOURSELF? DO YOU INTEND TO CARRY OUT THIS PLAN?: NO

## 2025-08-09 ENCOUNTER — HEALTH MAINTENANCE LETTER (OUTPATIENT)
Age: 65
End: 2025-08-09

## 2025-08-29 ENCOUNTER — APPOINTMENT (OUTPATIENT)
Dept: RADIOLOGY | Facility: HOSPITAL | Age: 65
End: 2025-08-29
Attending: EMERGENCY MEDICINE
Payer: COMMERCIAL

## 2025-08-29 PROCEDURE — 71046 X-RAY EXAM CHEST 2 VIEWS: CPT

## (undated) DEVICE — SUCTION MANIFOLD NEPTUNE 2 SYS 4 PORT 0702-020-000

## (undated) DEVICE — PREP CHLORAPREP BD CLEAR 1ML 930480

## (undated) DEVICE — GOWN IMPERVIOUS BREATHABLE SMART XLG 89045

## (undated) DEVICE — SU VICRYL 2-0 CT-2 27" J333H

## (undated) DEVICE — GLOVE BIOGEL PI INDICATOR 8.0 LF 41680

## (undated) DEVICE — NDL 25GA 2"  8881200441

## (undated) DEVICE — ESU CORD BIPOLAR GREEN 10-4000

## (undated) DEVICE — DRAPE POUCH INSTRUMENT 1018

## (undated) DEVICE — PREP CHLORAPREP CLEAR 3ML 930400

## (undated) DEVICE — Device

## (undated) DEVICE — SUTURE BOOTS 051003PBX

## (undated) DEVICE — TUBING SUCTION 10'X3/16" N510

## (undated) DEVICE — PITCHER STERILE 1000ML  SSK9004A

## (undated) DEVICE — SU VICRYL 2-0 CT-2 CR 8X18" J726D

## (undated) DEVICE — PREP POVIDONE IODINE SCRUB 7.5% 4OZ APL82212

## (undated) DEVICE — SU MONOCRYL 3-0 PS-1 27" Y936H

## (undated) DEVICE — GLOVE BIOGEL PI SZ 7.0 40870

## (undated) DEVICE — SUCTION IRR SYSTEM W/O TIP INTERPULSE HANDPIECE 0210-100-000

## (undated) DEVICE — SU ETHIBOND 3-0 RB-1DA 36" X558H

## (undated) DEVICE — ESU PENCIL W/COATED BLADE E2450H

## (undated) DEVICE — SUTURE PDS 1 CTB-1 ZB347

## (undated) DEVICE — PREP CHLORAPREP CLEAR 3ML 260400

## (undated) DEVICE — SU MONOCRYL 4-0 PS-2 27" UND Y426H

## (undated) DEVICE — COVER CAMERA VIDEO 5X96" 29-59009

## (undated) DEVICE — CUSTOM PACK TOTAL HIP SOP5BTHHEA

## (undated) DEVICE — PAD CHUX UNDERPAD 23X24" 7136

## (undated) DEVICE — DRAPE IOBAN INCISE 13X13" 6640EZ

## (undated) DEVICE — SOL NACL 0.9% IRRIG 1000ML BOTTLE 2F7124

## (undated) DEVICE — DECANTER VIAL 2006S

## (undated) DEVICE — BLADE SAGITTAL WIDE (SO-618) 2108-118

## (undated) DEVICE — SU MONOCRYL 3-0 PS-2 18" UND MCP497G

## (undated) DEVICE — PACK SET-UP STD 9102

## (undated) DEVICE — PILLOW HIP ABDUCTION CONCAVE 1.9

## (undated) DEVICE — HOLDER LIMB VELCRO OR 0814-1533

## (undated) DEVICE — LINEN TOWEL PACK X5 5464

## (undated) DEVICE — SUTURE VICRYL+ 1 27IN CT-1 UND VCP261H

## (undated) DEVICE — SOLUTION IRRIG 2B7127 .9NS 3000ML BAG

## (undated) DEVICE — PACK UNIVERSAL SPLIT 29131

## (undated) DEVICE — COMB STERILE 7" PLASTIC 14-1200

## (undated) DEVICE — DRSG STERI STRIP 1X5" R1548

## (undated) DEVICE — PLATE GROUNDING ADULT W/CORD 9165L

## (undated) DEVICE — CUP AND LID 2PK 2OZ STERILE  SSK9006A

## (undated) DEVICE — DRSG STERI STRIP 1/2X4" R1547

## (undated) DEVICE — LABEL MEDICATION SYSTEM 3303-P

## (undated) DEVICE — ESU ELEC BLADE 6" COATED E1450-6

## (undated) DEVICE — DRAPE IOBAN INCISE 23X17" 6650EZ

## (undated) DEVICE — GOWN XLG DISP 9545

## (undated) DEVICE — DRAPE C-ARM W/STRAPS 42X72" 07-CA104

## (undated) DEVICE — SOL WATER IRRIG 1000ML BOTTLE 2F7114

## (undated) DEVICE — STPL SKIN 35W ROTATING HEAD PRW35

## (undated) DEVICE — LINEN TOWEL PACK X6 WHITE 5487

## (undated) DEVICE — ESU ELEC BLADE 2.75" COATED/INSULATED E1455

## (undated) DEVICE — DRSG AQUACEL AG HYDROFIBER  3.5X10" 422605

## (undated) DEVICE — SUTURE VICRYL+ 2-0 27IN CT-1 UND VCP259H

## (undated) DEVICE — POSITIONER HEAD ADULT FP-HEADCR

## (undated) DEVICE — COVER CAMERA IN-LIGHT DISP LT-C02

## (undated) DEVICE — PACK NEURO MINOR UMMC SNE32MNMU4

## (undated) DEVICE — SYR 10ML FINGER CONTROL W/O NDL 309695

## (undated) DEVICE — LIGHT HANDLE X1 31140133

## (undated) DEVICE — SU VICRYL 3-0 SH 8X18" UND J864D

## (undated) DEVICE — VESSEL LOOPS YELLOW MAXI 31145694

## (undated) DEVICE — SYR 30ML LL W/O NDL 302832

## (undated) DEVICE — SPONGE COTTONOID 1/2X1/2" 20-04S

## (undated) DEVICE — DRAPE MAYO STAND 23X54 8337

## (undated) DEVICE — DRSG GAUZE 4X4" TRAY 6939

## (undated) DEVICE — NEEDLE HYPO 22X1-1/2 SAFETY 305900

## (undated) DEVICE — PREP POVIDONE IODINE SOLUTION 10% 4OZ

## (undated) DEVICE — PREP SKIN SCRUB TRAY 4461A

## (undated) DEVICE — SU SILK 2-0 TIE 12X30" A305H

## (undated) DEVICE — NDL COUNTER 20CT 31142493

## (undated) DEVICE — GLOVE BIOGEL INDICATOR 7.5 LF 41675

## (undated) DEVICE — BONE CLEANING TIP INTERPULSE  0210-010-000

## (undated) DEVICE — SUCTION SLEEVE NEPTUNE 2 165MM 0703-005-165

## (undated) DEVICE — GLOVE BIOGEL PI SZ 8.0 40880

## (undated) DEVICE — ADH SKIN CLOSURE PREMIERPRO EXOFIN 1.0ML 3470

## (undated) DEVICE — SU ETHIBOND 2-0 CT-2 CR 8X18" CX26D

## (undated) DEVICE — CATH TRAY FOLEY SURESTEP 16FR W/URNE MTR STLK LATEX A303316A

## (undated) DEVICE — A3 SUPPLIES- SEE NURSING INFO PAGE

## (undated) DEVICE — SUTURE VICRYL+ 1 18 CT/CR  VLT VCP753D

## (undated) DEVICE — SPONGE SURGIFOAM 100 1974

## (undated) RX ORDER — ONDANSETRON 2 MG/ML
INJECTION INTRAMUSCULAR; INTRAVENOUS
Status: DISPENSED
Start: 2021-10-22

## (undated) RX ORDER — BUPIVACAINE HYDROCHLORIDE 2.5 MG/ML
INJECTION, SOLUTION EPIDURAL; INFILTRATION; INTRACAUDAL
Status: DISPENSED
Start: 2021-10-22

## (undated) RX ORDER — ESMOLOL HYDROCHLORIDE 10 MG/ML
INJECTION INTRAVENOUS
Status: DISPENSED
Start: 2021-08-27

## (undated) RX ORDER — DEXAMETHASONE SODIUM PHOSPHATE 4 MG/ML
INJECTION, SOLUTION INTRA-ARTICULAR; INTRALESIONAL; INTRAMUSCULAR; INTRAVENOUS; SOFT TISSUE
Status: DISPENSED
Start: 2021-10-22

## (undated) RX ORDER — DEXAMETHASONE SODIUM PHOSPHATE 10 MG/ML
INJECTION, EMULSION INTRAMUSCULAR; INTRAVENOUS
Status: DISPENSED
Start: 2023-01-24

## (undated) RX ORDER — EPHEDRINE SULFATE 50 MG/ML
INJECTION, SOLUTION INTRAMUSCULAR; INTRAVENOUS; SUBCUTANEOUS
Status: DISPENSED
Start: 2021-10-22

## (undated) RX ORDER — FENTANYL CITRATE-0.9 % NACL/PF 10 MCG/ML
PLASTIC BAG, INJECTION (ML) INTRAVENOUS
Status: DISPENSED
Start: 2021-08-27

## (undated) RX ORDER — LIDOCAINE HYDROCHLORIDE 10 MG/ML
INJECTION, SOLUTION EPIDURAL; INFILTRATION; INTRACAUDAL; PERINEURAL
Status: DISPENSED
Start: 2021-08-27

## (undated) RX ORDER — EPHEDRINE SULFATE 50 MG/ML
INJECTION, SOLUTION INTRAMUSCULAR; INTRAVENOUS; SUBCUTANEOUS
Status: DISPENSED
Start: 2021-08-27

## (undated) RX ORDER — FENTANYL CITRATE 50 UG/ML
INJECTION, SOLUTION INTRAMUSCULAR; INTRAVENOUS
Status: DISPENSED
Start: 2021-10-22

## (undated) RX ORDER — LIDOCAINE HYDROCHLORIDE 20 MG/ML
INJECTION, SOLUTION EPIDURAL; INFILTRATION; INTRACAUDAL; PERINEURAL
Status: DISPENSED
Start: 2021-08-27

## (undated) RX ORDER — ROCURONIUM BROMIDE 50 MG/5 ML
SYRINGE (ML) INTRAVENOUS
Status: DISPENSED
Start: 2021-10-22

## (undated) RX ORDER — FENTANYL CITRATE-0.9 % NACL/PF 10 MCG/ML
PLASTIC BAG, INJECTION (ML) INTRAVENOUS
Status: DISPENSED
Start: 2021-10-22

## (undated) RX ORDER — GLYCOPYRROLATE 0.2 MG/ML
INJECTION, SOLUTION INTRAMUSCULAR; INTRAVENOUS
Status: DISPENSED
Start: 2021-08-27

## (undated) RX ORDER — PHENYLEPHRINE HYDROCHLORIDE 10 MG/ML
INJECTION INTRAVENOUS
Status: DISPENSED
Start: 2023-01-24

## (undated) RX ORDER — LIDOCAINE HYDROCHLORIDE 40 MG/ML
INJECTION, SOLUTION RETROBULBAR
Status: DISPENSED
Start: 2021-08-27

## (undated) RX ORDER — LIDOCAINE HYDROCHLORIDE 10 MG/ML
INJECTION, SOLUTION EPIDURAL; INFILTRATION; INTRACAUDAL; PERINEURAL
Status: DISPENSED
Start: 2023-01-24

## (undated) RX ORDER — CLINDAMYCIN PHOSPHATE 900 MG/50ML
INJECTION, SOLUTION INTRAVENOUS
Status: DISPENSED
Start: 2021-08-27

## (undated) RX ORDER — ALBUTEROL SULFATE 0.83 MG/ML
SOLUTION RESPIRATORY (INHALATION)
Status: DISPENSED
Start: 2021-08-27

## (undated) RX ORDER — CLINDAMYCIN PHOSPHATE 900 MG/50ML
INJECTION, SOLUTION INTRAVENOUS
Status: DISPENSED
Start: 2021-10-22

## (undated) RX ORDER — DEXAMETHASONE SODIUM PHOSPHATE 4 MG/ML
INJECTION, SOLUTION INTRA-ARTICULAR; INTRALESIONAL; INTRAMUSCULAR; INTRAVENOUS; SOFT TISSUE
Status: DISPENSED
Start: 2021-08-27

## (undated) RX ORDER — SCOLOPAMINE TRANSDERMAL SYSTEM 1 MG/1
PATCH, EXTENDED RELEASE TRANSDERMAL
Status: DISPENSED
Start: 2021-08-27

## (undated) RX ORDER — GLYCOPYRROLATE 0.2 MG/ML
INJECTION INTRAMUSCULAR; INTRAVENOUS
Status: DISPENSED
Start: 2023-01-24

## (undated) RX ORDER — ACETAMINOPHEN 325 MG/1
TABLET ORAL
Status: DISPENSED
Start: 2021-08-27

## (undated) RX ORDER — LIDOCAINE HYDROCHLORIDE AND EPINEPHRINE 10; 10 MG/ML; UG/ML
INJECTION, SOLUTION INFILTRATION; PERINEURAL
Status: DISPENSED
Start: 2021-10-22

## (undated) RX ORDER — FENTANYL CITRATE 50 UG/ML
INJECTION, SOLUTION INTRAMUSCULAR; INTRAVENOUS
Status: DISPENSED
Start: 2021-08-27

## (undated) RX ORDER — FENTANYL CITRATE 50 UG/ML
INJECTION, SOLUTION INTRAMUSCULAR; INTRAVENOUS
Status: DISPENSED
Start: 2023-01-24

## (undated) RX ORDER — BUPIVACAINE HYDROCHLORIDE AND EPINEPHRINE 5; 5 MG/ML; UG/ML
INJECTION, SOLUTION PERINEURAL
Status: DISPENSED
Start: 2021-08-27

## (undated) RX ORDER — PROPOFOL 10 MG/ML
INJECTION, EMULSION INTRAVENOUS
Status: DISPENSED
Start: 2021-08-27

## (undated) RX ORDER — ONDANSETRON 2 MG/ML
INJECTION INTRAMUSCULAR; INTRAVENOUS
Status: DISPENSED
Start: 2021-08-27

## (undated) RX ORDER — LIDOCAINE HYDROCHLORIDE 20 MG/ML
INJECTION, SOLUTION EPIDURAL; INFILTRATION; INTRACAUDAL; PERINEURAL
Status: DISPENSED
Start: 2021-10-22

## (undated) RX ORDER — ONDANSETRON 2 MG/ML
INJECTION INTRAMUSCULAR; INTRAVENOUS
Status: DISPENSED
Start: 2023-01-24

## (undated) RX ORDER — BUPIVACAINE HYDROCHLORIDE 5 MG/ML
INJECTION, SOLUTION EPIDURAL; INTRACAUDAL
Status: DISPENSED
Start: 2023-01-24

## (undated) RX ORDER — EPHEDRINE SULFATE 50 MG/ML
INJECTION, SOLUTION INTRAMUSCULAR; INTRAVENOUS; SUBCUTANEOUS
Status: DISPENSED
Start: 2023-01-24

## (undated) RX ORDER — PROPOFOL 10 MG/ML
INJECTION, EMULSION INTRAVENOUS
Status: DISPENSED
Start: 2021-10-22